# Patient Record
Sex: FEMALE | Race: BLACK OR AFRICAN AMERICAN | NOT HISPANIC OR LATINO
[De-identification: names, ages, dates, MRNs, and addresses within clinical notes are randomized per-mention and may not be internally consistent; named-entity substitution may affect disease eponyms.]

---

## 2017-01-09 PROBLEM — Z00.00 ENCOUNTER FOR PREVENTIVE HEALTH EXAMINATION: Status: ACTIVE | Noted: 2017-01-09

## 2017-01-18 ENCOUNTER — APPOINTMENT (OUTPATIENT)
Dept: GASTROENTEROLOGY | Facility: CLINIC | Age: 37
End: 2017-01-18

## 2017-01-18 VITALS
DIASTOLIC BLOOD PRESSURE: 90 MMHG | TEMPERATURE: 98.2 F | SYSTOLIC BLOOD PRESSURE: 130 MMHG | OXYGEN SATURATION: 99 % | HEART RATE: 71 BPM | WEIGHT: 230 LBS | HEIGHT: 68 IN | BODY MASS INDEX: 34.86 KG/M2

## 2017-01-18 DIAGNOSIS — Z82.49 FAMILY HISTORY OF ISCHEMIC HEART DISEASE AND OTHER DISEASES OF THE CIRCULATORY SYSTEM: ICD-10-CM

## 2017-01-18 DIAGNOSIS — K59.00 CONSTIPATION, UNSPECIFIED: ICD-10-CM

## 2017-01-18 DIAGNOSIS — Z78.9 OTHER SPECIFIED HEALTH STATUS: ICD-10-CM

## 2017-01-18 DIAGNOSIS — F15.90 OTHER STIMULANT USE, UNSPECIFIED, UNCOMPLICATED: ICD-10-CM

## 2017-01-18 DIAGNOSIS — K92.1 MELENA: ICD-10-CM

## 2017-01-18 DIAGNOSIS — K46.9 UNSPECIFIED ABDOMINAL HERNIA W/OUT OBSTRUCTION OR GANGRENE: ICD-10-CM

## 2017-01-18 DIAGNOSIS — I10 ESSENTIAL (PRIMARY) HYPERTENSION: ICD-10-CM

## 2017-01-18 RX ORDER — DOCUSATE SODIUM 100 MG/1
100 CAPSULE ORAL
Qty: 30 | Refills: 0 | Status: ACTIVE | OUTPATIENT
Start: 2017-01-18

## 2017-01-18 RX ORDER — LUBIPROSTONE 24 UG/1
24 CAPSULE, GELATIN COATED ORAL TWICE DAILY
Qty: 60 | Refills: 3 | Status: ACTIVE | OUTPATIENT
Start: 2017-01-18

## 2017-01-18 RX ORDER — RANITIDINE 150 MG/1
150 TABLET ORAL
Refills: 0 | Status: ACTIVE | COMMUNITY

## 2018-12-11 ENCOUNTER — EMERGENCY (EMERGENCY)
Facility: HOSPITAL | Age: 38
LOS: 1 days | Discharge: HOME | End: 2018-12-11
Attending: EMERGENCY MEDICINE | Admitting: EMERGENCY MEDICINE
Payer: MEDICAID

## 2018-12-11 VITALS
RESPIRATION RATE: 16 BRPM | OXYGEN SATURATION: 100 % | SYSTOLIC BLOOD PRESSURE: 142 MMHG | DIASTOLIC BLOOD PRESSURE: 84 MMHG | HEART RATE: 75 BPM | TEMPERATURE: 98 F

## 2018-12-11 DIAGNOSIS — Z98.891 HISTORY OF UTERINE SCAR FROM PREVIOUS SURGERY: Chronic | ICD-10-CM

## 2018-12-11 LAB
ALBUMIN SERPL ELPH-MCNC: 3.4 G/DL — SIGNIFICANT CHANGE UP (ref 3.3–5)
ALP SERPL-CCNC: 67 U/L — SIGNIFICANT CHANGE UP (ref 40–120)
ALT FLD-CCNC: 4 U/L — SIGNIFICANT CHANGE UP (ref 4–33)
ANISOCYTOSIS BLD QL: SIGNIFICANT CHANGE UP
APPEARANCE UR: CLEAR — SIGNIFICANT CHANGE UP
AST SERPL-CCNC: 10 U/L — SIGNIFICANT CHANGE UP (ref 4–32)
BACTERIA # UR AUTO: NEGATIVE — SIGNIFICANT CHANGE UP
BASOPHILS # BLD AUTO: 0.05 K/UL — SIGNIFICANT CHANGE UP (ref 0–0.2)
BASOPHILS NFR BLD AUTO: 0.9 % — SIGNIFICANT CHANGE UP (ref 0–2)
BASOPHILS NFR SPEC: 0.9 % — SIGNIFICANT CHANGE UP (ref 0–2)
BILIRUB SERPL-MCNC: < 0.2 MG/DL — LOW (ref 0.2–1.2)
BILIRUB UR-MCNC: NEGATIVE — SIGNIFICANT CHANGE UP
BLASTS # FLD: 0 % — SIGNIFICANT CHANGE UP (ref 0–0)
BLOOD UR QL VISUAL: NEGATIVE — SIGNIFICANT CHANGE UP
BUN SERPL-MCNC: 4 MG/DL — LOW (ref 7–23)
CALCIUM SERPL-MCNC: 8.7 MG/DL — SIGNIFICANT CHANGE UP (ref 8.4–10.5)
CHLORIDE SERPL-SCNC: 103 MMOL/L — SIGNIFICANT CHANGE UP (ref 98–107)
CO2 SERPL-SCNC: 27 MMOL/L — SIGNIFICANT CHANGE UP (ref 22–31)
COLOR SPEC: YELLOW — SIGNIFICANT CHANGE UP
CREAT SERPL-MCNC: 0.68 MG/DL — SIGNIFICANT CHANGE UP (ref 0.5–1.3)
DACRYOCYTES BLD QL SMEAR: SLIGHT — SIGNIFICANT CHANGE UP
EOSINOPHIL # BLD AUTO: 0.23 K/UL — SIGNIFICANT CHANGE UP (ref 0–0.5)
EOSINOPHIL NFR BLD AUTO: 4 % — SIGNIFICANT CHANGE UP (ref 0–6)
EOSINOPHIL NFR FLD: 7 % — HIGH (ref 0–6)
GIANT PLATELETS BLD QL SMEAR: PRESENT — SIGNIFICANT CHANGE UP
GLUCOSE SERPL-MCNC: 87 MG/DL — SIGNIFICANT CHANGE UP (ref 70–99)
GLUCOSE UR-MCNC: NEGATIVE — SIGNIFICANT CHANGE UP
HCG SERPL-ACNC: < 5 MIU/ML — SIGNIFICANT CHANGE UP
HCT VFR BLD CALC: 23.9 % — LOW (ref 34.5–45)
HGB BLD-MCNC: 6.7 G/DL — CRITICAL LOW (ref 11.5–15.5)
HIV COMBO RESULT: SIGNIFICANT CHANGE UP
HIV1+2 AB SPEC QL: SIGNIFICANT CHANGE UP
HYALINE CASTS # UR AUTO: HIGH
HYPOCHROMIA BLD QL: SIGNIFICANT CHANGE UP
IMM GRANULOCYTES # BLD AUTO: 0.01 # — SIGNIFICANT CHANGE UP
IMM GRANULOCYTES NFR BLD AUTO: 0.2 % — SIGNIFICANT CHANGE UP (ref 0–1.5)
KETONES UR-MCNC: NEGATIVE — SIGNIFICANT CHANGE UP
LEUKOCYTE ESTERASE UR-ACNC: NEGATIVE — SIGNIFICANT CHANGE UP
LIDOCAIN IGE QN: 13.7 U/L — SIGNIFICANT CHANGE UP (ref 7–60)
LYMPHOCYTES # BLD AUTO: 1.84 K/UL — SIGNIFICANT CHANGE UP (ref 1–3.3)
LYMPHOCYTES # BLD AUTO: 32.2 % — SIGNIFICANT CHANGE UP (ref 13–44)
LYMPHOCYTES NFR SPEC AUTO: 23.5 % — SIGNIFICANT CHANGE UP (ref 13–44)
MCHC RBC-ENTMCNC: 16 PG — LOW (ref 27–34)
MCHC RBC-ENTMCNC: 28 % — LOW (ref 32–36)
MCV RBC AUTO: 57 FL — LOW (ref 80–100)
METAMYELOCYTES # FLD: 0 % — SIGNIFICANT CHANGE UP (ref 0–1)
MICROCYTES BLD QL: SIGNIFICANT CHANGE UP
MONOCYTES # BLD AUTO: 0.75 K/UL — SIGNIFICANT CHANGE UP (ref 0–0.9)
MONOCYTES NFR BLD AUTO: 13.1 % — SIGNIFICANT CHANGE UP (ref 2–14)
MONOCYTES NFR BLD: 5.2 % — SIGNIFICANT CHANGE UP (ref 2–9)
MYELOCYTES NFR BLD: 0 % — SIGNIFICANT CHANGE UP (ref 0–0)
NEUTROPHIL AB SER-ACNC: 61.7 % — SIGNIFICANT CHANGE UP (ref 43–77)
NEUTROPHILS # BLD AUTO: 2.84 K/UL — SIGNIFICANT CHANGE UP (ref 1.8–7.4)
NEUTROPHILS NFR BLD AUTO: 49.6 % — SIGNIFICANT CHANGE UP (ref 43–77)
NEUTS BAND # BLD: 0 % — SIGNIFICANT CHANGE UP (ref 0–6)
NITRITE UR-MCNC: NEGATIVE — SIGNIFICANT CHANGE UP
NRBC # FLD: 0.02 — SIGNIFICANT CHANGE UP
OB PNL STL: NEGATIVE — SIGNIFICANT CHANGE UP
OTHER - HEMATOLOGY %: 0 — SIGNIFICANT CHANGE UP
PH UR: 7 — SIGNIFICANT CHANGE UP (ref 5–8)
PLATELET # BLD AUTO: 455 K/UL — HIGH (ref 150–400)
PLATELET COUNT - ESTIMATE: NORMAL — SIGNIFICANT CHANGE UP
PMV BLD: SIGNIFICANT CHANGE UP FL (ref 7–13)
POIKILOCYTOSIS BLD QL AUTO: SIGNIFICANT CHANGE UP
POLYCHROMASIA BLD QL SMEAR: SLIGHT — SIGNIFICANT CHANGE UP
POTASSIUM SERPL-MCNC: 3.3 MMOL/L — LOW (ref 3.5–5.3)
POTASSIUM SERPL-SCNC: 3.3 MMOL/L — LOW (ref 3.5–5.3)
PROMYELOCYTES # FLD: 0 % — SIGNIFICANT CHANGE UP (ref 0–0)
PROT SERPL-MCNC: 6.6 G/DL — SIGNIFICANT CHANGE UP (ref 6–8.3)
PROT UR-MCNC: 30 — SIGNIFICANT CHANGE UP
RBC # BLD: 4.19 M/UL — SIGNIFICANT CHANGE UP (ref 3.8–5.2)
RBC # FLD: 21.5 % — HIGH (ref 10.3–14.5)
RBC CASTS # UR COMP ASSIST: SIGNIFICANT CHANGE UP (ref 0–?)
SCHISTOCYTES BLD QL AUTO: SLIGHT — SIGNIFICANT CHANGE UP
SODIUM SERPL-SCNC: 141 MMOL/L — SIGNIFICANT CHANGE UP (ref 135–145)
SP GR SPEC: 1.03 — SIGNIFICANT CHANGE UP (ref 1–1.04)
SQUAMOUS # UR AUTO: SIGNIFICANT CHANGE UP
TARGETS BLD QL SMEAR: SLIGHT — SIGNIFICANT CHANGE UP
UROBILINOGEN FLD QL: SIGNIFICANT CHANGE UP
VARIANT LYMPHS # BLD: 1.7 % — SIGNIFICANT CHANGE UP
WBC # BLD: 5.72 K/UL — SIGNIFICANT CHANGE UP (ref 3.8–10.5)
WBC # FLD AUTO: 5.72 K/UL — SIGNIFICANT CHANGE UP (ref 3.8–10.5)
WBC UR QL: SIGNIFICANT CHANGE UP (ref 0–?)

## 2018-12-11 PROCEDURE — 76830 TRANSVAGINAL US NON-OB: CPT | Mod: 26

## 2018-12-11 PROCEDURE — 99218: CPT | Mod: 25

## 2018-12-11 RX ORDER — ACETAMINOPHEN 500 MG
650 TABLET ORAL ONCE
Qty: 0 | Refills: 0 | Status: COMPLETED | OUTPATIENT
Start: 2018-12-11 | End: 2018-12-11

## 2018-12-11 RX ADMIN — Medication 650 MILLIGRAM(S): at 23:37

## 2018-12-11 RX ADMIN — Medication 650 MILLIGRAM(S): at 22:08

## 2018-12-11 NOTE — ED PROVIDER NOTE - OBJECTIVE STATEMENT
37 yo F poor historian, unknown PMHx, chronic abdominal pain x 1 year, worked up by PCP and had endoscopy and colonoscopy with no findings presents to the ED c/o vaginal discomfort, watery vaginal discharge clear to white and malodor x 3 days. Pt report happened gradually, not associated with sexual intercourse. Denies h/o of STDs, or concern for STDs pt is sexual active with one partner, monogamous relationship, but would like to be tested today for all STDs. On ROS pt reports intermittent abdominal pain and constipation. subjective fevers and chills, intermittent diarrhea x 1 year, as noted above pt with colonoscopy and endoscopy and outpatient workup negative. LMP one week ago, menses usually one week, 3-5 pads a day. No melena no BRBPR.  Denies sob, cp, nausea, vomiting. 37 yo F poor historian, unknown PMHx, chronic abdominal pain x 1 year, worked up by PCP and had endoscopy and colonoscopy with no findings presents to the ED c/o vaginal discomfort, watery vaginal discharge clear to white and malodor x 3 days. Pt report happened gradually, not associated with sexual intercourse. Denies h/o of STDs, or concern for STDs pt is sexual active with one partner, monogamous relationship, but would like to be tested today for all STDs. On ROS pt reports dizziness, weakness, intermittent abdominal pain and constipation. subjective fevers and chills, intermittent diarrhea x 1 year, as noted above pt with colonoscopy and endoscopy and outpatient workup negative. LMP one week ago, menses usually one week, 3-5 pads a day. No melena no BRBPR.  Denies sob, cp, nausea, vomiting.

## 2018-12-11 NOTE — ED CDU PROVIDER INITIAL DAY NOTE - ATTENDING CONTRIBUTION TO CARE
DR. CATES, ATTENDING MD-  I performed a face to face bedside interview with patient regarding history of present illness, review of symptoms and past medical history. I completed an independent physical exam.  I have discussed patient's plan of care with PA.   Documentation as above in the note.    Bharathi: HD stable.  Being admitted to obs unit for transfusion, observation, repeat CBC post transfusion

## 2018-12-11 NOTE — ED CDU PROVIDER INITIAL DAY NOTE - NONTENDER LOCATION
periumbilical/umbilical/suprapubic/left costovertebral angle/left lower quadrant/right costovertebral angle/left upper quadrant/right upper quadrant/right lower quadrant

## 2018-12-11 NOTE — ED ADULT NURSE NOTE - ED STAT RN HANDOFF DETAILS
No acute distress at present. Respirations are even and unlabored on room air. Awaiting type and screen results, receiving RN made aware. Pt. is admitted to Observation Unit. Report given to CDU SALEEM Honeycutt. Pt. will be transported to CDU bed 3.

## 2018-12-11 NOTE — ED CDU PROVIDER INITIAL DAY NOTE - OBJECTIVE STATEMENT
39 yo F, with PMH of anemia w/ blood transfusion (17 years ago), menorrhagia, chronic abdominal pain (1 year) with neg work up of endoscopy and colonoscopy, presents to ER c/o vaginal discomfort w/ discharge x3 days. Pt reports generalized weakness. Admits pain with urination x1 week, no hematuria. Admits abdominal pain and constipation, last bowel movement yesterday, nonbloody. Denies any headache, visual disturbances, n/v, or any other complaints. Pt has neg TVUS in ER, found to have Hg of 6.7, sent to CDU for 1 units of pRBC.

## 2018-12-11 NOTE — ED ADULT NURSE NOTE - NSIMPLEMENTINTERV_GEN_ALL_ED
Implemented All Fall Risk Interventions:  [place] to call system. Call bell, personal items and telephone within reach. Instruct patient to call for assistance. Room bathroom lighting operational. Non-slip footwear when patient is off stretcher. Physically safe environment: no spills, clutter or unnecessary equipment. Stretcher in lowest position, wheels locked, appropriate side rails in place. Provide visual cue, wrist band, yellow gown, etc. Monitor gait and stability. Monitor for mental status changes and reorient to person, place, and time. Review medications for side effects contributing to fall risk. Reinforce activity limits and safety measures with patient and family.

## 2018-12-11 NOTE — ED PROVIDER NOTE - MEDICAL DECISION MAKING DETAILS
39 yo F with vaginal complaints.  And with chronic abdominal complaints  Plan cbc, cmp, lipase, ua, STD studies.

## 2018-12-11 NOTE — ED CDU PROVIDER INITIAL DAY NOTE - MEDICAL DECISION MAKING DETAILS
39 yo F, with PMH of anemia w/ blood transfusion (17 years ago), menorrhagia, chronic abdominal pain (1 year) with neg work up of endoscopy and colonoscopy, presents to ER c/o vaginal discomfort w/ discharge x3 days. Pt has neg TVUS in ER, found to have Hg of 6.7, sent to CDU for 1 units of pRBC.

## 2018-12-11 NOTE — ED ADULT TRIAGE NOTE - CHIEF COMPLAINT QUOTE
Pt. c/o vaginal pain, itching and white colored discharge with odor x few days. States she's been also having generalized abdominal pain with n/v/d and subjective fevers x 1 years. All tests normal done by PCP.

## 2018-12-11 NOTE — ED PROVIDER NOTE - PROGRESS NOTE DETAILS
when cbc did result, h/h 6.7/ 23. pt does report history of anemia. Pt consented. 1 unit ordered to transfuse. No concern for vaginal complaints as no discharge on exam and no CMT, no h/o STD. Pt will need PMD follow, hematology, obgyn and GI for chronic symptoms upon dc .

## 2018-12-11 NOTE — ED ADULT NURSE REASSESSMENT NOTE - NS ED NURSE REASSESS COMMENT FT1
Report received from SALEEM New. Pt. received in intake room 7, with 20 gauge IV in left ac, A&Ox4, ambulatory. As per SALEEM Noyola, first type and screen sent. Author of this note sent confirmatory type and screen. Awaiting results. VS as noted. Respirations are even and unlabored on room air. Will continue to monitor.

## 2018-12-11 NOTE — ED ADULT NURSE NOTE - OBJECTIVE STATEMENT
pt coming with some vag. discharge denies dysuria/hematuria, pt stated has Hx. Anemia taking Iron pill for long menses approx 10 days.   GYN done and US done repeated cbc for low hgb.  IV to left AC 20g in place.   pending dispo.    Jazmín New RN

## 2018-12-11 NOTE — ED PROVIDER NOTE - ATTENDING CONTRIBUTION TO CARE
DR. CATES, ATTENDING MD-  I performed a face to face bedside interview with patient regarding history of present illness, review of symptoms and past medical history. I completed an independent physical exam.  I have discussed patient's plan of care with PA.   Documentation as above in the note.    Bharathi: presents with multiple chronic complaints, but primary reason for ER visit was for vaginal itching.  Non-pregnant, and benign exam per my PA.  Of more concern, Hb 6.7.  Guiaic neg.  abd soft ntnd.  Patient does report having recent heavy menstruation and is on iron pills, although non-compliant.  HD stable.  I suspect this is more chronic, and has no signs on exam of active bleeding.  but given her complaints of weakness/dizziness will transfuse, and put in CDU for observation.

## 2018-12-12 VITALS
SYSTOLIC BLOOD PRESSURE: 138 MMHG | HEART RATE: 70 BPM | OXYGEN SATURATION: 100 % | DIASTOLIC BLOOD PRESSURE: 88 MMHG | TEMPERATURE: 99 F | RESPIRATION RATE: 18 BRPM

## 2018-12-12 DIAGNOSIS — R07.89 OTHER CHEST PAIN: ICD-10-CM

## 2018-12-12 LAB
C TRACH RRNA SPEC QL NAA+PROBE: SIGNIFICANT CHANGE UP
FERRITIN SERPL-MCNC: 15.87 NG/ML — SIGNIFICANT CHANGE UP (ref 15–150)
FERRITIN SERPL-MCNC: 26.68 NG/ML — SIGNIFICANT CHANGE UP (ref 15–150)
HBA1C BLD-MCNC: 5.4 % — SIGNIFICANT CHANGE UP (ref 4–5.6)
HCT VFR BLD CALC: 24.9 % — LOW (ref 34.5–45)
HCT VFR BLD CALC: 27.3 % — LOW (ref 34.5–45)
HGB BLD-MCNC: 7.2 G/DL — LOW (ref 11.5–15.5)
HGB BLD-MCNC: 8.3 G/DL — LOW (ref 11.5–15.5)
IRON SATN MFR SERPL: 12 UG/DL — LOW (ref 30–160)
IRON SATN MFR SERPL: 271 UG/DL — HIGH (ref 30–160)
IRON SATN MFR SERPL: 315 UG/DL — SIGNIFICANT CHANGE UP (ref 140–530)
IRON SATN MFR SERPL: 340 UG/DL — SIGNIFICANT CHANGE UP (ref 140–530)
MCHC RBC-ENTMCNC: 16.8 PG — LOW (ref 27–34)
MCHC RBC-ENTMCNC: 17.5 PG — LOW (ref 27–34)
MCHC RBC-ENTMCNC: 28.9 % — LOW (ref 32–36)
MCHC RBC-ENTMCNC: 30.4 % — LOW (ref 32–36)
MCV RBC AUTO: 57.7 FL — LOW (ref 80–100)
MCV RBC AUTO: 58 FL — LOW (ref 80–100)
N GONORRHOEA RRNA SPEC QL NAA+PROBE: SIGNIFICANT CHANGE UP
NRBC # FLD: 0 — SIGNIFICANT CHANGE UP
NRBC # FLD: 0.03 — SIGNIFICANT CHANGE UP
PLATELET # BLD AUTO: 375 K/UL — SIGNIFICANT CHANGE UP (ref 150–400)
PLATELET # BLD AUTO: 394 K/UL — SIGNIFICANT CHANGE UP (ref 150–400)
PMV BLD: SIGNIFICANT CHANGE UP FL (ref 7–13)
PMV BLD: SIGNIFICANT CHANGE UP FL (ref 7–13)
RBC # BLD: 4.29 M/UL — SIGNIFICANT CHANGE UP (ref 3.8–5.2)
RBC # BLD: 4.73 M/UL — SIGNIFICANT CHANGE UP (ref 3.8–5.2)
RBC # FLD: 22.9 % — HIGH (ref 10.3–14.5)
RBC # FLD: 25.1 % — HIGH (ref 10.3–14.5)
SPECIMEN SOURCE: SIGNIFICANT CHANGE UP
T PALLIDUM AB TITR SER: NEGATIVE — SIGNIFICANT CHANGE UP
TROPONIN T, HIGH SENSITIVITY: < 6 NG/L — SIGNIFICANT CHANGE UP (ref ?–14)
TROPONIN T, HIGH SENSITIVITY: < 6 NG/L — SIGNIFICANT CHANGE UP (ref ?–14)
UIBC SERPL-MCNC: 327.7 UG/DL — SIGNIFICANT CHANGE UP (ref 110–370)
UIBC SERPL-MCNC: 43.6 UG/DL — LOW (ref 110–370)
WBC # BLD: 8.6 K/UL — SIGNIFICANT CHANGE UP (ref 3.8–10.5)
WBC # BLD: 9.82 K/UL — SIGNIFICANT CHANGE UP (ref 3.8–10.5)
WBC # FLD AUTO: 8.6 K/UL — SIGNIFICANT CHANGE UP (ref 3.8–10.5)
WBC # FLD AUTO: 9.82 K/UL — SIGNIFICANT CHANGE UP (ref 3.8–10.5)

## 2018-12-12 PROCEDURE — 93306 TTE W/DOPPLER COMPLETE: CPT | Mod: 26

## 2018-12-12 PROCEDURE — 99217: CPT

## 2018-12-12 PROCEDURE — 93010 ELECTROCARDIOGRAM REPORT: CPT | Mod: 59

## 2018-12-12 NOTE — ED CDU PROVIDER SUBSEQUENT DAY NOTE - ATTENDING CONTRIBUTION TO CARE
38 year old female with fe def anemia presents to cdu with anemia, no active bleeding. transfused 1 unit but did not respond appropriately.  will tx 2nd unit prbc. patient non compliant with iron.  recheck cbc following blood

## 2018-12-12 NOTE — ED CDU PROVIDER DISPOSITION NOTE - NSFOLLOWUPINSTRUCTIONS_ED_ALL_ED_FT
Follow up with your primary physician as well as your gynecologist within 48 hours, taking all results from the ER to be reviewed.  Continue all medications you were previously on prior to this ER visit.  Compliance reinforced with meds. Make sure your taking your iron  every day as directed. If any concerning or new signs or symptoms return to the ER .

## 2018-12-12 NOTE — CONSULT NOTE ADULT - PROBLEM SELECTOR RECOMMENDATION 9
-chest pain less like cardiac etiology, given chronic in nature and trop <6  -obtain echo to r/o structural abnormalities given

## 2018-12-12 NOTE — ED CDU PROVIDER SUBSEQUENT DAY NOTE - PROGRESS NOTE DETAILS
Pt states she experiences some midsternal chest pressure while just started getting blood transfusion, no other associate symptoms. Ekg ordered, showing inverted T in Lead III, V1, V3-V6, ST flattening in Lead II, V2. No prior EKG to compare. EKG reviewed by Dr. Rod, recommends trop and cardiology consult. Unable to draw blood from IV line.  Trop finally obtained and sent by RN. Trop <6. Patient sleeping comfortably in bed, NAD, No complaints. As per RN, pt's chest pressure resolved. Cardiology consulted to evaluate patient in CDU. Pt received one unit of pRBC, will repeat CBC. Will continue to monitor and reassess. Pt feeling well, no complaints. Seen and cleared by cardi earlier in day, echo wnl.    Repeat hgb up to 8.3, pt without complaints . As dw attending scofi will dc home with pcp , gyn fu and compliance reinforced with con't iron

## 2018-12-12 NOTE — ED CDU PROVIDER DISPOSITION NOTE - ATTENDING CONTRIBUTION TO CARE
38 year old with iron def anemia presents with weakness and anemia. tx'd 2 units. ok to dc if hg above 8

## 2018-12-12 NOTE — CONSULT NOTE ADULT - SUBJECTIVE AND OBJECTIVE BOX
HISTORY OF PRESENT ILLNESS:37 yo F, with PMH of anemia w/ blood transfusion (17 years ago), menorrhagia, chronic abdominal pain (1 year) with neg work up of endoscopy and colonoscopy, presents to ER c/o vaginal discomfort w/ discharge x3 days. Pt reports generalized weakness. Admits pain with urination x1 week, no hematuria. Admits abdominal pain and constipation, last bowel movement yesterday, nonbloody. Denies any headache, visual disturbances, n/v, or any other complaints. Pt has neg TVUS in ER, found to have Hg of 6.7, sent to CDU for 1 units of pRBC. Pt c/o some midsternal chest pressure while just started getting blood transfusion, with no other associate symptoms. Cardiology consulted for Ekg showed inverted T in Lead III,aVF V1, V3-V6, ST flattening in Lead V2. No prior EKG to compare. High sensitive trop <6.  Vital signs :127/60.HR 76.RR16,sat 100%.temp 98,4F  0357: Patient currently sleeping comfortably without any complains and request me to come back later to examine.    Allergies: No Known Allergies        	    MEDICATIONS:  iron pillsPAST MEDICAL & SURGICAL HISTORY:  Anemia  H/O:       FAMILY HISTORY:  No pertinent family history in first degree relatives        REVIEW OF SYSTEMS:  General: + fatigue/malaise,   Skin: no rashes.  Ophthalmologic: no blurred vision, no loss of vision. 	  ENT: no sore throat, rhinorrhea, sinus congestion.  Cardiovascular: + chest pain ,no palpitation, no dizziness, no diaphoresis, no edema  Respiratory: no SOB, cough or wheeze.  Gastrointestinal: + abdomen pain, no N/V/D, no melena/hematemesis/hematochezia.  Genitourinary: + burning on urination,+ vaginal discharge and discharge  ,no hematuria.  Musculoskeletal: no myalgias or arthralgias.  Neurological: no changes in vision or hearing, no lightheadedness/dizziness, no syncope/near syncope	  Psychiatric: no unusual stress/anxiety.       PHYSICAL EXAM:  T(C): 36.9 (18 @ 02:19), Max: 37.1 (18 @ 20:43)  HR: 76 (18 @ 02:19) (73 - 79)  BP: 127/66 (12-12-18 @ 02:19) (103/60 - 142/84)  RR: 16 (18 @ 02:19) (16 - 18)        Appearance: Normal	  HEENT:   Normal oral mucosa, PERRL, EOMI	  Lymphatic: No lymphadenopathy  Cardiovascular: Normal S1 S2, No JVD, No murmurs, No edema  Respiratory: Lungs clear to auscultation	  Psychiatry: A & O x 3, Mood & affect appropriate  Gastrointestinal: obese, Soft, + BS	  Skin: No rashes, No ecchymoses, No cyanosis	  Neurologic: Non-focal  Extremities: Normal range of motion, No clubbing, cyanosis or edema  Vascular: Peripheral pulses palpable 2+ bilaterally        LABS:	 	    CBC Full  -  ( 11 Dec 2018 15:35 )  WBC Count : 5.72 K/uL  Hemoglobin : 6.7 g/dL  Hematocrit : 23.9 %  Platelet Count - Automated : 455 K/uL  Mean Cell Volume : 57.0 fL  Mean Cell Hemoglobin : 16.0 pg  Mean Cell Hemoglobin Concentration : 28.0 %  Auto Neutrophil # : 2.84 K/uL  Auto Lymphocyte # : 1.84 K/uL  Auto Monocyte # : 0.75 K/uL  Auto Eosinophil # : 0.23 K/uL  Auto Basophil # : 0.05 K/uL  Auto Neutrophil % : 49.6 %  Auto Lymphocyte % : 32.2 %  Auto Monocyte % : 13.1 %  Auto Eosinophil % : 4.0 %  Auto Basophil % : 0.9 %        141  |  103  |  4<L>  ----------------------------<  87  3.3<L>   |  27  |  0.68    Ca    8.7      11 Dec 2018 15:35    TPro  6.6  /  Alb  3.4  /  TBili  < 0.2<L>  /  DBili  x   /  AST  10  /  ALT  4   /  AlkPhos  67        HgA1c: Hemoglobin A1C, Whole Blood: 5.4 % ( @ 15:15)    high sensitive trop <6 HISTORY OF PRESENT ILLNESS:37 yo F, with PMH of anemia w/ blood transfusion (17 years ago), menorrhagia, chronic abdominal pain (1 year) with neg work up of endoscopy and colonoscopy, presents to ER c/o vaginal discomfort w/ discharge x3 days. Pt reports generalized weakness. Admits pain with urination x1 week, no hematuria. Admits abdominal pain and constipation, last bowel movement yesterday, nonbloody. Denies any headache, visual disturbances, n/v, or any other complaints. Pt has neg TVUS in ER, found to have Hg of 6.7, sent to CDU for 1 units of pRBC. Pt c/o some midsternal chest pressure while just started getting blood transfusion, with no other associate symptoms. Cardiology consulted for Ekg showed  1st degree heart block , inverted T in Lead III,aVF V1, V3-V6, ST flattening in Lead V2. No prior EKG to compare. High sensitive trop <6.  Vital signs :127/60.HR 76.RR16,sat 100%.temp 98,4F  0357: Patient currently sleeping comfortably without any complains and request me to come back later to examine.    Allergies: No Known Allergies        	    MEDICATIONS:  iron pillsPAST MEDICAL & SURGICAL HISTORY:  Anemia  H/O:       FAMILY HISTORY:  No pertinent family history in first degree relatives        REVIEW OF SYSTEMS:  General: + fatigue/malaise,   Skin: no rashes.  Ophthalmologic: no blurred vision, no loss of vision. 	  ENT: no sore throat, rhinorrhea, sinus congestion.  Cardiovascular: + chest pain ,no palpitation, no dizziness, no diaphoresis, no edema  Respiratory: no SOB, cough or wheeze.  Gastrointestinal: + abdomen pain, no N/V/D, no melena/hematemesis/hematochezia.  Genitourinary: + burning on urination,+ vaginal discharge and discharge  ,no hematuria.  Musculoskeletal: no myalgias or arthralgias.  Neurological: no changes in vision or hearing, no lightheadedness/dizziness, no syncope/near syncope	  Psychiatric: no unusual stress/anxiety.       PHYSICAL EXAM:  T(C): 36.9 (18 @ 02:19), Max: 37.1 (18 @ 20:43)  HR: 76 (18 @ 02:19) (73 - 79)  BP: 127/66 (18 @ 02:19) (103/60 - 142/84)  RR: 16 (18 @ 02:19) (16 - 18)        Appearance: Normal	  HEENT:   Normal oral mucosa, PERRL, EOMI	  Lymphatic: No lymphadenopathy  Cardiovascular: Normal S1 S2, No JVD, No murmurs, No edema  Respiratory: Lungs clear to auscultation	  Psychiatry: A & O x 3, Mood & affect appropriate  Gastrointestinal: obese, Soft, + BS	  Skin: No rashes, No ecchymoses, No cyanosis	  Neurologic: Non-focal  Extremities: Normal range of motion, No clubbing, cyanosis or edema  Vascular: Peripheral pulses palpable 2+ bilaterally        LABS:	 	    CBC Full  -  ( 11 Dec 2018 15:35 )  WBC Count : 5.72 K/uL  Hemoglobin : 6.7 g/dL  Hematocrit : 23.9 %  Platelet Count - Automated : 455 K/uL  Mean Cell Volume : 57.0 fL  Mean Cell Hemoglobin : 16.0 pg  Mean Cell Hemoglobin Concentration : 28.0 %  Auto Neutrophil # : 2.84 K/uL  Auto Lymphocyte # : 1.84 K/uL  Auto Monocyte # : 0.75 K/uL  Auto Eosinophil # : 0.23 K/uL  Auto Basophil # : 0.05 K/uL  Auto Neutrophil % : 49.6 %  Auto Lymphocyte % : 32.2 %  Auto Monocyte % : 13.1 %  Auto Eosinophil % : 4.0 %  Auto Basophil % : 0.9 %        141  |  103  |  4<L>  ----------------------------<  87  3.3<L>   |  27  |  0.68    Ca    8.7      11 Dec 2018 15:35    TPro  6.6  /  Alb  3.4  /  TBili  < 0.2<L>  /  DBili  x   /  AST  10  /  ALT  4   /  AlkPhos  67        HgA1c: Hemoglobin A1C, Whole Blood: 5.4 % ( @ 15:15)    high sensitive trop <6 HISTORY OF PRESENT ILLNESS:37 yo F, with PMH of anemia w/ blood transfusion (17 years ago), menorrhagia, chronic abdominal pain (1 year) with neg work up of endoscopy and colonoscopy, presents to ER c/o vaginal discomfort w/ discharge x3 days. Pt reports generalized weakness. Admits pain with urination x1 week, no hematuria. Admits abdominal pain and constipation, last bowel movement yesterday, nonbloody. Denies any headache, visual disturbances, n/v, or any other complaints. Pt has neg TVUS in ER, found to have Hg of 6.7, sent to CDU for 1 units of pRBC. Pt c/o some midsternal chest pressure while just started getting blood transfusion, with no other associate symptoms. Cardiology consulted for Ekg showed  1st degree heart block , inverted T in Lead III,aVF V1, V3-V6, ST flattening in Lead V2. No prior EKG to compare. High sensitive trop <6.  Vital signs :127/60.HR 76.RR16,sat 100%.temp 98,4F  0357: Patient currently sleeping comfortably without any complains and request me to come back later to examine.  0545: patient was seen again. Currently  denies chest pain or sob. She reports  chronic frequent epigastric pain with  transient chest pressures which resolve itself .She had similar 8/10 chest pressure raditating  from epigastric area  for few minutes and resolved itself last night.    Allergies: No Known Allergies        	    MEDICATIONS:  iron pillsPAST MEDICAL & SURGICAL HISTORY:  Anemia  H/O:       FAMILY HISTORY:  No pertinent family history in first degree relatives        REVIEW OF SYSTEMS:  General: + fatigue/malaise,   Skin: no rashes.  Ophthalmologic: no blurred vision, no loss of vision. 	  ENT: no sore throat, rhinorrhea, sinus congestion.  Cardiovascular: + chest pain ,no palpitation, no dizziness, no diaphoresis, no edema  Respiratory: no SOB, cough or wheeze.  Gastrointestinal: + abdomen pain and epigastric pain , no N/V/D, no melena/hematemesis/hematochezia.  Genitourinary: + burning on urination,+ vaginal  pain and discharge   ,no hematuria.  Musculoskeletal: no myalgias or arthralgias.  Neurological: no changes in vision or hearing, no lightheadedness/dizziness, no syncope/near syncope	  Psychiatric: no unusual stress/anxiety.       PHYSICAL EXAM:  T(C): 36.9 (18 @ 02:19), Max: 37.1 (18 @ 20:43)  HR: 76 (18 @ 02:19) (73 - 79)  BP: 127/66 (18 @ 02:19) (103/60 - 142/84)  RR: 16 (18 @ 02:19) (16 - 18)        Appearance: Normal	  HEENT:   Normal oral mucosa, PERRL, EOMI	  Lymphatic: No lymphadenopathy  Cardiovascular: Normal S1 S2, No JVD, No murmurs, No edema  Respiratory: Lungs clear to auscultation	  Psychiatry: A & O x 3, Mood & affect appropriate  Gastrointestinal: obese, Soft, + BS	  Skin: No rashes, No ecchymoses, No cyanosis	  Neurologic: Non-focal  Extremities: Normal range of motion, No clubbing, cyanosis or edema  Vascular: Peripheral pulses palpable 2+ bilaterally        LABS:	 	    CBC Full  -  ( 11 Dec 2018 15:35 )  WBC Count : 5.72 K/uL  Hemoglobin : 6.7 g/dL  Hematocrit : 23.9 %  Platelet Count - Automated : 455 K/uL  Mean Cell Volume : 57.0 fL  Mean Cell Hemoglobin : 16.0 pg  Mean Cell Hemoglobin Concentration : 28.0 %  Auto Neutrophil # : 2.84 K/uL  Auto Lymphocyte # : 1.84 K/uL  Auto Monocyte # : 0.75 K/uL  Auto Eosinophil # : 0.23 K/uL  Auto Basophil # : 0.05 K/uL  Auto Neutrophil % : 49.6 %  Auto Lymphocyte % : 32.2 %  Auto Monocyte % : 13.1 %  Auto Eosinophil % : 4.0 %  Auto Basophil % : 0.9 %        141  |  103  |  4<L>  ----------------------------<  87  3.3<L>   |  27  |  0.68    Ca    8.7      11 Dec 2018 15:35    TPro  6.6  /  Alb  3.4  /  TBili  < 0.2<L>  /  DBili  x   /  AST  10  /  ALT  4   /  AlkPhos  67        HgA1c: Hemoglobin A1C, Whole Blood: 5.4 % ( @ 15:15)    high sensitive trop <6 HISTORY OF PRESENT ILLNESS:37 yo F, with PMH of anemia w/ blood transfusion (17 years ago), menorrhagia, chronic abdominal pain (1 year) with neg work up of endoscopy and colonoscopy, presents to ER c/o vaginal discomfort w/ discharge x3 days. Pt reports generalized weakness. Admits pain with urination x1 week, no hematuria. Admits abdominal pain and constipation, last bowel movement yesterday, nonbloody. Denies any headache, visual disturbances, n/v, or any other complaints. Pt has neg TVUS in ER, found to have Hg of 6.7, sent to CDU for 1 units of pRBC. Pt c/o some midsternal chest pressure while just started getting blood transfusion, with no other associate symptoms. Cardiology consulted for Ekg showed  1st degree heart block , inverted T in Lead III,aVF V1, V3-V6, ST flattening in Lead V2. No prior EKG to compare. High sensitive trop <6.  Vital signs :127/60.HR 76.RR16,sat 100%.temp 98,4F  0357: Patient currently sleeping comfortably without any complains and request me to come back later to examine.  0545: patient was seen again. Currently  denies chest pain or sob. She reports  chronic frequent epigastric pain with  transient chest pressures which resolve itself .She had similar 8/10 chest pressure raditating  from epigastric area to chest   for few minutes and resolved itself last night.    Allergies: No Known Allergies        	    MEDICATIONS:  iron pillsPAST MEDICAL & SURGICAL HISTORY:  Anemia  H/O:       FAMILY HISTORY:  No pertinent family history in first degree relatives        REVIEW OF SYSTEMS:  General: + fatigue/malaise,   Skin: no rashes.  Ophthalmologic: no blurred vision, no loss of vision. 	  ENT: no sore throat, rhinorrhea, sinus congestion.  Cardiovascular: + chest pain ,no palpitation, no dizziness, no diaphoresis, no edema  Respiratory: no SOB, cough or wheeze.  Gastrointestinal: + abdomen pain and epigastric pain , no N/V/D, no melena/hematemesis/hematochezia.  Genitourinary: + burning on urination,+ vaginal  pain and discharge   ,no hematuria.  Musculoskeletal: no myalgias or arthralgias.  Neurological: no changes in vision or hearing, no lightheadedness/dizziness, no syncope/near syncope	  Psychiatric: no unusual stress/anxiety.       PHYSICAL EXAM:  T(C): 36.9 (18 @ 02:19), Max: 37.1 (18 @ 20:43)  HR: 76 (18 @ 02:19) (73 - 79)  BP: 127/66 (18 @ 02:19) (103/60 - 142/84)  RR: 16 (18 @ 02:19) (16 - 18)        Appearance: Normal	  HEENT:   Normal oral mucosa, PERRL, EOMI	  Lymphatic: No lymphadenopathy  Cardiovascular: Normal S1 S2, No JVD, No murmurs, No edema  Respiratory: Lungs clear to auscultation	  Psychiatry: A & O x 3, Mood & affect appropriate  Gastrointestinal: obese, Soft, + BS	  Skin: No rashes, No ecchymoses, No cyanosis	  Neurologic: Non-focal  Extremities: Normal range of motion, No clubbing, cyanosis or edema  Vascular: Peripheral pulses palpable 2+ bilaterally        LABS:	 	    CBC Full  -  ( 11 Dec 2018 15:35 )  WBC Count : 5.72 K/uL  Hemoglobin : 6.7 g/dL  Hematocrit : 23.9 %  Platelet Count - Automated : 455 K/uL  Mean Cell Volume : 57.0 fL  Mean Cell Hemoglobin : 16.0 pg  Mean Cell Hemoglobin Concentration : 28.0 %  Auto Neutrophil # : 2.84 K/uL  Auto Lymphocyte # : 1.84 K/uL  Auto Monocyte # : 0.75 K/uL  Auto Eosinophil # : 0.23 K/uL  Auto Basophil # : 0.05 K/uL  Auto Neutrophil % : 49.6 %  Auto Lymphocyte % : 32.2 %  Auto Monocyte % : 13.1 %  Auto Eosinophil % : 4.0 %  Auto Basophil % : 0.9 %        141  |  103  |  4<L>  ----------------------------<  87  3.3<L>   |  27  |  0.68    Ca    8.7      11 Dec 2018 15:35    TPro  6.6  /  Alb  3.4  /  TBili  < 0.2<L>  /  DBili  x   /  AST  10  /  ALT  4   /  AlkPhos  67        HgA1c: Hemoglobin A1C, Whole Blood: 5.4 % ( @ 15:15)    high sensitive trop <6

## 2018-12-12 NOTE — ED CDU PROVIDER SUBSEQUENT DAY NOTE - MEDICAL DECISION MAKING DETAILS
38 y.o female with pmhx of anemia , sent to CDU for transfusion. hemoglobin 6.7-> 7.2 after 1 unit. additional PRBC ordered. pending repeat CBC.

## 2018-12-12 NOTE — CONSULT NOTE ADULT - ASSESSMENT
37 yo F, with PMH of anemia w/ blood transfusion (17 years ago), menorrhagia, chronic abdominal pain (1 year) with neg work up of endoscopy and colonoscopy, presents to ER c/o vaginal discomfort w/ discharge x3 days, generalized weakness, pain with urination x1 week, abdominal pain and constipation found to be anemia ,who c/o  chest pain while blood transfusion .EKG showed  TWI in multiple leads with normal high sensitive trops 39 yo F, with PMH of anemia w/ blood transfusion (17 years ago), menorrhagia, chronic abdominal pain (1 year) with neg work up of endoscopy and colonoscopy, presents to ER c/o vaginal discomfort w/ discharge x3 days, generalized weakness, pain with urination x1 week, abdominal pain and constipation found to be anemia ,who c/o transient   chest pain while blood transfusion .EKG showed  TWI in multiple leads with normal high sensitive trops 37 yo F, with PMH of anemia w/ blood transfusion (17 years ago), menorrhagia, chronic abdominal pain (1 year) with neg work up of endoscopy and colonoscopy, presents to ER c/o vaginal discomfort w/ discharge x3 days, generalized weakness, pain with urination x1 week, abdominal pain and constipation found to be anemia ,who c/o transient   epigastric pain raditating to midsternum  while blood transfusion .EKG showed  TWI in multiple leads with normal high sensitive trops

## 2018-12-12 NOTE — ED CDU PROVIDER SUBSEQUENT DAY NOTE - HISTORY
38 y.o female with pmhx of anemia w/ blood transfusion (17 years ago), menorrhagia, chronic abdominal pain (1 year) with neg work up of endoscopy and colonoscopy sent to CDU for transfusion. Was also seen by cardiology who recommended echo, which was wnl. repeat cbc showed 7.2 and additional PRBC unit was ordered. Pt awaiting repeat cbc. Denies any current complaints. Resting comfortably.

## 2018-12-13 LAB
BACTERIA UR CULT: SIGNIFICANT CHANGE UP
SPECIMEN SOURCE: SIGNIFICANT CHANGE UP

## 2018-12-14 NOTE — ED POST DISCHARGE NOTE - RESULT SUMMARY
UCX: Culture grew 3 or more types of organisms which indicate collection contamination; consider recollection only if clinically indicated. No antibiotic noted.

## 2018-12-14 NOTE — ED POST DISCHARGE NOTE - DETAILS
Pt called and informed ucx results, Pt c/o slight dysuria> Pt instructed to follow up with dr and have repeat ucx. Pt called and informed ucx results, Pt c/o  vaginal discomfort and slight dysuria. Pt instructed to follow up with dr and have repeat ucx. Pt called and informed ucx results, Pt c/o  vaginal discomfort, watery discharge and slight dysuria. Pt instructed to follow up with  and have repeat ucx.

## 2018-12-31 ENCOUNTER — EMERGENCY (EMERGENCY)
Facility: HOSPITAL | Age: 38
LOS: 1 days | Discharge: HOME | End: 2018-12-31
Attending: EMERGENCY MEDICINE | Admitting: EMERGENCY MEDICINE
Payer: MEDICAID

## 2018-12-31 VITALS
HEART RATE: 77 BPM | OXYGEN SATURATION: 99 % | SYSTOLIC BLOOD PRESSURE: 120 MMHG | RESPIRATION RATE: 16 BRPM | TEMPERATURE: 98 F | DIASTOLIC BLOOD PRESSURE: 67 MMHG

## 2018-12-31 DIAGNOSIS — Z98.891 HISTORY OF UTERINE SCAR FROM PREVIOUS SURGERY: Chronic | ICD-10-CM

## 2018-12-31 PROBLEM — D64.9 ANEMIA, UNSPECIFIED: Chronic | Status: ACTIVE | Noted: 2018-12-11

## 2018-12-31 LAB
ALBUMIN SERPL ELPH-MCNC: 3.7 G/DL — SIGNIFICANT CHANGE UP (ref 3.3–5)
ALP SERPL-CCNC: 71 U/L — SIGNIFICANT CHANGE UP (ref 40–120)
ALT FLD-CCNC: 5 U/L — SIGNIFICANT CHANGE UP (ref 4–33)
APPEARANCE UR: SIGNIFICANT CHANGE UP
APTT BLD: 33.1 SEC — SIGNIFICANT CHANGE UP (ref 27.5–36.3)
AST SERPL-CCNC: 9 U/L — SIGNIFICANT CHANGE UP (ref 4–32)
BACTERIA # UR AUTO: NEGATIVE — SIGNIFICANT CHANGE UP
BASE EXCESS BLDV CALC-SCNC: 3 MMOL/L — SIGNIFICANT CHANGE UP
BASOPHILS # BLD AUTO: 0.06 K/UL — SIGNIFICANT CHANGE UP (ref 0–0.2)
BASOPHILS NFR BLD AUTO: 0.8 % — SIGNIFICANT CHANGE UP (ref 0–2)
BILIRUB SERPL-MCNC: < 0.2 MG/DL — LOW (ref 0.2–1.2)
BILIRUB UR-MCNC: NEGATIVE — SIGNIFICANT CHANGE UP
BLD GP AB SCN SERPL QL: NEGATIVE — SIGNIFICANT CHANGE UP
BLOOD GAS VENOUS - CREATININE: 0.51 MG/DL — SIGNIFICANT CHANGE UP (ref 0.5–1.3)
BLOOD UR QL VISUAL: HIGH
BUN SERPL-MCNC: 6 MG/DL — LOW (ref 7–23)
CALCIUM SERPL-MCNC: 9 MG/DL — SIGNIFICANT CHANGE UP (ref 8.4–10.5)
CHLORIDE BLDV-SCNC: 108 MMOL/L — SIGNIFICANT CHANGE UP (ref 96–108)
CHLORIDE SERPL-SCNC: 103 MMOL/L — SIGNIFICANT CHANGE UP (ref 98–107)
CO2 SERPL-SCNC: 25 MMOL/L — SIGNIFICANT CHANGE UP (ref 22–31)
COLOR SPEC: YELLOW — SIGNIFICANT CHANGE UP
CREAT SERPL-MCNC: 0.7 MG/DL — SIGNIFICANT CHANGE UP (ref 0.5–1.3)
EOSINOPHIL # BLD AUTO: 0.22 K/UL — SIGNIFICANT CHANGE UP (ref 0–0.5)
EOSINOPHIL NFR BLD AUTO: 2.9 % — SIGNIFICANT CHANGE UP (ref 0–6)
GAS PNL BLDV: 136 MMOL/L — SIGNIFICANT CHANGE UP (ref 136–146)
GLUCOSE BLDV-MCNC: 107 — HIGH (ref 70–99)
GLUCOSE SERPL-MCNC: 96 MG/DL — SIGNIFICANT CHANGE UP (ref 70–99)
GLUCOSE UR-MCNC: NEGATIVE — SIGNIFICANT CHANGE UP
HCO3 BLDV-SCNC: 26 MMOL/L — SIGNIFICANT CHANGE UP (ref 20–27)
HCT VFR BLD CALC: 26.9 % — LOW (ref 34.5–45)
HCT VFR BLDV CALC: 25.5 % — LOW (ref 34.5–45)
HGB BLD-MCNC: 8 G/DL — LOW (ref 11.5–15.5)
HGB BLDV-MCNC: 8.2 G/DL — LOW (ref 11.5–15.5)
IMM GRANULOCYTES # BLD AUTO: 0.05 # — SIGNIFICANT CHANGE UP
IMM GRANULOCYTES NFR BLD AUTO: 0.7 % — SIGNIFICANT CHANGE UP (ref 0–1.5)
INR BLD: 1.07 — SIGNIFICANT CHANGE UP (ref 0.88–1.17)
KETONES UR-MCNC: NEGATIVE — SIGNIFICANT CHANGE UP
LACTATE BLDV-MCNC: 0.8 MMOL/L — SIGNIFICANT CHANGE UP (ref 0.5–2)
LEUKOCYTE ESTERASE UR-ACNC: SIGNIFICANT CHANGE UP
LIDOCAIN IGE QN: 13 U/L — SIGNIFICANT CHANGE UP (ref 7–60)
LYMPHOCYTES # BLD AUTO: 2.14 K/UL — SIGNIFICANT CHANGE UP (ref 1–3.3)
LYMPHOCYTES # BLD AUTO: 28 % — SIGNIFICANT CHANGE UP (ref 13–44)
MCHC RBC-ENTMCNC: 17.8 PG — LOW (ref 27–34)
MCHC RBC-ENTMCNC: 29.7 % — LOW (ref 32–36)
MCV RBC AUTO: 59.8 FL — LOW (ref 80–100)
MONOCYTES # BLD AUTO: 0.91 K/UL — HIGH (ref 0–0.9)
MONOCYTES NFR BLD AUTO: 11.9 % — SIGNIFICANT CHANGE UP (ref 2–14)
NEUTROPHILS # BLD AUTO: 4.25 K/UL — SIGNIFICANT CHANGE UP (ref 1.8–7.4)
NEUTROPHILS NFR BLD AUTO: 55.7 % — SIGNIFICANT CHANGE UP (ref 43–77)
NITRITE UR-MCNC: NEGATIVE — SIGNIFICANT CHANGE UP
NRBC # FLD: 0 — SIGNIFICANT CHANGE UP
PCO2 BLDV: 49 MMHG — SIGNIFICANT CHANGE UP (ref 41–51)
PH BLDV: 7.37 PH — SIGNIFICANT CHANGE UP (ref 7.32–7.43)
PH UR: 6 — SIGNIFICANT CHANGE UP (ref 5–8)
PLATELET # BLD AUTO: 475 K/UL — HIGH (ref 150–400)
PMV BLD: 9.9 FL — SIGNIFICANT CHANGE UP (ref 7–13)
PO2 BLDV: 26 MMHG — LOW (ref 35–40)
POTASSIUM BLDV-SCNC: 3.9 MMOL/L — SIGNIFICANT CHANGE UP (ref 3.4–4.5)
POTASSIUM SERPL-MCNC: 3.9 MMOL/L — SIGNIFICANT CHANGE UP (ref 3.5–5.3)
POTASSIUM SERPL-SCNC: 3.9 MMOL/L — SIGNIFICANT CHANGE UP (ref 3.5–5.3)
PROT SERPL-MCNC: 7.1 G/DL — SIGNIFICANT CHANGE UP (ref 6–8.3)
PROT UR-MCNC: 30 — SIGNIFICANT CHANGE UP
PROTHROM AB SERPL-ACNC: 11.9 SEC — SIGNIFICANT CHANGE UP (ref 9.8–13.1)
RBC # BLD: 4.5 M/UL — SIGNIFICANT CHANGE UP (ref 3.8–5.2)
RBC # FLD: 27.1 % — HIGH (ref 10.3–14.5)
RBC CASTS # UR COMP ASSIST: SIGNIFICANT CHANGE UP (ref 0–?)
RH IG SCN BLD-IMP: POSITIVE — SIGNIFICANT CHANGE UP
SAO2 % BLDV: 34.5 % — LOW (ref 60–85)
SODIUM SERPL-SCNC: 139 MMOL/L — SIGNIFICANT CHANGE UP (ref 135–145)
SP GR SPEC: 1.02 — SIGNIFICANT CHANGE UP (ref 1–1.04)
SQUAMOUS # UR AUTO: SIGNIFICANT CHANGE UP
UROBILINOGEN FLD QL: NORMAL — SIGNIFICANT CHANGE UP
WBC # BLD: 7.63 K/UL — SIGNIFICANT CHANGE UP (ref 3.8–10.5)
WBC # FLD AUTO: 7.63 K/UL — SIGNIFICANT CHANGE UP (ref 3.8–10.5)
WBC UR QL: SIGNIFICANT CHANGE UP (ref 0–?)

## 2018-12-31 PROCEDURE — 99283 EMERGENCY DEPT VISIT LOW MDM: CPT

## 2018-12-31 RX ORDER — MECLIZINE HCL 12.5 MG
1 TABLET ORAL
Qty: 21 | Refills: 0 | OUTPATIENT
Start: 2018-12-31 | End: 2019-01-06

## 2018-12-31 RX ORDER — MECLIZINE HCL 12.5 MG
25 TABLET ORAL ONCE
Qty: 0 | Refills: 0 | Status: COMPLETED | OUTPATIENT
Start: 2018-12-31 | End: 2018-12-31

## 2018-12-31 RX ADMIN — Medication 25 MILLIGRAM(S): at 12:44

## 2018-12-31 NOTE — ED ADULT NURSE NOTE - NSIMPLEMENTINTERV_GEN_ALL_ED
Implemented All Universal Safety Interventions:  [place] to call system. Call bell, personal items and telephone within reach. Instruct patient to call for assistance. Room bathroom lighting operational. Non-slip footwear when patient is off stretcher. Physically safe environment: no spills, clutter or unnecessary equipment. Stretcher in lowest position, wheels locked, appropriate side rails in place.

## 2018-12-31 NOTE — ED PROVIDER NOTE - NSFOLLOWUPINSTRUCTIONS_ED_ALL_ED_FT
Please follow up with your primary care doctor in a week. Also make an appointment with your gastroenterologist for further management of your abdominal pain. Take your medications as prescribed and return to the Emergency Department with any worsening or concerning symptoms.

## 2018-12-31 NOTE — ED ADULT NURSE NOTE - OBJECTIVE STATEMENT
39 yo female c/o one month of worsening dizziness, nausea and epigastric abdominal pain. No associated vomiting, fevers, chills, or diarrhea. No dysuria, frequency ot urgency. No flank pain. Pt also c/o headaches. Headache was not acute onset and not worst headache of her life. Dizziness is worse with turning head and positional changes.

## 2018-12-31 NOTE — ED PROVIDER NOTE - OBJECTIVE STATEMENT
37 yo female c/o one month of worsening dizziness, nausea and epigastric abdominal pain. No associated vomiting, fevers, chills, or diarrhea. No dysuria, frequency ot urgency. No flank pain. Pt also c/o headaches. Headache was not acute onset and not worst headache of her life. Dizziness is worse with turning head and positional changes. No recent travel or known sick contacts. Pt endorses that she had a recent URI with nasal congestions but denies ear pain and ringing in the ear. 37 yo female c/o one month of worsening dizziness, nausea and epigastric abdominal pain. No associated vomiting, fevers, chills, or diarrhea. No dysuria, frequency ot urgency. No flank pain. Pt also c/o headaches. Headache was not acute onset and not worst headache of her life. Dizziness is worse with turning head and positional changes. No recent travel or known sick contacts. Pt endorses that she had a recent URI with nasal congestions but denies ear pain and ringing in the ear. Pt seen recently in the ED for abdominal pain and had abnormal EKG and is s/p CDU workup.

## 2018-12-31 NOTE — ED PROVIDER NOTE - MEDICAL DECISION MAKING DETAILS
39 yo female with positional dizziness in the setting of recent URI- likley BPPV, No neuro deficits on exam. Pt also has mild epigastric TTP will obtain cbc, cmp, lipase, u/a, EKG, treat symptomatically and reassess.

## 2018-12-31 NOTE — ED PROVIDER NOTE - CRANIAL NERVE AND PUPILLARY EXAM
cranial nerves 2-12 intact/+ nystagmus as documented above, no dysmetria, disdiadochokinesia, negative rombergs, no pronator drift, no ataxia.

## 2018-12-31 NOTE — ED PROVIDER NOTE - PROGRESS NOTE DETAILS
Ranjana: EKG unchanged from previous, no chest pain or sob. Ranjana: labs unremarkable, pt well appearing and feeling better. pt had recent endoscopy/colonoscopy for the same epigastric pain. will d.c o f/u with her outpatient GI doctor. Pt also had recent CDU stay with normal echo. will instruct pt to f/u with outpatient cardiology as well. referral list provided to her.

## 2019-01-01 LAB
BACTERIA UR CULT: SIGNIFICANT CHANGE UP
SPECIMEN SOURCE: SIGNIFICANT CHANGE UP

## 2019-02-01 ENCOUNTER — INPATIENT (INPATIENT)
Facility: HOSPITAL | Age: 39
LOS: 3 days | Discharge: HOME | End: 2019-02-05
Attending: HOSPITALIST | Admitting: HOSPITALIST
Payer: MEDICAID

## 2019-02-01 VITALS
OXYGEN SATURATION: 100 % | SYSTOLIC BLOOD PRESSURE: 114 MMHG | TEMPERATURE: 98 F | RESPIRATION RATE: 18 BRPM | DIASTOLIC BLOOD PRESSURE: 66 MMHG | HEART RATE: 81 BPM

## 2019-02-01 DIAGNOSIS — Z98.891 HISTORY OF UTERINE SCAR FROM PREVIOUS SURGERY: Chronic | ICD-10-CM

## 2019-02-01 LAB
ALBUMIN SERPL ELPH-MCNC: 3.4 G/DL — SIGNIFICANT CHANGE UP (ref 3.3–5)
ALP SERPL-CCNC: 66 U/L — SIGNIFICANT CHANGE UP (ref 40–120)
ALT FLD-CCNC: < 4 U/L — LOW (ref 4–33)
ANION GAP SERPL CALC-SCNC: 12 MMO/L — SIGNIFICANT CHANGE UP (ref 7–14)
APPEARANCE UR: CLEAR — SIGNIFICANT CHANGE UP
AST SERPL-CCNC: 15 U/L — SIGNIFICANT CHANGE UP (ref 4–32)
BACTERIA # UR AUTO: SIGNIFICANT CHANGE UP
BASOPHILS # BLD AUTO: 0.05 K/UL — SIGNIFICANT CHANGE UP (ref 0–0.2)
BASOPHILS NFR BLD AUTO: 0.9 % — SIGNIFICANT CHANGE UP (ref 0–2)
BILIRUB SERPL-MCNC: < 0.2 MG/DL — LOW (ref 0.2–1.2)
BILIRUB UR-MCNC: NEGATIVE — SIGNIFICANT CHANGE UP
BLOOD UR QL VISUAL: NEGATIVE — SIGNIFICANT CHANGE UP
BUN SERPL-MCNC: 5 MG/DL — LOW (ref 7–23)
CALCIUM SERPL-MCNC: 9.5 MG/DL — SIGNIFICANT CHANGE UP (ref 8.4–10.5)
CHLORIDE SERPL-SCNC: 101 MMOL/L — SIGNIFICANT CHANGE UP (ref 98–107)
CO2 SERPL-SCNC: 27 MMOL/L — SIGNIFICANT CHANGE UP (ref 22–31)
COLOR SPEC: YELLOW — SIGNIFICANT CHANGE UP
CREAT SERPL-MCNC: 0.63 MG/DL — SIGNIFICANT CHANGE UP (ref 0.5–1.3)
EOSINOPHIL # BLD AUTO: 0.27 K/UL — SIGNIFICANT CHANGE UP (ref 0–0.5)
EOSINOPHIL NFR BLD AUTO: 4.9 % — SIGNIFICANT CHANGE UP (ref 0–6)
GLUCOSE SERPL-MCNC: 112 MG/DL — HIGH (ref 70–99)
GLUCOSE UR-MCNC: NEGATIVE — SIGNIFICANT CHANGE UP
HCT VFR BLD CALC: 26.5 % — LOW (ref 34.5–45)
HGB BLD-MCNC: 7.8 G/DL — LOW (ref 11.5–15.5)
HYALINE CASTS # UR AUTO: HIGH
IMM GRANULOCYTES NFR BLD AUTO: 0.5 % — SIGNIFICANT CHANGE UP (ref 0–1.5)
KETONES UR-MCNC: NEGATIVE — SIGNIFICANT CHANGE UP
LEUKOCYTE ESTERASE UR-ACNC: NEGATIVE — SIGNIFICANT CHANGE UP
LIDOCAIN IGE QN: 11.7 U/L — SIGNIFICANT CHANGE UP (ref 7–60)
LYMPHOCYTES # BLD AUTO: 1.84 K/UL — SIGNIFICANT CHANGE UP (ref 1–3.3)
LYMPHOCYTES # BLD AUTO: 33.7 % — SIGNIFICANT CHANGE UP (ref 13–44)
MCHC RBC-ENTMCNC: 17.4 PG — LOW (ref 27–34)
MCHC RBC-ENTMCNC: 29.4 % — LOW (ref 32–36)
MCV RBC AUTO: 59.3 FL — LOW (ref 80–100)
MONOCYTES # BLD AUTO: 0.64 K/UL — SIGNIFICANT CHANGE UP (ref 0–0.9)
MONOCYTES NFR BLD AUTO: 11.7 % — SIGNIFICANT CHANGE UP (ref 2–14)
MUCOUS THREADS # UR AUTO: SIGNIFICANT CHANGE UP
NEUTROPHILS # BLD AUTO: 2.63 K/UL — SIGNIFICANT CHANGE UP (ref 1.8–7.4)
NEUTROPHILS NFR BLD AUTO: 48.3 % — SIGNIFICANT CHANGE UP (ref 43–77)
NITRITE UR-MCNC: NEGATIVE — SIGNIFICANT CHANGE UP
NRBC # FLD: 0 K/UL — LOW (ref 25–125)
PH UR: 6 — SIGNIFICANT CHANGE UP (ref 5–8)
PLATELET # BLD AUTO: 539 K/UL — HIGH (ref 150–400)
PMV BLD: 9.5 FL — SIGNIFICANT CHANGE UP (ref 7–13)
POTASSIUM SERPL-MCNC: 3.7 MMOL/L — SIGNIFICANT CHANGE UP (ref 3.5–5.3)
POTASSIUM SERPL-SCNC: 3.7 MMOL/L — SIGNIFICANT CHANGE UP (ref 3.5–5.3)
PROT SERPL-MCNC: 6.7 G/DL — SIGNIFICANT CHANGE UP (ref 6–8.3)
PROT UR-MCNC: 20 — SIGNIFICANT CHANGE UP
RBC # BLD: 4.47 M/UL — SIGNIFICANT CHANGE UP (ref 3.8–5.2)
RBC # FLD: 24.3 % — HIGH (ref 10.3–14.5)
RBC CASTS # UR COMP ASSIST: SIGNIFICANT CHANGE UP (ref 0–?)
SODIUM SERPL-SCNC: 140 MMOL/L — SIGNIFICANT CHANGE UP (ref 135–145)
SP GR SPEC: 1.02 — SIGNIFICANT CHANGE UP (ref 1–1.04)
SQUAMOUS # UR AUTO: SIGNIFICANT CHANGE UP
UROBILINOGEN FLD QL: NORMAL — SIGNIFICANT CHANGE UP
WBC # BLD: 5.46 K/UL — SIGNIFICANT CHANGE UP (ref 3.8–10.5)
WBC # FLD AUTO: 5.46 K/UL — SIGNIFICANT CHANGE UP (ref 3.8–10.5)
WBC UR QL: SIGNIFICANT CHANGE UP (ref 0–?)

## 2019-02-01 RX ORDER — SODIUM CHLORIDE 9 MG/ML
1000 INJECTION INTRAMUSCULAR; INTRAVENOUS; SUBCUTANEOUS ONCE
Qty: 0 | Refills: 0 | Status: COMPLETED | OUTPATIENT
Start: 2019-02-01 | End: 2019-02-01

## 2019-02-01 RX ORDER — ONDANSETRON 8 MG/1
4 TABLET, FILM COATED ORAL ONCE
Qty: 0 | Refills: 0 | Status: COMPLETED | OUTPATIENT
Start: 2019-02-01 | End: 2019-02-01

## 2019-02-01 RX ORDER — FAMOTIDINE 10 MG/ML
20 INJECTION INTRAVENOUS ONCE
Qty: 0 | Refills: 0 | Status: COMPLETED | OUTPATIENT
Start: 2019-02-01 | End: 2019-02-01

## 2019-02-01 RX ADMIN — SODIUM CHLORIDE 2000 MILLILITER(S): 9 INJECTION INTRAMUSCULAR; INTRAVENOUS; SUBCUTANEOUS at 21:10

## 2019-02-01 RX ADMIN — Medication 30 MILLILITER(S): at 21:10

## 2019-02-01 RX ADMIN — ONDANSETRON 4 MILLIGRAM(S): 8 TABLET, FILM COATED ORAL at 21:10

## 2019-02-01 RX ADMIN — FAMOTIDINE 20 MILLIGRAM(S): 10 INJECTION INTRAVENOUS at 21:10

## 2019-02-01 NOTE — ED ADULT NURSE NOTE - OBJECTIVE STATEMENT
patient presents for intermittent abd pain x3 days with N/V/D. Patient taking medications from home country. Seen at [facility] and given docusate and pantoprazole.  20G IV in left AC. Labs sent.

## 2019-02-01 NOTE — ED ADULT TRIAGE NOTE - CHIEF COMPLAINT QUOTE
Abdominal pain with green diarrhea, with headache and dizziness x 3 days.  Positive nausea.  Chest pain last week.

## 2019-02-01 NOTE — ED PROVIDER NOTE - OBJECTIVE STATEMENT
39 yo female PMHx gastritis presented to the ED for abdominal pain x 3 days. Pain is located in epigastrium and radiates to her chest, crampy, 6/10 at this time, but can get to a 10/10. Pain is worse after eating. Has had similar episodes of this over the past 10 years. Last went to GI 1 year ago, endoscopy was done and told she had a stomach infection, started on abx, as per patient. Pt tried taking Tylenol and Motrin for pain w/o relief. Pt is also reporting green diarrhea x 3 days. Admits to taking Ampicillin from her country for the past 2 weeks to try helping the abdominal pain. Admits to chest pain, dizziness (room is spinning), and nausea. Went to [facility] 2 days ago and they gave her docusate and pantoprazole without relief.  Denies SOB, vomiting, LOC, vision changes, sick contacts, recent travel.

## 2019-02-01 NOTE — ED PROVIDER NOTE - MEDICAL DECISION MAKING DETAILS
39 yo female PMHx gastritis c/o abominal pain x 3 days  -possible gastritis  -labs, fluids, pain meds, reassess  -GI f/u outpatient A:  -37 yo female PMHx gastritis c/o abominal pain x 3 days - possible gastritis  P:  -labs, fluids, pain meds, reassess

## 2019-02-02 DIAGNOSIS — R50.9 FEVER, UNSPECIFIED: ICD-10-CM

## 2019-02-02 DIAGNOSIS — T80.92XA UNSPECIFIED TRANSFUSION REACTION, INITIAL ENCOUNTER: ICD-10-CM

## 2019-02-02 DIAGNOSIS — Z29.9 ENCOUNTER FOR PROPHYLACTIC MEASURES, UNSPECIFIED: ICD-10-CM

## 2019-02-02 DIAGNOSIS — D50.9 IRON DEFICIENCY ANEMIA, UNSPECIFIED: ICD-10-CM

## 2019-02-02 DIAGNOSIS — R10.9 UNSPECIFIED ABDOMINAL PAIN: ICD-10-CM

## 2019-02-02 LAB
ALBUMIN SERPL ELPH-MCNC: 3.4 G/DL — SIGNIFICANT CHANGE UP (ref 3.3–5)
ALP SERPL-CCNC: 67 U/L — SIGNIFICANT CHANGE UP (ref 40–120)
ALT FLD-CCNC: 5 U/L — SIGNIFICANT CHANGE UP (ref 4–33)
ANION GAP SERPL CALC-SCNC: 14 MMO/L — SIGNIFICANT CHANGE UP (ref 7–14)
APPEARANCE UR: CLEAR — SIGNIFICANT CHANGE UP
APTT BLD: 28.7 SEC — SIGNIFICANT CHANGE UP (ref 27.5–36.3)
AST SERPL-CCNC: 17 U/L — SIGNIFICANT CHANGE UP (ref 4–32)
B PERT DNA SPEC QL NAA+PROBE: NOT DETECTED — SIGNIFICANT CHANGE UP
BASE EXCESS BLDV CALC-SCNC: 0.7 MMOL/L — SIGNIFICANT CHANGE UP
BASOPHILS # BLD AUTO: 0.05 K/UL — SIGNIFICANT CHANGE UP (ref 0–0.2)
BASOPHILS NFR BLD AUTO: 0.4 % — SIGNIFICANT CHANGE UP (ref 0–2)
BILIRUB SERPL-MCNC: 1.2 MG/DL — SIGNIFICANT CHANGE UP (ref 0.2–1.2)
BILIRUB UR-MCNC: NEGATIVE — SIGNIFICANT CHANGE UP
BLD GP AB SCN SERPL QL: NEGATIVE — SIGNIFICANT CHANGE UP
BLD GP AB SCN SERPL QL: NEGATIVE — SIGNIFICANT CHANGE UP
BLOOD GAS VENOUS - CREATININE: 0.55 MG/DL — SIGNIFICANT CHANGE UP (ref 0.5–1.3)
BLOOD UR QL VISUAL: NEGATIVE — SIGNIFICANT CHANGE UP
BUN SERPL-MCNC: 5 MG/DL — LOW (ref 7–23)
C PNEUM DNA SPEC QL NAA+PROBE: NOT DETECTED — SIGNIFICANT CHANGE UP
CALCIUM SERPL-MCNC: 8.7 MG/DL — SIGNIFICANT CHANGE UP (ref 8.4–10.5)
CHLORIDE BLDV-SCNC: 110 MMOL/L — HIGH (ref 96–108)
CHLORIDE SERPL-SCNC: 102 MMOL/L — SIGNIFICANT CHANGE UP (ref 98–107)
CO2 SERPL-SCNC: 24 MMOL/L — SIGNIFICANT CHANGE UP (ref 22–31)
COLOR SPEC: YELLOW — SIGNIFICANT CHANGE UP
CREAT SERPL-MCNC: 0.7 MG/DL — SIGNIFICANT CHANGE UP (ref 0.5–1.3)
D DIMER BLD IA.RAPID-MCNC: 967 NG/ML — SIGNIFICANT CHANGE UP
EOSINOPHIL # BLD AUTO: 0.17 K/UL — SIGNIFICANT CHANGE UP (ref 0–0.5)
EOSINOPHIL NFR BLD AUTO: 1.3 % — SIGNIFICANT CHANGE UP (ref 0–6)
FIBRINOGEN PPP-MCNC: 734 MG/DL — HIGH (ref 350–510)
FLUAV H1 2009 PAND RNA SPEC QL NAA+PROBE: NOT DETECTED — SIGNIFICANT CHANGE UP
FLUAV H1 RNA SPEC QL NAA+PROBE: NOT DETECTED — SIGNIFICANT CHANGE UP
FLUAV H3 RNA SPEC QL NAA+PROBE: NOT DETECTED — SIGNIFICANT CHANGE UP
FLUAV SUBTYP SPEC NAA+PROBE: NOT DETECTED — SIGNIFICANT CHANGE UP
FLUBV RNA SPEC QL NAA+PROBE: NOT DETECTED — SIGNIFICANT CHANGE UP
GAS PNL BLDV: 138 MMOL/L — SIGNIFICANT CHANGE UP (ref 136–146)
GLUCOSE BLDV-MCNC: 86 — SIGNIFICANT CHANGE UP (ref 70–99)
GLUCOSE SERPL-MCNC: 84 MG/DL — SIGNIFICANT CHANGE UP (ref 70–99)
GLUCOSE UR-MCNC: NEGATIVE — SIGNIFICANT CHANGE UP
HADV DNA SPEC QL NAA+PROBE: NOT DETECTED — SIGNIFICANT CHANGE UP
HCO3 BLDV-SCNC: 24 MMOL/L — SIGNIFICANT CHANGE UP (ref 20–27)
HCOV PNL SPEC NAA+PROBE: SIGNIFICANT CHANGE UP
HCT VFR BLD CALC: 29.4 % — LOW (ref 34.5–45)
HCT VFR BLDV CALC: 26.1 % — LOW (ref 34.5–45)
HGB BLD-MCNC: 8.8 G/DL — LOW (ref 11.5–15.5)
HGB BLDV-MCNC: 8.4 G/DL — LOW (ref 11.5–15.5)
HMPV RNA SPEC QL NAA+PROBE: NOT DETECTED — SIGNIFICANT CHANGE UP
HPIV1 RNA SPEC QL NAA+PROBE: NOT DETECTED — SIGNIFICANT CHANGE UP
HPIV2 RNA SPEC QL NAA+PROBE: NOT DETECTED — SIGNIFICANT CHANGE UP
HPIV3 RNA SPEC QL NAA+PROBE: NOT DETECTED — SIGNIFICANT CHANGE UP
HPIV4 RNA SPEC QL NAA+PROBE: NOT DETECTED — SIGNIFICANT CHANGE UP
IMM GRANULOCYTES NFR BLD AUTO: 0.4 % — SIGNIFICANT CHANGE UP (ref 0–1.5)
INR BLD: 1.16 — SIGNIFICANT CHANGE UP (ref 0.88–1.17)
KETONES UR-MCNC: NEGATIVE — SIGNIFICANT CHANGE UP
LACTATE BLDV-MCNC: 2.1 MMOL/L — HIGH (ref 0.5–2)
LEUKOCYTE ESTERASE UR-ACNC: NEGATIVE — SIGNIFICANT CHANGE UP
LYMPHOCYTES # BLD AUTO: 1.11 K/UL — SIGNIFICANT CHANGE UP (ref 1–3.3)
LYMPHOCYTES # BLD AUTO: 8.3 % — LOW (ref 13–44)
MCHC RBC-ENTMCNC: 18.3 PG — LOW (ref 27–34)
MCHC RBC-ENTMCNC: 29.9 % — LOW (ref 32–36)
MCV RBC AUTO: 61.3 FL — LOW (ref 80–100)
MONOCYTES # BLD AUTO: 0.32 K/UL — SIGNIFICANT CHANGE UP (ref 0–0.9)
MONOCYTES NFR BLD AUTO: 2.4 % — SIGNIFICANT CHANGE UP (ref 2–14)
NEUTROPHILS # BLD AUTO: 11.65 K/UL — HIGH (ref 1.8–7.4)
NEUTROPHILS NFR BLD AUTO: 87.2 % — HIGH (ref 43–77)
NITRITE UR-MCNC: NEGATIVE — SIGNIFICANT CHANGE UP
NRBC # FLD: 0 K/UL — LOW (ref 25–125)
OB PNL STL: NEGATIVE — SIGNIFICANT CHANGE UP
PCO2 BLDV: 46 MMHG — SIGNIFICANT CHANGE UP (ref 41–51)
PH BLDV: 7.36 PH — SIGNIFICANT CHANGE UP (ref 7.32–7.43)
PH UR: 6 — SIGNIFICANT CHANGE UP (ref 5–8)
PLATELET # BLD AUTO: 462 K/UL — HIGH (ref 150–400)
PMV BLD: 9.2 FL — SIGNIFICANT CHANGE UP (ref 7–13)
PO2 BLDV: 34 MMHG — LOW (ref 35–40)
POTASSIUM BLDV-SCNC: 3.4 MMOL/L — SIGNIFICANT CHANGE UP (ref 3.4–4.5)
POTASSIUM SERPL-MCNC: 4.1 MMOL/L — SIGNIFICANT CHANGE UP (ref 3.5–5.3)
POTASSIUM SERPL-SCNC: 4.1 MMOL/L — SIGNIFICANT CHANGE UP (ref 3.5–5.3)
PROT SERPL-MCNC: 6.7 G/DL — SIGNIFICANT CHANGE UP (ref 6–8.3)
PROT UR-MCNC: 10 — SIGNIFICANT CHANGE UP
PROTHROM AB SERPL-ACNC: 13.3 SEC — HIGH (ref 9.8–13.1)
RBC # BLD: 4.8 M/UL — SIGNIFICANT CHANGE UP (ref 3.8–5.2)
RBC # FLD: 25.2 % — HIGH (ref 10.3–14.5)
RH IG SCN BLD-IMP: POSITIVE — SIGNIFICANT CHANGE UP
RH IG SCN BLD-IMP: POSITIVE — SIGNIFICANT CHANGE UP
RSV RNA SPEC QL NAA+PROBE: NOT DETECTED — SIGNIFICANT CHANGE UP
RV+EV RNA SPEC QL NAA+PROBE: NOT DETECTED — SIGNIFICANT CHANGE UP
SAO2 % BLDV: 55.4 % — LOW (ref 60–85)
SODIUM SERPL-SCNC: 140 MMOL/L — SIGNIFICANT CHANGE UP (ref 135–145)
SP GR SPEC: 1.02 — SIGNIFICANT CHANGE UP (ref 1–1.04)
UROBILINOGEN FLD QL: NORMAL — SIGNIFICANT CHANGE UP
WBC # BLD: 13.36 K/UL — HIGH (ref 3.8–10.5)
WBC # FLD AUTO: 13.36 K/UL — HIGH (ref 3.8–10.5)

## 2019-02-02 PROCEDURE — 71046 X-RAY EXAM CHEST 2 VIEWS: CPT | Mod: 26

## 2019-02-02 PROCEDURE — 86078 PHYS BLOOD BANK SERV REACTJ: CPT

## 2019-02-02 PROCEDURE — 99223 1ST HOSP IP/OBS HIGH 75: CPT

## 2019-02-02 RX ORDER — PANTOPRAZOLE SODIUM 20 MG/1
40 TABLET, DELAYED RELEASE ORAL
Qty: 0 | Refills: 0 | Status: DISCONTINUED | OUTPATIENT
Start: 2019-02-02 | End: 2019-02-03

## 2019-02-02 RX ORDER — ACETAMINOPHEN 500 MG
975 TABLET ORAL ONCE
Qty: 0 | Refills: 0 | Status: COMPLETED | OUTPATIENT
Start: 2019-02-02 | End: 2019-02-02

## 2019-02-02 RX ORDER — SODIUM CHLORIDE 9 MG/ML
1000 INJECTION INTRAMUSCULAR; INTRAVENOUS; SUBCUTANEOUS ONCE
Qty: 0 | Refills: 0 | Status: COMPLETED | OUTPATIENT
Start: 2019-02-02 | End: 2019-02-02

## 2019-02-02 RX ORDER — ACETAMINOPHEN 500 MG
1000 TABLET ORAL ONCE
Qty: 0 | Refills: 0 | Status: COMPLETED | OUTPATIENT
Start: 2019-02-02 | End: 2019-02-02

## 2019-02-02 RX ORDER — ACETAMINOPHEN 500 MG
650 TABLET ORAL ONCE
Qty: 0 | Refills: 0 | Status: DISCONTINUED | OUTPATIENT
Start: 2019-02-02 | End: 2019-02-02

## 2019-02-02 RX ORDER — DIPHENHYDRAMINE HCL 50 MG
50 CAPSULE ORAL ONCE
Qty: 0 | Refills: 0 | Status: COMPLETED | OUTPATIENT
Start: 2019-02-02 | End: 2019-02-02

## 2019-02-02 RX ORDER — ACETAMINOPHEN 500 MG
975 TABLET ORAL ONCE
Qty: 0 | Refills: 0 | Status: DISCONTINUED | OUTPATIENT
Start: 2019-02-02 | End: 2019-02-02

## 2019-02-02 RX ORDER — ACETAMINOPHEN 500 MG
650 TABLET ORAL EVERY 6 HOURS
Qty: 0 | Refills: 0 | Status: DISCONTINUED | OUTPATIENT
Start: 2019-02-02 | End: 2019-02-03

## 2019-02-02 RX ORDER — PANTOPRAZOLE SODIUM 20 MG/1
40 TABLET, DELAYED RELEASE ORAL ONCE
Qty: 0 | Refills: 0 | Status: COMPLETED | OUTPATIENT
Start: 2019-02-02 | End: 2019-02-02

## 2019-02-02 RX ORDER — ACETAMINOPHEN 500 MG
1000 TABLET ORAL ONCE
Qty: 0 | Refills: 0 | Status: DISCONTINUED | OUTPATIENT
Start: 2019-02-02 | End: 2019-02-02

## 2019-02-02 RX ORDER — TRAMADOL HYDROCHLORIDE 50 MG/1
25 TABLET ORAL ONCE
Qty: 0 | Refills: 0 | Status: DISCONTINUED | OUTPATIENT
Start: 2019-02-02 | End: 2019-02-02

## 2019-02-02 RX ADMIN — Medication 50 MILLIGRAM(S): at 05:16

## 2019-02-02 RX ADMIN — Medication 650 MILLIGRAM(S): at 15:38

## 2019-02-02 RX ADMIN — Medication 30 MILLILITER(S): at 23:27

## 2019-02-02 RX ADMIN — Medication 1000 MILLIGRAM(S): at 22:46

## 2019-02-02 RX ADMIN — Medication 125 MILLIGRAM(S): at 05:16

## 2019-02-02 RX ADMIN — SODIUM CHLORIDE 1000 MILLILITER(S): 9 INJECTION INTRAMUSCULAR; INTRAVENOUS; SUBCUTANEOUS at 06:36

## 2019-02-02 RX ADMIN — Medication 650 MILLIGRAM(S): at 14:06

## 2019-02-02 RX ADMIN — Medication 400 MILLIGRAM(S): at 22:31

## 2019-02-02 RX ADMIN — Medication 975 MILLIGRAM(S): at 05:35

## 2019-02-02 RX ADMIN — TRAMADOL HYDROCHLORIDE 25 MILLIGRAM(S): 50 TABLET ORAL at 20:34

## 2019-02-02 RX ADMIN — Medication 975 MILLIGRAM(S): at 05:06

## 2019-02-02 RX ADMIN — TRAMADOL HYDROCHLORIDE 25 MILLIGRAM(S): 50 TABLET ORAL at 21:03

## 2019-02-02 RX ADMIN — PANTOPRAZOLE SODIUM 40 MILLIGRAM(S): 20 TABLET, DELAYED RELEASE ORAL at 01:59

## 2019-02-02 NOTE — ED CDU PROVIDER INITIAL DAY NOTE - OBJECTIVE STATEMENT
39 yo female PMHx gastritis presented to the ED for abdominal pain x 3 days. Pain is located in epigastrium and radiates to her chest, crampy, 6/10 at this time, but can get to a 10/10. Pain is worse after eating. Has had similar episodes of this over the past 10 years. Last went to GI 1 year ago, endoscopy was done and told she had a stomach infection, started on abx, as per patient. Pt tried taking Tylenol and Motrin for pain w/o relief. Pt is also reporting green diarrhea x 3 days. Admits to taking A[place]icillin from her country for the past 2 weeks to try helping the abdominal pain. Admits to chest pain, dizziness (room is spinning), and nausea. Went to [facility] 2 days ago and they gave her docusate and pantoprazole without relief.  Denies SOB, vomiting, LOC, vision changes, sick contacts, recent travel.    CDU JAZMYN [place] Note-----  35 yo female, hx as above.  Pt. was evaluated in the ED; labs grossly unremarkable except for Hb 7.8.  Assessment:  Abdominal pain likely secondary to gastritis, Symptomatic anemia requiring transfusions.  Pt dispo'd to CDU for care plan:  Transfuse PRBC per orders, repeat CBC, supportive care, general observation care / monitoring.  On CDU evaluation, pt reports current abdominal pain (points all over abdomen in general) has been occurring for months; pt states she is in process of getting referral from PMD for a new GI doctor.  With episodes of abdominal pain, +associated nausea, no hx/o vomiting.  No hx/o urinary discomfort, frequency, hematuria.  Pt. has no other c/o.  CDU plan of care d/w pt who verbalizes agreement with plan.  Pt. states last transfusion was in December 2018.

## 2019-02-02 NOTE — CONSULT NOTE ADULT - SUBJECTIVE AND OBJECTIVE BOX
Chief Complaint: Abdominal pain    HPI:    37 y/o F originally from [place] with history of microcytic anemia and presumed H. pylori gastritis, presenting with abdominal pain. Ms. Whelan endorses worsening abdominal pain over the past several weeks. She describes the pain as a constant cramping sensation located in her epigastrium. The pain does not radiate. The pain is worse after meals. She endorse nausea, but denies vomiting. She endorses occasional episodes of bloody stools, but denies black stools, bloody emesis, and coffee ground emesis, diarrhea, and constipation. She denies difficulty swallowing and painful swallowing.     The patient underwent upper endoscopy and colonoscopy in 2017 at [facility]. She states she was diagnosed with an infection and took antibiotics for two weeks. She had similar symptoms of abdominal pain at the time which improved after her antibiotic treatment.     The patient states she has had anemia since her childhood, but she does not recall if she was ever given a diagnosis for her anemia. Her father, sister, and children are all anemic. She endorses prolonged periods for several months last year, with periods last 2 weeks. Her current periods last about 1 week.    The patient was found to have a Hgb of 7.8 upon presentation and was transfused 1 unit of pRBCs, after which she developed a transfusion reaction. She was admitted for observation following her transfusion reaction.    Allergies:  No Known Allergies    Home Medications:  None    Hospital Medications:  acetaminophen   Tablet .. 650 milliGRAM(s) Oral every 6 hours PRN  pantoprazole    Tablet 40 milliGRAM(s) Oral before breakfast    PMHX/PSHX:  Gastritis  Anemia  H/O:     Family history:  Family history of anemia (Father, Sibling, Child)    Denies family history of colon cancer/polyps, stomach cancer/polyps, pancreatic cancer/masses, liver cancer/disease, ovarian cancer and endometrial cancer.    Social History:     No history of tobacco use, EtOH use or illicit drug use    ROS:     General:  No wt loss, fevers, chills, night sweats, + fatigue  Eyes:  Good vision, no reported pain  ENT:  No sore throat, pain, runny nose, dysphagia  CV:  No pain, palpitations, hypo/hypertension  Pulm:  No dyspnea, cough, tachypnea, wheezing  GI:  + pain, + nausea, No vomiting, No diarrhea, No constipation, No weight loss, No fever, No pruritis, + rectal bleeding, No tarry stools, No dysphagia,  :  No pain, bleeding, incontinence, nocturia  Skin:  No rash, tattoos, scars, edema    PHYSICAL EXAM:     GENERAL:  No acute distress, obese  HEENT:  Normocephalic/atraumatic, no scleral icterus  CHEST:  Clear to auscultation bilaterally, no wheezes/rales/ronchi, no accessory muscle use  HEART:  Regular rate and rhythm, no murmurs/rubs/gallops  ABDOMEN:  Soft, non-tender, non-distended, normoactive bowel sounds  RECTAL: Patient declined  EXTREMITIES: No cyanosis, clubbing, or edema  SKIN:  No rash/erythema  NEURO:  Alert and oriented x 3    Vital Signs:  Vital Signs Last 24 Hrs  T(C): 36.7 (2019 11:37), Max: 38.6 (2019 04:17)  T(F): 98 (2019 11:37), Max: 101.4 (2019 04:17)  HR: 83 (2019 11:37) (78 - 116)  BP: 116/69 (2019 11:37) (103/67 - 134/60)  BP(mean): --  RR: 17 (2019 11:37) (15 - 22)  SpO2: 98% (2019 11:37) (98% - 100%)  Daily Height in cm: 182.88 (2019 11:37)    Daily Weight in k.3 (2019 11:37)    LABS:                        8.8    13.36 )-----------( 462      ( 2019 05:00 )             29.4     Mean Cell Volume: 61.3 fL (- @ 05:00)        140  |  102  |  5<L>  ----------------------------<  84  4.1   |  24  |  0.70    Ca    8.7      2019 05:00    TPro  6.7  /  Alb  3.4  /  TBili  1.2  /  DBili  x   /  AST  17  /  ALT  5   /  AlkPhos  67      LIVER FUNCTIONS - ( 2019 05:00 )  Alb: 3.4 g/dL / Pro: 6.7 g/dL / ALK PHOS: 67 u/L / ALT: 5 u/L / AST: 17 u/L / GGT: x           PT/INR - ( 2019 05:00 )   PT: 13.3 SEC;   INR: 1.16       PTT - ( 2019 05:00 )  PTT:28.7 SEC  Urinalysis Basic - ( 2019 06:20 )    Color: YELLOW / Appearance: CLEAR / S.021 / pH: 6.0  Gluc: NEGATIVE / Ketone: NEGATIVE  / Bili: NEGATIVE / Urobili: NORMAL   Blood: NEGATIVE / Protein: 10 / Nitrite: NEGATIVE   Leuk Esterase: NEGATIVE / RBC: x / WBC x   Sq Epi: x / Non Sq Epi: x / Bacteria: x    Amylase Serum--      Lipase serum11.7       Ammonia--                          8.8    13.36 )-----------( 462      ( 2019 05:00 )             29.4                         7.8    5.46  )-----------( 539      ( 2019 21:14 )             26.5     Imaging:    < from: Xray Chest 2 Views PA/Lat (19 @ 06:25) >    EXAM:  XR CHEST PA LAT 2V        PROCEDURE DATE:  2019         INTERPRETATION:  CLINICAL INFORMATION: Fever.    EXAM: PA and lateral views of the chest.    COMPARISON: None.        IMPRESSION:  Clear lungs. No pleural effusions or pneumothorax.    Cardiac and mediastinal silhouettes within normal limits.    Trachea midline.    Unremarkable osseous structures.    CIPRIANO ALVAREZ M.D., RADIOLOGY RESIDENT  This document has been electronically signed.  BERNARD ROMO M.D., ATTENDING RADIOLOGIST  This document has been electronically signed. 2019  9:42AM      < end of copied text > Chief Complaint: Abdominal pain    HPI:    37 y/o F originally from [place] with history of microcytic anemia and presumed H. pylori gastritis, presenting with abdominal pain. Ms. Whelan endorses worsening abdominal pain over the past several weeks. She describes the pain as a constant cramping sensation located in her epigastrium. The pain does not radiate. The pain is worse after meals. She endorse nausea, but denies vomiting. She endorses occasional episodes of bloody stools, but denies black stools, bloody emesis, and coffee ground emesis, diarrhea, and constipation. She denies difficulty swallowing and painful swallowing. She denies NSAID use.    The patient underwent upper endoscopy and colonoscopy in 2017 at [facility]. She states she was diagnosed with an infection and took antibiotics for two weeks. She had similar symptoms of abdominal pain at the time which improved after her antibiotic treatment.     The patient states she has had anemia since her childhood, but she does not recall if she was ever given a diagnosis for her anemia. Her father, sister, and children are all anemic. She endorses prolonged periods for several months last year, with periods last 2 weeks. Her current periods last about 1 week.    The patient was found to have a Hgb of 7.8 upon presentation and was transfused 1 unit of pRBCs, after which she developed a transfusion reaction. She was admitted for observation following her transfusion reaction.    Allergies:  No Known Allergies    Home Medications:  None    Hospital Medications:  acetaminophen   Tablet .. 650 milliGRAM(s) Oral every 6 hours PRN  pantoprazole    Tablet 40 milliGRAM(s) Oral before breakfast    PMHX/PSHX:  Gastritis  Anemia  H/O:     Family history:  Family history of anemia (Father, Sibling, Child)    Denies family history of colon cancer/polyps, stomach cancer/polyps, pancreatic cancer/masses, liver cancer/disease, ovarian cancer and endometrial cancer.    Social History:     No history of tobacco use, EtOH use or illicit drug use    ROS:     General:  No wt loss, fevers, chills, night sweats, + fatigue  Eyes:  Good vision, no reported pain  ENT:  No sore throat, pain, runny nose, dysphagia  CV:  No pain, palpitations, hypo/hypertension  Pulm:  No dyspnea, cough, tachypnea, wheezing  GI:  + pain, + nausea, No vomiting, No diarrhea, No constipation, No weight loss, No fever, No pruritis, + rectal bleeding, No tarry stools, No dysphagia,  :  No pain, bleeding, incontinence, nocturia  Skin:  No rash, tattoos, scars, edema    PHYSICAL EXAM:     GENERAL:  No acute distress, obese  HEENT:  Normocephalic/atraumatic, no scleral icterus  CHEST:  Clear to auscultation bilaterally, no wheezes/rales/ronchi, no accessory muscle use  HEART:  Regular rate and rhythm, no murmurs/rubs/gallops  ABDOMEN:  Soft, non-tender, non-distended, normoactive bowel sounds  RECTAL: Patient declined  EXTREMITIES: No cyanosis, clubbing, or edema  SKIN:  No rash/erythema  NEURO:  Alert and oriented x 3    Vital Signs:  Vital Signs Last 24 Hrs  T(C): 36.7 (2019 11:37), Max: 38.6 (2019 04:17)  T(F): 98 (2019 11:37), Max: 101.4 (2019 04:17)  HR: 83 (2019 11:37) (78 - 116)  BP: 116/69 (2019 11:37) (103/67 - 134/60)  BP(mean): --  RR: 17 (2019 11:37) (15 - 22)  SpO2: 98% (2019 11:37) (98% - 100%)  Daily Height in cm: 182.88 (2019 11:37)    Daily Weight in k.3 (2019 11:37)    LABS:                        8.8    13.36 )-----------( 462      ( 2019 05:00 )             29.4     Mean Cell Volume: 61.3 fL (-19 @ 05:00)        140  |  102  |  5<L>  ----------------------------<  84  4.1   |  24  |  0.70    Ca    8.7      2019 05:00    TPro  6.7  /  Alb  3.4  /  TBili  1.2  /  DBili  x   /  AST  17  /  ALT  5   /  AlkPhos  67      LIVER FUNCTIONS - ( 2019 05:00 )  Alb: 3.4 g/dL / Pro: 6.7 g/dL / ALK PHOS: 67 u/L / ALT: 5 u/L / AST: 17 u/L / GGT: x           PT/INR - ( 2019 05:00 )   PT: 13.3 SEC;   INR: 1.16       PTT - ( 2019 05:00 )  PTT:28.7 SEC  Urinalysis Basic - ( 2019 06:20 )    Color: YELLOW / Appearance: CLEAR / S.021 / pH: 6.0  Gluc: NEGATIVE / Ketone: NEGATIVE  / Bili: NEGATIVE / Urobili: NORMAL   Blood: NEGATIVE / Protein: 10 / Nitrite: NEGATIVE   Leuk Esterase: NEGATIVE / RBC: x / WBC x   Sq Epi: x / Non Sq Epi: x / Bacteria: x    Amylase Serum--      Lipase serum11.7       Ammonia--                          8.8    13.36 )-----------( 462      ( 2019 05:00 )             29.4                         7.8    5.46  )-----------( 539      ( 2019 21:14 )             26.5     Imaging:    < from: Xray Chest 2 Views PA/Lat (19 @ 06:25) >    EXAM:  XR CHEST PA LAT 2V        PROCEDURE DATE:  2019         INTERPRETATION:  CLINICAL INFORMATION: Fever.    EXAM: PA and lateral views of the chest.    COMPARISON: None.        IMPRESSION:  Clear lungs. No pleural effusions or pneumothorax.    Cardiac and mediastinal silhouettes within normal limits.    Trachea midline.    Unremarkable osseous structures.    CIPRIANO ALVAREZ M.D., RADIOLOGY RESIDENT  This document has been electronically signed.  BERNARD ROMO M.D., ATTENDING RADIOLOGIST  This document has been electronically signed. 2019  9:42AM      < end of copied text >

## 2019-02-02 NOTE — CONSULT NOTE ADULT - ATTENDING COMMENTS
Anemia, abdominal pain, rectal bleeding/BRBPR  Can not rule out PUD, neoplasm  EGD colonoscopy this week

## 2019-02-02 NOTE — H&P ADULT - NSHPPHYSICALEXAM_GEN_ALL_CORE
GENERAL: NAD, well-groomed, appears fatigued and lethargic  HEAD:  Atraumatic, Normocephalic  EYES: EOMI, PERRLA, conjunctiva and sclera clear  ENMT: No tonsillar erythema, exudates, or enlargement; Moist mucous membranes, good dentition  NECK: Supple, No JVD, Normal thyroid  HEART: Regular rate and rhythm; No murmurs, rubs, or gallops  RESPIRATORY: CTA B/L, No W/R/R  ABDOMEN: Soft, Nontender, Nondistended; Bowel sounds present, no hepatomegaly/splenomegaly  NEUROLOGY: A&Ox3, nonfocal, moving all extremities  EXTREMITIES:  2+ Peripheral Pulses, No clubbing, cyanosis, or edema  SKIN: warm, dry, normal color, no rash or abnormal lesions

## 2019-02-02 NOTE — H&P ADULT - PROBLEM SELECTOR PLAN 3
Pt developed Temp 101.4 hour into pRBC transfusion. Pt w/ chills but absence of other systemic symptoms.   - will give Tylenol for fever Pt developed Temp 101.4 one hour into pRBC transfusion. Pt w/ chills but absence of other systemic symptoms. CXR clear.  - will give Tylenol for fever

## 2019-02-02 NOTE — ED CDU PROVIDER INITIAL DAY NOTE - PROGRESS NOTE DETAILS
Called to bedside for r/o prbc transfusion reaction. See attending attestation for details. At 0415 hrs, pt called out c/o chills; oral temp checked ---> 100.1; rectal temp checked ---> 101.4 reported by RN.  /84, , RR 22.  Transfusion stopped; blood bank notified; transfusion reaction protocol initiated.  I notified ED blue side attending Dr. Bain, who evaluated pt (see attending note).  On my reassessment, pt with no active c/o.  Pt has benign cardio/pulm/abdominal exam with no objective TTP.  On further questioning of pt, pt mentions she has been having loose stools for many months, not just one day as originally stated.  No URI symptoms. Full labs/UA/blood and urine cultures/CXR ordered.  Pt. being admitted to hospital for further medical workup; pt verbalizes understanding and agreement to plan. In the interim, case discussed with Dr. Frankel, hospitalist, who accepted pt to his service for admission.  (Pt states her PMD is affiliated with [facility].)  MAR actively being notified. At 0415 hrs, pt called out c/o chills; oral temp checked ---> 100.1; rectal temp checked ---> 101.4 reported by RN.  /84, , RR 22.  Transfusion stopped; Tylenol ordered; blood bank notified; transfusion reaction protocol initiated.  I notified ED blue side attending Dr. Bain, who evaluated pt (see attending note).  On my reassessment, pt with no active c/o (apart from chills).  Pt has benign cardio/pulm/abdominal exam with no objective TTP on abd exam.  On further questioning of pt, pt mentions she has been having loose stools for many months, not just one day as originally stated.  No URI symptoms. Full labs/UA/blood and urine cultures/CXR ordered.  Pt. being admitted to hospital for further medical workup; pt verbalizes understanding and agreement to plan.

## 2019-02-02 NOTE — H&P ADULT - PROBLEM SELECTOR PLAN 2
Pt presents w/ acute on chronic epigastric abdominal pain. According to hx of "bacterial infection" found on EGD/colonoscopy from 2017, high suspicion for H pylori infection causing gastritis vs PUD.   - will try to obtain records of prior EGD/colonoscopy from [place]  - GI consulted  - Pt presents w/ acute on chronic epigastric abdominal pain. According to hx of "bacterial infection" found on EGD/colonoscopy from 2017, high suspicion for H pylori infection causing PUD vs gastritis.   - will try to obtain records of prior EGD/colonoscopy from [place]  - GI consulted  - started on protonix 40 mg daily

## 2019-02-02 NOTE — ED CDU PROVIDER INITIAL DAY NOTE - INDICATION FOR OBSERVATION
Other/Transfuse PRBC per orders, repeat CBC, supportive care, general observation care / monitoring./Therapeutic Intensity

## 2019-02-02 NOTE — H&P ADULT - PROBLEM SELECTOR PLAN 1
Pt w/ hx of chronic microcytic anemia 2/2 iron deficiency due to GI bleed vs menorrhagia vs hemoglobinopathy. Iron panel from 12/11/18 showing total Fe of 12.  - will reorder iron panel  - fecal occult negative. However, will c/s GI given hx concerning for gastritis vs PUD  - will try to obtain records of previous GI w/u from [facility] on Monday  - pt states she follows closely at the [facility], will try to obtain any hemoglobinopathy w/u  - if GI w/u negative, will consider GYN c/s given possible hx of menorrhagia  - c/w iron supplementation Pt w/ hx of chronic microcytic anemia 2/2 iron deficiency due to GI bleed vs menorrhagia vs underlying hemoglobinopathy. Iron panel from 12/11/18 notable for low total iron. Baseline Hgb unknown.   - will reorder iron panel  - fecal occult negative. However, will c/s GI given hx concerning for PUD vs gastritis  - will try to obtain records of previous GI w/u from [place] [facility] on Monday  - pt states she follows closely at the [facility], will try to obtain any hemoglobinopathy w/u  - will order Hgb electrophoresis  - if GI w/u negative, will consider GYN c/s given possible hx of menorrhagia  - c/w iron supplementation]  - transfuse for goal Hgb>7  - daily CBCs

## 2019-02-02 NOTE — H&P ADULT - ASSESSMENT
Pt is a 39 yo F with a PMHx of chronic gastritis 2/2 possible H pylori infection, chronic anemia, presenting with abdominal pain. Pt found to have Hgb 7.8 Pt is a 37 yo F with a PMHx of chronic gastritis, chronic anemia, presenting with abdominal pain. Pt found to have Hgb 7.8, started on 1U pRBC transfusion but developed fever to 101.4 about an hour into the transfusion.

## 2019-02-02 NOTE — H&P ADULT - NSHPLABSRESULTS_GEN_ALL_CORE
8.8    13.36 )-----------( 462      ( 2019 05:00 )             29.4     02-    140  |  102  |  5<L>  ----------------------------<  84  4.1   |  24  |  0.70    Ca    8.7      2019 05:00    TPro  6.7  /  Alb  3.4  /  TBili  1.2  /  DBili  x   /  AST  17  /  ALT  5   /  AlkPhos  67  02-02    PT/INR - ( 2019 05:00 )   PT: 13.3 SEC;   INR: 1.16          PTT - ( 2019 05:00 )  PTT:28.7 SEC  Urinalysis Basic - ( 2019 06:20 )    Color: YELLOW / Appearance: CLEAR / S.021 / pH: 6.0  Gluc: NEGATIVE / Ketone: NEGATIVE  / Bili: NEGATIVE / Urobili: NORMAL   Blood: NEGATIVE / Protein: 10 / Nitrite: NEGATIVE   Leuk Esterase: NEGATIVE / RBC: x / WBC x   Sq Epi: x / Non Sq Epi: x / Bacteria: x      RADIOLOGY, EKG & ADDITIONAL TESTS: Reviewed.     EXAM:  XR CHEST PA LAT 2V      PROCEDURE DATE:  2019     INTERPRETATION:  CLINICAL INFORMATION: Fever.  EXAM: PA and lateral views of the chest.  COMPARISON: None.        IMPRESSION:  Clear lungs. No pleural effusions or pneumothorax.  Cardiac and mediastinal silhouettes within normal limits.  Trachea midline.  Unremarkable osseous structures.

## 2019-02-02 NOTE — H&P ADULT - NSHPREVIEWOFSYSTEMS_GEN_ALL_CORE
REVIEW OF SYSTEMS:    CONSTITUTIONAL: + weakness, fatigue, fevers or chills. No weight change, appetite change  EYES: No visual changes; No double vision,  No vertigo, eye pain  Ears: no otalgia, no otorrhea, no hearing loss, tinnitus  Nose: no epistaxis, rhinorrhea, post-discharge, sinus pressure  Throat: no throat pain, no oral lesions, tooth pain   NECK: No pain or stiffness  RESPIRATORY: No cough (productive or dry), wheezing, hemoptysis; No shortness of breath, orthopnea, PND, HENRY, snoring  CARDIOVASCULAR: No chest pain or palpitations, no leg edema, no claudication    GASTROINTESTINAL: + epigastric pain, nausea, and diarrhea. No vomiting, hematemesis, constipation. No melena or hematochezia.  GENITOURINARY: No dysuria, frequency, urgency or hematuria, no pelvic pain, urinary incontinence, urgency  Musculoskeletal: no joints or muscle pain, no swelling in joints or muscles  NEUROLOGICAL: No numbness or weakness, headache, memory loss, seizures, dizziness, vertigo, syncope, ataxia  SKIN: No pruritis, rashes, lesions or new moles  Psych: No anxiety, sadness, insomnia, suicide thoughts  Endocrine: No Heat or Cold intolerance, polydipsia, polyphagia  Heme/Lymph: no LN enlargement, no easy bruising or bleeding

## 2019-02-02 NOTE — H&P ADULT - FAMILY HISTORY
No pertinent family history in first degree relatives Father  Still living? Unknown  Family history of anemia, Age at diagnosis: Age Unknown     Sibling  Still living? Unknown  Family history of anemia, Age at diagnosis: Age Unknown     Child  Still living? Unknown  Family history of anemia, Age at diagnosis: Age Unknown

## 2019-02-02 NOTE — H&P ADULT - HISTORY OF PRESENT ILLNESS
Pt is a 39 yo F with a PMHx of chronic gastritis 2/2 possible H pylori infection, chronic anemia, presenting with Pt is a 37 yo F with a PMHx of chronic gastritis 2/2 possible H pylori infection, chronic anemia, presenting with abdominal pain. Pain is epigastric and radiates up her chest. Pt unable to describe the quality of the pain but states it is waxing/waning and intermittent. She has had this pain for the past 10 years. She underwent an endoscopy/colonoscopy in 2017 at [place] and was told she had a bacteria which was treated with antibiotics. The pain initially improved with the antibiotics but it later returned. Pt states she had green diarrhea for the past few days but no melena, hematochezia. Pt took Tylenol and Motrin for the pain without relief. Pt went to [facility] 2 days ago where she received docusate and pantoprazole without relief. Pt endorsing nausea, headache, lightheadedness. Pt denies dysuria, polyuria, f/c, URI sx. In terms of her anemia history, pt states that she has been anemic since she was a child but she does not know the etiology of the anemia. Her father, sister, and children are also anemic. She has only been transfused once in December 2018 and she denies experiencing any reaction at that time.     In ED, VS Temp 98.3, HR 78, /67, RR 15 SpO2 99%. Hgb 7.8, MCV 59.3. Pt ordered for 1U pRBCs but about an hour into transfusion, pt developed chills, found to have fever to 101.4 and transfusion stopped. Pt had no associated rash, cp, sob. UA, RVP negative. CXR w/ clear lungs b/l. Pt received solumedrol and benadryl x1 per transfusion reaction protocol. Pt is a 37 yo F with a PMHx of chronic gastritis 2/2 possible H pylori infection, chronic anemia, presenting with abdominal pain. Pain is epigastric and radiates up her chest. Pt unable to describe the quality of the pain but states it is waxing/waning and intermittent. She has had this pain for the past 10 years. She underwent an endoscopy/colonoscopy in 2017 at [place] and was told she had a bacteria which was treated with antibiotics. The pain initially improved with the antibiotics but it later returned. Pt states she had green diarrhea for the past few days but no melena, hematochezia. Pt took Tylenol and Motrin for the pain without relief. Pt went to [facility] 2 days ago where she received docusate and pantoprazole without relief. Pt states that she has had dark stools over the past few months and has occasional hematochezia. She has been taking lifelong iron supplementation. Pt endorsing nausea, headache, lightheadedness. Pt denies dysuria, polyuria, f/c, URI sx. In terms of her anemia history, pt states that she has been anemic since she was a child but she does not know the etiology of the anemia. Her father, sister, and children also have anemia. She has only been transfused once in December 2018 and she denies experiencing any reaction at that time.     In ED, VS Temp 98.3, HR 78, /67, RR 15 SpO2 99%. Hgb 7.8, MCV 59.3. Pt ordered for 1U pRBCs but about an hour into transfusion, pt developed chills, found to have fever to 101.4 and transfusion stopped. Pt had no associated rash, cp, sob. UA, RVP negative. CXR w/ clear lungs b/l. Pt received solumedrol and benadryl x1 per transfusion reaction protocol. Pt is a 39 yo F with a PMHx of chronic gastritis, microcytic anemia, presenting with acute on chronic abdominal pain x3 days. Pain is epigastric and radiates up her chest. Pt unable to describe the quality of the pain but states it is waxing/waning and intermittent. Pt took Tylenol and Motrin for the pain without relief. Pt went to [facility] 2 days ago where she received docusate and pantoprazole which also did not provide improvement. She also endorses multiple days of green BMs. She states she has epigastric pain for a number of years. She underwent an endoscopy/colonoscopy in 2017 at [place] and was told she had a bacteria which was treated with antibiotics. The pain initially improved with the antibiotics but it later returned. She also endorses numerous months of dark stools and occasional hematochezia. She has been on iron supplementation for years. Pt also currently endorses nausea, headache, lightheadedness. Pt denies dysuria, polyuria, f/c, URI sx. In terms of her anemia history, pt states that she has been anemic since she was a child but she does not know the etiology of the anemia. Her father, sister, and children also have anemia. She has only been transfused once in December 2018 and she denies experiencing any reaction at that time. She endorses menorrhagia. She states her menstrual periods last around a week, and she requires up to 5 medium sized pads for the first four days of her period. She has had an IUD for the past two years.     In ED, VS Temp 98.3, HR 78, /67, RR 15 SpO2 99%. Hgb 7.8, MCV 59.3. Pt ordered for 1U pRBCs but about an hour into transfusion, pt developed chills, found to have fever to 101.4 and transfusion stopped. Pt had no associated rash, cp, sob. UA, RVP negative. CXR w/ clear lungs b/l. Pt received solumedrol and benadryl x1 per transfusion reaction protocol. Pt is a 39 yo F with a PMHx of chronic gastritis, microcytic anemia, presenting with acute on chronic abdominal pain x3 days. Pain is epigastric and radiates up her chest. Pt unable to describe the quality of the pain but states it is waxing/waning and intermittent. Pt took Tylenol and Motrin for the pain without relief. She went to [facility] 2 days ago where she received docusate and pantoprazole which also did not provide improvement. She endorses multiple days of green BMs. She states she has epigastric pain for a number of years, for which she underwent an endoscopy/colonoscopy in 2017 at [place]. She was told she had a bacteria which was treated with antibiotics. The pain initially improved with the antibiotics but it later returned. She also endorses numerous months of dark stools and occasional hematochezia. She has been on iron supplementation for years. Pt currently endorses nausea, headache, lightheadedness. Pt denies dysuria, polyuria, f/c, URI sx, UTI sx. In terms of her anemia history, pt states that she has been anemic since she was a child but she does not know the etiology of the anemia. Her father, sister, and children also have anemia. She has only been transfused once in December 2018 and she denies experiencing any reaction at that time. She endorses menorrhagia. She states her menstrual periods last around a week, and she requires up to 5 medium sized pads for the first four days of her period. She has had an IUD for the past two years.     In ED, VS Temp 98.3, HR 78, /67, RR 15 SpO2 99%. Hgb 7.8, MCV 59.3. Pt ordered for 1U pRBCs but about an hour into transfusion, pt developed chills, found to have fever to 101.4 and transfusion stopped. Pt had no associated rash, cp, sob. UA, RVP negative. CXR w/ clear lungs b/l. Pt received solumedrol and benadryl x1 per transfusion reaction protocol.

## 2019-02-02 NOTE — ED CDU PROVIDER INITIAL DAY NOTE - ATTENDING CONTRIBUTION TO CARE
Called to bedside for possible transfusion reaction to pRBCs. Fever 101 2 hours into receiving blood transfusion and chills. Pt presented to ED for epigastric pain worse with eating for 1-2 months (time course varies by interviewer) a/w watery diarrhea (last diarrhea 24 hrs ago). No cough. No dysuria. No fever prior to blood transfusion. Lungs CTA. No CP or SOB. Abdomen soft NTND. Solumedrol and benadryl + transfusion reaction protocol. Fever w/u with UA and CXR given fever. Abdomen soft with out focal TTP will defer imaging. Pt to be admitted for fever of unknown origin vs transfusion reaction. HD stable. pRBCs were being given for low Hgb (however close to pt's baseline per MR) without active bleeding but due to symptoms of weakness and lightheadedness. AVERY.

## 2019-02-02 NOTE — PATIENT PROFILE ADULT - NSPROPTRIGHTSUPPORTPHONE_GEN_A_NUR
Infant stable temp in [place], <3N-PASS, voided this shift, no stool. Meds given, A&B spell x1,  tolerating 29mls gavage feedings of 26kcal SHMF/Neosure & Liq Protein over 45 mins. Breast fed/attempt x2 6-21 mls, Mom attentive to infant, updated at bedside, continue to monitor.   [phone]

## 2019-02-02 NOTE — CONSULT NOTE ADULT - ASSESSMENT
Impression:    1. Abdominal pain: Primary concern for peptic ulcer disease given patient's prior history likely representing H. pylori infection. Differential includes esophagitis and erosive gastritis.  2. Microcytic anemia: Prior iron studies in 12/2018 notable for elevated iron and normal ferritin, which may indicate underlying thalassemia, especially in light of patient's family history. The patient endorses occasional episodes of hematochezia, which may represent anorectal disease.     Recommendations:  - Plan for upper endoscopy / colonoscopy early next week  - Monitor CBCs daily  - Transfuse for goal Hgb >/= 7.0  - Continue pantoprazole 40 mg PO daily  - Consider hematology consult for evaluation of anemia  - Supportive care per primary team    Pedro Luis Friedman MD  Gastroenterology Fellow  Pager number: [phone] / [phone]

## 2019-02-02 NOTE — H&P ADULT - NSHPSOCIALHISTORY_GEN_ALL_CORE
Pt lives at home with her two children. She works as a home health aid. She immigrated from [place] 15 years ago. She denies any etoh use, illicit drug use, cigarette smoking.

## 2019-02-02 NOTE — ED CDU PROVIDER INITIAL DAY NOTE - CARDIOVASCULAR [+], MLM
Pt not c/o chest pain on CDU evaluation; c/o chronic intermittent abdominal pains, points over abdomen in general.

## 2019-02-03 ENCOUNTER — TRANSCRIPTION ENCOUNTER (OUTPATIENT)
Age: 39
End: 2019-02-03

## 2019-02-03 LAB
ALBUMIN SERPL ELPH-MCNC: 3.3 G/DL — SIGNIFICANT CHANGE UP (ref 3.3–5)
ALP SERPL-CCNC: 60 U/L — SIGNIFICANT CHANGE UP (ref 40–120)
ALT FLD-CCNC: < 4 U/L — LOW (ref 4–33)
ANION GAP SERPL CALC-SCNC: 12 MMO/L — SIGNIFICANT CHANGE UP (ref 7–14)
ANISOCYTOSIS BLD QL: SIGNIFICANT CHANGE UP
AST SERPL-CCNC: 8 U/L — SIGNIFICANT CHANGE UP (ref 4–32)
BACTERIA UR CULT: SIGNIFICANT CHANGE UP
BACTERIA UR CULT: SIGNIFICANT CHANGE UP
BILIRUB SERPL-MCNC: 0.3 MG/DL — SIGNIFICANT CHANGE UP (ref 0.2–1.2)
BUN SERPL-MCNC: 7 MG/DL — SIGNIFICANT CHANGE UP (ref 7–23)
CALCIUM SERPL-MCNC: 8.7 MG/DL — SIGNIFICANT CHANGE UP (ref 8.4–10.5)
CHLORIDE SERPL-SCNC: 104 MMOL/L — SIGNIFICANT CHANGE UP (ref 98–107)
CO2 SERPL-SCNC: 24 MMOL/L — SIGNIFICANT CHANGE UP (ref 22–31)
CREAT SERPL-MCNC: 0.59 MG/DL — SIGNIFICANT CHANGE UP (ref 0.5–1.3)
ELLIPTOCYTES BLD QL SMEAR: SLIGHT — SIGNIFICANT CHANGE UP
FERRITIN SERPL-MCNC: 72.14 NG/ML — SIGNIFICANT CHANGE UP (ref 15–150)
GLUCOSE SERPL-MCNC: 98 MG/DL — SIGNIFICANT CHANGE UP (ref 70–99)
HAPTOGLOB SERPL-MCNC: 297 MG/DL — HIGH (ref 34–200)
HCT VFR BLD CALC: 25.9 % — LOW (ref 34.5–45)
HCT VFR BLD CALC: 26.2 % — LOW (ref 34.5–45)
HGB BLD-MCNC: 7.7 G/DL — LOW (ref 11.5–15.5)
HGB BLD-MCNC: 8 G/DL — LOW (ref 11.5–15.5)
HYPOCHROMIA BLD QL: SIGNIFICANT CHANGE UP
IRON SATN MFR SERPL: 20 UG/DL — LOW (ref 30–160)
IRON SATN MFR SERPL: 255 UG/DL — SIGNIFICANT CHANGE UP (ref 140–530)
MAGNESIUM SERPL-MCNC: 2 MG/DL — SIGNIFICANT CHANGE UP (ref 1.6–2.6)
MANUAL SMEAR VERIFICATION: YES — SIGNIFICANT CHANGE UP
MCHC RBC-ENTMCNC: 18.1 PG — LOW (ref 27–34)
MCHC RBC-ENTMCNC: 18.5 PG — LOW (ref 27–34)
MCHC RBC-ENTMCNC: 29.7 % — LOW (ref 32–36)
MCHC RBC-ENTMCNC: 30.5 % — LOW (ref 32–36)
MCV RBC AUTO: 60.5 FL — LOW (ref 80–100)
MCV RBC AUTO: 60.8 FL — LOW (ref 80–100)
MICROCYTES BLD QL: SIGNIFICANT CHANGE UP
NRBC # FLD: 0 K/UL — LOW (ref 25–125)
NRBC # FLD: 0 K/UL — LOW (ref 25–125)
PHOSPHATE SERPL-MCNC: 2.8 MG/DL — SIGNIFICANT CHANGE UP (ref 2.5–4.5)
PLATELET # BLD AUTO: 515 K/UL — HIGH (ref 150–400)
PLATELET # BLD AUTO: 516 K/UL — HIGH (ref 150–400)
PLATELET COUNT - ESTIMATE: SIGNIFICANT CHANGE UP
PMV BLD: 9.2 FL — SIGNIFICANT CHANGE UP (ref 7–13)
PMV BLD: 9.3 FL — SIGNIFICANT CHANGE UP (ref 7–13)
POIKILOCYTOSIS BLD QL AUTO: SIGNIFICANT CHANGE UP
POLYCHROMASIA BLD QL SMEAR: SLIGHT — SIGNIFICANT CHANGE UP
POTASSIUM SERPL-MCNC: 3.6 MMOL/L — SIGNIFICANT CHANGE UP (ref 3.5–5.3)
POTASSIUM SERPL-SCNC: 3.6 MMOL/L — SIGNIFICANT CHANGE UP (ref 3.5–5.3)
PROT SERPL-MCNC: 6.1 G/DL — SIGNIFICANT CHANGE UP (ref 6–8.3)
RBC # BLD: 4.26 M/UL — SIGNIFICANT CHANGE UP (ref 3.8–5.2)
RBC # BLD: 4.33 M/UL — SIGNIFICANT CHANGE UP (ref 3.8–5.2)
RBC # FLD: 25 % — HIGH (ref 10.3–14.5)
RBC # FLD: 25 % — HIGH (ref 10.3–14.5)
RETICS #: 38 K/UL — SIGNIFICANT CHANGE UP (ref 25–125)
RETICS/RBC NFR: 0.9 % — SIGNIFICANT CHANGE UP (ref 0.5–2.5)
SCHISTOCYTES BLD QL AUTO: SLIGHT — SIGNIFICANT CHANGE UP
SODIUM SERPL-SCNC: 140 MMOL/L — SIGNIFICANT CHANGE UP (ref 135–145)
SPECIMEN SOURCE: SIGNIFICANT CHANGE UP
UIBC SERPL-MCNC: 234.7 UG/DL — SIGNIFICANT CHANGE UP (ref 110–370)
WBC # BLD: 9.62 K/UL — SIGNIFICANT CHANGE UP (ref 3.8–10.5)
WBC # BLD: 9.67 K/UL — SIGNIFICANT CHANGE UP (ref 3.8–10.5)
WBC # FLD AUTO: 9.62 K/UL — SIGNIFICANT CHANGE UP (ref 3.8–10.5)
WBC # FLD AUTO: 9.67 K/UL — SIGNIFICANT CHANGE UP (ref 3.8–10.5)

## 2019-02-03 PROCEDURE — 99222 1ST HOSP IP/OBS MODERATE 55: CPT | Mod: GC

## 2019-02-03 PROCEDURE — 99233 SBSQ HOSP IP/OBS HIGH 50: CPT

## 2019-02-03 PROCEDURE — 83020 HEMOGLOBIN ELECTROPHORESIS: CPT | Mod: 26

## 2019-02-03 RX ORDER — FERROUS SULFATE 325(65) MG
325 TABLET ORAL DAILY
Qty: 0 | Refills: 0 | Status: DISCONTINUED | OUTPATIENT
Start: 2019-02-03 | End: 2019-02-05

## 2019-02-03 RX ORDER — ACETAMINOPHEN 500 MG
1000 TABLET ORAL ONCE
Qty: 0 | Refills: 0 | Status: COMPLETED | OUTPATIENT
Start: 2019-02-03 | End: 2019-02-03

## 2019-02-03 RX ORDER — PANTOPRAZOLE SODIUM 20 MG/1
40 TABLET, DELAYED RELEASE ORAL
Qty: 0 | Refills: 0 | Status: DISCONTINUED | OUTPATIENT
Start: 2019-02-03 | End: 2019-02-05

## 2019-02-03 RX ADMIN — Medication 400 MILLIGRAM(S): at 23:28

## 2019-02-03 RX ADMIN — Medication 1000 MILLIGRAM(S): at 05:17

## 2019-02-03 RX ADMIN — PANTOPRAZOLE SODIUM 40 MILLIGRAM(S): 20 TABLET, DELAYED RELEASE ORAL at 17:00

## 2019-02-03 RX ADMIN — Medication 400 MILLIGRAM(S): at 05:02

## 2019-02-03 RX ADMIN — PANTOPRAZOLE SODIUM 40 MILLIGRAM(S): 20 TABLET, DELAYED RELEASE ORAL at 06:50

## 2019-02-03 RX ADMIN — Medication 325 MILLIGRAM(S): at 17:00

## 2019-02-03 RX ADMIN — Medication 1000 MILLIGRAM(S): at 23:43

## 2019-02-03 NOTE — DISCHARGE NOTE ADULT - PROVIDER TOKENS
FREE:[LAST:[[facility]],PHONE:[phone]],FAX:[(   )    -],ADDRESS:[Please wait for them to contact you about an appointment.]] FREE:[LAST:[Please call your insurance company for a PCP.],PHONE:[(   )    -],FAX:[(   )    -]]

## 2019-02-03 NOTE — DISCHARGE NOTE ADULT - MEDICATION SUMMARY - MEDICATIONS TO TAKE
I will START or STAY ON the medications listed below when I get home from the hospital:    aluminum hydroxide-magnesium hydroxide 200 mg-200 mg/5 mL oral suspension  -- 30 milliliter(s) by mouth every 4 hours, As needed, for heart burn  -- Indication: For Heartburn    FeroSul 325 mg (65 mg elemental iron) oral tablet  -- 1 tab(s) by mouth once a day with food and juice.   -- Indication: For Anemia    Colace 50 mg oral capsule  -- 1 cap(s) by mouth 2 times a day  -- Indication: For Constipation    Protonix 40 mg oral delayed release tablet  -- 1 tab(s) by mouth once a day  -- Indication: For GERD I will START or STAY ON the medications listed below when I get home from the hospital:    aluminum hydroxide-magnesium hydroxide 200 mg-200 mg/5 mL oral suspension  -- 30 milliliter(s) by mouth every 4 hours, As needed, for heart burn  -- Indication: For Abdominal pain    FeroSul 325 mg (65 mg elemental iron) oral tablet  -- 1 tab(s) by mouth once a day with food and juice.   -- Indication: For Anemia    Colace 50 mg oral capsule  -- 1 cap(s) by mouth 2 times a day  -- Indication: For Need for prophylactic measure    Protonix 40 mg oral delayed release tablet  -- 1 tab(s) by mouth once a day  -- Indication: For Abdominal pain

## 2019-02-03 NOTE — DISCHARGE NOTE ADULT - CONDITIONS AT DISCHARGE
patient is alert and orientedx4, self sufficient, able to make needs known. Patient is stable at the time of discharge. Patient came  in with abdominal pain and fevers, pt afebrile, denies abdominal pain. Patient is s/p colonoscopy. Patient is alert and oriented x4, self sufficient, able to make needs known. Patient is stable at the time of discharge. Patient came  in with abdominal pain and fevers. Pt currently afebrile, pt complained of abdominal discomfort post solid food.  Given Maalox for discomfort with relief verbalized. Patient is s/p colonoscopy/endoscopy, medical team explained results to patient. Verbal & written discharge instructions provided to pt. IV to be removed prior to discharge.

## 2019-02-03 NOTE — DISCHARGE NOTE ADULT - ADDITIONAL INSTRUCTIONS
For a primary care physician, please call your INSURANCE COMPANY to see which physician is covered. Unfortunately [facility] does not accept your insurance at this time.

## 2019-02-03 NOTE — PROGRESS NOTE ADULT - SUBJECTIVE AND OBJECTIVE BOX
Patient is a 38y old  Female who presents with a chief complaint of Transfusion reaction (02 Feb 2019 13:48)      Overnight Events:      REVIEW OF SYSTEMS:  CONSTITUTIONAL: No weakness, fevers or chills  EYES/ENT: No visual changes, no throat pain   RESPIRATORY: No cough, wheezing, hemoptysis; No shortness of breath  CARDIOVASCULAR: No chest pain or palpitations  GASTROINTESTINAL: No abdominal, nausea, vomiting, or hematemesis; No diarrhea or constipation. No melena or hematochezia.  GENITOURINARY: No dysuria, frequency or hematuria  NEUROLOGICAL: No dizziness, numbness, or weakness  SKIN: No itching, burning, rashes, or lesions   All other review of systems is negative unless indicated above.    VITAL SIGNS:  Vital Signs Last 24 Hrs  T(C): 36.4 (03 Feb 2019 06:48), Max: 36.8 (02 Feb 2019 21:27)  T(F): 97.5 (03 Feb 2019 06:48), Max: 98.2 (02 Feb 2019 21:27)  HR: 63 (03 Feb 2019 06:48) (63 - 83)  BP: 98/62 (03 Feb 2019 06:48) (98/62 - 132/84)  BP(mean): --  RR: 17 (03 Feb 2019 06:48) (17 - 18)  SpO2: 100% (03 Feb 2019 06:48) (99% - 100%)    PHYSICAL EXAM:   GENERAL: no acute distress  HEENT: NC/AT, EOMI, neck supple, MMM  RESPIRATORY: LCTAB/L, no rhonchi, rales, or wheezing  CARDIOVASCULAR: RRR, no murmurs, gallops, rubs  ABDOMINAL: soft, non-tender, non-distended, positive bowel sounds   EXTREMITIES: no clubbing, cyanosis, or edema  NEUROLOGICAL: alert and oriented x 3, non-focal  SKIN: no rashes or lesions   MUSCULOSKELETAL: no gross joint deformity                          7.7    9.67  )-----------( 515      ( 03 Feb 2019 05:20 )             25.9     02-03    140  |  104  |  7   ----------------------------<  98  3.6   |  24  |  0.59    Ca    8.7      03 Feb 2019 05:20  Phos  2.8     02-03  Mg     2.0     02-03    TPro  6.1  /  Alb  3.3  /  TBili  0.3  /  DBili  x   /  AST  8   /  ALT  < 4<L>  /  AlkPhos  60  02-03    PT/INR - ( 02 Feb 2019 05:00 )   PT: 13.3 SEC;   INR: 1.16          PTT - ( 02 Feb 2019 05:00 )  PTT:28.7 SEC    CAPILLARY BLOOD GLUCOSE        I&O's Summary      MEDICATIONS  (STANDING):  pantoprazole  Injectable 40 milliGRAM(s) IV Push two times a day Patient is a 38y old  Female who presents with a chief complaint of Transfusion reaction (02 Feb 2019 13:48)      Overnight Events:  Pt feels well this am. She has not had any further dark/tarry stools.     REVIEW OF SYSTEMS:  CONSTITUTIONAL: No weakness, fevers or chills  EYES/ENT: No visual changes, no throat pain   RESPIRATORY: No cough, wheezing, hemoptysis; No shortness of breath  CARDIOVASCULAR: No chest pain or palpitations  GASTROINTESTINAL: No abdominal, nausea, vomiting, or hematemesis; No diarrhea or constipation. No melena or hematochezia.  GENITOURINARY: No dysuria, frequency or hematuria  NEUROLOGICAL: No dizziness, numbness, or weakness  SKIN: No itching, burning, rashes, or lesions   All other review of systems is negative unless indicated above.    VITAL SIGNS:  Vital Signs Last 24 Hrs  T(C): 36.4 (03 Feb 2019 06:48), Max: 36.8 (02 Feb 2019 21:27)  T(F): 97.5 (03 Feb 2019 06:48), Max: 98.2 (02 Feb 2019 21:27)  HR: 63 (03 Feb 2019 06:48) (63 - 83)  BP: 98/62 (03 Feb 2019 06:48) (98/62 - 132/84)  BP(mean): --  RR: 17 (03 Feb 2019 06:48) (17 - 18)  SpO2: 100% (03 Feb 2019 06:48) (99% - 100%)    PHYSICAL EXAM:   GENERAL: no acute distress  HEENT: NC/AT, EOMI, neck supple, MMM  RESPIRATORY: LCTAB/L, no rhonchi, rales, or wheezing  CARDIOVASCULAR: RRR, no murmurs, gallops, rubs  ABDOMINAL: soft, non-tender, non-distended, positive bowel sounds   EXTREMITIES: no clubbing, cyanosis, or edema  NEUROLOGICAL: alert and oriented x 3, non-focal  SKIN: no rashes or lesions   MUSCULOSKELETAL: no gross joint deformity                          7.7    9.67  )-----------( 515      ( 03 Feb 2019 05:20 )             25.9     02-03    140  |  104  |  7   ----------------------------<  98  3.6   |  24  |  0.59    Ca    8.7      03 Feb 2019 05:20  Phos  2.8     02-03  Mg     2.0     02-03    TPro  6.1  /  Alb  3.3  /  TBili  0.3  /  DBili  x   /  AST  8   /  ALT  < 4<L>  /  AlkPhos  60  02-03    PT/INR - ( 02 Feb 2019 05:00 )   PT: 13.3 SEC;   INR: 1.16          PTT - ( 02 Feb 2019 05:00 )  PTT:28.7 SEC    CAPILLARY BLOOD GLUCOSE        I&O's Summary      MEDICATIONS  (STANDING):  pantoprazole  Injectable 40 milliGRAM(s) IV Push two times a day

## 2019-02-03 NOTE — DISCHARGE NOTE ADULT - PATIENT PORTAL LINK FT
You can access the [facility], offered by [facility], by registering with the following website: [email or web address]

## 2019-02-03 NOTE — DISCHARGE NOTE ADULT - HOSPITAL COURSE
Pt is a 37 yo F with a PMHx of chronic gastritis, microcytic anemia, presenting with acute on chronic abdominal pain x3 days. In ED, VS Temp 98.3, HR 78, /67, RR 15 SpO2 99%. Hgb 7.8, MCV 59.3. Pt ordered for 1U pRBCs but about an hour into transfusion, pt developed chills, found to have fever to 101.4 and transfusion stopped. Pt had no associated rash, cp, sob. UA, RVP negative. CXR w/ clear lungs b/l. Pt received solumedrol and benadryl x1 per transfusion reaction protocol. Pt admitted to medicine floors for further management. She was started on a PPI. 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 Pt admitted w/ HgB < 7 and stabilized w/ 1u pRBC. Transfusion c/b febrile non-hemolytic reaction that resolved w/ termination of transfusion and tylenol w/ no further complications. 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 Per Heme, did not have enough HgB S to qualify for SC disease. Course c/b diffuse abdominal pain for which pt underwent EGD + colonoscopy w/ no acute findings, pending final biopsy results. Infectious w/up negative. Prior to d/c, pt tolerated normal diet w/out issue. Pt instructed to f/up w/ new PCP and Hematology.

## 2019-02-03 NOTE — PROGRESS NOTE ADULT - PROBLEM SELECTOR PLAN 2
Pt presents w/ acute on chronic epigastric abdominal pain. According to hx of "bacterial infection" found on EGD/colonoscopy from 2017, high suspicion for H pylori infection causing PUD vs gastritis.   - will try to obtain records of prior EGD/colonoscopy from [place]  - GI consulted  - on IV protonix BID

## 2019-02-03 NOTE — DISCHARGE NOTE ADULT - CARE PROVIDER_API CALL
[facility],   Please wait for them to contact you about an appointment.  Phone: [phone]  Fax: (   )    -  Follow Up Time: Please call your insurance company for a PCP.,   Phone: (   )    -  Fax: (   )    -  Follow Up Time:

## 2019-02-03 NOTE — PROGRESS NOTE ADULT - PROBLEM SELECTOR PLAN 1
Pt w/ hx of chronic microcytic anemia 2/2 iron deficiency due to GI bleed vs underlying hemoglobinopathy. Iron panel from 12/11/18 notable for low total iron. Baseline Hgb unknown.   -Pelvic US wnl   - fecal occult negative. However, will c/s GI given hx concerning for PUD vs gastritis  - will try to obtain records of previous GI w/u from [facility] on Monday  - pt states she follows closely at the [facility], will try to obtain any hemoglobinopathy w/u  - will order Hgb electrophoresis  - c/w iron supplementation]  - transfuse for goal Hgb>7  - daily CBCs

## 2019-02-03 NOTE — DISCHARGE NOTE ADULT - PLAN OF CARE
Please call [phone] to set up an appointment with a Hematologist (blood specialist) within 4 WEEKS. Hemoglobin C trait is when parts of your red blood cells are made differently than other people and increases your risk for low red blood cell counts (anemia) and fatigue. Take iron pills and eat a balanced diet to increase  your body's ability to produce more red blood cells.     Our team will call you to set up an appointment with a [facility] Primary Care Provider. You will need to call the number above to get an appointment with a blood specialist.    When you take your IRON (FERROUS SULFATE), take it with food and a glass of apple or orange juice. Hemoglobin C trait is when parts of your red blood cells are made differently than other people and increases your risk for low red blood cell counts (anemia) and fatigue. Take iron pills and eat a balanced diet to increase  your body's ability to produce more red blood cells. You will need to call the number above to get an appointment with a blood specialist.    When you take your IRON (FERROUS SULFATE), take it with food and a glass of apple or orange juice.

## 2019-02-03 NOTE — DISCHARGE NOTE ADULT - MEDICATION SUMMARY - MEDICATIONS TO STOP TAKING
I will STOP taking the medications listed below when I get home from the hospital:    iron polysaccharide (as elemental iron) 50 mg oral capsule  -- cap(s) by mouth once a day I will STOP taking the medications listed below when I get home from the hospital:  None

## 2019-02-03 NOTE — DISCHARGE NOTE ADULT - CARE PLAN
Principal Discharge DX:	Hemoglobin C trait  Goal:	Please call [phone] to set up an appointment with a Hematologist (blood specialist) within 4 WEEKS.  Assessment and plan of treatment:	Hemoglobin C trait is when parts of your red blood cells are made differently than other people and increases your risk for low red blood cell counts (anemia) and fatigue. Take iron pills and eat a balanced diet to increase  your body's ability to produce more red blood cells.     Our team will call you to set up an appointment with a [facility] Primary Care Provider. You will need to call the number above to get an appointment with a blood specialist.    When you take your IRON (FERROUS SULFATE), take it with food and a glass of apple or orange juice. Principal Discharge DX:	Hemoglobin C trait  Goal:	Please call [phone] to set up an appointment with a Hematologist (blood specialist) within 4 WEEKS.  Assessment and plan of treatment:	Hemoglobin C trait is when parts of your red blood cells are made differently than other people and increases your risk for low red blood cell counts (anemia) and fatigue. Take iron pills and eat a balanced diet to increase  your body's ability to produce more red blood cells. You will need to call the number above to get an appointment with a blood specialist.    When you take your IRON (FERROUS SULFATE), take it with food and a glass of apple or orange juice.

## 2019-02-04 LAB
ANION GAP SERPL CALC-SCNC: 10 MMO/L — SIGNIFICANT CHANGE UP (ref 7–14)
BUN SERPL-MCNC: 7 MG/DL — SIGNIFICANT CHANGE UP (ref 7–23)
CALCIUM SERPL-MCNC: 8.5 MG/DL — SIGNIFICANT CHANGE UP (ref 8.4–10.5)
CHLORIDE SERPL-SCNC: 103 MMOL/L — SIGNIFICANT CHANGE UP (ref 98–107)
CO2 SERPL-SCNC: 26 MMOL/L — SIGNIFICANT CHANGE UP (ref 22–31)
CREAT SERPL-MCNC: 0.67 MG/DL — SIGNIFICANT CHANGE UP (ref 0.5–1.3)
GLUCOSE SERPL-MCNC: 86 MG/DL — SIGNIFICANT CHANGE UP (ref 70–99)
HCT VFR BLD CALC: 26.5 % — LOW (ref 34.5–45)
HGB A MFR BLD: 68.2 % — LOW
HGB A2 MFR BLD: 2.8 % — SIGNIFICANT CHANGE UP (ref 2.4–3.5)
HGB BLD-MCNC: 7.7 G/DL — LOW (ref 11.5–15.5)
HGB C MFR BLD: 27 % — HIGH (ref 0–0)
HGB ELECT COMMENT: SIGNIFICANT CHANGE UP
HGB F MFR BLD: < 1 % — SIGNIFICANT CHANGE UP (ref 0–1.5)
HGB S BLD QL: NEGATIVE — SIGNIFICANT CHANGE UP
HGB S MFR BLD: 1.7 % — HIGH (ref 0–0)
MAGNESIUM SERPL-MCNC: 2.1 MG/DL — SIGNIFICANT CHANGE UP (ref 1.6–2.6)
MCHC RBC-ENTMCNC: 17.8 PG — LOW (ref 27–34)
MCHC RBC-ENTMCNC: 29.1 % — LOW (ref 32–36)
MCV RBC AUTO: 61.2 FL — LOW (ref 80–100)
NRBC # FLD: 0 K/UL — LOW (ref 25–125)
PHOSPHATE SERPL-MCNC: 2.8 MG/DL — SIGNIFICANT CHANGE UP (ref 2.5–4.5)
PLATELET # BLD AUTO: 546 K/UL — HIGH (ref 150–400)
PMV BLD: 9.6 FL — SIGNIFICANT CHANGE UP (ref 7–13)
POTASSIUM SERPL-MCNC: 3.4 MMOL/L — LOW (ref 3.5–5.3)
POTASSIUM SERPL-SCNC: 3.4 MMOL/L — LOW (ref 3.5–5.3)
RBC # BLD: 4.33 M/UL — SIGNIFICANT CHANGE UP (ref 3.8–5.2)
RBC # FLD: 25.2 % — HIGH (ref 10.3–14.5)
SODIUM SERPL-SCNC: 139 MMOL/L — SIGNIFICANT CHANGE UP (ref 135–145)
SOLUBILITY: NEGATIVE — SIGNIFICANT CHANGE UP
TRANSFERRIN SERPL-MCNC: 195 MG/DL — LOW (ref 200–360)
WBC # BLD: 8.54 K/UL — SIGNIFICANT CHANGE UP (ref 3.8–10.5)
WBC # FLD AUTO: 8.54 K/UL — SIGNIFICANT CHANGE UP (ref 3.8–10.5)

## 2019-02-04 PROCEDURE — 99232 SBSQ HOSP IP/OBS MODERATE 35: CPT | Mod: GC

## 2019-02-04 PROCEDURE — 99233 SBSQ HOSP IP/OBS HIGH 50: CPT | Mod: GC

## 2019-02-04 RX ORDER — SODIUM CHLORIDE 9 MG/ML
500 INJECTION, SOLUTION INTRAVENOUS
Qty: 0 | Refills: 0 | Status: DISCONTINUED | OUTPATIENT
Start: 2019-02-04 | End: 2019-02-04

## 2019-02-04 RX ORDER — AER TRAVELER 0.5 G/1
1 SOLUTION RECTAL; TOPICAL EVERY 12 HOURS
Qty: 0 | Refills: 0 | Status: DISCONTINUED | OUTPATIENT
Start: 2019-02-04 | End: 2019-02-05

## 2019-02-04 RX ORDER — SOD SULF/SODIUM/NAHCO3/KCL/PEG
1000 SOLUTION, RECONSTITUTED, ORAL ORAL
Qty: 0 | Refills: 0 | Status: DISCONTINUED | OUTPATIENT
Start: 2019-02-04 | End: 2019-02-04

## 2019-02-04 RX ORDER — SOD SULF/SODIUM/NAHCO3/KCL/PEG
4000 SOLUTION, RECONSTITUTED, ORAL ORAL ONCE
Qty: 0 | Refills: 0 | Status: DISCONTINUED | OUTPATIENT
Start: 2019-02-04 | End: 2019-02-04

## 2019-02-04 RX ORDER — LIDOCAINE 4 G/100G
1 CREAM TOPICAL EVERY 12 HOURS
Qty: 0 | Refills: 0 | Status: DISCONTINUED | OUTPATIENT
Start: 2019-02-04 | End: 2019-02-05

## 2019-02-04 RX ORDER — POTASSIUM CHLORIDE 20 MEQ
20 PACKET (EA) ORAL ONCE
Qty: 0 | Refills: 0 | Status: COMPLETED | OUTPATIENT
Start: 2019-02-04 | End: 2019-02-04

## 2019-02-04 RX ORDER — NITROGLYCERIN 6.5 MG
1 CAPSULE, EXTENDED RELEASE ORAL EVERY 12 HOURS
Qty: 0 | Refills: 0 | Status: DISCONTINUED | OUTPATIENT
Start: 2019-02-04 | End: 2019-02-04

## 2019-02-04 RX ORDER — SOD SULF/SODIUM/NAHCO3/KCL/PEG
1000 SOLUTION, RECONSTITUTED, ORAL ORAL
Qty: 0 | Refills: 0 | Status: COMPLETED | OUTPATIENT
Start: 2019-02-04 | End: 2019-02-05

## 2019-02-04 RX ORDER — SODIUM CHLORIDE 9 MG/ML
1000 INJECTION, SOLUTION INTRAVENOUS
Qty: 0 | Refills: 0 | Status: DISCONTINUED | OUTPATIENT
Start: 2019-02-04 | End: 2019-02-05

## 2019-02-04 RX ORDER — PHENYLEPHRINE-SHARK LIVER OIL-MINERAL OIL-PETROLATUM RECTAL OINTMENT
1 OINTMENT (GRAM) RECTAL DAILY
Qty: 0 | Refills: 0 | Status: DISCONTINUED | OUTPATIENT
Start: 2019-02-04 | End: 2019-02-04

## 2019-02-04 RX ADMIN — SODIUM CHLORIDE 75 MILLILITER(S): 9 INJECTION, SOLUTION INTRAVENOUS at 16:08

## 2019-02-04 RX ADMIN — Medication 325 MILLIGRAM(S): at 11:52

## 2019-02-04 RX ADMIN — Medication 1000 MILLILITER(S): at 22:05

## 2019-02-04 RX ADMIN — Medication 20 MILLIEQUIVALENT(S): at 08:31

## 2019-02-04 RX ADMIN — PANTOPRAZOLE SODIUM 40 MILLIGRAM(S): 20 TABLET, DELAYED RELEASE ORAL at 19:23

## 2019-02-04 RX ADMIN — SODIUM CHLORIDE 75 MILLILITER(S): 9 INJECTION, SOLUTION INTRAVENOUS at 21:39

## 2019-02-04 RX ADMIN — PANTOPRAZOLE SODIUM 40 MILLIGRAM(S): 20 TABLET, DELAYED RELEASE ORAL at 05:02

## 2019-02-04 NOTE — PROGRESS NOTE ADULT - SUBJECTIVE AND OBJECTIVE BOX
Nasreen Beatty MD PGY1  [phone]    Patient is a 38y old  Female who presents with a chief complaint of transfusion reaction (03 Feb 2019 18:21)    SUBJECTIVE/OVERNIGHT EVENTS     Vital Signs Last 24 Hrs  T(C): 36.9 (04 Feb 2019 06:00), Max: 37.1 (03 Feb 2019 21:47)  T(F): 98.4 (04 Feb 2019 06:00), Max: 98.7 (03 Feb 2019 21:47)  HR: 62 (04 Feb 2019 06:00) (59 - 71)  BP: 100/52 (04 Feb 2019 06:00) (100/52 - 119/67)  BP(mean): --  RR: 18 (04 Feb 2019 06:00) (16 - 18)  SpO2: 100% (04 Feb 2019 06:00) (98% - 100%)    PHYSICAL EXAM:  GENERAL: NAD, well-developed  HEAD:  Atraumatic, Normocephalic  EYES: EOMI, PERRLA, conjunctiva and sclera clear  NECK: Supple, No JVD  CHEST/LUNG: Clear to auscultation bilaterally; No wheeze  HEART: Regular rate and rhythm; No murmurs, rubs, or gallops  ABDOMEN: Soft, Nontender, Nondistended; Bowel sounds present  EXTREMITIES:  2+ Peripheral Pulses, No clubbing, cyanosis, or edema  PSYCH: AAOx3  NEUROLOGY: non-focal  SKIN: No rashes or lesions Nasreen Beatty MD PGY1  [phone]    Patient is a 38y old  Female who presents with a chief complaint of transfusion reaction (03 Feb 2019 18:21)    SUBJECTIVE/OVERNIGHT EVENTS   No acute events O/N.    This AM continues to complain of diffuse abdominal pain, worse w/ any food or liquid. Complains of painful BMs w/ small amounts of blood on toilet paper, but no melena or blood in toilet bowel or on stool. Also complaining of malaise and headache, believes it is due to hypotension. Orthostatic negative.     Vital Signs Last 24 Hrs  T(C): 36.9 (04 Feb 2019 06:00), Max: 37.1 (03 Feb 2019 21:47)  T(F): 98.4 (04 Feb 2019 06:00), Max: 98.7 (03 Feb 2019 21:47)  HR: 62 (04 Feb 2019 06:00) (59 - 71)  BP: 100/52 (04 Feb 2019 06:00) (100/52 - 119/67)  BP(mean): --  RR: 18 (04 Feb 2019 06:00) (16 - 18)  SpO2: 100% (04 Feb 2019 06:00) (98% - 100%)    PHYSICAL EXAM:  GENERAL: NAD, appears fatigued  HEAD:  Atraumatic, Normocephalic  EYES: EOMI, conjunctiva and sclera clear  NECK: Supple,   CHEST/LUNG: Clear to auscultation bilaterally; No wheeze  HEART: Regular rate and rhythm; No murmurs, rubs, or gallops  ABDOMEN: Soft, Nondistended; Bowel sounds present                   Diffuse non-specific tenderness of RUQ, LUQ, and RLQ.  EXTREMITIES:  2+ Peripheral Pulses, trace pitting edema to knees.  PSYCH: AAOx3  NEUROLOGY: non-focal  SKIN: No rashes or lesions        MEDICATIONS  (STANDING):  ferrous    sulfate 325 milliGRAM(s) Oral daily  nitroglycerin  0.2% Ointment Compound 1 Application(s) Rectal every 12 hours  pantoprazole  Injectable 40 milliGRAM(s) IV Push two times a day  polyethylene glycol/electrolyte Solution. 4000 milliLiter(s) Oral once    MEDICATIONS  (PRN):  aluminum hydroxide/magnesium hydroxide/simethicone Suspension 30 milliLiter(s) Oral every 4 hours PRN Dyspepsia  hemorrhoidal Ointment 1 Application(s) Rectal daily PRN hemorrhoids    CAPILLARY BLOOD GLUCOSE        I&O's Summary      LABS                        7.7    8.54  )-----------( 546      ( 04 Feb 2019 05:40 )             26.5                         8.0    9.62  )-----------( 516      ( 03 Feb 2019 19:04 )             26.2     02-04    139  |  103  |  7   ----------------------------<  86  3.4<L>   |  26  |  0.67  02-03    140  |  104  |  7   ----------------------------<  98  3.6   |  24  |  0.59    Ca    8.5      04 Feb 2019 05:40  Ca    8.7      03 Feb 2019 05:20  Phos  2.8     02-04  Mg     2.1     02-04    TPro  6.1  /  Alb  3.3  /  TBili  0.3  /  DBili  x   /  AST  8   /  ALT  < 4<L>  /  AlkPhos  60  02-03

## 2019-02-04 NOTE — PROGRESS NOTE ADULT - PROBLEM SELECTOR PLAN 2
-pain stable, not improving  -per pt, hx "bacterial infection" found on EGD/colonoscopy from 2017  -pending endoscopy w/ GI tomorrow

## 2019-02-04 NOTE — PROGRESS NOTE ADULT - SUBJECTIVE AND OBJECTIVE BOX
Chief Complaint: Abdominal pain    Interval Events:   - The patient endorses improvement in her abdominal pain. She denies nausea and vomiting. The patient denies bloody emesis, coffee ground emesis, bloody stools, and black tarry stools.    Allergies:  No Known Allergies    Hospital Medications:  aluminum hydroxide/magnesium hydroxide/simethicone Suspension 30 milliLiter(s) Oral every 4 hours PRN  ferrous    sulfate 325 milliGRAM(s) Oral daily  pantoprazole  Injectable 40 milliGRAM(s) IV Push two times a day  potassium chloride    Tablet ER 20 milliEquivalent(s) Oral once    PMHX/PSHX:  Gastritis  Anemia  H/O:     ROS:     General:  No wt loss, fevers, chills, night sweats, fatigue,   Eyes:  Good vision, no reported pain  ENT:  No sore throat, pain, runny nose, dysphagia  CV:  No pain, palpitations, hypo/hypertension  Pulm:  No dyspnea, cough, tachypnea, wheezing  GI:  + pain, No nausea, No vomiting, No diarrhea, No constipation, No weight loss, No fever, No pruritis, No rectal bleeding, No tarry stools, No dysphagia  :  No pain, bleeding, incontinence, nocturia  Skin:  No rash, tattoos, scars, edema    PHYSICAL EXAM:   Vital Signs:  Vital Signs Last 24 Hrs  T(C): 36.9 (2019 06:00), Max: 37.1 (2019 21:47)  T(F): 98.4 (2019 06:00), Max: 98.7 (2019 21:47)  HR: 62 (2019 06:00) (59 - 71)  BP: 100/52 (2019 06:00) (100/52 - 119/67)  BP(mean): --  RR: 18 (2019 06:00) (16 - 18)  SpO2: 100% (2019 06:00) (98% - 100%)    GENERAL:  No acute distress, obese  HEENT:  Normocephalic/atraumatic, no scleral icterus  CHEST:  Clear to auscultation bilaterally, no wheezes/rales/ronchi, no accessory muscle use  HEART:  Regular rate and rhythm, no murmurs/rubs/gallops  ABDOMEN:  Soft, non-tender, non-distended, normoactive bowel sounds  RECTAL: Patient declined  EXTREMITIES: No cyanosis, clubbing, or edema  SKIN:  No rash/erythema  NEURO:  Alert and oriented x 3    LABS:                        7.7    8.54  )-----------( 546      ( 2019 05:40 )             26.5     Mean Cell Volume: 61.2 fL (- @ 05:40)    -    139  |  103  |  7   ----------------------------<  86  3.4<L>   |  26  |  0.67    Ca    8.5      2019 05:40  Phos  2.8       Mg     2.1     -    TPro  6.1  /  Alb  3.3  /  TBili  0.3  /  DBili  x   /  AST  8   /  ALT  < 4<L>  /  AlkPhos  60  02-03    LIVER FUNCTIONS - ( 2019 05:20 )  Alb: 3.3 g/dL / Pro: 6.1 g/dL / ALK PHOS: 60 u/L / ALT: < 4 u/L / AST: 8 u/L / GGT: x                      7.7    8.54  )-----------( 546      ( 2019 05:40 )             26.5                         8.0    9.62  )-----------( 516      ( 2019 19:04 )             26.2                         7.7    9.67  )-----------( 515      ( 2019 05:20 )             25.9                         8.8    13.36 )-----------( 462      ( 2019 05:00 )             29.4                         7.8    5.46  )-----------( 539      ( 2019 21:14 )             26.5     Imaging:    No new imaging

## 2019-02-05 ENCOUNTER — RESULT REVIEW (OUTPATIENT)
Age: 39
End: 2019-02-05

## 2019-02-05 VITALS
TEMPERATURE: 98 F | SYSTOLIC BLOOD PRESSURE: 102 MMHG | DIASTOLIC BLOOD PRESSURE: 57 MMHG | OXYGEN SATURATION: 100 % | RESPIRATION RATE: 20 BRPM | HEART RATE: 58 BPM

## 2019-02-05 LAB
ANION GAP SERPL CALC-SCNC: 11 MMO/L — SIGNIFICANT CHANGE UP (ref 7–14)
BLD GP AB SCN SERPL QL: NEGATIVE — SIGNIFICANT CHANGE UP
BUN SERPL-MCNC: 5 MG/DL — LOW (ref 7–23)
CALCIUM SERPL-MCNC: 9.3 MG/DL — SIGNIFICANT CHANGE UP (ref 8.4–10.5)
CHLORIDE SERPL-SCNC: 105 MMOL/L — SIGNIFICANT CHANGE UP (ref 98–107)
CO2 SERPL-SCNC: 25 MMOL/L — SIGNIFICANT CHANGE UP (ref 22–31)
CREAT SERPL-MCNC: 0.72 MG/DL — SIGNIFICANT CHANGE UP (ref 0.5–1.3)
GLUCOSE SERPL-MCNC: 94 MG/DL — SIGNIFICANT CHANGE UP (ref 70–99)
HCT VFR BLD CALC: 29.4 % — LOW (ref 34.5–45)
HGB BLD-MCNC: 8.9 G/DL — LOW (ref 11.5–15.5)
MAGNESIUM SERPL-MCNC: 2.1 MG/DL — SIGNIFICANT CHANGE UP (ref 1.6–2.6)
MCHC RBC-ENTMCNC: 18.4 PG — LOW (ref 27–34)
MCHC RBC-ENTMCNC: 30.3 % — LOW (ref 32–36)
MCV RBC AUTO: 60.9 FL — LOW (ref 80–100)
NRBC # FLD: 0 K/UL — LOW (ref 25–125)
PHOSPHATE SERPL-MCNC: 3.1 MG/DL — SIGNIFICANT CHANGE UP (ref 2.5–4.5)
PLATELET # BLD AUTO: 569 K/UL — HIGH (ref 150–400)
PMV BLD: 9.2 FL — SIGNIFICANT CHANGE UP (ref 7–13)
POTASSIUM SERPL-MCNC: 3.6 MMOL/L — SIGNIFICANT CHANGE UP (ref 3.5–5.3)
POTASSIUM SERPL-SCNC: 3.6 MMOL/L — SIGNIFICANT CHANGE UP (ref 3.5–5.3)
RBC # BLD: 4.83 M/UL — SIGNIFICANT CHANGE UP (ref 3.8–5.2)
RBC # FLD: 25.5 % — HIGH (ref 10.3–14.5)
RH IG SCN BLD-IMP: POSITIVE — SIGNIFICANT CHANGE UP
SODIUM SERPL-SCNC: 141 MMOL/L — SIGNIFICANT CHANGE UP (ref 135–145)
WBC # BLD: 7.91 K/UL — SIGNIFICANT CHANGE UP (ref 3.8–10.5)
WBC # FLD AUTO: 7.91 K/UL — SIGNIFICANT CHANGE UP (ref 3.8–10.5)

## 2019-02-05 PROCEDURE — 88312 SPECIAL STAINS GROUP 1: CPT | Mod: 26

## 2019-02-05 PROCEDURE — 45380 COLONOSCOPY AND BIOPSY: CPT | Mod: GC

## 2019-02-05 PROCEDURE — 99239 HOSP IP/OBS DSCHRG MGMT >30: CPT

## 2019-02-05 PROCEDURE — 43239 EGD BIOPSY SINGLE/MULTIPLE: CPT | Mod: GC

## 2019-02-05 PROCEDURE — 88305 TISSUE EXAM BY PATHOLOGIST: CPT | Mod: 26

## 2019-02-05 RX ORDER — PANTOPRAZOLE SODIUM 20 MG/1
1 TABLET, DELAYED RELEASE ORAL
Qty: 30 | Refills: 0
Start: 2019-02-05 | End: 2019-03-06

## 2019-02-05 RX ORDER — IRON POLYSACCHARIDE COMPLEX 150 MG
0 CAPSULE ORAL
Qty: 0 | Refills: 0 | COMMUNITY

## 2019-02-05 RX ORDER — FERROUS SULFATE 325(65) MG
1 TABLET ORAL
Qty: 30 | Refills: 11
Start: 2019-02-05 | End: 2020-01-30

## 2019-02-05 RX ADMIN — Medication 1000 MILLILITER(S): at 00:36

## 2019-02-05 RX ADMIN — PANTOPRAZOLE SODIUM 40 MILLIGRAM(S): 20 TABLET, DELAYED RELEASE ORAL at 05:57

## 2019-02-05 RX ADMIN — Medication 325 MILLIGRAM(S): at 13:37

## 2019-02-05 RX ADMIN — Medication 30 MILLILITER(S): at 15:02

## 2019-02-05 RX ADMIN — SODIUM CHLORIDE 75 MILLILITER(S): 9 INJECTION, SOLUTION INTRAVENOUS at 05:58

## 2019-02-05 NOTE — PROGRESS NOTE ADULT - PROBLEM SELECTOR PLAN 3
-febrile to 101.4 w/ chills, since resolved w/ no new fevers  -CXR clear, stable on RA, no SOB.
-febrile to 101.4 w/ chills, since resolved w/ no new fevers  -CXR clear, stable on RA, no SOB.
Pt developed Temp 101.4 one hour into pRBC transfusion. Pt w/ chills but absence of other systemic symptoms. CXR clear.  - s/p Tylenol for fever

## 2019-02-05 NOTE — PROGRESS NOTE ADULT - PROBLEM SELECTOR PLAN 2
-pain stable, not improving  -per pt, hx "bacterial infection" found on EGD/colonoscopy from 2017  -pending endoscopy w/ GI tomorrow -pain stable, not improving  -per pt, hx "bacterial infection" found on EGD/colonoscopy from 2017  -endoscopy and colonoscopy negative

## 2019-02-05 NOTE — PROGRESS NOTE ADULT - ASSESSMENT
Impression:    1. Abdominal pain: Primary concern for peptic ulcer disease given patient's prior history likely representing H. pylori infection. Differential includes esophagitis and erosive gastritis.  2. Microcytic anemia: Iron studies this admission most consistent for MAGDALENO, however, with elevated haptoglobin which is suspicious for hemolytic anemia. Prior iron studies in 12/2018 notable for elevated iron and normal ferritin, which may indicate underlying thalassemia, especially in light of patient's family history. The patient endorses occasional episodes of hematochezia, which may represent anorectal disease.     Recommendations:  - Plan for upper endoscopy / colonoscopy early next week  - Monitor CBCs daily  - Transfuse for goal Hgb >/= 7.0  - Continue pantoprazole 40 mg PO daily  - Consider hematology consult for evaluation of anemia  - Supportive care per primary team    Pedro Luis Friedman MD  Gastroenterology Fellow  Pager number: [phone] / [phone]
Pt is a 39 yo F with a PMHx of chronic gastritis, chronic anemia, presenting with abdominal pain. Pt found to have Hgb 7.8, started on 1U pRBC transfusion but developed fever to 101.4 about an hour into the transfusion.

## 2019-02-05 NOTE — PROGRESS NOTE ADULT - PROBLEM SELECTOR PLAN 1
-s/p txf on admission, since then HgB stable, hemodynamically stable          -febrile rxn to txf, since has remained asymptomatic w/ no new fevers  -per pt, hx of anemia since 7 yo and states all children also have anemia  -concern for thalassemia, pending HgB electrophoresis vs GI loss, pending endoscopy tomorrow  -hemolysis labs negative. TIBC/ferritin wnl.   -scant blood w/ BM, likely anal fissure/hemorrhoids, low concern for LGIB. -likely hemoglobin C trait per electrophoresis  -H/H stable   -hemolysis labs negative. TIBC/ferritin wnl.   -scant blood w/ BM, likely anal fissure/hemorrhoids, low concern for LGIB.  -Endo/colonoscopy negative

## 2019-02-05 NOTE — PROGRESS NOTE ADULT - PROBLEM SELECTOR PLAN 4
DVT: Improve 0, encourage ambulation  Diet: clear liquid for GI prep, NPO midnight
DVT: Improve 0, encourage ambulation  Diet: clear liquid for GI prep, NPO midnight
DVT: Improve 0, encourage ambulation  Diet: Regular  Dispo: pending

## 2019-02-05 NOTE — PROGRESS NOTE ADULT - SUBJECTIVE AND OBJECTIVE BOX
Nasreen Beatty MD PGY1  [phone]    Patient is a 38y old  Female who presents with a chief complaint of Transfusion reaction (04 Feb 2019 08:28)    SUBJECTIVE/OVERNIGHT EVENTS   Unable to complete prep O/N.    Vital Signs Last 24 Hrs  T(C): 36.8 (05 Feb 2019 05:56), Max: 37.5 (04 Feb 2019 14:19)  T(F): 98.2 (05 Feb 2019 05:56), Max: 99.5 (04 Feb 2019 14:19)  HR: 65 (05 Feb 2019 05:56) (62 - 65)  BP: 114/70 (05 Feb 2019 05:56) (97/61 - 128/77)  BP(mean): --  RR: 17 (05 Feb 2019 05:56) (17 - 17)  SpO2: 99% (05 Feb 2019 05:56) (98% - 100%)    PHYSICAL EXAM:  GENERAL: NAD, well-developed  HEAD:  Atraumatic, Normocephalic  EYES: EOMI, PERRLA, conjunctiva and sclera clear  NECK: Supple, No JVD  CHEST/LUNG: Clear to auscultation bilaterally; No wheeze  HEART: Regular rate and rhythm; No murmurs, rubs, or gallops  ABDOMEN: Soft, Nontender, Nondistended; Bowel sounds present  EXTREMITIES:  2+ Peripheral Pulses, No clubbing, cyanosis, or edema  PSYCH: AAOx3  NEUROLOGY: non-focal  SKIN: No rashes or lesions Nasreen Beatty MD PGY1  [phone]    Patient is a 38y old  Female who presents with a chief complaint of Transfusion reaction (04 Feb 2019 08:28)    SUBJECTIVE/OVERNIGHT EVENTS   Unable to complete prep O/N.    Vital Signs Last 24 Hrs  T(C): 36.8 (05 Feb 2019 05:56), Max: 37.5 (04 Feb 2019 14:19)  T(F): 98.2 (05 Feb 2019 05:56), Max: 99.5 (04 Feb 2019 14:19)  HR: 65 (05 Feb 2019 05:56) (62 - 65)  BP: 114/70 (05 Feb 2019 05:56) (97/61 - 128/77)  BP(mean): --  RR: 17 (05 Feb 2019 05:56) (17 - 17)  SpO2: 99% (05 Feb 2019 05:56) (98% - 100%)    PHYSICAL EXAM:  GENERAL: NAD, appears fatigued  HEAD:  Atraumatic, Normocephalic  EYES: EOMI, conjunctiva and sclera clear  NECK: Supple,   CHEST/LUNG: Clear to auscultation bilaterally; No wheeze  HEART: Regular rate and rhythm; No murmurs, rubs, or gallops  ABDOMEN: Soft, Nondistended; Bowel sounds present                   Diffuse non-specific tenderness of RUQ, LUQ, and RLQ.  EXTREMITIES:  2+ Peripheral Pulses, trace pitting edema to knees.  PSYCH: AAOx3  NEUROLOGY: non-focal  SKIN: No rashes or lesions        MEDICATIONS  (STANDING):  ferrous    sulfate 325 milliGRAM(s) Oral daily  lactated ringers. 1000 milliLiter(s) (75 mL/Hr) IV Continuous <Continuous>  pantoprazole  Injectable 40 milliGRAM(s) IV Push two times a day    MEDICATIONS  (PRN):  aluminum hydroxide/magnesium hydroxide/simethicone Suspension 30 milliLiter(s) Oral every 4 hours PRN Dyspepsia  lidocaine 2% Gel 1 Application(s) Topical every 12 hours PRN anal fissure/hemorroids  witch hazel Pads 1 Application(s) Topical every 12 hours PRN hemorrhoids    CAPILLARY BLOOD GLUCOSE        I&O's Summary      LABS                        8.9    7.91  )-----------( 569      ( 05 Feb 2019 06:30 )             29.4                         7.7    8.54  )-----------( 546      ( 04 Feb 2019 05:40 )             26.5     02-05    141  |  105  |  5<L>  ----------------------------<  94  3.6   |  25  |  0.72  02-04    139  |  103  |  7   ----------------------------<  86  3.4<L>   |  26  |  0.67    Ca    9.3      05 Feb 2019 06:30  Ca    8.5      04 Feb 2019 05:40  Phos  3.1     02-05  Mg     2.1     02-05            < from: Upper Endoscopy (02.05.19 @ 11:10) >  Findings:       The examined esophagus was normal.       Z-line was regular and was found 40 cm from the incisors. The Z line,        GEJ, proximal aspect of the gastric folds and hiatus were lcoated at        40cm.       The entire examined stomach was normal. Biopsies x 2 were taken with a        cold forceps for Helicobacter pylori testing from the gastric antrum and        the gastric body. Estimated blood loss was minimal.       One less 3 mm sessile polyp with no bleeding was found in the posterior        wall of the duodenal bulb. Biopsies were taken with a cold forceps for        histology.       The exam of the duodenum was otherwise normal to the 2nd portion.       Biopsies for histology were taken with a cold forceps in the 2nd part of        the duodenum for evaluation of celiac disease.                                                                                   Impression:          - Normal esophagus.                       - Z-lineregular, 40 cm from the incisors.                       - Normal stomach. Biopsied.                       - One 3mm posterior wall duodenal bulb polyp. Biopsied.                       - Biopsies were taken with a cold forceps for evaluation                of celiac disease.  Recommendation:      - Return patient to hospital [facility] for ongoing care.                       - Resume regular diet.                       - Await pathology results.    < end of copied text >    < from: Colonoscopy (02.05.19 @ 11:09) >  Findings:       A few superficial 3 to 4 mm ulcers were found in the distal rectum. The        interval rectal mucosa was normal. No bleeding was present. No stigmata        of recent bleeding were seen. Biopsies were taken with a cold forceps     for histology. Estimated blood loss was minimal.       A moderate amount of liquid stool was found in the entire colon, without        interference to visualization.       The exam was otherwise normal throughout the examined colon. Mucous was     seen in the terminal ileum, which was easily washed off.                                                                                   Impression:          - A few superfical ulcers in the distal rectum.                        Biopsied. Colonosocpy examination was otherwise normal.  Recommendation:      - Return patient to hospital [facility] for ongoing care.                       - Resume regular diet.                       - Await pathology results.                                       Attending Participation:       I was present and participated during the entire procedure, including        non-key portions.                                                                                     < end of copied text >

## 2019-02-06 LAB — SURGICAL PATHOLOGY STUDY: SIGNIFICANT CHANGE UP

## 2019-02-07 LAB
BACTERIA BLD CULT: SIGNIFICANT CHANGE UP
BACTERIA BLD CULT: SIGNIFICANT CHANGE UP

## 2019-05-29 ENCOUNTER — EMERGENCY (EMERGENCY)
Facility: HOSPITAL | Age: 39
LOS: 1 days | Discharge: HOME | End: 2019-05-29
Attending: EMERGENCY MEDICINE | Admitting: EMERGENCY MEDICINE
Payer: MEDICAID

## 2019-05-29 VITALS
TEMPERATURE: 98 F | HEART RATE: 75 BPM | DIASTOLIC BLOOD PRESSURE: 71 MMHG | RESPIRATION RATE: 17 BRPM | OXYGEN SATURATION: 100 % | SYSTOLIC BLOOD PRESSURE: 114 MMHG

## 2019-05-29 DIAGNOSIS — Z98.891 HISTORY OF UTERINE SCAR FROM PREVIOUS SURGERY: Chronic | ICD-10-CM

## 2019-05-29 PROBLEM — K29.70 GASTRITIS, UNSPECIFIED, WITHOUT BLEEDING: Chronic | Status: ACTIVE | Noted: 2019-02-01

## 2019-05-29 LAB
ALBUMIN SERPL ELPH-MCNC: 3.4 G/DL — SIGNIFICANT CHANGE UP (ref 3.3–5)
ALP SERPL-CCNC: 74 U/L — SIGNIFICANT CHANGE UP (ref 40–120)
ALT FLD-CCNC: 7 U/L — SIGNIFICANT CHANGE UP (ref 4–33)
ANION GAP SERPL CALC-SCNC: 11 MMO/L — SIGNIFICANT CHANGE UP (ref 7–14)
ANISOCYTOSIS BLD QL: SIGNIFICANT CHANGE UP
APPEARANCE UR: SIGNIFICANT CHANGE UP
AST SERPL-CCNC: 10 U/L — SIGNIFICANT CHANGE UP (ref 4–32)
BACTERIA # UR AUTO: NEGATIVE — SIGNIFICANT CHANGE UP
BASOPHILS # BLD AUTO: 0.05 K/UL — SIGNIFICANT CHANGE UP (ref 0–0.2)
BASOPHILS NFR BLD AUTO: 0.8 % — SIGNIFICANT CHANGE UP (ref 0–2)
BILIRUB SERPL-MCNC: < 0.2 MG/DL — LOW (ref 0.2–1.2)
BILIRUB UR-MCNC: NEGATIVE — SIGNIFICANT CHANGE UP
BLOOD UR QL VISUAL: NEGATIVE — SIGNIFICANT CHANGE UP
BUN SERPL-MCNC: 4 MG/DL — LOW (ref 7–23)
CALCIUM SERPL-MCNC: 9.2 MG/DL — SIGNIFICANT CHANGE UP (ref 8.4–10.5)
CHLORIDE SERPL-SCNC: 104 MMOL/L — SIGNIFICANT CHANGE UP (ref 98–107)
CO2 SERPL-SCNC: 26 MMOL/L — SIGNIFICANT CHANGE UP (ref 22–31)
COLOR SPEC: YELLOW — SIGNIFICANT CHANGE UP
CREAT SERPL-MCNC: 0.54 MG/DL — SIGNIFICANT CHANGE UP (ref 0.5–1.3)
EOSINOPHIL # BLD AUTO: 0.21 K/UL — SIGNIFICANT CHANGE UP (ref 0–0.5)
EOSINOPHIL NFR BLD AUTO: 3.6 % — SIGNIFICANT CHANGE UP (ref 0–6)
GLUCOSE SERPL-MCNC: 93 MG/DL — SIGNIFICANT CHANGE UP (ref 70–99)
GLUCOSE UR-MCNC: NEGATIVE — SIGNIFICANT CHANGE UP
HCT VFR BLD CALC: 26.3 % — LOW (ref 34.5–45)
HGB BLD-MCNC: 7.8 G/DL — LOW (ref 11.5–15.5)
HYALINE CASTS # UR AUTO: HIGH
HYPOCHROMIA BLD QL: SIGNIFICANT CHANGE UP
IMM GRANULOCYTES NFR BLD AUTO: 0.3 % — SIGNIFICANT CHANGE UP (ref 0–1.5)
KETONES UR-MCNC: NEGATIVE — SIGNIFICANT CHANGE UP
LEUKOCYTE ESTERASE UR-ACNC: NEGATIVE — SIGNIFICANT CHANGE UP
LIDOCAIN IGE QN: 9 U/L — SIGNIFICANT CHANGE UP (ref 7–60)
LYMPHOCYTES # BLD AUTO: 1.77 K/UL — SIGNIFICANT CHANGE UP (ref 1–3.3)
LYMPHOCYTES # BLD AUTO: 29.9 % — SIGNIFICANT CHANGE UP (ref 13–44)
MANUAL SMEAR VERIFICATION: SIGNIFICANT CHANGE UP
MCHC RBC-ENTMCNC: 17.2 PG — LOW (ref 27–34)
MCHC RBC-ENTMCNC: 29.7 % — LOW (ref 32–36)
MCV RBC AUTO: 58.1 FL — LOW (ref 80–100)
MICROCYTES BLD QL: SIGNIFICANT CHANGE UP
MONOCYTES # BLD AUTO: 0.58 K/UL — SIGNIFICANT CHANGE UP (ref 0–0.9)
MONOCYTES NFR BLD AUTO: 9.8 % — SIGNIFICANT CHANGE UP (ref 2–14)
NEUTROPHILS # BLD AUTO: 3.28 K/UL — SIGNIFICANT CHANGE UP (ref 1.8–7.4)
NEUTROPHILS NFR BLD AUTO: 55.6 % — SIGNIFICANT CHANGE UP (ref 43–77)
NITRITE UR-MCNC: NEGATIVE — SIGNIFICANT CHANGE UP
NRBC # FLD: 0.04 K/UL — SIGNIFICANT CHANGE UP (ref 0–0)
PH UR: 6 — SIGNIFICANT CHANGE UP (ref 5–8)
PLATELET # BLD AUTO: 525 K/UL — HIGH (ref 150–400)
PLATELET COUNT - ESTIMATE: SIGNIFICANT CHANGE UP
PMV BLD: 9.7 FL — SIGNIFICANT CHANGE UP (ref 7–13)
POIKILOCYTOSIS BLD QL AUTO: SIGNIFICANT CHANGE UP
POLYCHROMASIA BLD QL SMEAR: SLIGHT — SIGNIFICANT CHANGE UP
POTASSIUM SERPL-MCNC: 3.7 MMOL/L — SIGNIFICANT CHANGE UP (ref 3.5–5.3)
POTASSIUM SERPL-SCNC: 3.7 MMOL/L — SIGNIFICANT CHANGE UP (ref 3.5–5.3)
PROT SERPL-MCNC: 6.7 G/DL — SIGNIFICANT CHANGE UP (ref 6–8.3)
PROT UR-MCNC: 30 — SIGNIFICANT CHANGE UP
RBC # BLD: 4.53 M/UL — SIGNIFICANT CHANGE UP (ref 3.8–5.2)
RBC # FLD: 21.1 % — HIGH (ref 10.3–14.5)
RBC CASTS # UR COMP ASSIST: SIGNIFICANT CHANGE UP (ref 0–?)
SODIUM SERPL-SCNC: 141 MMOL/L — SIGNIFICANT CHANGE UP (ref 135–145)
SP GR SPEC: 1.03 — SIGNIFICANT CHANGE UP (ref 1–1.04)
SQUAMOUS # UR AUTO: SIGNIFICANT CHANGE UP
TARGETS BLD QL SMEAR: SLIGHT — SIGNIFICANT CHANGE UP
UROBILINOGEN FLD QL: NORMAL — SIGNIFICANT CHANGE UP
WBC # BLD: 5.91 K/UL — SIGNIFICANT CHANGE UP (ref 3.8–10.5)
WBC # FLD AUTO: 5.91 K/UL — SIGNIFICANT CHANGE UP (ref 3.8–10.5)
WBC UR QL: SIGNIFICANT CHANGE UP (ref 0–?)

## 2019-05-29 PROCEDURE — 99284 EMERGENCY DEPT VISIT MOD MDM: CPT

## 2019-05-29 RX ORDER — FAMOTIDINE 10 MG/ML
20 INJECTION INTRAVENOUS ONCE
Refills: 0 | Status: COMPLETED | OUTPATIENT
Start: 2019-05-29 | End: 2019-05-29

## 2019-05-29 RX ORDER — SODIUM CHLORIDE 9 MG/ML
1000 INJECTION INTRAMUSCULAR; INTRAVENOUS; SUBCUTANEOUS ONCE
Refills: 0 | Status: COMPLETED | OUTPATIENT
Start: 2019-05-29 | End: 2019-05-29

## 2019-05-29 RX ADMIN — SODIUM CHLORIDE 1000 MILLILITER(S): 9 INJECTION INTRAMUSCULAR; INTRAVENOUS; SUBCUTANEOUS at 13:09

## 2019-05-29 RX ADMIN — FAMOTIDINE 20 MILLIGRAM(S): 10 INJECTION INTRAVENOUS at 13:09

## 2019-05-29 NOTE — ED PROVIDER NOTE - CARE PLAN
Principal Discharge DX:	Gastritis Principal Discharge DX:	Gastritis  Assessment and plan of treatment:	Patient advised to follow up with PRIMARY CARE DOCTOR IN 1-2 DAYS, GASTROENTEROLOGIST WITHIN WEEK  and told to return to the emergency department immediately for any new or concerning symptoms  OR ANY OTHER COMPLAINTS. Patient agrees with plan.    Take medication as directed   Follow Acid reflux/Gastritis diet to limit symptoms  Stay well hydrated   FOLLOW UP WITH HEMATOLOGIST FOR ANEMIA AND GASTROENTEROLOGIST FOR CHRONIC GASTRITIS

## 2019-05-29 NOTE — ED PROVIDER NOTE - PROGRESS NOTE DETAILS
Patient results of labs and UA reviewed. Pt anemic but denies any active bleeding. Will recommend f/u with PCP/hematologist. UA normal. ECG stable, discussed with MD, stable for DC home and outpatient f/u with PCP and GI for chronic gastritis. Pt understood and agreed with plan. All question and concerns addressed. Strict return instructions given. Pt given GERD diet recommendations and Rx for Pepcid. GI referral list given.

## 2019-05-29 NOTE — ED PROVIDER NOTE - CHIEF COMPLAINT
The patient is a 38y Female complaining of The patient is a 38y Female complaining of abdominal pain, fatigue

## 2019-05-29 NOTE — ED ADULT TRIAGE NOTE - CHIEF COMPLAINT QUOTE
Co right upper abdominal pain x "few months", states she has been seen in ED in past for same complaint but nothing was found. Pain accompanied with nausea, headache and intermittent diarrhea, denies vomiting.

## 2019-05-29 NOTE — ED PROVIDER NOTE - OBJECTIVE STATEMENT
HPI; Patient is a 38 y.o female with PMHx of anemia (requiring prior transfusion), prior H.pylori infection, who presents to ED c/o epigastric abdominal pain intermittently x 5 months. Patient also notes intermittent lightheadedness over past week, states mostly with standing. Also notes an episode during this past week where she states epigastric pain felt more pressure like. Pt seen and worked up for similar abdominal pain in February, 2019 where she had endoscopy, report showed + gastritis, patient was suppose to be on PPI and f/u with GI outpatient but never did. Pt also admits to Slight Rt side headache, but states she has hx of similar headaches in past. Also notes mild dysuria. Denies pleuritic pain, recent travel, changes in vision, numbness or tingling, weakness, dizziness, recent trauma/falls, melena, hematemesis, hematuria, back pain, neck pain, fevers, chills, hx of strokes, CAD or any other complaints.

## 2019-05-29 NOTE — ED PROVIDER NOTE - CLINICAL SUMMARY MEDICAL DECISION MAKING FREE TEXT BOX
Patient is a 38 y.o female with PMHx of anemia (requiring prior transfusion), prior H.pylori infection, who presents to ED c/o epigastric abdominal pain intermittently x 5 months. DDx- includes but not limited to; Gastritis, dehydration, r/o UTI. Plan- labs, ecg, UA/UC, pepcid, maalox, fluids, will monitor and reassess.

## 2019-05-29 NOTE — ED PROVIDER NOTE - ATTENDING CONTRIBUTION TO CARE
DR. CATES, ATTENDING MD-  I performed a face to face bedside interview with patient regarding history of present illness, review of symptoms and past medical history. I completed an independent physical exam.  I have discussed patient's plan of care with PA.   Documentation as above in the note.    Bharathi: c/o abdominal pain, nausea, decreased appetite and dizziness for months, gradually worsening in intensity, but no acute change in sx today.  Patient is s/p recent admission for same in 2/2019, at that time workup included endoscopy which showed pathology findings c/w chronic inflammation vs celiac disease.  Patient did not follow-up after leaving hospital, or change diet or take prescribed protonix.  On my exam, abd soft nt, nabs.  Patient otherwise well appearing, comfortable, ambulatory without ataxia or weakness.  Neuro exam afocal incl nl ftn and gait.    A/P: chronic gastritis/duodenitis secondary to IBD vs celiac disease.  plan for PPI, gluten-free diet and outpt f/u with GI.

## 2019-05-29 NOTE — ED PROVIDER NOTE - FAMILY HISTORY
Father  Still living? Unknown  Family history of anemia, Age at diagnosis: Age Unknown     Sibling  Still living? Unknown  Family history of anemia, Age at diagnosis: Age Unknown     Child  Still living? Unknown  Family history of anemia, Age at diagnosis: Age Unknown

## 2019-05-29 NOTE — ED PROVIDER NOTE - PLAN OF CARE
Patient advised to follow up with PRIMARY CARE DOCTOR IN 1-2 DAYS, GASTROENTEROLOGIST WITHIN WEEK  and told to return to the emergency department immediately for any new or concerning symptoms  OR ANY OTHER COMPLAINTS. Patient agrees with plan.    Take medication as directed   Follow Acid reflux/Gastritis diet to limit symptoms  Stay well hydrated   FOLLOW UP WITH HEMATOLOGIST FOR ANEMIA AND GASTROENTEROLOGIST FOR CHRONIC GASTRITIS

## 2019-05-29 NOTE — ED PROVIDER NOTE - PHYSICAL EXAMINATION
Vital signs reviewed.   CONSTITUTIONAL: Well-appearing; well-nourished; in no apparent distress. Non-toxic appearing.   HEAD: Normocephalic, atraumatic.  EYES: PERRL, EOM intact, conjunctiva and sclera WNL. No nystagmus   ENT: normal nose; no rhinorrhea; normal pharynx  NECK/LYMPH: Supple; non-tender; No midline tenderness.   CARD: Normal S1, S2; no murmurs, rubs, or gallops noted.  RESP: Normal chest excursion with respiration; breath sounds clear and equal bilaterally; no wheezes, rhonchi, or rales.  ABD/GI: soft, non-distended; non-tender; no palpable organomegaly, no pulsatile mass. NCVAT.   EXT/MS: moves all extremities; distal pulses are normal, no pedal edema. No midline tenderness noted.  Pt ambulating well in ED.   SKIN: Normal for age and race; warm; dry; good turgor; no apparent lesions or exudate noted.  NEURO: Awake, alert, oriented x 3, no gross deficits, CN II-XII grossly intact, no motor or sensory deficit noted. No pronator drift. No facial droop. No slurred speech. 5/5 strength UE/LE b/l. Normal finger to nose test.   PSYCH: Normal mood; appropriate affect.

## 2019-05-29 NOTE — ED PROVIDER NOTE - NSFOLLOWUPCLINICS_GEN_ALL_ED_FT
[facility]  Gastroenterology  [address] [place] [id number]  Phone: [phone]  Fax:   Follow Up Time: 1-3 Days

## 2019-05-30 LAB
BACTERIA UR CULT: SIGNIFICANT CHANGE UP
SPECIMEN SOURCE: SIGNIFICANT CHANGE UP

## 2019-06-26 ENCOUNTER — EMERGENCY (EMERGENCY)
Facility: HOSPITAL | Age: 39
LOS: 1 days | Discharge: HOME | End: 2019-06-26
Attending: EMERGENCY MEDICINE | Admitting: EMERGENCY MEDICINE
Payer: MEDICAID

## 2019-06-26 VITALS
OXYGEN SATURATION: 99 % | DIASTOLIC BLOOD PRESSURE: 66 MMHG | HEART RATE: 78 BPM | SYSTOLIC BLOOD PRESSURE: 107 MMHG | RESPIRATION RATE: 16 BRPM

## 2019-06-26 VITALS
RESPIRATION RATE: 16 BRPM | DIASTOLIC BLOOD PRESSURE: 85 MMHG | TEMPERATURE: 99 F | OXYGEN SATURATION: 99 % | HEART RATE: 80 BPM | SYSTOLIC BLOOD PRESSURE: 131 MMHG

## 2019-06-26 DIAGNOSIS — Z98.891 HISTORY OF UTERINE SCAR FROM PREVIOUS SURGERY: Chronic | ICD-10-CM

## 2019-06-26 LAB
ALBUMIN SERPL ELPH-MCNC: 3.4 G/DL — SIGNIFICANT CHANGE UP (ref 3.3–5)
ALP SERPL-CCNC: 67 U/L — SIGNIFICANT CHANGE UP (ref 40–120)
ALT FLD-CCNC: 4 U/L — SIGNIFICANT CHANGE UP (ref 4–33)
ANION GAP SERPL CALC-SCNC: 12 MMO/L — SIGNIFICANT CHANGE UP (ref 7–14)
APPEARANCE UR: CLEAR — SIGNIFICANT CHANGE UP
AST SERPL-CCNC: 11 U/L — SIGNIFICANT CHANGE UP (ref 4–32)
BACTERIA # UR AUTO: NEGATIVE — SIGNIFICANT CHANGE UP
BASOPHILS # BLD AUTO: 0.06 K/UL — SIGNIFICANT CHANGE UP (ref 0–0.2)
BASOPHILS NFR BLD AUTO: 0.8 % — SIGNIFICANT CHANGE UP (ref 0–2)
BILIRUB SERPL-MCNC: < 0.2 MG/DL — LOW (ref 0.2–1.2)
BILIRUB UR-MCNC: NEGATIVE — SIGNIFICANT CHANGE UP
BLOOD UR QL VISUAL: NEGATIVE — SIGNIFICANT CHANGE UP
BUN SERPL-MCNC: 7 MG/DL — SIGNIFICANT CHANGE UP (ref 7–23)
CALCIUM SERPL-MCNC: 9.1 MG/DL — SIGNIFICANT CHANGE UP (ref 8.4–10.5)
CHLORIDE SERPL-SCNC: 104 MMOL/L — SIGNIFICANT CHANGE UP (ref 98–107)
CO2 SERPL-SCNC: 25 MMOL/L — SIGNIFICANT CHANGE UP (ref 22–31)
COLOR SPEC: YELLOW — SIGNIFICANT CHANGE UP
CREAT SERPL-MCNC: 0.62 MG/DL — SIGNIFICANT CHANGE UP (ref 0.5–1.3)
EOSINOPHIL # BLD AUTO: 0.22 K/UL — SIGNIFICANT CHANGE UP (ref 0–0.5)
EOSINOPHIL NFR BLD AUTO: 3.1 % — SIGNIFICANT CHANGE UP (ref 0–6)
GLUCOSE SERPL-MCNC: 97 MG/DL — SIGNIFICANT CHANGE UP (ref 70–99)
GLUCOSE UR-MCNC: NEGATIVE — SIGNIFICANT CHANGE UP
HCT VFR BLD CALC: 26.3 % — LOW (ref 34.5–45)
HGB BLD-MCNC: 7.8 G/DL — LOW (ref 11.5–15.5)
HYALINE CASTS # UR AUTO: NEGATIVE — SIGNIFICANT CHANGE UP
IMM GRANULOCYTES NFR BLD AUTO: 0.3 % — SIGNIFICANT CHANGE UP (ref 0–1.5)
KETONES UR-MCNC: NEGATIVE — SIGNIFICANT CHANGE UP
LEUKOCYTE ESTERASE UR-ACNC: NEGATIVE — SIGNIFICANT CHANGE UP
LIDOCAIN IGE QN: 17.2 U/L — SIGNIFICANT CHANGE UP (ref 7–60)
LYMPHOCYTES # BLD AUTO: 1.84 K/UL — SIGNIFICANT CHANGE UP (ref 1–3.3)
LYMPHOCYTES # BLD AUTO: 25.7 % — SIGNIFICANT CHANGE UP (ref 13–44)
MCHC RBC-ENTMCNC: 17.1 PG — LOW (ref 27–34)
MCHC RBC-ENTMCNC: 29.7 % — LOW (ref 32–36)
MCV RBC AUTO: 57.8 FL — LOW (ref 80–100)
MONOCYTES # BLD AUTO: 0.71 K/UL — SIGNIFICANT CHANGE UP (ref 0–0.9)
MONOCYTES NFR BLD AUTO: 9.9 % — SIGNIFICANT CHANGE UP (ref 2–14)
NEUTROPHILS # BLD AUTO: 4.32 K/UL — SIGNIFICANT CHANGE UP (ref 1.8–7.4)
NEUTROPHILS NFR BLD AUTO: 60.2 % — SIGNIFICANT CHANGE UP (ref 43–77)
NITRITE UR-MCNC: NEGATIVE — SIGNIFICANT CHANGE UP
NRBC # FLD: 0 K/UL — SIGNIFICANT CHANGE UP (ref 0–0)
PH UR: 6 — SIGNIFICANT CHANGE UP (ref 5–8)
PLATELET # BLD AUTO: 513 K/UL — HIGH (ref 150–400)
PMV BLD: 9.8 FL — SIGNIFICANT CHANGE UP (ref 7–13)
POTASSIUM SERPL-MCNC: 3.5 MMOL/L — SIGNIFICANT CHANGE UP (ref 3.5–5.3)
POTASSIUM SERPL-SCNC: 3.5 MMOL/L — SIGNIFICANT CHANGE UP (ref 3.5–5.3)
PROT SERPL-MCNC: 6.3 G/DL — SIGNIFICANT CHANGE UP (ref 6–8.3)
PROT UR-MCNC: 20 — SIGNIFICANT CHANGE UP
RBC # BLD: 4.55 M/UL — SIGNIFICANT CHANGE UP (ref 3.8–5.2)
RBC # FLD: 21.2 % — HIGH (ref 10.3–14.5)
RBC CASTS # UR COMP ASSIST: SIGNIFICANT CHANGE UP (ref 0–?)
SODIUM SERPL-SCNC: 141 MMOL/L — SIGNIFICANT CHANGE UP (ref 135–145)
SP GR SPEC: 1.02 — SIGNIFICANT CHANGE UP (ref 1–1.04)
SQUAMOUS # UR AUTO: SIGNIFICANT CHANGE UP
UROBILINOGEN FLD QL: NORMAL — SIGNIFICANT CHANGE UP
WBC # BLD: 7.17 K/UL — SIGNIFICANT CHANGE UP (ref 3.8–10.5)
WBC # FLD AUTO: 7.17 K/UL — SIGNIFICANT CHANGE UP (ref 3.8–10.5)
WBC UR QL: SIGNIFICANT CHANGE UP (ref 0–?)

## 2019-06-26 PROCEDURE — 93010 ELECTROCARDIOGRAM REPORT: CPT

## 2019-06-26 PROCEDURE — 99284 EMERGENCY DEPT VISIT MOD MDM: CPT | Mod: 25

## 2019-06-26 RX ORDER — FAMOTIDINE 10 MG/ML
20 INJECTION INTRAVENOUS ONCE
Refills: 0 | Status: COMPLETED | OUTPATIENT
Start: 2019-06-26 | End: 2019-06-26

## 2019-06-26 RX ORDER — ACETAMINOPHEN 500 MG
975 TABLET ORAL ONCE
Refills: 0 | Status: COMPLETED | OUTPATIENT
Start: 2019-06-26 | End: 2019-06-26

## 2019-06-26 RX ORDER — FAMOTIDINE 10 MG/ML
1 INJECTION INTRAVENOUS
Qty: 28 | Refills: 0
Start: 2019-06-26 | End: 2019-07-09

## 2019-06-26 RX ORDER — SODIUM CHLORIDE 9 MG/ML
2000 INJECTION INTRAMUSCULAR; INTRAVENOUS; SUBCUTANEOUS ONCE
Refills: 0 | Status: COMPLETED | OUTPATIENT
Start: 2019-06-26 | End: 2019-06-26

## 2019-06-26 RX ADMIN — FAMOTIDINE 20 MILLIGRAM(S): 10 INJECTION INTRAVENOUS at 03:21

## 2019-06-26 RX ADMIN — Medication 30 MILLILITER(S): at 03:21

## 2019-06-26 RX ADMIN — SODIUM CHLORIDE 1000 MILLILITER(S): 9 INJECTION INTRAMUSCULAR; INTRAVENOUS; SUBCUTANEOUS at 03:22

## 2019-06-26 RX ADMIN — Medication 975 MILLIGRAM(S): at 03:21

## 2019-06-26 NOTE — ED PROVIDER NOTE - NSFOLLOWUPINSTRUCTIONS_ED_ALL_ED_FT
fair balance Please take Pepcid 20mg, 1 tablet 2 times a day for 14 days  Please follow up with a GI within a week.

## 2019-06-26 NOTE — ED PROVIDER NOTE - CHPI ED SYMPTOMS NEG
no chills/Denies f/c, vomiting, sour taste in the back of the mouth, blood in stool, or burning urinating./no burning urination/no blood in stool/no fever/no vomiting

## 2019-06-26 NOTE — ED ADULT TRIAGE NOTE - CHIEF COMPLAINT QUOTE
pt arrives w/ c/o epigastric pain. pt states has nausea but no vomiting. pt pmh anemia and gastritis, ekg in progress

## 2019-06-26 NOTE — ED PROVIDER NOTE - OBJECTIVE STATEMENT
37 y/o F with a PMHx of anemic presents to the ED c/o epigastric pain radiating to the back x2 years, worsening today. Pt states pain worsen when eating spicy foods in which pt ate spicy food yesterday.  Of note, pt has mild n/d. Denies f/c, vomiting, sour taste in the back of the mouth, blood in stool, or burning urinating. No other acute complaints at time of eval. 37 y/o F with a PMHx of anemic presents to the ED c/o burning epigastric pain radiating to the back x2 years, worsening today. Pt states pain worsen when eating spicy foods in which pt ate spicy food yesterday.  Of note, pt has mild n/d. Denies f/c, vomiting, sour taste in the back of the mouth, blood in stool, or burning urinating. No other acute complaints at time of eval. Pt was previously being treated for GERD and this feels similar to her regular GERD pain.

## 2019-06-26 NOTE — ED PROVIDER NOTE - NSFOLLOWUPCLINICS_GEN_ALL_ED_FT
[facility]  Gastroenterology  [address] [place] [address] [address]  [place] [address]  Phone: [phone]  Fax:   Follow Up Time:     [facility]  Gastroenterology  [address] [place] [address]  Phone: [phone]  Fax: [phone]  Follow Up Time:

## 2019-06-26 NOTE — ED PROVIDER NOTE - ATTENDING CONTRIBUTION TO CARE
Afebrile. Awake and Alert. Lungs CTA. Heart RRR. Abdomen soft, mild epigastric TTP, ND. CN II-XII grossly intact. Moves all extremities without lateralization.    GI meds for gastritis  r/o pancreatitis w/ lipase  No RUQ TTP to suggest acute cholecystitis  f/u GI

## 2019-06-26 NOTE — ED ADULT NURSE NOTE - OBJECTIVE STATEMENT
pt received to tr-c, c/o epigastric pain x a few hours. pt endorse eating spicy foods. endorses nausea, no vomiting. 20G IV placed L AC, labs sent. medicated per MD order.

## 2019-06-26 NOTE — ED PROVIDER NOTE - CLINICAL SUMMARY MEDICAL DECISION MAKING FREE TEXT BOX
39 y/o F with a PMHx of anemic presents to the ED c/o burning epigastric pain radiating to the back x2 years, worsening today. Pt states pain worsen when eating spicy foods in which pt ate spicy food yesterday.  Of note, pt has mild n/d. Denies f/c, vomiting, sour taste in the back of the mouth, blood in stool, or burning urinating. No other acute complaints at time of eval. Pt was previously being treated for GERD and this feels similar to her regular GERD pain. PE was unremarkable, likely GERD, will give GI cocktail and obtain basic labs, then reassess.

## 2019-06-27 LAB
BACTERIA UR CULT: SIGNIFICANT CHANGE UP
SPECIMEN SOURCE: SIGNIFICANT CHANGE UP

## 2019-10-18 NOTE — ED PROVIDER NOTE - CPE EDP EYES NORM
[facility] for continuing care.  [facility].  The  Facility-Administered Medications   Medication   • mirtazapine (REMERON) tablet 15 mg   • hydrALAZINE (APRESOLINE) tablet 25 mg   • venlafaxine XR (EFFEXOR XR) 24 hr capsule 37.5 mg   • CARBOXYMethylcellulose (REFRESH PLUS) 0.5 % ophthalmic solution 1 drop   • ferrous sulfate (65 mg Fe per 325 mg) tablet 325 mg   • levothyroxine (SYNTHROID, LEVOTHROID) tablet 25 mcg   • benztropine mesylate (COGENTIN) 1 MG/ML injection 1 mg    Or   • benztropine (COGENTIN) tablet 1 mg   • hydrOXYzine (ATARAX) tablet 50 mg   • OLANZapine (ZyPREXA ZYDIS) disintegrating tablet 5 mg   • LORazepam (ATIVAN) injection 1 mg    Or   • LORazepam (ATIVAN) tablet 1 mg   • haloperidol (HALDOL) 5 MG/ML injection 5 mg    Or   • haloperidol (HALDOL) tablet 10 mg   • zolpidem (AMBIEN) tablet 5 mg   • ibuprofen (MOTRIN) tablet 600 mg   • polyethylene glycol (GLYCOLAX, MIRALAX) packet 17 g   • docusate sodium-sennosides (SENOKOT S) 50-8.6 MG 2 tablet   • bisacodyl (DULCOLAX) suppository 10 mg   • magnesium hydroxide (MILK OF MAGNESIA) 400 MG/5ML suspension 30 mL   • aluminum-magnesium hydroxide-simethicone (MAALOX) 200-200-20 MG/5ML suspension 30 mL   • ondansetron (ZOFRAN ODT) disintegrating tablet 4 mg   • loperamide (IMODIUM) capsule 2 mg         Examination:     VITALS:  Visit Vitals  /90 (BP Location: RUE - Right upper extremity, Patient Position: Supine)   Pulse 79   Temp 97.7 °F (36.5 °C) (Oral)   Resp 18   Ht 5' 6\" (1.676 m)   Wt 51.6 kg   SpO2 97%   BMI 18.35 kg/m²       Labs:  Reviewed           Mental Status Examination:    Appearance  [] Unremarkable  [x]  Thin  [] Uncomfortable  [] Intoxicated  [] Other:    Hygiene  [x] Unremarkable  []  Marginal  []  Disheveled  []  Malodorous  []  Unkempt    []  Other:    Interpersonal behavior  [x]  Appropriate  []  Pleasant  []  Engaged  []  Guarded  []  Hostile  []  Withdrawn  []  Good eye contact  []  Avoidant eye contact  []  Other:    Speech Rate  [x] Normal  []   Fast  []   Slow  []    CMS/[facility]) normal...

## 2020-03-10 NOTE — PATIENT PROFILE ADULT - DO YOU FEEL LIKE HURTING YOURSELF OR OTHERS?
Fluconazole 100 MG       Last Written Prescription Date:  2/17/2020  Last Fill Quantity: 10,   # refills: 0  Last Office Visit: 2/17/2020  Future Office visit:       Routing refill request to provider for review/approval because:  Drug not on the G, P or [facility] refill protocol or controlled substance     no

## 2020-05-14 ENCOUNTER — EMERGENCY (EMERGENCY)
Facility: HOSPITAL | Age: 40
LOS: 1 days | Discharge: HOME | End: 2020-05-14
Attending: STUDENT IN AN ORGANIZED HEALTH CARE EDUCATION/TRAINING PROGRAM | Admitting: STUDENT IN AN ORGANIZED HEALTH CARE EDUCATION/TRAINING PROGRAM
Payer: MEDICAID

## 2020-05-14 VITALS
RESPIRATION RATE: 19 BRPM | DIASTOLIC BLOOD PRESSURE: 66 MMHG | TEMPERATURE: 98 F | OXYGEN SATURATION: 99 % | SYSTOLIC BLOOD PRESSURE: 106 MMHG | HEART RATE: 64 BPM

## 2020-05-14 VITALS
TEMPERATURE: 98 F | RESPIRATION RATE: 16 BRPM | SYSTOLIC BLOOD PRESSURE: 116 MMHG | OXYGEN SATURATION: 100 % | DIASTOLIC BLOOD PRESSURE: 75 MMHG | HEART RATE: 78 BPM

## 2020-05-14 DIAGNOSIS — Z98.891 HISTORY OF UTERINE SCAR FROM PREVIOUS SURGERY: Chronic | ICD-10-CM

## 2020-05-14 LAB
ALBUMIN SERPL ELPH-MCNC: 3.7 G/DL — SIGNIFICANT CHANGE UP (ref 3.3–5)
ALP SERPL-CCNC: 79 U/L — SIGNIFICANT CHANGE UP (ref 40–120)
ALT FLD-CCNC: < 5 U/L — SIGNIFICANT CHANGE UP (ref 4–33)
ANION GAP SERPL CALC-SCNC: 11 MMO/L — SIGNIFICANT CHANGE UP (ref 7–14)
ANISOCYTOSIS BLD QL: SIGNIFICANT CHANGE UP
APPEARANCE UR: CLEAR — SIGNIFICANT CHANGE UP
APTT BLD: 32.6 SEC — SIGNIFICANT CHANGE UP (ref 27.5–36.3)
AST SERPL-CCNC: 7 U/L — SIGNIFICANT CHANGE UP (ref 4–32)
BASE EXCESS BLDV CALC-SCNC: 2.4 MMOL/L — SIGNIFICANT CHANGE UP
BASOPHILS # BLD AUTO: 0.08 K/UL — SIGNIFICANT CHANGE UP (ref 0–0.2)
BASOPHILS NFR BLD AUTO: 1.1 % — SIGNIFICANT CHANGE UP (ref 0–2)
BASOPHILS NFR SPEC: 0.9 % — SIGNIFICANT CHANGE UP (ref 0–2)
BILIRUB SERPL-MCNC: < 0.2 MG/DL — LOW (ref 0.2–1.2)
BILIRUB UR-MCNC: NEGATIVE — SIGNIFICANT CHANGE UP
BLASTS # FLD: 0 % — SIGNIFICANT CHANGE UP (ref 0–0)
BLD GP AB SCN SERPL QL: NEGATIVE — SIGNIFICANT CHANGE UP
BLOOD GAS VENOUS - CREATININE: 0.67 MG/DL — SIGNIFICANT CHANGE UP (ref 0.5–1.3)
BLOOD GAS VENOUS - FIO2: 21 — SIGNIFICANT CHANGE UP
BLOOD UR QL VISUAL: NEGATIVE — SIGNIFICANT CHANGE UP
BUN SERPL-MCNC: 8 MG/DL — SIGNIFICANT CHANGE UP (ref 7–23)
CALCIUM SERPL-MCNC: 9 MG/DL — SIGNIFICANT CHANGE UP (ref 8.4–10.5)
CHLORIDE BLDV-SCNC: 106 MMOL/L — SIGNIFICANT CHANGE UP (ref 96–108)
CHLORIDE SERPL-SCNC: 103 MMOL/L — SIGNIFICANT CHANGE UP (ref 98–107)
CO2 SERPL-SCNC: 24 MMOL/L — SIGNIFICANT CHANGE UP (ref 22–31)
COLOR SPEC: SIGNIFICANT CHANGE UP
CREAT SERPL-MCNC: 0.75 MG/DL — SIGNIFICANT CHANGE UP (ref 0.5–1.3)
EOSINOPHIL # BLD AUTO: 0.39 K/UL — SIGNIFICANT CHANGE UP (ref 0–0.5)
EOSINOPHIL NFR BLD AUTO: 5.4 % — SIGNIFICANT CHANGE UP (ref 0–6)
EOSINOPHIL NFR FLD: 5.2 % — SIGNIFICANT CHANGE UP (ref 0–6)
EPI CELLS # UR: SIGNIFICANT CHANGE UP
GAS PNL BLDV: 139 MMOL/L — SIGNIFICANT CHANGE UP (ref 136–146)
GIANT PLATELETS BLD QL SMEAR: PRESENT — SIGNIFICANT CHANGE UP
GLUCOSE BLDV-MCNC: 97 MG/DL — SIGNIFICANT CHANGE UP (ref 70–99)
GLUCOSE SERPL-MCNC: 100 MG/DL — HIGH (ref 70–99)
GLUCOSE UR-MCNC: NEGATIVE — SIGNIFICANT CHANGE UP
HCO3 BLDV-SCNC: 25 MMOL/L — SIGNIFICANT CHANGE UP (ref 20–27)
HCT VFR BLD CALC: 29.5 % — LOW (ref 34.5–45)
HCT VFR BLDV CALC: 28.8 % — LOW (ref 34.5–45)
HGB BLD-MCNC: 9.1 G/DL — LOW (ref 11.5–15.5)
HGB BLDV-MCNC: 9.3 G/DL — LOW (ref 11.5–15.5)
HYPOCHROMIA BLD QL: SLIGHT — SIGNIFICANT CHANGE UP
IMM GRANULOCYTES NFR BLD AUTO: 0.4 % — SIGNIFICANT CHANGE UP (ref 0–1.5)
INR BLD: 1.12 — SIGNIFICANT CHANGE UP (ref 0.88–1.17)
KETONES UR-MCNC: NEGATIVE — SIGNIFICANT CHANGE UP
LACTATE BLDV-MCNC: 0.9 MMOL/L — SIGNIFICANT CHANGE UP (ref 0.5–2)
LEUKOCYTE ESTERASE UR-ACNC: NEGATIVE — SIGNIFICANT CHANGE UP
LIDOCAIN IGE QN: 17.2 U/L — SIGNIFICANT CHANGE UP (ref 7–60)
LYMPHOCYTES # BLD AUTO: 1.97 K/UL — SIGNIFICANT CHANGE UP (ref 1–3.3)
LYMPHOCYTES # BLD AUTO: 27.1 % — SIGNIFICANT CHANGE UP (ref 13–44)
LYMPHOCYTES NFR SPEC AUTO: 16.7 % — SIGNIFICANT CHANGE UP (ref 13–44)
MCHC RBC-ENTMCNC: 18.5 PG — LOW (ref 27–34)
MCHC RBC-ENTMCNC: 30.8 % — LOW (ref 32–36)
MCV RBC AUTO: 59.8 FL — LOW (ref 80–100)
METAMYELOCYTES # FLD: 0 % — SIGNIFICANT CHANGE UP (ref 0–1)
MICROCYTES BLD QL: SIGNIFICANT CHANGE UP
MONOCYTES # BLD AUTO: 0.53 K/UL — SIGNIFICANT CHANGE UP (ref 0–0.9)
MONOCYTES NFR BLD AUTO: 7.3 % — SIGNIFICANT CHANGE UP (ref 2–14)
MONOCYTES NFR BLD: 5.3 % — SIGNIFICANT CHANGE UP (ref 2–9)
MYELOCYTES NFR BLD: 0 % — SIGNIFICANT CHANGE UP (ref 0–0)
NEUTROPHIL AB SER-ACNC: 71.9 % — SIGNIFICANT CHANGE UP (ref 43–77)
NEUTROPHILS # BLD AUTO: 4.28 K/UL — SIGNIFICANT CHANGE UP (ref 1.8–7.4)
NEUTROPHILS NFR BLD AUTO: 58.7 % — SIGNIFICANT CHANGE UP (ref 43–77)
NEUTS BAND # BLD: 0 % — SIGNIFICANT CHANGE UP (ref 0–6)
NITRITE UR-MCNC: NEGATIVE — SIGNIFICANT CHANGE UP
NRBC # BLD: 1 /100WBC — SIGNIFICANT CHANGE UP
NRBC # FLD: 0 K/UL — SIGNIFICANT CHANGE UP (ref 0–0)
OTHER - HEMATOLOGY %: 0 — SIGNIFICANT CHANGE UP
OVALOCYTES BLD QL SMEAR: SLIGHT — SIGNIFICANT CHANGE UP
PCO2 BLDV: 49 MMHG — SIGNIFICANT CHANGE UP (ref 41–51)
PH BLDV: 7.37 PH — SIGNIFICANT CHANGE UP (ref 7.32–7.43)
PH UR: 5.5 — SIGNIFICANT CHANGE UP (ref 5–8)
PLATELET # BLD AUTO: 558 K/UL — HIGH (ref 150–400)
PLATELET COUNT - ESTIMATE: NORMAL — SIGNIFICANT CHANGE UP
PMV BLD: 9.5 FL — SIGNIFICANT CHANGE UP (ref 7–13)
PO2 BLDV: < 24 MMHG — LOW (ref 35–40)
POIKILOCYTOSIS BLD QL AUTO: SIGNIFICANT CHANGE UP
POLYCHROMASIA BLD QL SMEAR: SLIGHT — SIGNIFICANT CHANGE UP
POTASSIUM BLDV-SCNC: 3.4 MMOL/L — SIGNIFICANT CHANGE UP (ref 3.4–4.5)
POTASSIUM SERPL-MCNC: 3.4 MMOL/L — LOW (ref 3.5–5.3)
POTASSIUM SERPL-SCNC: 3.4 MMOL/L — LOW (ref 3.5–5.3)
PROMYELOCYTES # FLD: 0 % — SIGNIFICANT CHANGE UP (ref 0–0)
PROT SERPL-MCNC: 7.5 G/DL — SIGNIFICANT CHANGE UP (ref 6–8.3)
PROT UR-MCNC: NEGATIVE — SIGNIFICANT CHANGE UP
PROTHROM AB SERPL-ACNC: 12.8 SEC — SIGNIFICANT CHANGE UP (ref 9.8–13.1)
RBC # BLD: 4.93 M/UL — SIGNIFICANT CHANGE UP (ref 3.8–5.2)
RBC # FLD: 22.4 % — HIGH (ref 10.3–14.5)
RBC CASTS # UR COMP ASSIST: SIGNIFICANT CHANGE UP (ref 0–?)
RH IG SCN BLD-IMP: POSITIVE — SIGNIFICANT CHANGE UP
SAO2 % BLDV: 24.7 % — LOW (ref 60–85)
SMUDGE CELLS # BLD: PRESENT — SIGNIFICANT CHANGE UP
SODIUM SERPL-SCNC: 138 MMOL/L — SIGNIFICANT CHANGE UP (ref 135–145)
SP GR SPEC: 1.02 — SIGNIFICANT CHANGE UP (ref 1–1.04)
TARGETS BLD QL SMEAR: SLIGHT — SIGNIFICANT CHANGE UP
UROBILINOGEN FLD QL: NORMAL — SIGNIFICANT CHANGE UP
VARIANT LYMPHS # BLD: 0 % — SIGNIFICANT CHANGE UP
WBC # BLD: 7.28 K/UL — SIGNIFICANT CHANGE UP (ref 3.8–10.5)
WBC # FLD AUTO: 7.28 K/UL — SIGNIFICANT CHANGE UP (ref 3.8–10.5)
WBC UR QL: SIGNIFICANT CHANGE UP (ref 0–?)

## 2020-05-14 PROCEDURE — 99283 EMERGENCY DEPT VISIT LOW MDM: CPT

## 2020-05-14 RX ORDER — POTASSIUM CHLORIDE 20 MEQ
40 PACKET (EA) ORAL ONCE
Refills: 0 | Status: COMPLETED | OUTPATIENT
Start: 2020-05-14 | End: 2020-05-14

## 2020-05-14 RX ORDER — FAMOTIDINE 10 MG/ML
20 INJECTION INTRAVENOUS ONCE
Refills: 0 | Status: COMPLETED | OUTPATIENT
Start: 2020-05-14 | End: 2020-05-14

## 2020-05-14 RX ORDER — SODIUM CHLORIDE 9 MG/ML
1000 INJECTION INTRAMUSCULAR; INTRAVENOUS; SUBCUTANEOUS ONCE
Refills: 0 | Status: COMPLETED | OUTPATIENT
Start: 2020-05-14 | End: 2020-05-14

## 2020-05-14 RX ADMIN — Medication 40 MILLIEQUIVALENT(S): at 22:00

## 2020-05-14 RX ADMIN — Medication 30 MILLILITER(S): at 21:09

## 2020-05-14 RX ADMIN — FAMOTIDINE 20 MILLIGRAM(S): 10 INJECTION INTRAVENOUS at 21:09

## 2020-05-14 RX ADMIN — SODIUM CHLORIDE 1000 MILLILITER(S): 9 INJECTION INTRAMUSCULAR; INTRAVENOUS; SUBCUTANEOUS at 21:34

## 2020-05-14 NOTE — ED ADULT TRIAGE NOTE - PAIN: PRESENCE, MLM
Problem: Falls - Risk of  Goal: Absence of falls  Outcome: Met This Shift      Problem: Risk for Impaired Skin Integrity  Goal: Tissue integrity - skin and mucous membranes  Structural intactness and normal physiological function of skin and  mucous membranes.    Outcome: Met This Shift
Problem: Falls - Risk of  Goal: Absence of falls  Outcome: Ongoing      Problem: Risk for Impaired Skin Integrity  Goal: Tissue integrity - skin and mucous membranes  Structural intactness and normal physiological function of skin and  mucous membranes.    Outcome: Ongoing      Problem: Mobility - Impaired:  Goal: Mobility will improve  Mobility will improve   Outcome: Ongoing      Problem: IP BALANCE  Goal: LTG - patient will maintain standing balance to allow for completion of daily activities  Outcome: Ongoing      Problem: Nutrition  Goal: Optimal nutrition therapy  Outcome: Ongoing
Problem: Mobility - Impaired:  Goal: Mobility will improve  Mobility will improve  Outcome: Ongoing
Problem: Nutrition  Goal: Optimal nutrition therapy  Outcome: Ongoing  Nutrition Problem: Inadequate oral intake  Intervention: Food and/or Nutrient Delivery: Modify current diet, Start ONS (Liberalize diet to General with decreased intake/recent weight loss. To add high calorie oral supplement tid and frozen oral supplement bid)  Nutritional Goals: Intake >75% of meals/supplement. Stable weight.
complains of pain/discomfort

## 2020-05-14 NOTE — ED PROVIDER NOTE - CLINICAL SUMMARY MEDICAL DECISION MAKING FREE TEXT BOX
Multiple recurrent complaints. Recurrent abdominal pain for which pt has had multiple work ups within the past few years. Will evaluate for worsening anemia. Will check labs. If all normal advised pt to follow up with her PCP and GI doctor for further evaluation. Pt verbalizes understanding and agreement.

## 2020-05-14 NOTE — ED ADULT TRIAGE NOTE - CHIEF COMPLAINT QUOTE
Pt. c/o abdominal pain, cp, nausea and vomiting x few days. Denies diarrhea, cough or fevers. h/o gastric ulcers, anemia. No known Covid exposure. Pt. c/o abdominal pain, cp, nausea and vomiting x few days. Also states she's been having bloody stools. Denies diarrhea, cough or fevers. h/o gastric ulcers, anemia. No known Covid exposure.

## 2020-05-14 NOTE — ED ADULT NURSE NOTE - OBJECTIVE STATEMENT
pt comes in c/o generalized pain including CP and states it has been going on for months now. Pt a.xox3 and ambulatory with a steady gait. Pt also c/o skin rash that has appeared on R foot and back. Pt appears in no distress at this time and noted to have even and unlabored respirations. Pt monitored via cardiac monitor and pulse ox.

## 2020-05-14 NOTE — ED ADULT NURSE NOTE - CHIEF COMPLAINT QUOTE
Pt. c/o abdominal pain, cp, nausea and vomiting x few days. Also states she's been having bloody stools. Denies diarrhea, cough or fevers. h/o gastric ulcers, anemia. No known Covid exposure.

## 2020-05-14 NOTE — ED PROVIDER NOTE - OBJECTIVE STATEMENT
38yo F h/o anemia, gastritis, h pylori, p/w months-years of abdominal pain, chest pain, back pain, arm pain. Pt states she has been having the same pain for years and has been taking tylenol for it.  Pt wants to know why she is having these symptoms for so long, states sh cant tolerate pain anymore so came to ED tonight. Pt reports months of intermittent blood streaked stools. Pt has had multiple endoscopies and a colonoscopy within the past 2-3 years. Pt denies any acute changes in symptoms. no fevers, no chills, no vomiting. Has been transfused on the past.   No etoh or tobacco use

## 2020-05-14 NOTE — ED PROVIDER NOTE - ATTENDING CONTRIBUTION TO CARE
40yo F ho anemia, gastritis, h pylori, pw months of abdominal pain, chest pain, back pain, arm pain. pt states she has been having the same pain for years and has been taking tylenol for it. cant tolerate pain anymore so came to ED tonight. no acute change in symptoms. no fevers, no chills, no vomiting. kayley cute bleeding. has been transfused on the past.   pt with normal vs, normal exam, with vague nonacute symptoms. will check basic labs, eval for worsening and symptomatic anemia. abd benign. pt with multiple complaints and willneed outpatient follow up with heme/gi/rheum and pmd

## 2020-05-14 NOTE — ED PROVIDER NOTE - NSFOLLOWUPINSTRUCTIONS_ED_ALL_ED_FT
Follow up with your primary care physician in 48-72 hours- bring copies of your results.  Return to the ER for worsening or persistent symptoms, including but not limited to worsening/persistent pain, shortness of breath, fevers, vomiting, lightheadedness, passing out and/or ANY NEW OR CONCERNING SYMPTOMS. If you have issues obtaining follow up, please call: [phone] to obtain a doctor or specialist who takes your insurance in your area.  You may call [phone] to make an appointment with the internal medicine clinic.

## 2020-05-14 NOTE — ED PROVIDER NOTE - PATIENT PORTAL LINK FT
You can access the FollowMyHealth Patient Portal offered by [facility] by registering at the following website: [email or web address]. By joining [employer]’s FollowMyHealth portal, you will also be able to view your health information using other applications (apps) compatible with our system.

## 2020-09-14 ENCOUNTER — EMERGENCY (EMERGENCY)
Age: 40
LOS: 1 days | Discharge: HOME | End: 2020-09-14
Attending: STUDENT IN AN ORGANIZED HEALTH CARE EDUCATION/TRAINING PROGRAM | Admitting: STUDENT IN AN ORGANIZED HEALTH CARE EDUCATION/TRAINING PROGRAM
Payer: MEDICAID

## 2020-09-14 VITALS
HEART RATE: 80 BPM | DIASTOLIC BLOOD PRESSURE: 87 MMHG | TEMPERATURE: 98 F | OXYGEN SATURATION: 100 % | SYSTOLIC BLOOD PRESSURE: 124 MMHG | HEIGHT: 72 IN | RESPIRATION RATE: 16 BRPM

## 2020-09-14 DIAGNOSIS — Z98.891 HISTORY OF UTERINE SCAR FROM PREVIOUS SURGERY: Chronic | ICD-10-CM

## 2020-09-14 LAB
ALBUMIN SERPL ELPH-MCNC: 3.5 G/DL — SIGNIFICANT CHANGE UP (ref 3.3–5)
ALP SERPL-CCNC: 63 U/L — SIGNIFICANT CHANGE UP (ref 40–120)
ALT FLD-CCNC: < 5 U/L — SIGNIFICANT CHANGE UP (ref 4–33)
ANION GAP SERPL CALC-SCNC: 11 MMO/L — SIGNIFICANT CHANGE UP (ref 7–14)
ANISOCYTOSIS BLD QL: SIGNIFICANT CHANGE UP
APPEARANCE UR: SIGNIFICANT CHANGE UP
AST SERPL-CCNC: 6 U/L — SIGNIFICANT CHANGE UP (ref 4–32)
BACTERIA # UR AUTO: SIGNIFICANT CHANGE UP
BASOPHILS # BLD AUTO: 0.06 K/UL — SIGNIFICANT CHANGE UP (ref 0–0.2)
BASOPHILS NFR BLD AUTO: 1.2 % — SIGNIFICANT CHANGE UP (ref 0–2)
BASOPHILS NFR SPEC: 2.6 % — HIGH (ref 0–2)
BILIRUB SERPL-MCNC: 0.2 MG/DL — SIGNIFICANT CHANGE UP (ref 0.2–1.2)
BILIRUB UR-MCNC: NEGATIVE — SIGNIFICANT CHANGE UP
BLASTS # FLD: 0 % — SIGNIFICANT CHANGE UP (ref 0–0)
BLOOD UR QL VISUAL: SIGNIFICANT CHANGE UP
BUN SERPL-MCNC: 5 MG/DL — LOW (ref 7–23)
CALCIUM SERPL-MCNC: 8.6 MG/DL — SIGNIFICANT CHANGE UP (ref 8.4–10.5)
CHLORIDE SERPL-SCNC: 105 MMOL/L — SIGNIFICANT CHANGE UP (ref 98–107)
CO2 SERPL-SCNC: 24 MMOL/L — SIGNIFICANT CHANGE UP (ref 22–31)
COLOR SPEC: YELLOW — SIGNIFICANT CHANGE UP
CREAT SERPL-MCNC: 0.56 MG/DL — SIGNIFICANT CHANGE UP (ref 0.5–1.3)
DACRYOCYTES BLD QL SMEAR: SLIGHT — SIGNIFICANT CHANGE UP
ELLIPTOCYTES BLD QL SMEAR: SIGNIFICANT CHANGE UP
EOSINOPHIL # BLD AUTO: 0.24 K/UL — SIGNIFICANT CHANGE UP (ref 0–0.5)
EOSINOPHIL NFR BLD AUTO: 4.7 % — SIGNIFICANT CHANGE UP (ref 0–6)
EOSINOPHIL NFR FLD: 4.3 % — SIGNIFICANT CHANGE UP (ref 0–6)
GIANT PLATELETS BLD QL SMEAR: PRESENT — SIGNIFICANT CHANGE UP
GLUCOSE SERPL-MCNC: 89 MG/DL — SIGNIFICANT CHANGE UP (ref 70–99)
GLUCOSE UR-MCNC: NEGATIVE — SIGNIFICANT CHANGE UP
HCT VFR BLD CALC: 25 % — LOW (ref 34.5–45)
HGB BLD-MCNC: 7.1 G/DL — LOW (ref 11.5–15.5)
HYALINE CASTS # UR AUTO: HIGH
HYPOCHROMIA BLD QL: SIGNIFICANT CHANGE UP
IMM GRANULOCYTES NFR BLD AUTO: 0.4 % — SIGNIFICANT CHANGE UP (ref 0–1.5)
KETONES UR-MCNC: NEGATIVE — SIGNIFICANT CHANGE UP
LEUKOCYTE ESTERASE UR-ACNC: NEGATIVE — SIGNIFICANT CHANGE UP
LIDOCAIN IGE QN: 11.1 U/L — SIGNIFICANT CHANGE UP (ref 7–60)
LYMPHOCYTES # BLD AUTO: 1.78 K/UL — SIGNIFICANT CHANGE UP (ref 1–3.3)
LYMPHOCYTES # BLD AUTO: 34.7 % — SIGNIFICANT CHANGE UP (ref 13–44)
LYMPHOCYTES NFR SPEC AUTO: 23.9 % — SIGNIFICANT CHANGE UP (ref 13–44)
MCHC RBC-ENTMCNC: 16 PG — LOW (ref 27–34)
MCHC RBC-ENTMCNC: 28.4 % — LOW (ref 32–36)
MCV RBC AUTO: 56.4 FL — LOW (ref 80–100)
METAMYELOCYTES # FLD: 0 % — SIGNIFICANT CHANGE UP (ref 0–1)
MICROCYTES BLD QL: SIGNIFICANT CHANGE UP
MONOCYTES # BLD AUTO: 0.5 K/UL — SIGNIFICANT CHANGE UP (ref 0–0.9)
MONOCYTES NFR BLD AUTO: 9.7 % — SIGNIFICANT CHANGE UP (ref 2–14)
MONOCYTES NFR BLD: 6.8 % — SIGNIFICANT CHANGE UP (ref 2–9)
MYELOCYTES NFR BLD: 0 % — SIGNIFICANT CHANGE UP (ref 0–0)
NEUTROPHIL AB SER-ACNC: 59.8 % — SIGNIFICANT CHANGE UP (ref 43–77)
NEUTROPHILS # BLD AUTO: 2.53 K/UL — SIGNIFICANT CHANGE UP (ref 1.8–7.4)
NEUTROPHILS NFR BLD AUTO: 49.3 % — SIGNIFICANT CHANGE UP (ref 43–77)
NEUTS BAND # BLD: 0 % — SIGNIFICANT CHANGE UP (ref 0–6)
NITRITE UR-MCNC: NEGATIVE — SIGNIFICANT CHANGE UP
NRBC # FLD: 0 K/UL — SIGNIFICANT CHANGE UP (ref 0–0)
OTHER - HEMATOLOGY %: 0 — SIGNIFICANT CHANGE UP
OVALOCYTES BLD QL SMEAR: SLIGHT — SIGNIFICANT CHANGE UP
PH UR: 6 — SIGNIFICANT CHANGE UP (ref 5–8)
PLATELET # BLD AUTO: 462 K/UL — HIGH (ref 150–400)
PLATELET COUNT - ESTIMATE: NORMAL — SIGNIFICANT CHANGE UP
PMV BLD: SIGNIFICANT CHANGE UP FL (ref 7–13)
POIKILOCYTOSIS BLD QL AUTO: SIGNIFICANT CHANGE UP
POLYCHROMASIA BLD QL SMEAR: SLIGHT — SIGNIFICANT CHANGE UP
POTASSIUM SERPL-MCNC: 3.7 MMOL/L — SIGNIFICANT CHANGE UP (ref 3.5–5.3)
POTASSIUM SERPL-SCNC: 3.7 MMOL/L — SIGNIFICANT CHANGE UP (ref 3.5–5.3)
PROMYELOCYTES # FLD: 0 % — SIGNIFICANT CHANGE UP (ref 0–0)
PROT SERPL-MCNC: 6.4 G/DL — SIGNIFICANT CHANGE UP (ref 6–8.3)
PROT UR-MCNC: 30 — SIGNIFICANT CHANGE UP
RBC # BLD: 4.43 M/UL — SIGNIFICANT CHANGE UP (ref 3.8–5.2)
RBC # FLD: 22.2 % — HIGH (ref 10.3–14.5)
RBC CASTS # UR COMP ASSIST: SIGNIFICANT CHANGE UP (ref 0–?)
REVIEW TO FOLLOW: YES — SIGNIFICANT CHANGE UP
SCHISTOCYTES BLD QL AUTO: SLIGHT — SIGNIFICANT CHANGE UP
SODIUM SERPL-SCNC: 140 MMOL/L — SIGNIFICANT CHANGE UP (ref 135–145)
SP GR SPEC: 1.03 — SIGNIFICANT CHANGE UP (ref 1–1.04)
SQUAMOUS # UR AUTO: SIGNIFICANT CHANGE UP
TARGETS BLD QL SMEAR: SLIGHT — SIGNIFICANT CHANGE UP
UROBILINOGEN FLD QL: NORMAL — SIGNIFICANT CHANGE UP
VARIANT LYMPHS # BLD: 2.6 % — SIGNIFICANT CHANGE UP
WBC # BLD: 5.13 K/UL — SIGNIFICANT CHANGE UP (ref 3.8–10.5)
WBC # FLD AUTO: 5.13 K/UL — SIGNIFICANT CHANGE UP (ref 3.8–10.5)
WBC UR QL: SIGNIFICANT CHANGE UP (ref 0–?)

## 2020-09-14 PROCEDURE — 99284 EMERGENCY DEPT VISIT MOD MDM: CPT

## 2020-09-14 RX ORDER — SODIUM CHLORIDE 9 MG/ML
1000 INJECTION INTRAMUSCULAR; INTRAVENOUS; SUBCUTANEOUS ONCE
Refills: 0 | Status: COMPLETED | OUTPATIENT
Start: 2020-09-14 | End: 2020-09-14

## 2020-09-14 RX ORDER — FAMOTIDINE 10 MG/ML
20 INJECTION INTRAVENOUS DAILY
Refills: 0 | Status: DISCONTINUED | OUTPATIENT
Start: 2020-09-14 | End: 2020-09-14

## 2020-09-14 RX ORDER — SUCRALFATE 1 G
1 TABLET ORAL
Qty: 28 | Refills: 0
Start: 2020-09-14 | End: 2020-09-20

## 2020-09-14 RX ORDER — FERROUS SULFATE 325(65) MG
1 TABLET ORAL
Qty: 30 | Refills: 1
Start: 2020-09-14 | End: 2020-11-12

## 2020-09-14 RX ORDER — FAMOTIDINE 10 MG/ML
1 INJECTION INTRAVENOUS
Qty: 10 | Refills: 0
Start: 2020-09-14 | End: 2020-09-23

## 2020-09-14 RX ORDER — ACETAMINOPHEN 500 MG
975 TABLET ORAL ONCE
Refills: 0 | Status: COMPLETED | OUTPATIENT
Start: 2020-09-14 | End: 2020-09-14

## 2020-09-14 RX ORDER — METOCLOPRAMIDE HCL 10 MG
10 TABLET ORAL ONCE
Refills: 0 | Status: COMPLETED | OUTPATIENT
Start: 2020-09-14 | End: 2020-09-14

## 2020-09-14 RX ORDER — FAMOTIDINE 10 MG/ML
20 INJECTION INTRAVENOUS ONCE
Refills: 0 | Status: DISCONTINUED | OUTPATIENT
Start: 2020-09-14 | End: 2020-09-14

## 2020-09-14 NOTE — ED ADULT NURSE NOTE - NSIMPLEMENTINTERV_GEN_ALL_ED
Implemented All Universal Safety Interventions:  [employer] to call system. Call bell, personal items and telephone within reach. Instruct patient to call for assistance. Room bathroom lighting operational. Non-slip footwear when patient is off stretcher. Physically safe environment: no spills, clutter or unnecessary equipment. Stretcher in lowest position, wheels locked, appropriate side rails in place.

## 2020-09-14 NOTE — ED ADULT TRIAGE NOTE - CHIEF COMPLAINT QUOTE
Pt complaining of abdominal pain and nausea for 2 weeks. Pt also complaining of weakness and unable to tolerate PO intake. Pt denies chest pain, sob, fever or chills.

## 2020-09-14 NOTE — ED PROVIDER NOTE - ATTENDING CONTRIBUTION TO CARE
38 yo F past medical history anemia with chronic abd pain, n/v. no changes in bowel movements, urinary complaints. Prior work ups have not identified etiology.  exam as above. chronic abd pain. plan: labs, symptom relief, reassess.

## 2020-09-14 NOTE — ED PROVIDER NOTE - NS_EDPROVIDERDISPOUSERTYPE_ED_A_ED
Statement Selected Scribe Attestation (For Scribes USE Only)... Attending Attestation (For Attendings USE Only).../Scribe Attestation (For Scribes USE Only)...

## 2020-09-14 NOTE — ED PROVIDER NOTE - CLINICAL SUMMARY MEDICAL DECISION MAKING FREE TEXT BOX
38 y/o F with abdominal pain. Obtain CBC, CMP to r/o infectious etiology/electrolyte abnormality and UA to r/o UTI. Give pain, antiemetic medication and reassess. 38 y/o F with abdominal pain. Obtain CBC, CMP to r/o infectious etiology/electrolyte abnormality and UA to r/o UTI. Give pain, antiemetic medication and reassess. Hemoglobin 7.1. Pt states she stopped taking her iron medications a few months ago. No active bleeding, denies chest pain, shortness of breath, nausea/vomiting or weakness. Will discharge with Heme referral and iron sulfate. Return precautions provided. 38 y/o F with abdominal pain. Obtain CBC, CMP to r/o infectious etiology/electrolyte abnormality and UA to r/o UTI. Give pain, antiemetic medication and reassess. Hemoglobin 7.1. Pt states she stopped taking her iron medications a few months ago do to side affects of iron. No active bleeding, denies chest pain, shortness of breath, nausea/vomiting or weakness, or other signs if symptomatic anemia. Will discharge with Heme referral and iron sulfate. Return precautions provided.

## 2020-09-14 NOTE — ED PROVIDER NOTE - OBJECTIVE STATEMENT
40 y/o F no PMHx presents to the ED c/o abdominal pain starting yesterday around 3pm-4pm. Described abdominal pain and strong and deep inside. Experiencing similar symptoms for the last 8 years. Has been evaluated by GI and PCP a few months ago with an unremarkable workup. Hx of multiple CT scans and two colonoscopy. Admits to nausea and vomiting. Takes African medication for symptom relief. Has daily bowel movements. Denies CP, SOB. LMP September 6th. 38 y/o F w/ PMHx of anemia presents to the ED c/o abdominal pain starting yesterday around 3pm-4pm. Described abdominal pain and strong and deep inside. Experiencing similar symptoms for the last 8 years. Has been evaluated by GI and PCP a few months ago with an unremarkable workup. Hx of multiple CT scans and two colonoscopy. Admits to nausea and vomiting. Takes African medication for symptom relief. Has daily bowel movements. Denies CP, SOB, palpitations, weakness, vaginal bleeding or rectal bleeding. LMP September 6th. 40 y/o F w/ PMH of anemia presents to the ED c/o abdominal pain. Has experienced similar symptoms for the past 8 years. Describes pain as "something deep" that comes and goes. Most recent episode occurred yesterday afternoon prompting visit to ER. Evaluated by GI and PCP a few months ago with an unremarkable workup. Hx of multiple CT scans, endoscopy and colonoscopy. Admits to nausea and vomiting. Takes African medication for symptom relief. Has daily bowel movements. Denies headache, dizziness, nausea/vomiting, CP, SOB, palpitations, weakness, vaginal bleeding or rectal bleeding. LMP September 6th.

## 2020-09-14 NOTE — ED PROVIDER NOTE - PATIENT PORTAL LINK FT
You can access the FollowMyHealth Patient Portal offered by [facility] by registering at the following website: [email or web address]. By joining [employer]’s FollowMyHealth portal, you will also be able to view your health information using other applications (apps) compatible with our system.

## 2020-09-14 NOTE — ED PROVIDER NOTE - NS ED ROS FT
Constitutional: (-) fever  Head: Normal cephalic, Atraumatic  Eyes/ENT: (-) vision changes, (-) hearing changes  Cardiovascular: (-) chest pain, (-) wheezing  Respiratory: (-) cough, (-) shortness of breath  Gastrointestinal: (+) vomiting, (-) diarrhea, (+) abdominal pain  : (-) dysuria   Musculoskeletal: (-) back pain  Integumentary: (-) rash, (-) edema  Neurological: (-)loc  Allergic/Immunologic: (-) pruritus

## 2020-09-14 NOTE — ED PROVIDER NOTE - PHYSICAL EXAMINATION
Vital signs reviewed.   CONSTITUTIONAL: Well-appearing; well-nourished; in no apparent distress. Non-toxic appearing.   HEAD: Normocephalic, atraumatic.  EYES: PERRL, EOM intact, conjunctiva and no sclera injection noted  ENT: normal nose; no rhinorrhea; normal pharynx with no tonsillar hypertrophy, no erythema, no exudate  NECK/LYMPH: Supple; non-tender  CARD: Normal S1, S2  RESP: Normal chest excursion with respiration; breath sounds clear and equal bilaterally; no wheezes, rhonchi, or rales.  ABD/GI: soft, non-distended; non-tender;   EXT/MS: moves all extremities; distal pulses are normal, no pedal edema.  SKIN: Normal for age and race; warm; dry; good turgor; no apparent lesions or exudate noted.  NEURO: Awake, alert, oriented x 3, no gross deficits, CN II-XII grossly intact, no motor or sensory deficit noted.  PSYCH: Normal mood; appropriate affect.

## 2020-09-15 LAB
CULTURE RESULTS: SIGNIFICANT CHANGE UP
SPECIMEN SOURCE: SIGNIFICANT CHANGE UP

## 2020-10-08 ENCOUNTER — EMERGENCY (EMERGENCY)
Facility: HOSPITAL | Age: 40
LOS: 1 days | Discharge: HOME | End: 2020-10-08
Attending: EMERGENCY MEDICINE | Admitting: EMERGENCY MEDICINE
Payer: MEDICAID

## 2020-10-08 VITALS
TEMPERATURE: 98 F | HEIGHT: 72 IN | HEART RATE: 88 BPM | OXYGEN SATURATION: 100 % | DIASTOLIC BLOOD PRESSURE: 68 MMHG | SYSTOLIC BLOOD PRESSURE: 102 MMHG | RESPIRATION RATE: 15 BRPM

## 2020-10-08 DIAGNOSIS — Z98.891 HISTORY OF UTERINE SCAR FROM PREVIOUS SURGERY: Chronic | ICD-10-CM

## 2020-10-08 LAB
ALBUMIN SERPL ELPH-MCNC: 3.6 G/DL — SIGNIFICANT CHANGE UP (ref 3.3–5)
ALP SERPL-CCNC: 71 U/L — SIGNIFICANT CHANGE UP (ref 40–120)
ALT FLD-CCNC: < 4 U/L — LOW (ref 4–33)
ANION GAP SERPL CALC-SCNC: 11 MMO/L — SIGNIFICANT CHANGE UP (ref 7–14)
ANISOCYTOSIS BLD QL: SIGNIFICANT CHANGE UP
APPEARANCE UR: SIGNIFICANT CHANGE UP
AST SERPL-CCNC: 6 U/L — SIGNIFICANT CHANGE UP (ref 4–32)
BACTERIA # UR AUTO: NEGATIVE — SIGNIFICANT CHANGE UP
BASOPHILS # BLD AUTO: 0.05 K/UL — SIGNIFICANT CHANGE UP (ref 0–0.2)
BASOPHILS NFR BLD AUTO: 0.8 % — SIGNIFICANT CHANGE UP (ref 0–2)
BASOPHILS NFR SPEC: 1.7 % — SIGNIFICANT CHANGE UP (ref 0–2)
BILIRUB SERPL-MCNC: 0.2 MG/DL — SIGNIFICANT CHANGE UP (ref 0.2–1.2)
BILIRUB UR-MCNC: SIGNIFICANT CHANGE UP
BLASTS # FLD: 0 % — SIGNIFICANT CHANGE UP (ref 0–0)
BLOOD UR QL VISUAL: NEGATIVE — SIGNIFICANT CHANGE UP
BUN SERPL-MCNC: 7 MG/DL — SIGNIFICANT CHANGE UP (ref 7–23)
CALCIUM SERPL-MCNC: 9.3 MG/DL — SIGNIFICANT CHANGE UP (ref 8.4–10.5)
CHLORIDE SERPL-SCNC: 103 MMOL/L — SIGNIFICANT CHANGE UP (ref 98–107)
CO2 SERPL-SCNC: 25 MMOL/L — SIGNIFICANT CHANGE UP (ref 22–31)
COLOR SPEC: YELLOW — SIGNIFICANT CHANGE UP
CREAT SERPL-MCNC: 0.7 MG/DL — SIGNIFICANT CHANGE UP (ref 0.5–1.3)
DACRYOCYTES BLD QL SMEAR: SIGNIFICANT CHANGE UP
ELLIPTOCYTES BLD QL SMEAR: SIGNIFICANT CHANGE UP
EOSINOPHIL # BLD AUTO: 0.16 K/UL — SIGNIFICANT CHANGE UP (ref 0–0.5)
EOSINOPHIL NFR BLD AUTO: 2.5 % — SIGNIFICANT CHANGE UP (ref 0–6)
EOSINOPHIL NFR FLD: 0.9 % — SIGNIFICANT CHANGE UP (ref 0–6)
GIANT PLATELETS BLD QL SMEAR: PRESENT — SIGNIFICANT CHANGE UP
GLUCOSE SERPL-MCNC: 92 MG/DL — SIGNIFICANT CHANGE UP (ref 70–99)
GLUCOSE UR-MCNC: NEGATIVE — SIGNIFICANT CHANGE UP
HCT VFR BLD CALC: 27.1 % — LOW (ref 34.5–45)
HGB BLD-MCNC: 7.7 G/DL — LOW (ref 11.5–15.5)
HYPOCHROMIA BLD QL: SIGNIFICANT CHANGE UP
IMM GRANULOCYTES NFR BLD AUTO: 0.8 % — SIGNIFICANT CHANGE UP (ref 0–1.5)
KETONES UR-MCNC: SIGNIFICANT CHANGE UP
LEUKOCYTE ESTERASE UR-ACNC: NEGATIVE — SIGNIFICANT CHANGE UP
LYMPHOCYTES # BLD AUTO: 1.54 K/UL — SIGNIFICANT CHANGE UP (ref 1–3.3)
LYMPHOCYTES # BLD AUTO: 23.8 % — SIGNIFICANT CHANGE UP (ref 13–44)
LYMPHOCYTES NFR SPEC AUTO: 20.2 % — SIGNIFICANT CHANGE UP (ref 13–44)
MCHC RBC-ENTMCNC: 15.8 PG — LOW (ref 27–34)
MCHC RBC-ENTMCNC: 28.4 % — LOW (ref 32–36)
MCV RBC AUTO: 55.6 FL — LOW (ref 80–100)
METAMYELOCYTES # FLD: 0 % — SIGNIFICANT CHANGE UP (ref 0–1)
MICROCYTES BLD QL: SIGNIFICANT CHANGE UP
MONOCYTES # BLD AUTO: 0.7 K/UL — SIGNIFICANT CHANGE UP (ref 0–0.9)
MONOCYTES NFR BLD AUTO: 10.8 % — SIGNIFICANT CHANGE UP (ref 2–14)
MONOCYTES NFR BLD: 7 % — SIGNIFICANT CHANGE UP (ref 2–9)
MUCOUS THREADS # UR AUTO: SIGNIFICANT CHANGE UP
MYELOCYTES NFR BLD: 0 % — SIGNIFICANT CHANGE UP (ref 0–0)
NEUTROPHIL AB SER-ACNC: 66.7 % — SIGNIFICANT CHANGE UP (ref 43–77)
NEUTROPHILS # BLD AUTO: 3.98 K/UL — SIGNIFICANT CHANGE UP (ref 1.8–7.4)
NEUTROPHILS NFR BLD AUTO: 61.3 % — SIGNIFICANT CHANGE UP (ref 43–77)
NEUTS BAND # BLD: 2.6 % — SIGNIFICANT CHANGE UP (ref 0–6)
NITRITE UR-MCNC: NEGATIVE — SIGNIFICANT CHANGE UP
NRBC # FLD: 0 K/UL — SIGNIFICANT CHANGE UP (ref 0–0)
OTHER - HEMATOLOGY %: 0 — SIGNIFICANT CHANGE UP
PH UR: 6 — SIGNIFICANT CHANGE UP (ref 5–8)
PLATELET # BLD AUTO: 534 K/UL — HIGH (ref 150–400)
PLATELET COUNT - ESTIMATE: SIGNIFICANT CHANGE UP
PMV BLD: SIGNIFICANT CHANGE UP FL (ref 7–13)
POIKILOCYTOSIS BLD QL AUTO: SIGNIFICANT CHANGE UP
POTASSIUM SERPL-MCNC: 3.7 MMOL/L — SIGNIFICANT CHANGE UP (ref 3.5–5.3)
POTASSIUM SERPL-SCNC: 3.7 MMOL/L — SIGNIFICANT CHANGE UP (ref 3.5–5.3)
PROMYELOCYTES # FLD: 0 % — SIGNIFICANT CHANGE UP (ref 0–0)
PROT SERPL-MCNC: 6.5 G/DL — SIGNIFICANT CHANGE UP (ref 6–8.3)
PROT UR-MCNC: 50 — SIGNIFICANT CHANGE UP
RBC # BLD: 4.87 M/UL — SIGNIFICANT CHANGE UP (ref 3.8–5.2)
RBC # FLD: 23 % — HIGH (ref 10.3–14.5)
RBC CASTS # UR COMP ASSIST: SIGNIFICANT CHANGE UP (ref 0–?)
REVIEW TO FOLLOW: YES — SIGNIFICANT CHANGE UP
SARS-COV-2 RNA SPEC QL NAA+PROBE: SIGNIFICANT CHANGE UP
SCHISTOCYTES BLD QL AUTO: SIGNIFICANT CHANGE UP
SODIUM SERPL-SCNC: 139 MMOL/L — SIGNIFICANT CHANGE UP (ref 135–145)
SP GR SPEC: 1.04 — SIGNIFICANT CHANGE UP (ref 1–1.04)
SQUAMOUS # UR AUTO: SIGNIFICANT CHANGE UP
TARGETS BLD QL SMEAR: SLIGHT — SIGNIFICANT CHANGE UP
TSH SERPL-MCNC: 0.95 UIU/ML — SIGNIFICANT CHANGE UP (ref 0.27–4.2)
UROBILINOGEN FLD QL: SIGNIFICANT CHANGE UP
VARIANT LYMPHS # BLD: 0.9 % — SIGNIFICANT CHANGE UP
WBC # BLD: 6.48 K/UL — SIGNIFICANT CHANGE UP (ref 3.8–10.5)
WBC # FLD AUTO: 6.48 K/UL — SIGNIFICANT CHANGE UP (ref 3.8–10.5)
WBC UR QL: SIGNIFICANT CHANGE UP (ref 0–?)

## 2020-10-08 PROCEDURE — 99284 EMERGENCY DEPT VISIT MOD MDM: CPT

## 2020-10-08 PROCEDURE — 71046 X-RAY EXAM CHEST 2 VIEWS: CPT | Mod: 26

## 2020-10-08 RX ORDER — LIDOCAINE 4 G/100G
10 CREAM TOPICAL ONCE
Refills: 0 | Status: COMPLETED | OUTPATIENT
Start: 2020-10-08 | End: 2020-10-08

## 2020-10-08 RX ORDER — ACETAMINOPHEN 500 MG
975 TABLET ORAL ONCE
Refills: 0 | Status: COMPLETED | OUTPATIENT
Start: 2020-10-08 | End: 2020-10-08

## 2020-10-08 RX ORDER — FAMOTIDINE 10 MG/ML
20 INJECTION INTRAVENOUS ONCE
Refills: 0 | Status: COMPLETED | OUTPATIENT
Start: 2020-10-08 | End: 2020-10-08

## 2020-10-08 RX ADMIN — FAMOTIDINE 20 MILLIGRAM(S): 10 INJECTION INTRAVENOUS at 12:22

## 2020-10-08 RX ADMIN — Medication 975 MILLIGRAM(S): at 12:23

## 2020-10-08 NOTE — ED PROVIDER NOTE - NSFOLLOWUPINSTRUCTIONS_ED_ALL_ED_FT
The cause of your abdominal pain and shortness of breath was not found.     Your hemoglobin was 7.7 which is your baseline. Please follow up with your primary care doctor for continued treatment of your anemia.    Your COVID results will be texted to you.    You should expect a call from our booking office for rheumatology to make an appointment.     Abdominal Pain    Many things can cause abdominal pain. Many times, abdominal pain is not caused by a disease and will improve without treatment. Your health care provider will do a physical exam to determine if there is a dangerous cause of your pain; blood tests and imaging may help determine the cause of your pain. However, in many cases, no cause may be found and you may need further testing as an outpatient. Monitor your abdominal pain for any changes.     SEEK IMMEDIATE MEDICAL CARE IF YOU HAVE ANY OF THE FOLLOWING SYMPTOMS: worsening abdominal pain, uncontrollable vomiting, profuse diarrhea, inability to have bowel movements or pass gas, black or bloody stools, fever accompanying chest pain or back pain, or fainting. Worsening shortness of breath. These symptoms may represent a serious problem that is an emergency. Do not wait to see if the symptoms will go away. Get medical help right away. Call 911 and do not drive yourself to the hospital. The cause of your abdominal pain and shortness of breath was not found.     Your hemoglobin was 7.7 which is your baseline. Please follow up with your primary care doctor for continued treatment of your anemia.    Your COVID results will be texted to you.    You should expect a call from our booking office for gastroenterology to make an appointment.     Abdominal Pain    Many things can cause abdominal pain. Many times, abdominal pain is not caused by a disease and will improve without treatment. Your health care provider will do a physical exam to determine if there is a dangerous cause of your pain; blood tests and imaging may help determine the cause of your pain. However, in many cases, no cause may be found and you may need further testing as an outpatient. Monitor your abdominal pain for any changes.     SEEK IMMEDIATE MEDICAL CARE IF YOU HAVE ANY OF THE FOLLOWING SYMPTOMS: worsening abdominal pain, uncontrollable vomiting, profuse diarrhea, inability to have bowel movements or pass gas, black or bloody stools, fever accompanying chest pain or back pain, or fainting. Worsening shortness of breath. These symptoms may represent a serious problem that is an emergency. Do not wait to see if the symptoms will go away. Get medical help right away. Call 911 and do not drive yourself to the hospital.

## 2020-10-08 NOTE — ED PROVIDER NOTE - OBJECTIVE STATEMENT
38 yo F with ho iron deficiency anemia presenting with gastritis and sob in past few days. Abdominal pain is diffuse an moderate mild to moderate severity. Non radiating. No modifying factors. Has been seen previously here and at [facility] for similar complaint and nothing was found to be abnormal. Denies n/v, fever. LBM today. No ho abdominal surgeries. Also with some SOB over last few days. Denies fever, cough, covid exposure, swelling legs, recent travel, contraception use. States that her entire body has been hurting her for years.

## 2020-10-08 NOTE — ED PROVIDER NOTE - CLINICAL SUMMARY MEDICAL DECISION MAKING FREE TEXT BOX
Joseph Frankel PGY2: 40 yo F with abdominal pain and sob. VSS. Patient looks well and is non toxic appearing. PE as above. Most likely gastritis as patient has history. Also consider pancreatitis but less likely as this has been chronic. For SOB consider anemia given ho. Patient PERC negative. will get CXR to ro anemia and ecg to ro arrhythmia although no history to suggest this. For patients chronic body pains will give referral for rheumatology follow up. Will reassess. Joseph Frankel PGY2: 40 yo F with abdominal pain and sob. VSS. Patient looks well and is non toxic appearing. PE as above. Most likely gastritis as patient has history. Also consider pancreatitis but less likely as this has been chronic. For SOB consider anemia given ho. Patient PERC negative. will get CXR to ro anemia and ecg to ro arrhythmia although no history to suggest this. Will reassess.

## 2020-10-08 NOTE — ED PROVIDER NOTE - PROGRESS NOTE DETAILS
Joseph Frankel PGY2: Labs with anemia to 7.7 which is patient's baseline. Taking iron and is following outpatient for this. Patient feeling better after medications. Given chronic full body pain and all other work up outpatient with no etiology will dc with rheum follow up. Discussed plan and return precautions with patient who understands and agrees. All questions answered. Joseph Frankel PGY2: Labs with anemia to 7.7 which is patient's baseline. Taking iron and is following outpatient for this. Patient feeling better after medications. Given chronic abd pain and all other work up outpatient with no etiology will dc with gastro follow up. Discussed plan and return precautions with patient who understands and agrees. All questions answered.

## 2020-10-08 NOTE — ED ADULT NURSE NOTE - CAS EDN DISCHARGE ASSESSMENT
Patient lab wnl. xrayshow pna, not typical b/l pattern of covid, patient taken zpac last week. saturation stable. will test for covid, treat pna, return precaution, patient neurovascularly intact, well appearing on dc Alert and oriented to person, place and time

## 2020-10-08 NOTE — ED ADULT NURSE NOTE - OBJECTIVE STATEMENT
Pt received AOX4, ambulatory at baseline presents to the ED with multiple medical complaints. Pt states she has been having mid-sternal, non radiating CP, dizziness, generalized abdominal pain, and headache for the past week. Pt states the headache is 4/10 and is located in the frontal areas. Pt states she has 4/10 midsternal CP, non-radiating at the moment along with SOB which started couple of days ago. Pt breathing even and unlabored, saturating at 100% on RA.  Pt states the abdomen pain is chronic for her. Pt states she also feels nauseous, and have been vomiting throughout the past week. Pt denies blood in the vomit. Pt denies vomiting today. Pt denies Chills, fever, cough, fatigue, diarrhea.  20G placed in LAC.

## 2020-10-08 NOTE — ED PROVIDER NOTE - ATTENDING CONTRIBUTION TO CARE
Dr. Ash: 40 yo female with iron deficiency anemia, in ED with abdominal pain and SOB for a few days.  Has previously been evaluated for this at outside hospital without source found.  On exam pt overall well appearing, in NAD, heart RRR, lungs CTAB, abd NTND, extremities without swelling, strength 5/5 in all extremities and skin without rash.

## 2020-10-08 NOTE — ED ADULT NURSE NOTE - CHIEF COMPLAINT QUOTE
Pt presents to ED for abd pain, nausea, headache and dizziness x1wk and SOB x2days. LMP 10/2. Pt able to speak in full complete sentences at this time, airway patent, SpO2 100%. Pmhx of anemia.

## 2020-10-08 NOTE — ED PROVIDER NOTE - NSFOLLOWUPCLINICS_GEN_ALL_ED_FT
[facility]  Gastroenterology  [address] [place] [id number]  Phone: [phone]  Fax:   Follow Up Time:

## 2020-10-08 NOTE — ED PROVIDER NOTE - PATIENT PORTAL LINK FT
You can access the FollowMyHealth Patient Portal offered by [facility] by registering at the following website: [email or web address]. By joining [employer]’s FollowMyHealth portal, you will also be able to view your health information using other applications (apps) compatible with our system.

## 2020-10-08 NOTE — ED PROVIDER NOTE - PHYSICAL EXAMINATION
Gen: Alert and oriented. Lying comfortably in bed. Answering questions appropriately  HEENT: extra occular movements intact, no nasal discharge, mucous membranes moist  CV: Regular rate and rhythm, +S1/S2, no murmurs/rubs/gallops,   Resp: Clear to ausculation bilaterally, no wheezes/rhonchi/rales  GI: Abdomen soft non-distended, mild epigastric tenderness, no masses  MSK: No open wounds, no bruising, no LE edema, Homans sign negative bl  Neuro: A&Ox4, following commands, moving all four extremities spontaneously  Psych: appropriate mood

## 2020-10-09 LAB
CULTURE RESULTS: NO GROWTH — SIGNIFICANT CHANGE UP
SPECIMEN SOURCE: SIGNIFICANT CHANGE UP

## 2020-12-10 NOTE — ED PROVIDER NOTE - NEUROLOGICAL, MLM
LOV 4/22/20. NOV 4/23/21.     Ok for neurology referral for HAs, 6 x 1.5 cm greatest diameter left posterior fossa arachnoid cyst seen on CT neck in 8/2013. Paola Dalton PA-C     Alert and oriented, no focal deficits, no motor or sensory deficits.

## 2021-01-01 NOTE — ED ADULT TRIAGE NOTE - PAIN: PRESENCE, MLM
NICU TRANSITIONAL CARE COORDINATION/DISCHARGE PLANNING NOTE    CGA: 31w6d DOL: 30    Barriers to DC: VT, NPO, TPN/SMOF, IV Caffeine, remains in isolette    Anticipate d/c home with parent in approximately 9 more weeks or up to 36 weeks post-menstrual age when infant able to PO all feeds and maintain body temperature in an open crib for minimum 48 hours, gain weight within acceptable limits for post-gestational age and is otherwise hemodynamically stable.      Possible need for skilled nursing visits, medications and/or dme at time of discharge    CM continue to follow complains of pain/discomfort

## 2021-04-13 NOTE — ED PROVIDER NOTE - GASTROINTESTINAL [-], MLM
no nausea/no vomiting Verbal - The patient responds to verbal stimuli by opening their eyes when someone speaks to them. The patient is not fully oriented to time, place, or person.

## 2021-04-14 ENCOUNTER — EMERGENCY (EMERGENCY)
Facility: HOSPITAL | Age: 41
LOS: 1 days | Discharge: HOME | End: 2021-04-14
Attending: STUDENT IN AN ORGANIZED HEALTH CARE EDUCATION/TRAINING PROGRAM | Admitting: STUDENT IN AN ORGANIZED HEALTH CARE EDUCATION/TRAINING PROGRAM
Payer: MEDICAID

## 2021-04-14 VITALS
RESPIRATION RATE: 18 BRPM | SYSTOLIC BLOOD PRESSURE: 124 MMHG | HEART RATE: 84 BPM | HEIGHT: 72 IN | DIASTOLIC BLOOD PRESSURE: 74 MMHG | OXYGEN SATURATION: 98 % | TEMPERATURE: 100 F

## 2021-04-14 VITALS
OXYGEN SATURATION: 100 % | SYSTOLIC BLOOD PRESSURE: 106 MMHG | RESPIRATION RATE: 15 BRPM | DIASTOLIC BLOOD PRESSURE: 67 MMHG | TEMPERATURE: 99 F | HEART RATE: 68 BPM

## 2021-04-14 DIAGNOSIS — Z98.891 HISTORY OF UTERINE SCAR FROM PREVIOUS SURGERY: Chronic | ICD-10-CM

## 2021-04-14 LAB
ALBUMIN SERPL ELPH-MCNC: 3.4 G/DL — SIGNIFICANT CHANGE UP (ref 3.3–5)
ALP SERPL-CCNC: 70 U/L — SIGNIFICANT CHANGE UP (ref 40–120)
ALT FLD-CCNC: <5 U/L — LOW (ref 4–33)
ANION GAP SERPL CALC-SCNC: 13 MMOL/L — SIGNIFICANT CHANGE UP (ref 7–14)
APPEARANCE UR: CLEAR — SIGNIFICANT CHANGE UP
AST SERPL-CCNC: 7 U/L — SIGNIFICANT CHANGE UP (ref 4–32)
BACTERIA # UR AUTO: NEGATIVE — SIGNIFICANT CHANGE UP
BASOPHILS # BLD AUTO: 0.12 K/UL — SIGNIFICANT CHANGE UP (ref 0–0.2)
BASOPHILS NFR BLD AUTO: 1.8 % — SIGNIFICANT CHANGE UP (ref 0–2)
BILIRUB SERPL-MCNC: <0.2 MG/DL — SIGNIFICANT CHANGE UP (ref 0.2–1.2)
BILIRUB UR-MCNC: NEGATIVE — SIGNIFICANT CHANGE UP
BUN SERPL-MCNC: 5 MG/DL — LOW (ref 7–23)
CALCIUM SERPL-MCNC: 9 MG/DL — SIGNIFICANT CHANGE UP (ref 8.4–10.5)
CHLORIDE SERPL-SCNC: 98 MMOL/L — SIGNIFICANT CHANGE UP (ref 98–107)
CO2 SERPL-SCNC: 26 MMOL/L — SIGNIFICANT CHANGE UP (ref 22–31)
COLOR SPEC: SIGNIFICANT CHANGE UP
CREAT SERPL-MCNC: 0.64 MG/DL — SIGNIFICANT CHANGE UP (ref 0.5–1.3)
DIFF PNL FLD: NEGATIVE — SIGNIFICANT CHANGE UP
EOSINOPHIL # BLD AUTO: 0.18 K/UL — SIGNIFICANT CHANGE UP (ref 0–0.5)
EOSINOPHIL NFR BLD AUTO: 2.6 % — SIGNIFICANT CHANGE UP (ref 0–6)
EPI CELLS # UR: 4 /HPF — SIGNIFICANT CHANGE UP (ref 0–5)
GLUCOSE SERPL-MCNC: 94 MG/DL — SIGNIFICANT CHANGE UP (ref 70–99)
GLUCOSE UR QL: NEGATIVE — SIGNIFICANT CHANGE UP
HCT VFR BLD CALC: 31.6 % — LOW (ref 34.5–45)
HGB BLD-MCNC: 9.8 G/DL — LOW (ref 11.5–15.5)
IANC: 3.68 K/UL — SIGNIFICANT CHANGE UP (ref 1.5–8.5)
KETONES UR-MCNC: NEGATIVE — SIGNIFICANT CHANGE UP
LEUKOCYTE ESTERASE UR-ACNC: NEGATIVE — SIGNIFICANT CHANGE UP
LIDOCAIN IGE QN: 17 U/L — SIGNIFICANT CHANGE UP (ref 7–60)
LYMPHOCYTES # BLD AUTO: 1.24 K/UL — SIGNIFICANT CHANGE UP (ref 1–3.3)
LYMPHOCYTES # BLD AUTO: 18.3 % — SIGNIFICANT CHANGE UP (ref 13–44)
MCHC RBC-ENTMCNC: 19 PG — LOW (ref 27–34)
MCHC RBC-ENTMCNC: 31 GM/DL — LOW (ref 32–36)
MCV RBC AUTO: 61.4 FL — LOW (ref 80–100)
MONOCYTES # BLD AUTO: 0.7 K/UL — SIGNIFICANT CHANGE UP (ref 0–0.9)
MONOCYTES NFR BLD AUTO: 10.4 % — SIGNIFICANT CHANGE UP (ref 2–14)
NEUTROPHILS # BLD AUTO: 4.41 K/UL — SIGNIFICANT CHANGE UP (ref 1.8–7.4)
NEUTROPHILS NFR BLD AUTO: 65.2 % — SIGNIFICANT CHANGE UP (ref 43–77)
NITRITE UR-MCNC: NEGATIVE — SIGNIFICANT CHANGE UP
PH UR: 6.5 — SIGNIFICANT CHANGE UP (ref 5–8)
PLATELET # BLD AUTO: 535 K/UL — HIGH (ref 150–400)
POTASSIUM SERPL-MCNC: 3.3 MMOL/L — LOW (ref 3.5–5.3)
POTASSIUM SERPL-SCNC: 3.3 MMOL/L — LOW (ref 3.5–5.3)
PROT SERPL-MCNC: 6.8 G/DL — SIGNIFICANT CHANGE UP (ref 6–8.3)
PROT UR-MCNC: ABNORMAL
RBC # BLD: 5.15 M/UL — SIGNIFICANT CHANGE UP (ref 3.8–5.2)
RBC # FLD: 19.4 % — HIGH (ref 10.3–14.5)
RBC CASTS # UR COMP ASSIST: 1 /HPF — SIGNIFICANT CHANGE UP (ref 0–4)
SARS-COV-2 RNA SPEC QL NAA+PROBE: SIGNIFICANT CHANGE UP
SODIUM SERPL-SCNC: 137 MMOL/L — SIGNIFICANT CHANGE UP (ref 135–145)
SP GR SPEC: 1.01 — LOW (ref 1.01–1.02)
UROBILINOGEN FLD QL: SIGNIFICANT CHANGE UP
WBC # BLD: 6.77 K/UL — SIGNIFICANT CHANGE UP (ref 3.8–10.5)
WBC # FLD AUTO: 6.77 K/UL — SIGNIFICANT CHANGE UP (ref 3.8–10.5)
WBC UR QL: 3 /HPF — SIGNIFICANT CHANGE UP (ref 0–5)

## 2021-04-14 PROCEDURE — 99285 EMERGENCY DEPT VISIT HI MDM: CPT

## 2021-04-14 PROCEDURE — 74177 CT ABD & PELVIS W/CONTRAST: CPT | Mod: 26

## 2021-04-14 RX ORDER — CIPROFLOXACIN LACTATE 400MG/40ML
500 VIAL (ML) INTRAVENOUS ONCE
Refills: 0 | Status: COMPLETED | OUTPATIENT
Start: 2021-04-14 | End: 2021-04-14

## 2021-04-14 RX ORDER — MOXIFLOXACIN HYDROCHLORIDE TABLETS, 400 MG 400 MG/1
1 TABLET, FILM COATED ORAL
Qty: 20 | Refills: 0
Start: 2021-04-14 | End: 2021-04-23

## 2021-04-14 RX ORDER — METRONIDAZOLE 500 MG
1 TABLET ORAL
Qty: 30 | Refills: 0
Start: 2021-04-14 | End: 2021-04-23

## 2021-04-14 RX ORDER — METRONIDAZOLE 500 MG
500 TABLET ORAL ONCE
Refills: 0 | Status: COMPLETED | OUTPATIENT
Start: 2021-04-14 | End: 2021-04-14

## 2021-04-14 RX ORDER — SODIUM CHLORIDE 9 MG/ML
1000 INJECTION INTRAMUSCULAR; INTRAVENOUS; SUBCUTANEOUS ONCE
Refills: 0 | Status: COMPLETED | OUTPATIENT
Start: 2021-04-14 | End: 2021-04-14

## 2021-04-14 RX ORDER — ACETAMINOPHEN 500 MG
975 TABLET ORAL ONCE
Refills: 0 | Status: COMPLETED | OUTPATIENT
Start: 2021-04-14 | End: 2021-04-14

## 2021-04-14 RX ORDER — FAMOTIDINE 10 MG/ML
20 INJECTION INTRAVENOUS ONCE
Refills: 0 | Status: COMPLETED | OUTPATIENT
Start: 2021-04-14 | End: 2021-04-14

## 2021-04-14 RX ORDER — MORPHINE SULFATE 50 MG/1
4 CAPSULE, EXTENDED RELEASE ORAL ONCE
Refills: 0 | Status: DISCONTINUED | OUTPATIENT
Start: 2021-04-14 | End: 2021-04-14

## 2021-04-14 RX ADMIN — SODIUM CHLORIDE 1000 MILLILITER(S): 9 INJECTION INTRAMUSCULAR; INTRAVENOUS; SUBCUTANEOUS at 21:04

## 2021-04-14 RX ADMIN — SODIUM CHLORIDE 1000 MILLILITER(S): 9 INJECTION INTRAMUSCULAR; INTRAVENOUS; SUBCUTANEOUS at 18:18

## 2021-04-14 RX ADMIN — Medication 975 MILLIGRAM(S): at 20:30

## 2021-04-14 RX ADMIN — FAMOTIDINE 20 MILLIGRAM(S): 10 INJECTION INTRAVENOUS at 18:18

## 2021-04-14 RX ADMIN — Medication 975 MILLIGRAM(S): at 18:18

## 2021-04-14 RX ADMIN — Medication 500 MILLIGRAM(S): at 23:27

## 2021-04-14 RX ADMIN — MORPHINE SULFATE 4 MILLIGRAM(S): 50 CAPSULE, EXTENDED RELEASE ORAL at 21:04

## 2021-04-14 NOTE — ED PROVIDER NOTE - ATTENDING CONTRIBUTION TO CARE
39yo F with 7 years of abdominal pain, states she has been through multiple CTs and EGDs but pain worsened he alst 2 weeks, cant sleep at night and decreased PO. + nausea, no vomiting, no diarrhea, no dysuria, pain is worse in epigastrium. has not been taking anything currently for pain. no fevers but borderline febrile in ED, lmp 4/2, no stds, no vaginal discharge  on exam bl lower abd tenderness  will get labs, CT abd, gi follow up

## 2021-04-14 NOTE — ED PROVIDER NOTE - PROGRESS NOTE DETAILS
Tanna: received pt on sign out pending CTAP. CTAP reveals multifocal enteritis and cecitis. Plan to treat with abx. Pt has GI to follow up with, however will give referral to our GI team and referral to patient liaison for appointment assistance. Given strict return precautions. VSS.

## 2021-04-14 NOTE — ED PROVIDER NOTE - PATIENT PORTAL LINK FT
You can access the FollowMyHealth Patient Portal offered by [facility] by registering at the following website: [email or web address]. By joining [employer]s FollowMyHealth portal, you will also be able to view your health information using other applications (apps) compatible with our system.

## 2021-04-14 NOTE — ED ADULT NURSE NOTE - OBJECTIVE STATEMENT
Receive pt. in Intake room 7 alert and oriented x 4, presenting to the ER with complaints of epigastric pain. Pt. stated " I have stomach burning and pain for 2 weeks". Medicated as ordered, no c/o nausea, no vomiting. Labs sent, will continue to monitor.

## 2021-04-14 NOTE — ED PROVIDER NOTE - OBJECTIVE STATEMENT
39yo F PMH Anemia, Gastritis C/O 7 years of abdominal pain, states she has been through multiple CTs and EGDs but pain worsened he over the past 2 week. Pt states she cannot sleep at night and does not have an appetite. Pt endorses nausea, denies vomiting, states she has difficulty eating due to nausea and poor appetite. Pt states she has a GI doctor who she last saw 1 month ago, states she had endoscopies in the past, normal as per patient. Pt denies any other sx or acute complaints, contrary to triage note pt does not endorse CASTILLO.

## 2021-04-14 NOTE — ED PROVIDER NOTE - CARE PLAN
Principal Discharge DX:	Acute abdominal pain   Principal Discharge DX:	Enteritis  Secondary Diagnosis:	Cecitis

## 2021-04-14 NOTE — ED PROVIDER NOTE - CLINICAL SUMMARY MEDICAL DECISION MAKING FREE TEXT BOX
39yo F PMH Anemia, Gastritis C/O 7 years of abdominal pain, states she has been through multiple CTs and EGDs but pain worsened he over the past 2 week. Pt states she cannot sleep at night and does not have an appetite. Pt endorses nausea, denies vomiting, states she has difficulty eating due to nausea and poor appetite. CT ordered to eval for acute abdominal pathology. Pt is well appearing, no acute distress. + Diffuse abdominal tenderness to palpation.

## 2021-04-14 NOTE — ED ADULT TRIAGE NOTE - CHIEF COMPLAINT QUOTE
p/t c/o of headaches, epigastric pain for few days, p/t unable to sleep due to pain, no neuro deficits noted

## 2021-04-14 NOTE — ED PROVIDER NOTE - NSFOLLOWUPCLINICS_GEN_ALL_ED_FT
[facility]astroenter[facility]  Gastroenterology  [address] [place] [address] [address]  [place] [address]  Phone: [phone]  Fax:   Follow Up Time: Urgent

## 2021-04-16 LAB
CULTURE RESULTS: SIGNIFICANT CHANGE UP
SPECIMEN SOURCE: SIGNIFICANT CHANGE UP

## 2021-12-23 ENCOUNTER — EMERGENCY (EMERGENCY)
Facility: HOSPITAL | Age: 41
LOS: 1 days | Discharge: HOME | End: 2021-12-23
Attending: STUDENT IN AN ORGANIZED HEALTH CARE EDUCATION/TRAINING PROGRAM | Admitting: STUDENT IN AN ORGANIZED HEALTH CARE EDUCATION/TRAINING PROGRAM
Payer: MEDICAID

## 2021-12-23 VITALS
HEART RATE: 52 BPM | DIASTOLIC BLOOD PRESSURE: 73 MMHG | OXYGEN SATURATION: 100 % | HEIGHT: 72 IN | TEMPERATURE: 98 F | SYSTOLIC BLOOD PRESSURE: 107 MMHG | RESPIRATION RATE: 18 BRPM

## 2021-12-23 VITALS
RESPIRATION RATE: 18 BRPM | OXYGEN SATURATION: 100 % | DIASTOLIC BLOOD PRESSURE: 68 MMHG | SYSTOLIC BLOOD PRESSURE: 101 MMHG | HEART RATE: 58 BPM | TEMPERATURE: 99 F

## 2021-12-23 DIAGNOSIS — Z98.891 HISTORY OF UTERINE SCAR FROM PREVIOUS SURGERY: Chronic | ICD-10-CM

## 2021-12-23 LAB
ALBUMIN SERPL ELPH-MCNC: 3.4 G/DL — SIGNIFICANT CHANGE UP (ref 3.3–5)
ALP SERPL-CCNC: 74 U/L — SIGNIFICANT CHANGE UP (ref 40–120)
ALT FLD-CCNC: 5 U/L — SIGNIFICANT CHANGE UP (ref 4–33)
ANION GAP SERPL CALC-SCNC: 9 MMOL/L — SIGNIFICANT CHANGE UP (ref 7–14)
APPEARANCE UR: ABNORMAL
AST SERPL-CCNC: 8 U/L — SIGNIFICANT CHANGE UP (ref 4–32)
BASOPHILS # BLD AUTO: 0.08 K/UL — SIGNIFICANT CHANGE UP (ref 0–0.2)
BASOPHILS NFR BLD AUTO: 1.7 % — SIGNIFICANT CHANGE UP (ref 0–2)
BILIRUB SERPL-MCNC: <0.2 MG/DL — SIGNIFICANT CHANGE UP (ref 0.2–1.2)
BILIRUB UR-MCNC: NEGATIVE — SIGNIFICANT CHANGE UP
BUN SERPL-MCNC: 6 MG/DL — LOW (ref 7–23)
CALCIUM SERPL-MCNC: 8.9 MG/DL — SIGNIFICANT CHANGE UP (ref 8.4–10.5)
CHLORIDE SERPL-SCNC: 103 MMOL/L — SIGNIFICANT CHANGE UP (ref 98–107)
CO2 SERPL-SCNC: 28 MMOL/L — SIGNIFICANT CHANGE UP (ref 22–31)
COLOR SPEC: ABNORMAL
CREAT SERPL-MCNC: 0.7 MG/DL — SIGNIFICANT CHANGE UP (ref 0.5–1.3)
DIFF PNL FLD: ABNORMAL
EOSINOPHIL # BLD AUTO: 0.09 K/UL — SIGNIFICANT CHANGE UP (ref 0–0.5)
EOSINOPHIL NFR BLD AUTO: 1.9 % — SIGNIFICANT CHANGE UP (ref 0–6)
GLUCOSE SERPL-MCNC: 82 MG/DL — SIGNIFICANT CHANGE UP (ref 70–99)
GLUCOSE UR QL: NEGATIVE — SIGNIFICANT CHANGE UP
HCT VFR BLD CALC: 31.7 % — LOW (ref 34.5–45)
HGB BLD-MCNC: 10.3 G/DL — LOW (ref 11.5–15.5)
IANC: 2.52 K/UL — SIGNIFICANT CHANGE UP (ref 1.5–8.5)
IMM GRANULOCYTES NFR BLD AUTO: 0.4 % — SIGNIFICANT CHANGE UP (ref 0–1.5)
KETONES UR-MCNC: ABNORMAL
LEUKOCYTE ESTERASE UR-ACNC: NEGATIVE — SIGNIFICANT CHANGE UP
LIDOCAIN IGE QN: 13 U/L — SIGNIFICANT CHANGE UP (ref 7–60)
LYMPHOCYTES # BLD AUTO: 1.4 K/UL — SIGNIFICANT CHANGE UP (ref 1–3.3)
LYMPHOCYTES # BLD AUTO: 29.7 % — SIGNIFICANT CHANGE UP (ref 13–44)
MCHC RBC-ENTMCNC: 21.2 PG — LOW (ref 27–34)
MCHC RBC-ENTMCNC: 32.5 GM/DL — SIGNIFICANT CHANGE UP (ref 32–36)
MCV RBC AUTO: 65.4 FL — LOW (ref 80–100)
MONOCYTES # BLD AUTO: 0.6 K/UL — SIGNIFICANT CHANGE UP (ref 0–0.9)
MONOCYTES NFR BLD AUTO: 12.7 % — SIGNIFICANT CHANGE UP (ref 2–14)
NEUTROPHILS # BLD AUTO: 2.52 K/UL — SIGNIFICANT CHANGE UP (ref 1.8–7.4)
NEUTROPHILS NFR BLD AUTO: 53.6 % — SIGNIFICANT CHANGE UP (ref 43–77)
NITRITE UR-MCNC: NEGATIVE — SIGNIFICANT CHANGE UP
NRBC # BLD: 0 /100 WBCS — SIGNIFICANT CHANGE UP
NRBC # FLD: 0 K/UL — SIGNIFICANT CHANGE UP
PH UR: 5.5 — SIGNIFICANT CHANGE UP (ref 5–8)
PLATELET # BLD AUTO: 418 K/UL — HIGH (ref 150–400)
POTASSIUM SERPL-MCNC: 3.6 MMOL/L — SIGNIFICANT CHANGE UP (ref 3.5–5.3)
POTASSIUM SERPL-SCNC: 3.6 MMOL/L — SIGNIFICANT CHANGE UP (ref 3.5–5.3)
PROT SERPL-MCNC: 6.1 G/DL — SIGNIFICANT CHANGE UP (ref 6–8.3)
PROT UR-MCNC: ABNORMAL
RBC # BLD: 4.85 M/UL — SIGNIFICANT CHANGE UP (ref 3.8–5.2)
RBC # FLD: 18.7 % — HIGH (ref 10.3–14.5)
SODIUM SERPL-SCNC: 140 MMOL/L — SIGNIFICANT CHANGE UP (ref 135–145)
SP GR SPEC: 1.03 — SIGNIFICANT CHANGE UP (ref 1–1.05)
TROPONIN T, HIGH SENSITIVITY RESULT: <6 NG/L — SIGNIFICANT CHANGE UP
UROBILINOGEN FLD QL: SIGNIFICANT CHANGE UP
WBC # BLD: 4.71 K/UL — SIGNIFICANT CHANGE UP (ref 3.8–10.5)
WBC # FLD AUTO: 4.71 K/UL — SIGNIFICANT CHANGE UP (ref 3.8–10.5)

## 2021-12-23 PROCEDURE — 71046 X-RAY EXAM CHEST 2 VIEWS: CPT | Mod: 26

## 2021-12-23 PROCEDURE — 93010 ELECTROCARDIOGRAM REPORT: CPT

## 2021-12-23 PROCEDURE — 99285 EMERGENCY DEPT VISIT HI MDM: CPT | Mod: 25

## 2021-12-23 PROCEDURE — 74177 CT ABD & PELVIS W/CONTRAST: CPT | Mod: 26,MA

## 2021-12-23 RX ORDER — FAMOTIDINE 10 MG/ML
20 INJECTION INTRAVENOUS ONCE
Refills: 0 | Status: COMPLETED | OUTPATIENT
Start: 2021-12-23 | End: 2021-12-23

## 2021-12-23 RX ORDER — SODIUM CHLORIDE 9 MG/ML
1000 INJECTION INTRAMUSCULAR; INTRAVENOUS; SUBCUTANEOUS ONCE
Refills: 0 | Status: COMPLETED | OUTPATIENT
Start: 2021-12-23 | End: 2021-12-23

## 2021-12-23 RX ADMIN — SODIUM CHLORIDE 1000 MILLILITER(S): 9 INJECTION INTRAMUSCULAR; INTRAVENOUS; SUBCUTANEOUS at 13:01

## 2021-12-23 RX ADMIN — Medication 30 MILLILITER(S): at 13:01

## 2021-12-23 RX ADMIN — FAMOTIDINE 20 MILLIGRAM(S): 10 INJECTION INTRAVENOUS at 12:28

## 2021-12-23 NOTE — ED PROVIDER NOTE - PATIENT PORTAL LINK FT
You can access the FollowMyHealth Patient Portal offered by [facility] by registering at the following website: [email or web address]. By joining [employer]’s FollowMyHealth portal, you will also be able to view your health information using other applications (apps) compatible with our system.

## 2021-12-23 NOTE — ED PROVIDER NOTE - NS ED ROS FT
ROS:  GENERAL: No fever, no chills, +weight loss  EYES: no change in vision  HEENT: no trouble swallowing, no trouble speaking  CARDIAC: no chest pain, no palpitations  PULMONARY: no cough, no shortness of breath  GI: + abdominal pain, + nausea, no vomiting, no diarrhea, + constipation  : No dysuria, no frequency, no change in appearance, or odor of urine  SKIN: no rashes  NEURO: no headache, no weakness  MSK: No joint pain

## 2021-12-23 NOTE — ED PROVIDER NOTE - NSDCPRINTRESULTS_ED_ALL_ED
Patient requests all Lab, Cardiology, and Radiology Results on their Discharge Instructions
Statement Selected

## 2021-12-23 NOTE — ED ADULT NURSE NOTE - OBJECTIVE STATEMENT
pt received to intake c/o abdominal pain x3 months. pt denies pain at this time but states it was 10/10 for the last few days. Pt states when present, pain radiates to her back and causes chest discomfort with SOB. Dr. Rogers made aware. EKG to be completed. Pt alert and oriented. Ambulatory with steady gait. Respirations even and unlabored. Abdomen contour symmetrical, nondistended. Denies fever, chills, N/V/D, urinary symptoms. Report bright red blood in stools. Right 20G PIV placed. Labs collected and sent. IVP Pepcid given as ordered. Pt pending Attending evaluation.

## 2021-12-23 NOTE — ED PROVIDER NOTE - NSICDXFAMILYHX_GEN_ALL_CORE_FT
FAMILY HISTORY:  Father  Still living? Unknown  Family history of anemia, Age at diagnosis: Age Unknown    Sibling  Still living? Unknown  Family history of anemia, Age at diagnosis: Age Unknown    Child  Still living? Unknown  Family history of anemia, Age at diagnosis: Age Unknown

## 2021-12-23 NOTE — ED PROVIDER NOTE - PHYSICAL EXAMINATION
Vital signs reviewed.   CONSTITUTIONAL: Well-appearing; well-nourished; in no apparent distress. Non-toxic appearing.   HEAD: Normocephalic, atraumatic.  NECK Supple; non-tender  CARD: Normal S1, S2, rate regular   RESP: Normal chest excursion with respiration; breath sounds clear and equal bilaterally  ABD/GI: soft, non-distended, diffuse discomfort, no specific point tenderness   EXT/MS: moves all extremities; distal pulses are normal, no pedal edema.  SKIN: warm, dry   NEURO: Awake, alert, oriented x 3

## 2021-12-23 NOTE — ED PROVIDER NOTE - CLINICAL SUMMARY MEDICAL DECISION MAKING FREE TEXT BOX
40 yo female presents with abdominal pain, nausea, constipation, and weight loss over the last 3 months, which seems to be an exacerbation of a chronic condition for which she has not received a diagnosis. Exam notable for diffuse abdominal discomfort. Plan for labs, urinalysis, antacid, CT scan, reassess

## 2021-12-23 NOTE — ED PROVIDER NOTE - OBJECTIVE STATEMENT
40 yo female with PMH anemia and gastritis presents c/o abdominal pain and weight loss for 10 years, worse in the last 3 months. She's recently lost weight (4 pants sizes) unintentionally. She has associated nausea and hard stools. She has been diagnosed with gastritis and has tried many medications which have not helped. She had an endoscopy in 2019 which was reportedly normal. She presents to the ER for evaluation. She denies fever, chills, chest pain, vomiting, diarrhea, flank pain, urinary symptoms, or any other acute complaints.

## 2021-12-23 NOTE — ED PROVIDER NOTE - NSFOLLOWUPCLINICS_GEN_ALL_ED_FT
Gastroenterology at [facility]  Gastroenterology  [address] [place] [address]  Phone: [phone]  Fax:     [facility]astro[facility]  Gastroenterology  [address] [address]  Phone: [phone]  Fax:     [facility]  Gastroenterology  [address] [place] [address]  Phone: [phone]  Fax: [phone]

## 2021-12-23 NOTE — ED PROVIDER NOTE - PROGRESS NOTE DETAILS
CT results reviewed with patient. Admits her son had Chron's disease, but she was never tested. Strongly advised to f/u with GI for further management of colitis. Stable for discharge and outpatient management.

## 2021-12-27 DIAGNOSIS — D58.2 OTHER HEMOGLOBINOPATHIES: ICD-10-CM

## 2021-12-28 ENCOUNTER — APPOINTMENT (OUTPATIENT)
Dept: INTERNAL MEDICINE | Facility: CLINIC | Age: 41
End: 2021-12-28

## 2022-01-18 NOTE — ED ADULT NURSE NOTE - NSSEPSISSUSPECTED_ED_A_ED
Progress Note    Admit Date:  1/12/2022    62 y.o. male with DMII, Chronic sCHF, CAD s/p stent placement x3, hx of MI, asthma, HTN, HLD, GERD, lumbar spondylosis and DDD s/p bilateral dorsal ramus medial branch ablation and interstim implant, depression who presented to [facility] ED with complaint of generalized weakness ongoing for approximately 2 months and frequent falls. In ED, CT of the head showed no acute abnormality. Chest x-ray with no acute cardiopulmonary process. Patient did test positive for COVID. He is vaccinated x2, no booster. Patient admitted to med-surg for further care. PT/OT consulted. Mr. Celsa Quintana developed rapid HR what appears to be Afib on TELE monitor and his pacer inappropriately delivered shock x 5 and this was witnessed by me and nursing staff. Pt awake, conscious during these episodes and by the end of the 5th shock, he started getting slightly confused    Given cardizem 10 mg IV x 1 and metoprolol 5 mg with control of HR  EKG after this episode showed sinus tach    Progressively developed confusion , fevers , started on precedex gtt      Subjective:      Pt transferred to ICU    Pt very confused and became agitated with removing lines. Now on precedex gtt, ct head neg   No further shocks from ICD. His ICD has been adjusted by cardiology   Now remains in NSR  , intermittent high rates    Low grade fevers resolved after starting rocephin       Objective:   Patient Vitals for the past 4 hrs:   BP Temp Temp src Pulse SpO2   01/18/22 0700 115/65 -- -- 70 --   01/18/22 0600 (!) 99/51 -- -- 68 --   01/18/22 0500 (!) 100/56 -- -- 62 --   01/18/22 0400 (!) 92/52 98.8 °F (37.1 °C) Oral 78 95 %            Intake/Output Summary (Last 24 hours) at 1/18/2022 0748  Last data filed at 1/18/2022 0534  Gross per 24 hour   Intake 2688.35 ml   Output 1450 ml   Net 1238.35 ml       Physical Exam:  Gen: middle aged obese male, confused and drowsy on sedation   Restless and agitated   . Appears to be not in any distress  Mucous Membranes:  Pink , anicteric  NG in place  Neck: No JVD, no carotid bruit, no thyromegaly  Chest:  Clear to auscultation bilaterally, diminished in bases   Cardiovascular:  Irregular,  S1S2 heard, no murmurs or gallops- left sided ICD +  Abdomen:  Soft, undistended, non tender, no organomegaly, BS present  Extremities: No edema or cyanosis. Distal pulses well felt  Neurological : drowsy , restless , moving all ext. Confused       Data:  CBC:   Recent Labs     01/16/22  0513 01/17/22  0538 01/18/22  0517   WBC 5.7 2.9* 2.3*   HGB 14.3 13.3* 12.6*   HCT 42.8 38.6* 37.7*   MCV 86.9 84.1 87.1   PLT 91* 62* 51*     BMP:   Recent Labs     01/16/22  0513 01/17/22  0538   * 134*   K 4.3 3.4*   CL 93* 94*   CO2 15* 20*   BUN 34* 14   CREATININE 1.4* 0.8*     LIVER PROFILE:   Recent Labs     01/17/22  1326   AST 30   ALT 22   BILIDIR <0.2   BILITOT 0.5   ALKPHOS 58     PT/INR:   Recent Labs     01/16/22  0513 01/16/22  1426   PROTIME 10.3 10.6   INR 0.91 0.94       CULTURES  Results for Renae Finney (MRN [id number]) as of 1/13/2022 13:29    Ref. Range 1/12/2022 19:35   SARS-CoV-2, NAAT Latest Ref Range: Not Detected  DETECTED (AA)          RADIOLOGY  XR CHEST PORTABLE   Final Result   NG tube is difficult to visualize, tip is likely below the diaphragm. Repeat   exam could be considered if clinically indicated. XR CHEST PORTABLE   Final Result   New elevated right hemidiaphragm, likely atelectasis or possibly infiltrate. Subpulmonic effusion not excluded. No overt failure. CT HEAD WO CONTRAST   Final Result   1. No acute intracranial abnormality. CT Head WO Contrast   Final Result   No acute intracranial abnormality. XR CHEST PORTABLE   Final Result   No acute cardiopulmonary process.            Pertinent previous results reviewed   Diagnostic cardiac cath 7/13/21  CONCLUSION:  Successful stenting of the true circumflex into the obtuse  marginal branch, successful stenting of the left posterior descending  coronary artery and successful stenting of the obtuse marginal branch,  all 80% stenosis reduced to 0 with these three drug-eluting stents. JAMES-3 blood flow was present in the circumflex artery and all its  branches.  The LAD is normal with minor irregularities only and the left  main is normal.  Nondominant right coronary artery.  LV ejection  fraction of 40% with inferior wall and inferoposterior hypokinesia.     ECHO 11/27/19  Summary   The left ventricular systolic function is moderately reduced with an   ejection fraction of 35 -40 %.   There is hypokinesis of the inferior and basal inferior walls.   There is akinesis of the inferolateral wall.   Left ventricular cavity size is moderately dilated.   Compared to previous study from 2-2-2015 no changes noted in left   ventricular function.   Mild mitral regurgitation. Assessment/Plan:      # ICD firing - he had 5 witnessed shocks and then more bursts (4-5 additional episodes) of AICD discharges and concern for malfunctioning ICD  - s.p  [employer] interrogation and now ICD is adjusted . - Cardiology consulted. - resumed  BB - toprol XL stopped due to low BP - decreased to  metoprolol 25BID with close monitoring. Afibb with RVR  - new onset with above ICD issues  - resume home cardizem and BB if tolerated. Can use IV meds intermittently  - now in NSR   - started eliquis but holding with low PLT       Acute Encephalopathy - possible sec to covid     CT head non focal x 2   He denies any drug or alcohol use.    precedex gtt for agitation  - adding seroquel  Ammonia level wnl   Consider MRI brain  Supportive care for now        #COVID 19  -Positive 1/12/22  -only complaints is a cough  -patient is vaccinated x2- no booster  - minimal  Hypoxia at 2 L.   - D2 decadron today   -Monitor, continuous pulse ox and telemetry        #DMII  - not controlled noted to have >1000 glucose in urine  -Hold home oral meds  -hold lantus , started D 5 fluids with NPO and low sugars   -Low dose SSI  -POCT glucose    #Thrombocytopenia  -Plt dropping to 51  -Monitor CBC , hold eliquis        #CAD s/p stent placement x3  #Hx of MI  Hx of VTACH s.p ICD  - ASA, statin, plavix       #Chronic sCHF no AE   -Last echo 11/27/19, EF 35-40%  -Pacemaker/ICD in place  -Continue ACEi, toprol XL, cardizem  - patient takes Lasix on Monday and Thursday - holding at this time        #Asthma no AE  -Symbicort  -albuterol prn  - on room air       #HLD  -continue statin       #GERD  -continue ppi       #Lumbar spondylosis and DDD   -s/p bilateral dorsal ramus medial branch ablation and interstim implant  -PDMP displays on chronic gabapentin, continue  - follows with Dr. Letty Romeo         #ALIN  - unsure if patient is compliant with CPAP ? If he still has CPAP at home       Morbid Obesity  - Body mass index is 41.37 kg/m². - Complicating assessment and treatment.  Placing patient at risk for multiple co-morbidities as well as early death and contributing to the patient's presentation.   - Counseled on weight loss.        DVT Prophylaxis: eliquis  Start TF      Diet: Diet NPO  ADULT TUBE FEEDING; Nasogastric; Standard with Fiber; Continuous; 20; Yes; 20; Q 4 hours; 60; 100; Q 4 hours  Code Status: Full Code      Samreen Baker MD, 1/18/2022 7:48 AM No
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
## 2022-03-19 ENCOUNTER — INPATIENT (INPATIENT)
Facility: HOSPITAL | Age: 42
LOS: 2 days | Discharge: HOME | End: 2022-03-22
Attending: STUDENT IN AN ORGANIZED HEALTH CARE EDUCATION/TRAINING PROGRAM | Admitting: STUDENT IN AN ORGANIZED HEALTH CARE EDUCATION/TRAINING PROGRAM
Payer: MEDICAID

## 2022-03-19 VITALS
HEIGHT: 72 IN | HEART RATE: 66 BPM | RESPIRATION RATE: 16 BRPM | SYSTOLIC BLOOD PRESSURE: 97 MMHG | DIASTOLIC BLOOD PRESSURE: 70 MMHG | TEMPERATURE: 98 F | OXYGEN SATURATION: 100 %

## 2022-03-19 DIAGNOSIS — Z98.891 HISTORY OF UTERINE SCAR FROM PREVIOUS SURGERY: Chronic | ICD-10-CM

## 2022-03-19 LAB
ALBUMIN SERPL ELPH-MCNC: 3.1 G/DL — LOW (ref 3.3–5)
ALP SERPL-CCNC: 44 U/L — SIGNIFICANT CHANGE UP (ref 40–120)
ALT FLD-CCNC: <5 U/L — SIGNIFICANT CHANGE UP (ref 4–33)
ANION GAP SERPL CALC-SCNC: 12 MMOL/L — SIGNIFICANT CHANGE UP (ref 7–14)
AST SERPL-CCNC: 7 U/L — SIGNIFICANT CHANGE UP (ref 4–32)
BASE EXCESS BLDV CALC-SCNC: 1.2 MMOL/L — SIGNIFICANT CHANGE UP (ref -2–3)
BASOPHILS # BLD AUTO: 0.02 K/UL — SIGNIFICANT CHANGE UP (ref 0–0.2)
BASOPHILS NFR BLD AUTO: 0.2 % — SIGNIFICANT CHANGE UP (ref 0–2)
BILIRUB SERPL-MCNC: <0.2 MG/DL — SIGNIFICANT CHANGE UP (ref 0.2–1.2)
BLOOD GAS VENOUS COMPREHENSIVE RESULT: SIGNIFICANT CHANGE UP
BUN SERPL-MCNC: 10 MG/DL — SIGNIFICANT CHANGE UP (ref 7–23)
CALCIUM SERPL-MCNC: 8.7 MG/DL — SIGNIFICANT CHANGE UP (ref 8.4–10.5)
CHLORIDE BLDV-SCNC: 104 MMOL/L — SIGNIFICANT CHANGE UP (ref 96–108)
CHLORIDE SERPL-SCNC: 105 MMOL/L — SIGNIFICANT CHANGE UP (ref 98–107)
CO2 BLDV-SCNC: 27.3 MMOL/L — HIGH (ref 22–26)
CO2 SERPL-SCNC: 22 MMOL/L — SIGNIFICANT CHANGE UP (ref 22–31)
CREAT SERPL-MCNC: 0.61 MG/DL — SIGNIFICANT CHANGE UP (ref 0.5–1.3)
EGFR: 116 ML/MIN/1.73M2 — SIGNIFICANT CHANGE UP
EOSINOPHIL # BLD AUTO: 0.06 K/UL — SIGNIFICANT CHANGE UP (ref 0–0.5)
EOSINOPHIL NFR BLD AUTO: 0.6 % — SIGNIFICANT CHANGE UP (ref 0–6)
GAS PNL BLDV: 137 MMOL/L — SIGNIFICANT CHANGE UP (ref 136–145)
GLUCOSE BLDV-MCNC: 94 MG/DL — SIGNIFICANT CHANGE UP (ref 70–99)
GLUCOSE SERPL-MCNC: 106 MG/DL — HIGH (ref 70–99)
HCG SERPL-ACNC: <5 MIU/ML — SIGNIFICANT CHANGE UP
HCO3 BLDV-SCNC: 26 MMOL/L — SIGNIFICANT CHANGE UP (ref 22–29)
HCT VFR BLD CALC: 29.3 % — LOW (ref 34.5–45)
HCT VFR BLDA CALC: 29 % — LOW (ref 34.5–46.5)
HGB BLD CALC-MCNC: 9.8 G/DL — LOW (ref 11.5–15.5)
HGB BLD-MCNC: 9.9 G/DL — LOW (ref 11.5–15.5)
IANC: 6.28 K/UL — SIGNIFICANT CHANGE UP (ref 1.5–8.5)
IMM GRANULOCYTES NFR BLD AUTO: 0.9 % — SIGNIFICANT CHANGE UP (ref 0–1.5)
LACTATE BLDV-MCNC: 0.8 MMOL/L — SIGNIFICANT CHANGE UP (ref 0.5–2)
LIDOCAIN IGE QN: 26 U/L — SIGNIFICANT CHANGE UP (ref 7–60)
LYMPHOCYTES # BLD AUTO: 2.23 K/UL — SIGNIFICANT CHANGE UP (ref 1–3.3)
LYMPHOCYTES # BLD AUTO: 23.3 % — SIGNIFICANT CHANGE UP (ref 13–44)
MCHC RBC-ENTMCNC: 22 PG — LOW (ref 27–34)
MCHC RBC-ENTMCNC: 33.8 GM/DL — SIGNIFICANT CHANGE UP (ref 32–36)
MCV RBC AUTO: 65 FL — LOW (ref 80–100)
MONOCYTES # BLD AUTO: 0.91 K/UL — HIGH (ref 0–0.9)
MONOCYTES NFR BLD AUTO: 9.5 % — SIGNIFICANT CHANGE UP (ref 2–14)
NEUTROPHILS # BLD AUTO: 6.28 K/UL — SIGNIFICANT CHANGE UP (ref 1.8–7.4)
NEUTROPHILS NFR BLD AUTO: 65.5 % — SIGNIFICANT CHANGE UP (ref 43–77)
NRBC # BLD: 0 /100 WBCS — SIGNIFICANT CHANGE UP
NRBC # FLD: 0 K/UL — SIGNIFICANT CHANGE UP
PCO2 BLDV: 41 MMHG — SIGNIFICANT CHANGE UP (ref 39–42)
PH BLDV: 7.41 — SIGNIFICANT CHANGE UP (ref 7.32–7.43)
PLATELET # BLD AUTO: 484 K/UL — HIGH (ref 150–400)
PO2 BLDV: 45 MMHG — SIGNIFICANT CHANGE UP
POTASSIUM BLDV-SCNC: 3.3 MMOL/L — LOW (ref 3.5–5.1)
POTASSIUM SERPL-MCNC: 3.5 MMOL/L — SIGNIFICANT CHANGE UP (ref 3.5–5.3)
POTASSIUM SERPL-SCNC: 3.5 MMOL/L — SIGNIFICANT CHANGE UP (ref 3.5–5.3)
PROT SERPL-MCNC: 5.4 G/DL — LOW (ref 6–8.3)
RBC # BLD: 4.51 M/UL — SIGNIFICANT CHANGE UP (ref 3.8–5.2)
RBC # FLD: 20.3 % — HIGH (ref 10.3–14.5)
SAO2 % BLDV: 72.6 % — SIGNIFICANT CHANGE UP
SODIUM SERPL-SCNC: 139 MMOL/L — SIGNIFICANT CHANGE UP (ref 135–145)
WBC # BLD: 9.59 K/UL — SIGNIFICANT CHANGE UP (ref 3.8–10.5)
WBC # FLD AUTO: 9.59 K/UL — SIGNIFICANT CHANGE UP (ref 3.8–10.5)

## 2022-03-19 PROCEDURE — 74177 CT ABD & PELVIS W/CONTRAST: CPT | Mod: 26,MA

## 2022-03-19 PROCEDURE — 99284 EMERGENCY DEPT VISIT MOD MDM: CPT

## 2022-03-19 PROCEDURE — 71045 X-RAY EXAM CHEST 1 VIEW: CPT | Mod: 26

## 2022-03-19 RX ORDER — SODIUM CHLORIDE 9 MG/ML
1000 INJECTION INTRAMUSCULAR; INTRAVENOUS; SUBCUTANEOUS ONCE
Refills: 0 | Status: COMPLETED | OUTPATIENT
Start: 2022-03-19 | End: 2022-03-19

## 2022-03-19 RX ORDER — ACETAMINOPHEN 500 MG
1000 TABLET ORAL ONCE
Refills: 0 | Status: COMPLETED | OUTPATIENT
Start: 2022-03-19 | End: 2022-03-19

## 2022-03-19 RX ADMIN — SODIUM CHLORIDE 1000 MILLILITER(S): 9 INJECTION INTRAMUSCULAR; INTRAVENOUS; SUBCUTANEOUS at 20:50

## 2022-03-19 RX ADMIN — Medication 400 MILLIGRAM(S): at 22:23

## 2022-03-19 NOTE — ED PROVIDER NOTE - OBJECTIVE STATEMENT
melonie: pt here after surgery in [facility] for some problem.  she says she [facility] hospitals [facility] [place] and "no one could do anything for my problem" so she went to [facility].  they did surgery- perhaps repair of a hernia or rectus diastasis.  of note pt also appears to be taking medicine for uc.  pt is on (meds via translation on [employer]ello) nexium, azathioprine, paracetamol, prednisolone phloroglucinol (antispasmotic), l\kalinor retard (used for calcium replacement)  pt here states her pain is now in her whole body.  denies fever, abd is soft,     denies N/V/abd pain/ HA/ fever/ chest pain/ SOB/ dysuria/ urgency/ vaginal dc/ extremity issue melonie: pt here after surgery in [facility] for some problem.  she says she went to multiple hospitals in the [facility] area and "no one could do anything for my problem" so she went to [facility].  they did surgery- perhaps repair of a hernia or rectus diastasis.  of note pt also appears to be taking medicine for uc.  pt is on (meds via translation on xiao qu wu you) nexium, azathioprine, paracetamol, prednisolone phloroglucinol (antispasmotic), l\kalinor retard (used for calcium replacement)  pt here states her pain is now in her abd and back.  denies fever, abd is soft,   pt has lost more than 70 lbs over the last year.  has not been able to folow with GI bc had [employer] insurance, now switching to [employer], won't be active until next month.    denies N/V/HA/ fever/ chest pain/ SOB/ dysuria/ urgency/ vaginal dc/ extremity issue

## 2022-03-19 NOTE — ED ADULT TRIAGE NOTE - CHIEF COMPLAINT QUOTE
Pt states that she had abd surgery in [facility] on 2/10, unclear what kind of surgery, pt comes with medical record that are not in english.  Pt states that she is having operative site pain.  PMH anemia, colitis

## 2022-03-19 NOTE — ED PROVIDER NOTE - PROGRESS NOTE DETAILS
melonie: ct results appreciated.  pt here to eval abd pain.  ct shows concern for infected seroma.  no elevated wbc, wound generally clean.  surgery to eval. DD ED ATTG:  rec'd s/o from Dr Johnson.  Surg saw pt, I d/w them, pt doesn't need surgical intervention.  Admit to medicine for GI eval and possible colonoscopy for definitive dx of Crohn's.  Above discussed with pt, she understands.  pt reports abd pain returning, reports tylenol she got before not very helpful.  Will rx morphine.  Pt not in significant distress.

## 2022-03-19 NOTE — ED ADULT TRIAGE NOTE - BP NONINVASIVE SYSTOLIC (MM HG)
----- Message from Jax Ramos MD sent at 12/6/2017  1:59 PM CST -----  Please notify the family of normal results.   97

## 2022-03-19 NOTE — ED ADULT TRIAGE NOTE - HAVE YOU HAD A FIRST COVID-19 BOOSTER?
I have reviewed discharge instructions with the patient. The patient verbalized understanding. Pt verbalized understanding. Provider Jeffrey Rater updated pt daughter [facility] that she will be d/c to home. Pt daughter coming to pick her up. No

## 2022-03-19 NOTE — ED PROVIDER NOTE - CLINICAL SUMMARY MEDICAL DECISION MAKING FREE TEXT BOX
melonie: pt here with abd surgery 10 days ago in [facility] to close rectus, here for all over abd pain/ body pain.  abd soft, pt unclear as to actual surgery.  also with hx of possible crohn's disease.  will ct to eval  abd soft.  pt afebrile

## 2022-03-19 NOTE — ED ADULT NURSE NOTE - OBJECTIVE STATEMENT
pt presents to intake room 4,  s/p ventral hernia surgery repair on 3/10. Patient states increased pain since last night, gneralized abdominal tenderness noted radiating to her lower back. central vertical suture site noted, dressing clean dry and intact. Respirations even and unlabored. Lung sounds clear with equal chest rise bilaterally. ABD is soft, tender, non distended with normal active bowel sounds. skin warm dry and intact otherwise. No complaints of chest pain, headache, nausea, dizziness, vomiting  SOB, fever, chills verbalized. 18GLAC, labs running, fluids running. awaiting further orders.

## 2022-03-19 NOTE — ED ADULT NURSE NOTE - CHIEF COMPLAINT QUOTE
Pt states that she had abd surgery in [facility] on [facility], unclear what kind of surgery, pt comes with medical record that are not in english.  Pt states that she is having operative site pain.  PMH anemia, colitis

## 2022-03-20 DIAGNOSIS — Z29.9 ENCOUNTER FOR PROPHYLACTIC MEASURES, UNSPECIFIED: ICD-10-CM

## 2022-03-20 DIAGNOSIS — K50.90 CROHN'S DISEASE, UNSPECIFIED, WITHOUT COMPLICATIONS: ICD-10-CM

## 2022-03-20 DIAGNOSIS — R10.9 UNSPECIFIED ABDOMINAL PAIN: ICD-10-CM

## 2022-03-20 DIAGNOSIS — K29.70 GASTRITIS, UNSPECIFIED, WITHOUT BLEEDING: ICD-10-CM

## 2022-03-20 DIAGNOSIS — K52.9 NONINFECTIVE GASTROENTERITIS AND COLITIS, UNSPECIFIED: ICD-10-CM

## 2022-03-20 DIAGNOSIS — Z98.890 OTHER SPECIFIED POSTPROCEDURAL STATES: Chronic | ICD-10-CM

## 2022-03-20 DIAGNOSIS — D64.9 ANEMIA, UNSPECIFIED: ICD-10-CM

## 2022-03-20 LAB
ALBUMIN SERPL ELPH-MCNC: 2.8 G/DL — LOW (ref 3.3–5)
ALP SERPL-CCNC: 34 U/L — LOW (ref 40–120)
ALT FLD-CCNC: <5 U/L — LOW (ref 4–33)
ANION GAP SERPL CALC-SCNC: 9 MMOL/L — SIGNIFICANT CHANGE UP (ref 7–14)
APPEARANCE UR: CLEAR — SIGNIFICANT CHANGE UP
AST SERPL-CCNC: 5 U/L — SIGNIFICANT CHANGE UP (ref 4–32)
BACTERIA # UR AUTO: NEGATIVE — SIGNIFICANT CHANGE UP
BILIRUB SERPL-MCNC: <0.2 MG/DL — SIGNIFICANT CHANGE UP (ref 0.2–1.2)
BILIRUB UR-MCNC: NEGATIVE — SIGNIFICANT CHANGE UP
BUN SERPL-MCNC: 8 MG/DL — SIGNIFICANT CHANGE UP (ref 7–23)
CALCIUM SERPL-MCNC: 8.1 MG/DL — LOW (ref 8.4–10.5)
CHLORIDE SERPL-SCNC: 102 MMOL/L — SIGNIFICANT CHANGE UP (ref 98–107)
CO2 SERPL-SCNC: 23 MMOL/L — SIGNIFICANT CHANGE UP (ref 22–31)
COLOR SPEC: YELLOW — SIGNIFICANT CHANGE UP
CREAT SERPL-MCNC: 0.51 MG/DL — SIGNIFICANT CHANGE UP (ref 0.5–1.3)
DIFF PNL FLD: NEGATIVE — SIGNIFICANT CHANGE UP
EGFR: 121 ML/MIN/1.73M2 — SIGNIFICANT CHANGE UP
EPI CELLS # UR: 2 /HPF — SIGNIFICANT CHANGE UP (ref 0–5)
GLUCOSE SERPL-MCNC: 94 MG/DL — SIGNIFICANT CHANGE UP (ref 70–99)
GLUCOSE UR QL: NEGATIVE — SIGNIFICANT CHANGE UP
HCT VFR BLD CALC: 28.1 % — LOW (ref 34.5–45)
HGB BLD-MCNC: 9 G/DL — LOW (ref 11.5–15.5)
HYALINE CASTS # UR AUTO: 5 /LPF — SIGNIFICANT CHANGE UP (ref 0–7)
KETONES UR-MCNC: NEGATIVE — SIGNIFICANT CHANGE UP
LEUKOCYTE ESTERASE UR-ACNC: NEGATIVE — SIGNIFICANT CHANGE UP
MAGNESIUM SERPL-MCNC: 1.7 MG/DL — SIGNIFICANT CHANGE UP (ref 1.6–2.6)
MCHC RBC-ENTMCNC: 21.4 PG — LOW (ref 27–34)
MCHC RBC-ENTMCNC: 32 GM/DL — SIGNIFICANT CHANGE UP (ref 32–36)
MCV RBC AUTO: 66.7 FL — LOW (ref 80–100)
NITRITE UR-MCNC: NEGATIVE — SIGNIFICANT CHANGE UP
NRBC # BLD: 0 /100 WBCS — SIGNIFICANT CHANGE UP
NRBC # FLD: 0 K/UL — SIGNIFICANT CHANGE UP
PH UR: 6 — SIGNIFICANT CHANGE UP (ref 5–8)
PHOSPHATE SERPL-MCNC: 3.3 MG/DL — SIGNIFICANT CHANGE UP (ref 2.5–4.5)
PLATELET # BLD AUTO: 416 K/UL — HIGH (ref 150–400)
POTASSIUM SERPL-MCNC: 3.4 MMOL/L — LOW (ref 3.5–5.3)
POTASSIUM SERPL-SCNC: 3.4 MMOL/L — LOW (ref 3.5–5.3)
PROT SERPL-MCNC: 5 G/DL — LOW (ref 6–8.3)
PROT UR-MCNC: ABNORMAL
RBC # BLD: 4.21 M/UL — SIGNIFICANT CHANGE UP (ref 3.8–5.2)
RBC # FLD: 20.1 % — HIGH (ref 10.3–14.5)
RBC CASTS # UR COMP ASSIST: 1 /HPF — SIGNIFICANT CHANGE UP (ref 0–4)
SARS-COV-2 RNA SPEC QL NAA+PROBE: SIGNIFICANT CHANGE UP
SODIUM SERPL-SCNC: 134 MMOL/L — LOW (ref 135–145)
SP GR SPEC: 1.03 — SIGNIFICANT CHANGE UP (ref 1–1.05)
UROBILINOGEN FLD QL: ABNORMAL
WBC # BLD: 7.09 K/UL — SIGNIFICANT CHANGE UP (ref 3.8–10.5)
WBC # FLD AUTO: 7.09 K/UL — SIGNIFICANT CHANGE UP (ref 3.8–10.5)
WBC UR QL: 3 /HPF — SIGNIFICANT CHANGE UP (ref 0–5)

## 2022-03-20 PROCEDURE — 99223 1ST HOSP IP/OBS HIGH 75: CPT

## 2022-03-20 PROCEDURE — 99222 1ST HOSP IP/OBS MODERATE 55: CPT

## 2022-03-20 PROCEDURE — 99231 SBSQ HOSP IP/OBS SF/LOW 25: CPT | Mod: GC

## 2022-03-20 RX ORDER — PREDNISOLONE 5 MG
0 TABLET ORAL
Qty: 0 | Refills: 0 | DISCHARGE

## 2022-03-20 RX ORDER — DOCUSATE SODIUM 100 MG
1 CAPSULE ORAL
Qty: 0 | Refills: 0 | DISCHARGE

## 2022-03-20 RX ORDER — KETOROLAC TROMETHAMINE 30 MG/ML
15 SYRINGE (ML) INJECTION ONCE
Refills: 0 | Status: DISCONTINUED | OUTPATIENT
Start: 2022-03-20 | End: 2022-03-20

## 2022-03-20 RX ORDER — LACTULOSE 10 G/15ML
10 SOLUTION ORAL DAILY
Refills: 0 | Status: DISCONTINUED | OUTPATIENT
Start: 2022-03-20 | End: 2022-03-22

## 2022-03-20 RX ORDER — ACETAMINOPHEN 500 MG
650 TABLET ORAL EVERY 6 HOURS
Refills: 0 | Status: DISCONTINUED | OUTPATIENT
Start: 2022-03-20 | End: 2022-03-22

## 2022-03-20 RX ORDER — HYDROMORPHONE HYDROCHLORIDE 2 MG/ML
0.5 INJECTION INTRAMUSCULAR; INTRAVENOUS; SUBCUTANEOUS EVERY 6 HOURS
Refills: 0 | Status: DISCONTINUED | OUTPATIENT
Start: 2022-03-20 | End: 2022-03-20

## 2022-03-20 RX ORDER — INFLUENZA VIRUS VACCINE 15; 15; 15; 15 UG/.5ML; UG/.5ML; UG/.5ML; UG/.5ML
0.5 SUSPENSION INTRAMUSCULAR ONCE
Refills: 0 | Status: DISCONTINUED | OUTPATIENT
Start: 2022-03-20 | End: 2022-03-22

## 2022-03-20 RX ORDER — POTASSIUM CHLORIDE 20 MEQ
20 PACKET (EA) ORAL ONCE
Refills: 0 | Status: COMPLETED | OUTPATIENT
Start: 2022-03-20 | End: 2022-03-20

## 2022-03-20 RX ORDER — HYDROMORPHONE HYDROCHLORIDE 2 MG/ML
0.5 INJECTION INTRAMUSCULAR; INTRAVENOUS; SUBCUTANEOUS ONCE
Refills: 0 | Status: DISCONTINUED | OUTPATIENT
Start: 2022-03-20 | End: 2022-03-20

## 2022-03-20 RX ORDER — CALCIUM CARBONATE 500(1250)
1 TABLET ORAL
Refills: 0 | Status: DISCONTINUED | OUTPATIENT
Start: 2022-03-20 | End: 2022-03-22

## 2022-03-20 RX ORDER — SODIUM CHLORIDE 9 MG/ML
1000 INJECTION INTRAMUSCULAR; INTRAVENOUS; SUBCUTANEOUS
Refills: 0 | Status: DISCONTINUED | OUTPATIENT
Start: 2022-03-20 | End: 2022-03-21

## 2022-03-20 RX ORDER — PANTOPRAZOLE SODIUM 20 MG/1
40 TABLET, DELAYED RELEASE ORAL
Refills: 0 | Status: DISCONTINUED | OUTPATIENT
Start: 2022-03-20 | End: 2022-03-22

## 2022-03-20 RX ORDER — MORPHINE SULFATE 50 MG/1
4 CAPSULE, EXTENDED RELEASE ORAL ONCE
Refills: 0 | Status: DISCONTINUED | OUTPATIENT
Start: 2022-03-20 | End: 2022-03-20

## 2022-03-20 RX ORDER — HYDROMORPHONE HYDROCHLORIDE 2 MG/ML
1 INJECTION INTRAMUSCULAR; INTRAVENOUS; SUBCUTANEOUS EVERY 6 HOURS
Refills: 0 | Status: DISCONTINUED | OUTPATIENT
Start: 2022-03-20 | End: 2022-03-22

## 2022-03-20 RX ADMIN — HYDROMORPHONE HYDROCHLORIDE 1 MILLIGRAM(S): 2 INJECTION INTRAMUSCULAR; INTRAVENOUS; SUBCUTANEOUS at 21:04

## 2022-03-20 RX ADMIN — HYDROMORPHONE HYDROCHLORIDE 1 MILLIGRAM(S): 2 INJECTION INTRAMUSCULAR; INTRAVENOUS; SUBCUTANEOUS at 15:25

## 2022-03-20 RX ADMIN — SODIUM CHLORIDE 75 MILLILITER(S): 9 INJECTION INTRAMUSCULAR; INTRAVENOUS; SUBCUTANEOUS at 23:24

## 2022-03-20 RX ADMIN — HYDROMORPHONE HYDROCHLORIDE 0.5 MILLIGRAM(S): 2 INJECTION INTRAMUSCULAR; INTRAVENOUS; SUBCUTANEOUS at 05:46

## 2022-03-20 RX ADMIN — SODIUM CHLORIDE 75 MILLILITER(S): 9 INJECTION INTRAMUSCULAR; INTRAVENOUS; SUBCUTANEOUS at 09:07

## 2022-03-20 RX ADMIN — Medication 15 MILLIGRAM(S): at 05:02

## 2022-03-20 RX ADMIN — HYDROMORPHONE HYDROCHLORIDE 1 MILLIGRAM(S): 2 INJECTION INTRAMUSCULAR; INTRAVENOUS; SUBCUTANEOUS at 22:10

## 2022-03-20 RX ADMIN — Medication 20 MILLIEQUIVALENT(S): at 17:20

## 2022-03-20 RX ADMIN — HYDROMORPHONE HYDROCHLORIDE 0.5 MILLIGRAM(S): 2 INJECTION INTRAMUSCULAR; INTRAVENOUS; SUBCUTANEOUS at 06:16

## 2022-03-20 RX ADMIN — Medication 15 MILLIGRAM(S): at 04:32

## 2022-03-20 RX ADMIN — PANTOPRAZOLE SODIUM 40 MILLIGRAM(S): 20 TABLET, DELAYED RELEASE ORAL at 09:07

## 2022-03-20 RX ADMIN — HYDROMORPHONE HYDROCHLORIDE 1 MILLIGRAM(S): 2 INJECTION INTRAMUSCULAR; INTRAVENOUS; SUBCUTANEOUS at 16:25

## 2022-03-20 RX ADMIN — Medication 1 TABLET(S): at 17:19

## 2022-03-20 NOTE — H&P ADULT - ATTENDING COMMENTS
41 year old female with a history of anemia, gastritis, reported Crohn's disease (per patient Dx 12/2021 in [facility]) on prednisolone and azathioprine, and recent rectus diastasis and umbilical hernia repair on 3/10/22 in [facility] presenting with chronic abdominal pain. She went to [facility] for surgery as it was not being offered here to see if it would help with her chronic pain. After her surgery she did not experience any improvement in her pain. Denies nausea, vomiting or diarrhea. Surgical site appears to be healing well. On personal read of documents with my limited [place]divian it appears she was diagnosed with Crohn's disease via a colonoscopy which showed superficial ulcerations in the colon, specifically around the ileocecal valve and terminal ileum. Biopsy showed preserved crypt structure and was suggestive of possible IBD. Patient was started on azathioprine and prednisolone (planned for long taper); states that azathioprine upsets her stomach whenever she takes it.    -appreciate surgery eval, no role for acute intervention  -GI emailed for consult  -CTAP reviewed- seroma at ventral abdominal wall  -will monitor off abx for now as patient appears well and no signs or symptoms of infection  -c/w clear liquid diet for now  -Dilaudid PRN for pain  -holding home steroids and azathioprine for now, will f/u with GI recommendations    D/w NORM Gautam

## 2022-03-20 NOTE — PATIENT PROFILE ADULT - FALL HARM RISK - UNIVERSAL INTERVENTIONS
Bed in lowest position, wheels locked, appropriate side rails in place/Call bell, personal items and telephone in reach/Instruct patient to call for assistance before getting out of bed or chair/Non-slip footwear when patient is out of bed/[place]in[place] to call system/Physically safe environment - no spills, clutter or unnecessary equipment/Purposeful Proactive Rounding/Room/bathroom lighting operational, light cord in reach

## 2022-03-20 NOTE — CONSULT NOTE ADULT - ASSESSMENT
41F PMHx anemia, gastritis, reported Crohn's disease (per patient Dx 12/2021 in [facility]) on prednisolone and azathioprine, and recent rectus diastasis and umbilical hernia repair 10 days ago on 3/10/22 in [facility], now presents with ongoing abdominal pain. Of note, patient has her medical records with her and all are from [facility] and in [place], and her meds are from [facility] and different names/formularies. She reports her last endoscopy being in December 2021 when she was diagnosed with Crohn's disease. She was having ongoing pain and had an umbilical hernia and rectus diastasis repair on 3/10/22 and post op developed abd pain worse with sitting upright and with eating. No a/w N/V/D, fevers/chills. She describes pain as diffuse all over her abdomen, radiating to back, 10/10 on pain scale at its worst, constant, pressure like, feels bloated when eats, feels like her breathing is restricted from taking a full breath as everything feels tight. She denies having any recent bloody BMs but has had bloody BMs in the past (cannot recall when last episode was). In the ED, she had CT scan that showed post op seroma and enteritis (ileal loops thickened). She was afebrile with stable vital signs. GI consulted for h/o Crohn's disease.    Impression:  #post-surgical seroma in setting of recent rectus diastasis and umbilical hernia repair on 3/10/22 in [facility]  #Crohn's disease- on Azathioprine and prednisolone (pt states it was started 3 weeks ago prior to recent abd surgery by a physician in [facility])    Recommendations:  - surgical input for management of seroma  - pain control per primary team  - can obtain GI PCR to eval enteritis given recent travel to [facility]  - no plan for endoscopic intervention at this time  - ok to c/w Azathioprine and prednisolone until pt follows up in GI clinic  - Follow up in Gastroenterology Clinic: [phone] ([facility] at [address] [place] [address]) or [phone] ([facility] at [address] [place] [address]).    We will sign off at this time. Please reconsult/page if questions.    **THIS NOTE IS NOT FINALIZED UNTIL SIGNED BY THE ATTENDING**      Joann Castelan MD  GI Fellow, PGY-4  Available via Microsoft Teams    NON-URGENT CONSULTS:  Please email [email or web address]  AT NIGHT AND ON WEEKENDS:  Contact on-call GI fellow via answering service ([phone]) from 5pm-8am and on weekends/holidays  MONDAY-FRIDAY 8AM-5PM:  Pager# [phone] ([facility]) or [phone] ([facility])  GI Phone# [phone] ([facility])     41F PMHx anemia, gastritis, reported Crohn's disease (per patient Dx 12/2021 in [facility]) on prednisolone and azathioprine, and recent rectus diastasis and umbilical hernia repair 10 days ago on 3/10/22 in [facility], now presents with ongoing abdominal pain. Of note, patient has her medical records with her and all are from [facility] and in [place], and her meds are from [facility] and different names/formularies. She reports her last endoscopy being in December 2021 when she was diagnosed with Crohn's disease. She was having ongoing pain and had an umbilical hernia and rectus diastasis repair on 3/10/22 and post op developed abd pain worse with sitting upright and with eating. No a/w N/V/D, fevers/chills. She describes pain as diffuse all over her abdomen, radiating to back, 10/10 on pain scale at its worst, constant, pressure like, feels bloated when eats, feels like her breathing is restricted from taking a full breath as everything feels tight. She denies having any recent bloody BMs but has had bloody BMs in the past (cannot recall when last episode was). In the ED, she had CT scan that showed post op seroma and enteritis (ileal loops thickened). She was afebrile with stable vital signs. GI consulted for h/o Crohn's disease.    Impression:  #post-surgical seroma in setting of recent rectus diastasis and umbilical hernia repair on 3/10/22 in [facility]  #Crohn's disease- on Azathioprine and prednisolone (pt states it was started 3 weeks ago prior to recent abd surgery by a physician in [facility])    Recommendations:  - surgical input for management of seroma  - pain control per primary team  - obtain GI PCR to eval enteritis given recent travel to [facility]  - send CRP, fecal calprotectin   - consider MRE   - will need outpatient follow up for management of Crohns Disease   - Follow up in Gastroenterology Clinic: [phone] ([facility] at [address] [place] [address]), or [phone] ([facility] at [address] [place] [address])     We will sign off at this time. Please reconsult/page if questions.    **THIS NOTE IS NOT FINALIZED UNTIL SIGNED BY THE ATTENDING**      Joann Castelan MD  GI Fellow, PGY-4  Available via Microsoft Teams    NON-URGENT CONSULTS:  Please email [email or web address]  [email or web address]  AT NIGHT AND ON WEEKENDS:  Contact on-call GI fellow via answering service ([phone]) from 5pm-8am and on weekends/holidays  MONDAY-FRIDAY 8AM-5PM:  Pager# [phone] ([facility]) or [phone] ([facility])  GI Phone# [phone] ([facility])

## 2022-03-20 NOTE — H&P ADULT - GASTROINTESTINAL COMMENTS
see HPI there is midline incision that is well healed, with suture still in place, no signs of infection, no drainage, no erythema

## 2022-03-20 NOTE — H&P ADULT - PROBLEM SELECTOR PLAN 5
- DVT ppx with SCD's given patient is ambulatory and low risk   - Case and plan to be discussed with attending - DVT ppx with SCD's given patient is ambulatory and low risk   - Case and plan discussed with Dr. Melo

## 2022-03-20 NOTE — H&P ADULT - HISTORY OF PRESENT ILLNESS
41F PMHx anemia, gastritis, reported Crohn's disease (per patient Dx 12/2021 in [facility]) on prednisolone and azathioprine, and recent rectus diastasis and umbilical hernia repair 10 days ago on 3/10/22 in [facility] by Dr. Selin Garcia, now presents with ongoing abdominal pain. Of note, patient has her medical records with her and all are from [facility] and in [place], and her meds are from [facility] and different names/formularies. She reports a long history of abdominal pain since 2010. She states she went to 4 different hospitals over the years and no one would intervene or fix the problem. Finally, she sought care in [facility]. She reports her last endoscopy being in December 2021 when she was diagnosed with Chron's disease. She was having ongoing pain and had an umblical hernia and rectus diastasis repair on 3/10/22 and has been doing ok post op except for ongoing pain. She describes pain as diffuse all over her abdomen, radiating to back, 10/10 on pain scale at its worst, constant, pressure like, feels bloated when eats, feels like her breathing is restricted from taking a full breath as everything feels tight. She denies fever, chills, N/V/D. She does endorse constipation and takes lactulose. She denies having any blood in stool and has been able to eat and drink ok. In the ED, she had CT scan that showed post op seroma and enteritis (ileal loops thickened). She was afebrile with stable vital signs. She was given 1L NS bolus, dilaudid IV, toradol, and tylenol for pain. Surgery was consulted and recommended no surgical intervention.

## 2022-03-20 NOTE — H&P ADULT - NSHPLABSRESULTS_GEN_ALL_CORE
9.9    9.59  )-----------( 484      ( 19 Mar 2022 20:56 )             29.3   03-19    139  |  105  |  10  ----------------------------<  106<H>  3.5   |  22  |  0.61    Ca    8.7      19 Mar 2022 20:56    TPro  5.4<L>  /  Alb  3.1<L>  /  TBili  <0.2  /  DBili  x   /  AST  7   /  ALT  <5  /  AlkPhos  44  03-19    covid PCR neg    HCG negative    Lactate 0.8    UA negative    EKG none in chart    CXR: No free air under the diaphragm.    CT A/P: 1. Enteritis.  2. Postsurgical changes in the ventral abdominal wall. 1.6 x 7.0 x 5.6 cm   simple appearing fluid collection in the left ventral abdominal wall most   likely reflects seroma. Recommend clinical correlation for superimposed   infection. 9.9    9.59  )-----------( 484      ( 19 Mar 2022 20:56 )             29.3   03-19    139  |  105  |  10  ----------------------------<  106<H>  3.5   |  22  |  0.61    Ca    8.7      19 Mar 2022 20:56    TPro  5.4<L>  /  Alb  3.1<L>  /  TBili  <0.2  /  DBili  x   /  AST  7   /  ALT  <5  /  AlkPhos  44  03-19    covid PCR neg    HCG negative    Lactate 0.8    UA negative    EKG: none in chart    CXR personally reviewed: No free air under the diaphragm.    CT A/P: 1. Enteritis.  2. Postsurgical changes in the ventral abdominal wall. 1.6 x 7.0 x 5.6 cm   simple appearing fluid collection in the left ventral abdominal wall most   likely reflects seroma. Recommend clinical correlation for superimposed   infection.

## 2022-03-20 NOTE — CONSULT NOTE ADULT - ATTENDING COMMENTS
Pt seen and examined.  Agree with resident eval and plan.  Imp: Seroma of abdominal surgical site.  No evidence of infection.  No surgical intervention.
41F w reported ?recent diagnosis of Crohns disease in [facility] (rx'd azathioprine and prednisone but ?compliance), very recent rectus diastasis and umbilical hernia repair in [facility] (10d ago), now presenting w abdominal pain. CT showing seroma, and enteritis.     - surgical management of seroma and post op pain  - check inflammatory markres -- send CRP, fecal calprotectin]  - if having diarrhea, send GI PCR and C diff   - inpatient vs outpatient MRE for ? small bowel crohns disease   - will need full out patient re-staging w/ egd/colonoscopy/imaging   - translation of records if possible     GI To follow, please call with questions.

## 2022-03-20 NOTE — H&P ADULT - ASSESSMENT
41F PMHx anemia, gastritis, reported Crohn's disease (per patient Dx 12/2021 in [facility]) on prednisolone and azathioprine, and recent rectus diastasis and umbilical hernia repair 10 days ago on 3/10/22 in [facility] by Dr. Selin Garcia, now presents with ongoing abdominal pain.

## 2022-03-20 NOTE — H&P ADULT - PROBLEM SELECTOR PLAN 2
- [facility] of [facility]  - she has Micronesian meds at bedside that include Azathioprine 50mg and Vitapred (prednisolone 20mg).  - she reports being on week 3 of 5 week plan of prednisolone 60mg daily, followed by plan for slow taper thereafter   - She reports that the Azathioprine makes her stomach upset   - Given all medical records are in Ugandan, ideally would have Ugandan in person  transcribe all the documents into our system in English for accuracy; per  this we do not have in person   - will consult GI for evaluation of enteritis as well as possibly definitive evaluation for inflammatory bowel disease as suggested by ER team with possible colonoscopy; as well as help with current management  - per my discussion with pharmacy, we have oral prednisolone liquid available, otherwise prednisolone is a 1:1 conversion with prednisone per pharmacy - will discuss with attending/GI about what steroid regimen to continue  - given GI upset with Azathioprine, will hold off on ordering pending GI eval and discussion with attending   - continue PPI daily and calcium supplement - [facility]  - she has Maltese meds at bedside that include Azathioprine 50mg and Vitapred (prednisolone 20mg).  - she reports being on week 3 of 5 week plan of prednisolone 60mg daily, followed by plan for slow taper thereafter   - She reports that the Azathioprine makes her stomach upset   - Given all medical records are in Tanzanian, ideally would have Tanzanian in person  transcribe all the documents into our system in English for accuracy; per my discussion with  we do not have in person  available at this time  - will consult GI for evaluation of enteritis as well as possibly definitive evaluation for inflammatory bowel disease as suggested by ER team with possible colonoscopy; as well as help with current management  - per my discussion with pharmacy, we have oral prednisolone liquid available, otherwise prednisolone is a 1:1 conversion with prednisone per pharmacy - will discuss with attending/GI about what steroid regimen to continue  - given GI upset with Azathioprine, will hold off on ordering pending GI eval and discussion with attending   - continue PPI daily and calcium supplement

## 2022-03-20 NOTE — CONSULT NOTE ADULT - SUBJECTIVE AND OBJECTIVE BOX
General Surgery Consult Note  Attending: Dr. Tom Bonilla  Service: B Team Surgery    HPI:  41F with history of anemia, gastritis, ulcerative colitis and recent rectus diastasis repair presents with abdominal pain and wound evaluation. She says she went to multiple hospitals in the [facility] area and "no one could do anything for my problem" so she went to [facility]. they did surgery- perhaps repair of a hernia or rectus diastasis.  She also appears to be taking medicine for ulcerative colitis. She is on (meds via translation on google) nexium, azathioprine, paracetamol, prednisolone phloroglucinol (antispasmotic), l\kalinor retard (used for calcium replacement). Her pain is now in her abdomen and back. She denies fever and she has lost more than 70 lbs over the last year. She has not been able to follow with GI because she had [employer] insurance, now switching to [employer], won't be active until next month. She denies N/V/HA/ fever/ chest pain/ SOB/ dysuria/ urgency/ vaginal dc/ extremity issue. Surgery consulted for wound evaluation and likely seroma seen on CT abdomen.       PAST MEDICAL & SURGICAL HISTORY:  Anemia  Gastritis  H/O:       ALLERGIES:  Allergies    No Known Allergies    Intolerances        SOCIAL HISTORY:    FAMILY HISTORY:  FAMILY HISTORY:  Family history of anemia (Father, Sibling, Child)        PHYSICAL EXAM:  General: NAD, resting comfortably  HEENT: NC/AT, normal hearing  Pulmonary: normal resp effort  Cardiovascular: RRR  Abdominal: soft, ND/NT, well healed midline incision  Extremities: WWP, no clubbing/cyanosis/edema  Neuro: A/O x 3, normal sensation, no focal deficits  Pulses: palpable distal pulses    VITAL SIGNS:  Vital Signs Last 24 Hrs  T(C): 36.8 (20 Mar 2022 02:15), Max: 36.8 (20 Mar 2022 02:15)  T(F): 98.2 (20 Mar 2022 02:15), Max: 98.2 (20 Mar 2022 02:15)  HR: 55 (20 Mar 2022 02:15) (55 - 66)  BP: 124/81 (20 Mar 2022 02:15) (97/70 - 124/81)  BP(mean): --  RR: 16 (20 Mar 2022 02:15) (16 - 16)  SpO2: 100% (20 Mar 2022 02:15) (100% - 100%)    I&O's Summary      LABS:                        9.9    9.59  )-----------( 484      ( 19 Mar 2022 20:56 )             29.3     -    139  |  105  |  10  ----------------------------<  106<H>  3.5   |  22  |  0.61    Ca    8.7      19 Mar 2022 20:56    TPro  5.4<L>  /  Alb  3.1<L>  /  TBili  <0.2  /  DBili  x   /  AST  7   /  ALT  <5  /  AlkPhos  44        Urinalysis Basic - ( 20 Mar 2022 01:33 )    Color: Yellow / Appearance: Clear / S.030 / pH: x  Gluc: x / Ketone: Negative  / Bili: Negative / Urobili: 3 mg/dL   Blood: x / Protein: Trace / Nitrite: Negative   Leuk Esterase: Negative / RBC: 1 /HPF / WBC 3 /HPF   Sq Epi: x / Non Sq Epi: 2 /HPF / Bacteria: Negative      CAPILLARY BLOOD GLUCOSE        LIVER FUNCTIONS - ( 19 Mar 2022 20:56 )  Alb: 3.1 g/dL / Pro: 5.4 g/dL / ALK PHOS: 44 U/L / ALT: <5 U/L / AST: 7 U/L / GGT: x             CULTURES:      RADIOLOGY & ADDITIONAL STUDIES:      < from: CT Abdomen and Pelvis w/ IV Cont (22 @ 23:36) >  FINDINGS:  LOWER CHEST: Right middle lobe calcified granuloma.    LIVER: Hypoattenuation along the falciform ligament likely reflecting   fatty infiltration.  BILE DUCTS: Normal caliber.  GALLBLADDER: Within normal limits.  SPLEEN: Within normal limits.  PANCREAS: Within normal limits.  ADRENALS: Within normal limits.  KIDNEYS/URETERS: Within normal limits.    BLADDER: Minimally distended.  REPRODUCTIVE ORGANS: Uterus and adnexa within normal limits.    BOWEL: No bowel obstruction. Appendix is normal. Mild distention of the   cecum, ascending colon and transverse colon with air and stool. Mild wall   thickening of ileal loops. PERITONEUM: Small free pelvic fluid.  VESSELS: Within normal limits.  RETROPERITONEUM/LYMPH NODES: No lymphadenopathy.  ABDOMINAL WALL: Apparent interval repair of previously noted diastases of   the rectus muscles and previously seen small fat-containing umbilical   hernia. Subcutaneous stranding in the ventral abdominal wall. Left   parasagittal simple appearing fluid collection measuring 1.6 cm (AP) x   7.0 cm (TV) x  6.4 cm (CC).  BONES: Degenerative changes.    IMPRESSION:    1. Enteritis.  2. Postsurgical changes in the ventral abdominal wall. 1.6 x 7.0 x 5.6 cm   simple appearing fluid collection in the left ventral abdominal wall most   likely reflects seroma. Recommend clinical correlation for superimposed   infection.    < end of copied text >        
  Chief Complaint:  Patient is a 41y old  Female who presents with a chief complaint of Abdominal Pain (20 Mar 2022 08:27)      HPI: 41F PMHx anemia, gastritis, reported Crohn's disease (per patient Dx 2021 in [facility]) on prednisolone and azathioprine, and recent rectus diastasis and umbilical hernia repair 10 days ago on 3/10/22 in [facility], now presents with ongoing abdominal pain. Of note, patient has her medical records with her and all are from [facility] and in Latvian, and her meds are from [facility] and different names/formularies. She reports her last endoscopy being in 2021 when she was diagnosed with Crohn's disease. She was having ongoing pain and had an umbilical hernia and rectus diastasis repair on 3/10/22 and post op developed abd pain worse with sitting upright and with eating. No a/w N/V/D, fevers/chills. She describes pain as diffuse all over her abdomen, radiating to back, 10/10 on pain scale at its worst, constant, pressure like, feels bloated when eats, feels like her breathing is restricted from taking a full breath as everything feels tight. She denies having any recent bloody BMs but has had bloody BMs in the past (cannot recall when last episode was). In the ED, she had CT scan that showed post op seroma and enteritis (ileal loops thickened). She was afebrile with stable vital signs. GI consulted for h/o Crohn's disease.    Allergies:  No Known Allergies      Home Medications:    Hospital Medications:  acetaminophen     Tablet .. 650 milliGRAM(s) Oral every 6 hours PRN  calcium carbonate   1250 mG (OsCal) 1 Tablet(s) Oral two times a day  HYDROmorphone   Tablet 1 milliGRAM(s) Oral every 6 hours PRN  influenza   Vaccine 0.5 milliLiter(s) IntraMuscular once  lactulose Syrup 10 Gram(s) Oral daily PRN  pantoprazole    Tablet 40 milliGRAM(s) Oral before breakfast  potassium chloride    Tablet ER 20 milliEquivalent(s) Oral once  sodium chloride 0.9%. 1000 milliLiter(s) IV Continuous <Continuous>      PMHX/PSHX:  Anemia    Gastritis    Crohn&#x27;s disease    No significant past surgical history    H/O:     H/O umbilical hernia repair        Family history:  No pertinent family history in first degree relatives    Family history of anemia (Father, Sibling, Child)     Denies any family history of GI-related disease or cancers.    Social History:   ETOH: denies  Tobacco: denies  Illicit drug use: denies    ROS: 14 point ROS negative unless otherwise stated in HPI      Vital Signs:  Vital Signs Last 24 Hrs  T(C): 36.8 (20 Mar 2022 11:54), Max: 36.8 (20 Mar 2022 02:15)  T(F): 98.2 (20 Mar 2022 11:54), Max: 98.3 (20 Mar 2022 03:29)  HR: 55 (20 Mar 2022 11:54) (54 - 66)  BP: 105/68 (20 Mar 2022 11:54) (97/70 - 124/81)  BP(mean): --  RR: 18 (20 Mar 2022 11:54) (16 - 18)  SpO2: 100% (20 Mar 2022 11:54) (99% - 100%)  Daily Height in cm: 182.88 (19 Mar 2022 18:57)    Daily     PHYSICAL EXAM:     GENERAL:  Appears stated age, well-groomed, well-nourished, no distress  HEENT:  NC/AT,  conjunctivae clear and pink  CHEST:  Full & symmetric excursion, no increased effort, breath sounds clear  HEART:  Regular rhythm, S1, S2, no murmur/rub/S3/S4  ABDOMEN:  Soft, +diffusely-tender, non-distended, +bowel sounds, +well healing midline surgical incision  EXTREMITIES:  no cyanosis,clubbing or edema  SKIN:  No rash/erythema/ecchymoses/petechiae/wounds/abscess/warm/dry  NEURO:  Alert, orientedx3      LABS:                        9.0    7.09  )-----------( 416      ( 20 Mar 2022 10:57 )             28.1     03-20    134<L>  |  102  |  8   ----------------------------<  94  3.4<L>   |  23  |  0.51    Ca    8.1<L>      20 Mar 2022 10:57  Phos  3.3     03-20  Mg     1.70     03-20    TPro  5.0<L>  /  Alb  2.8<L>  /  TBili  <0.2  /  DBili  x   /  AST  5   /  ALT  <5<L>  /  AlkPhos  34<L>  03-20    LIVER FUNCTIONS - ( 20 Mar 2022 10:57 )  Alb: 2.8 g/dL / Pro: 5.0 g/dL / ALK PHOS: 34 U/L / ALT: <5 U/L / AST: 5 U/L / GGT: x             Urinalysis Basic - ( 20 Mar 2022 01:33 )    Color: Yellow / Appearance: Clear / S.030 / pH: x  Gluc: x / Ketone: Negative  / Bili: Negative / Urobili: 3 mg/dL   Blood: x / Protein: Trace / Nitrite: Negative   Leuk Esterase: Negative / RBC: 1 /HPF / WBC 3 /HPF   Sq Epi: x / Non Sq Epi: 2 /HPF / Bacteria: Negative      Amylase Serum--      Lipase serum26       Ammonia--      Imaging:       ACC: [id number] EXAM:  CT ABDOMEN AND PELVIS IC                          PROCEDURE DATE:  2022          INTERPRETATION:  CLINICAL INFORMATION: Abdominal pain status post   surgery. Per report patient went to [facility] for surgery, perhaps repair  of a hernia or rectus diastasis.    COMPARISON: CT abdomen pelvis 2021.    CONTRAST/COMPLICATIONS:  IV Contrast: Omnipaque 350  90 cc administered   10 cc discarded  Oral Contrast: NONE  Complications: None reported at time of study completion    PROCEDURE:  CT of the Abdomen and Pelvis was performed.  Sagittal and coronal reformats were performed.    FINDINGS:  LOWER CHEST: Right middle lobe calcified granuloma.    LIVER: Hypoattenuation along the falciform ligament likely reflecting   fatty infiltration.  BILE DUCTS: Normal caliber.  GALLBLADDER: Within normal limits.  SPLEEN: Within normal limits.  PANCREAS: Within normal limits.  ADRENALS: Within normal limits.  KIDNEYS/URETERS: Within normal limits.    BLADDER: Minimally distended.  REPRODUCTIVE ORGANS: Uterus and adnexa within normal limits.    BOWEL: No bowel obstruction. Appendix is normal. Mild distention of the   cecum, ascending colon and transverse colon with air and stool. Mild wall   thickening of ileal loops. PERITONEUM: Small free pelvic fluid.  VESSELS: Within normal limits.  RETROPERITONEUM/LYMPH NODES: No lymphadenopathy.  ABDOMINAL WALL: Apparent interval repair of previously noted diastases of   the rectus muscles and previously seen small fat-containing umbilical   hernia. Subcutaneous stranding in the ventral abdominal wall. Left   parasagittal simple appearing fluid collection measuring 1.6 cm (AP) x   7.0 cm (TV) x  6.4 cm (CC).  BONES: Degenerative changes.    IMPRESSION:    1. Enteritis.  2. Postsurgical changes in the ventral abdominal wall. 1.6 x 7.0 x 5.6 cm   simple appearing fluid collection in the left ventral abdominal wall most   likely reflects seroma. Recommend clinical correlation for superimposed   infection.

## 2022-03-20 NOTE — H&P ADULT - RS GEN PE MLT RESP DETAILS PC
normal/airway patent/breath sounds equal/good air movement/respirations non-labored/clear to auscultation bilaterally/no intercostal retractions/no rales/no rhonchi/no wheezes

## 2022-03-20 NOTE — H&P ADULT - PROBLEM SELECTOR PLAN 3
- Hgb appears to be at baseline   - used to be on iron supplement, no longer taking  - check iron studies  - trend CBC daily, no evidence of active bleeding

## 2022-03-20 NOTE — CHART NOTE - NSCHARTNOTEFT_GEN_A_CORE
Patient seen and examined in ED on AM rounds  Recent diastasis recti and umbilical hernia repaired in [facility] on 3/10  Complains of abdominal discomfort  Tolerating diet and having GI function  Abdomen soft, NTND  Midline incision healing well with suture loops at superior/inferior aspects  Labs and imaging reviewed. Likely seroma in operative bed. No evidence for hematoma/abscess  No surgical intervention required at this time  Care per primary team  Please call if any additional questions/concerns    Italo Still, PGY5  [id number]

## 2022-03-20 NOTE — CONSULT NOTE ADULT - ASSESSMENT
ASSESSMENT:  41F with history of anemia, gastritis, ulcerative colitis and recent rectus diastasis repair presents with abdominal pain and wound evaluation. Surgery consulted for wound evaluation and likely seroma seen on CT abdomen.     PLAN:    - To be discussed with Dr. Tom Boinlla    [id number]   ASSESSMENT:  41F with history of anemia, gastritis, ulcerative colitis and recent rectus diastasis repair presents with abdominal pain and wound evaluation. Surgery consulted for wound evaluation and likely seroma seen on CT abdomen.     PLAN:    - No acute indication for surgical admission or intervention  - Wound appears well healing without signs of infection, likely seroma seen on CT  - GI consult for IBD  - Medicine admission  - Will follow  - Discussed with Dr. Tom Bonilla    [id number]

## 2022-03-20 NOTE — CONSULT NOTE ADULT - CONSULT REQUESTED DATE/TIME
20-Mar-2022 03:29
20-Mar-2022 12:29
Implemented All Universal Safety Interventions:  [place] to call system. Call bell, personal items and telephone within reach. Instruct patient to call for assistance. Room bathroom lighting operational. Non-slip footwear when patient is off stretcher. Physically safe environment: no spills, clutter or unnecessary equipment. Stretcher in lowest position, wheels locked, appropriate side rails in place.

## 2022-03-20 NOTE — H&P ADULT - NEGATIVE NEUROLOGICAL SYMPTOMS
no transient paralysis/no weakness/no paresthesias/no generalized seizures/no focal seizures/no syncope/no loss of sensation/no difficulty walking/no headache/no loss of consciousness/no confusion

## 2022-03-20 NOTE — H&P ADULT - NSICDXPASTSURGICALHX_GEN_ALL_CORE_FT
PAST SURGICAL HISTORY:  H/O umbilical hernia repair with rectus diastasis repair 3/10/22 in [facility]    H/O:

## 2022-03-21 LAB
ALBUMIN SERPL ELPH-MCNC: 2.7 G/DL — LOW (ref 3.3–5)
ALP SERPL-CCNC: 40 U/L — SIGNIFICANT CHANGE UP (ref 40–120)
ALT FLD-CCNC: <5 U/L — LOW (ref 4–33)
ANION GAP SERPL CALC-SCNC: 8 MMOL/L — SIGNIFICANT CHANGE UP (ref 7–14)
AST SERPL-CCNC: 5 U/L — SIGNIFICANT CHANGE UP (ref 4–32)
BILIRUB SERPL-MCNC: <0.2 MG/DL — SIGNIFICANT CHANGE UP (ref 0.2–1.2)
BUN SERPL-MCNC: 6 MG/DL — LOW (ref 7–23)
CALCIUM SERPL-MCNC: 8.1 MG/DL — LOW (ref 8.4–10.5)
CHLORIDE SERPL-SCNC: 103 MMOL/L — SIGNIFICANT CHANGE UP (ref 98–107)
CO2 SERPL-SCNC: 26 MMOL/L — SIGNIFICANT CHANGE UP (ref 22–31)
CREAT SERPL-MCNC: 0.54 MG/DL — SIGNIFICANT CHANGE UP (ref 0.5–1.3)
CRP SERPL-MCNC: 8.2 MG/L — HIGH
EGFR: 119 ML/MIN/1.73M2 — SIGNIFICANT CHANGE UP
FERRITIN SERPL-MCNC: 17 NG/ML — SIGNIFICANT CHANGE UP (ref 15–150)
GLUCOSE SERPL-MCNC: 82 MG/DL — SIGNIFICANT CHANGE UP (ref 70–99)
HCT VFR BLD CALC: 28.3 % — LOW (ref 34.5–45)
HGB BLD-MCNC: 9.4 G/DL — LOW (ref 11.5–15.5)
IRON SATN MFR SERPL: 12 UG/DL — LOW (ref 30–160)
IRON SATN MFR SERPL: 5 % — LOW (ref 14–50)
MCHC RBC-ENTMCNC: 21.8 PG — LOW (ref 27–34)
MCHC RBC-ENTMCNC: 33.2 GM/DL — SIGNIFICANT CHANGE UP (ref 32–36)
MCV RBC AUTO: 65.7 FL — LOW (ref 80–100)
NRBC # BLD: 0 /100 WBCS — SIGNIFICANT CHANGE UP
NRBC # FLD: 0 K/UL — SIGNIFICANT CHANGE UP
PLATELET # BLD AUTO: 417 K/UL — HIGH (ref 150–400)
POTASSIUM SERPL-MCNC: 3.7 MMOL/L — SIGNIFICANT CHANGE UP (ref 3.5–5.3)
POTASSIUM SERPL-SCNC: 3.7 MMOL/L — SIGNIFICANT CHANGE UP (ref 3.5–5.3)
PROT SERPL-MCNC: 4.8 G/DL — LOW (ref 6–8.3)
RBC # BLD: 4.31 M/UL — SIGNIFICANT CHANGE UP (ref 3.8–5.2)
RBC # FLD: 20.5 % — HIGH (ref 10.3–14.5)
SODIUM SERPL-SCNC: 137 MMOL/L — SIGNIFICANT CHANGE UP (ref 135–145)
TIBC SERPL-MCNC: 235 UG/DL — SIGNIFICANT CHANGE UP (ref 220–430)
UIBC SERPL-MCNC: 223 UG/DL — SIGNIFICANT CHANGE UP (ref 110–370)
WBC # BLD: 7.48 K/UL — SIGNIFICANT CHANGE UP (ref 3.8–10.5)
WBC # FLD AUTO: 7.48 K/UL — SIGNIFICANT CHANGE UP (ref 3.8–10.5)

## 2022-03-21 PROCEDURE — 99233 SBSQ HOSP IP/OBS HIGH 50: CPT

## 2022-03-21 PROCEDURE — 99232 SBSQ HOSP IP/OBS MODERATE 35: CPT | Mod: GC

## 2022-03-21 RX ORDER — BUDESONIDE, MICRONIZED 100 %
9 POWDER (GRAM) MISCELLANEOUS DAILY
Refills: 0 | Status: DISCONTINUED | OUTPATIENT
Start: 2022-03-21 | End: 2022-03-22

## 2022-03-21 RX ORDER — ENOXAPARIN SODIUM 100 MG/ML
40 INJECTION SUBCUTANEOUS EVERY 24 HOURS
Refills: 0 | Status: DISCONTINUED | OUTPATIENT
Start: 2022-03-21 | End: 2022-03-22

## 2022-03-21 RX ORDER — FERROUS SULFATE 325(65) MG
325 TABLET ORAL DAILY
Refills: 0 | Status: DISCONTINUED | OUTPATIENT
Start: 2022-03-21 | End: 2022-03-22

## 2022-03-21 RX ADMIN — HYDROMORPHONE HYDROCHLORIDE 1 MILLIGRAM(S): 2 INJECTION INTRAMUSCULAR; INTRAVENOUS; SUBCUTANEOUS at 22:06

## 2022-03-21 RX ADMIN — Medication 9 MILLIGRAM(S): at 15:18

## 2022-03-21 RX ADMIN — HYDROMORPHONE HYDROCHLORIDE 1 MILLIGRAM(S): 2 INJECTION INTRAMUSCULAR; INTRAVENOUS; SUBCUTANEOUS at 10:58

## 2022-03-21 RX ADMIN — ENOXAPARIN SODIUM 40 MILLIGRAM(S): 100 INJECTION SUBCUTANEOUS at 22:07

## 2022-03-21 RX ADMIN — Medication 1 TABLET(S): at 18:28

## 2022-03-21 RX ADMIN — PANTOPRAZOLE SODIUM 40 MILLIGRAM(S): 20 TABLET, DELAYED RELEASE ORAL at 06:43

## 2022-03-21 RX ADMIN — Medication 325 MILLIGRAM(S): at 15:20

## 2022-03-21 RX ADMIN — HYDROMORPHONE HYDROCHLORIDE 1 MILLIGRAM(S): 2 INJECTION INTRAMUSCULAR; INTRAVENOUS; SUBCUTANEOUS at 11:58

## 2022-03-21 RX ADMIN — Medication 1 TABLET(S): at 06:44

## 2022-03-21 RX ADMIN — HYDROMORPHONE HYDROCHLORIDE 1 MILLIGRAM(S): 2 INJECTION INTRAMUSCULAR; INTRAVENOUS; SUBCUTANEOUS at 23:10

## 2022-03-21 NOTE — PROGRESS NOTE ADULT - ASSESSMENT
41F with hx of Crohn's disease (dx 12/2021 in [facility]) on prednisolone and azathioprine, recent rectus diastasis/umbilical hernia repair 10 days ago (3/2022 in [facility]) who presents with abdominal pain, CTAP with likely seroma/post surgical changes (no intervention per surgery).

## 2022-03-21 NOTE — PROGRESS NOTE ADULT - ASSESSMENT
41F PMHx anemia, gastritis, reported Crohn's disease (per patient Dx 12/2021 in [facility]) on prednisolone and azathioprine, and recent rectus diastasis and umbilical hernia repair 10 days ago on 3/10/22 in [facility], now presents with ongoing abdominal pain. Of note, patient has her medical records with her and all are from [facility] and in French, and her meds are from [facility] and different names/formularies. She reports her last endoscopy being in December 2021 when she was diagnosed with Crohn's disease. She was having ongoing pain and had an umbilical hernia and rectus diastasis repair on 3/10/22 and post op developed abd pain worse with sitting upright and with eating. No a/w N/V/D, fevers/chills. She describes pain as diffuse all over her abdomen, radiating to back, 10/10 on pain scale at its worst, constant, pressure like, feels bloated when eats, feels like her breathing is restricted from taking a full breath as everything feels tight. She denies having any recent bloody BMs but has had bloody BMs in the past (cannot recall when last episode was). In the ED, she had CT scan that showed post op seroma and enteritis (ileal loops thickened). She was afebrile with stable vital signs. GI consulted for h/o Crohn's disease.    Impression:  #post-surgical seroma in setting of recent rectus diastasis and umbilical hernia repair on 3/10/22 in [facility]  #Crohn's disease- on Azathioprine and prednisolone (pt states it was started 3 weeks ago prior to recent abd surgery by a physician in [facility])    Recommendations:  - surgical management of seroma  - pain control per primary team  - if having diarrhea, send GI PCR and C diff   - send CRP, ESR fecal calprotectin   - consider inpt vs outpt MRE for eval of small bowel Crohn's disease   - will need full out patient re-staging w/ egd/colonoscopy/imaging   - translation of records if possible   - will need outpatient follow up for management of Crohns Disease   - Follow up in Gastroenterology Clinic: [phone] ([facility] at [address] [place] [address]), or [phone] (Fac[facility] at [address] [place] [address])       **THIS NOTE IS NOT FINALIZED UNTIL SIGNED BY THE ATTENDING**      Joann Castelan MD  GI Fellow, PGY-4  Available via Microsoft Teams    NON-URGENT CONSULTS:  Please email [email or web address]  AT NIGHT AND ON WEEKENDS:  Contact on-call GI fellow via answering service ([phone]) from 5pm-8am and on weekends/holidays  MONDAY-FRIDAY 8AM-5PM:  Pager# [phone] ([facility]) or [phone] ([facility])  GI Phone# [phone] ([facility])       41F PMHx anemia, gastritis, reported Crohn's disease (per patient Dx 12/2021 in [facility]) on prednisolone and azathioprine, and recent rectus diastasis and umbilical hernia repair 10 days ago on 3/10/22 in [facility], now presents with ongoing abdominal pain. Of note, patient has her medical records with her and all are from [facility] and in [place], and her meds are from [facility] and different names/formularies. She reports her last endoscopy being in December 2021 when she was diagnosed with Crohn's disease. She was having ongoing pain and had an umbilical hernia and rectus diastasis repair on 3/10/22 and post op developed abd pain worse with sitting upright and with eating. No a/w N/V/D, fevers/chills. She describes pain as diffuse all over her abdomen, radiating to back, 10/10 on pain scale at its worst, constant, pressure like, feels bloated when eats, feels like her breathing is restricted from taking a full breath as everything feels tight. She denies having any recent bloody BMs but has had bloody BMs in the past (cannot recall when last episode was). In the ED, she had CT scan that showed post op seroma and enteritis (ileal loops thickened). She was afebrile with stable vital signs. GI consulted for h/o Crohn's disease.    Impression:  #post-surgical seroma in setting of recent rectus diastasis and umbilical hernia repair on 3/10/22 in [facility]  #Crohn's disease- on Azathioprine and prednisolone (pt states it was started 3 weeks ago prior to recent abd surgery by a physician in [facility])    Recommendations:  - start Entocort (budesonide ileal release) at 9 mg daily   - surgical management of seroma  - pain control per primary team  - if having diarrhea, send GI PCR and C diff   - send CRP, ESR fecal calprotectin   - consider inpt vs outpt MRE for eval of small bowel Crohn's disease   - will need full out patient re-staging w/ egd/colonoscopy/imaging   - translation of records if possible   - will need outpatient follow up for management of Crohns Disease   - Follow up in Gastroenterology Clinic: [phone] ([facility] at [address] [place] [address]), or [phone] (Faculty Practice at [address] [place] [address])       **THIS NOTE IS NOT FINALIZED UNTIL SIGNED BY THE ATTENDING**      Joann Castelan MD  GI Fellow, PGY-4  Available via Microsoft Teams    NON-URGENT CONSULTS:  Please email [email or web address]  AT NIGHT AND ON WEEKENDS:  Contact on-call GI fellow via answering service ([phone]) from 5pm-8am and on weekends/holidays  MONDAY-FRIDAY 8AM-5PM:  Pager# [phone] ([facility]) or [phone] ([facility])  GI Phone# [phone] ([facility])

## 2022-03-22 ENCOUNTER — TRANSCRIPTION ENCOUNTER (OUTPATIENT)
Age: 42
End: 2022-03-22

## 2022-03-22 VITALS
OXYGEN SATURATION: 98 % | DIASTOLIC BLOOD PRESSURE: 64 MMHG | TEMPERATURE: 99 F | HEART RATE: 66 BPM | SYSTOLIC BLOOD PRESSURE: 98 MMHG | RESPIRATION RATE: 17 BRPM

## 2022-03-22 LAB
ALBUMIN SERPL ELPH-MCNC: 3.1 G/DL — LOW (ref 3.3–5)
ALP SERPL-CCNC: 39 U/L — LOW (ref 40–120)
ALT FLD-CCNC: 5 U/L — SIGNIFICANT CHANGE UP (ref 4–33)
ANION GAP SERPL CALC-SCNC: 8 MMOL/L — SIGNIFICANT CHANGE UP (ref 7–14)
AST SERPL-CCNC: 7 U/L — SIGNIFICANT CHANGE UP (ref 4–32)
BILIRUB DIRECT SERPL-MCNC: <0.2 MG/DL — SIGNIFICANT CHANGE UP (ref 0–0.3)
BILIRUB INDIRECT FLD-MCNC: SIGNIFICANT CHANGE UP MG/DL (ref 0–1)
BILIRUB SERPL-MCNC: <0.2 MG/DL — SIGNIFICANT CHANGE UP (ref 0.2–1.2)
BUN SERPL-MCNC: 6 MG/DL — LOW (ref 7–23)
CALCIUM SERPL-MCNC: 8.7 MG/DL — SIGNIFICANT CHANGE UP (ref 8.4–10.5)
CHLORIDE SERPL-SCNC: 103 MMOL/L — SIGNIFICANT CHANGE UP (ref 98–107)
CO2 SERPL-SCNC: 26 MMOL/L — SIGNIFICANT CHANGE UP (ref 22–31)
CREAT SERPL-MCNC: 0.48 MG/DL — LOW (ref 0.5–1.3)
CRP SERPL-MCNC: 19.4 MG/L — HIGH
EGFR: 123 ML/MIN/1.73M2 — SIGNIFICANT CHANGE UP
ERYTHROCYTE [SEDIMENTATION RATE] IN BLOOD: 12 MM/HR — SIGNIFICANT CHANGE UP (ref 4–25)
GLUCOSE SERPL-MCNC: 92 MG/DL — SIGNIFICANT CHANGE UP (ref 70–99)
HCT VFR BLD CALC: 28.6 % — LOW (ref 34.5–45)
HGB BLD-MCNC: 9.4 G/DL — LOW (ref 11.5–15.5)
MAGNESIUM SERPL-MCNC: 1.8 MG/DL — SIGNIFICANT CHANGE UP (ref 1.6–2.6)
MCHC RBC-ENTMCNC: 21.4 PG — LOW (ref 27–34)
MCHC RBC-ENTMCNC: 32.9 GM/DL — SIGNIFICANT CHANGE UP (ref 32–36)
MCV RBC AUTO: 65 FL — LOW (ref 80–100)
NRBC # BLD: 0 /100 WBCS — SIGNIFICANT CHANGE UP
NRBC # FLD: 0 K/UL — SIGNIFICANT CHANGE UP
PHOSPHATE SERPL-MCNC: 3.3 MG/DL — SIGNIFICANT CHANGE UP (ref 2.5–4.5)
PLATELET # BLD AUTO: 426 K/UL — HIGH (ref 150–400)
POTASSIUM SERPL-MCNC: 3.6 MMOL/L — SIGNIFICANT CHANGE UP (ref 3.5–5.3)
POTASSIUM SERPL-SCNC: 3.6 MMOL/L — SIGNIFICANT CHANGE UP (ref 3.5–5.3)
PROT SERPL-MCNC: 5.4 G/DL — LOW (ref 6–8.3)
RBC # BLD: 4.4 M/UL — SIGNIFICANT CHANGE UP (ref 3.8–5.2)
RBC # FLD: 20.3 % — HIGH (ref 10.3–14.5)
SODIUM SERPL-SCNC: 137 MMOL/L — SIGNIFICANT CHANGE UP (ref 135–145)
WBC # BLD: 5.94 K/UL — SIGNIFICANT CHANGE UP (ref 3.8–10.5)
WBC # FLD AUTO: 5.94 K/UL — SIGNIFICANT CHANGE UP (ref 3.8–10.5)

## 2022-03-22 PROCEDURE — 99239 HOSP IP/OBS DSCHRG MGMT >30: CPT

## 2022-03-22 PROCEDURE — 99232 SBSQ HOSP IP/OBS MODERATE 35: CPT | Mod: GC

## 2022-03-22 RX ORDER — BUDESONIDE, MICRONIZED 100 %
3 POWDER (GRAM) MISCELLANEOUS
Qty: 90 | Refills: 0
Start: 2022-03-22 | End: 2022-04-20

## 2022-03-22 RX ADMIN — PANTOPRAZOLE SODIUM 40 MILLIGRAM(S): 20 TABLET, DELAYED RELEASE ORAL at 05:55

## 2022-03-22 RX ADMIN — HYDROMORPHONE HYDROCHLORIDE 1 MILLIGRAM(S): 2 INJECTION INTRAMUSCULAR; INTRAVENOUS; SUBCUTANEOUS at 10:29

## 2022-03-22 RX ADMIN — HYDROMORPHONE HYDROCHLORIDE 1 MILLIGRAM(S): 2 INJECTION INTRAMUSCULAR; INTRAVENOUS; SUBCUTANEOUS at 11:00

## 2022-03-22 RX ADMIN — HYDROMORPHONE HYDROCHLORIDE 1 MILLIGRAM(S): 2 INJECTION INTRAMUSCULAR; INTRAVENOUS; SUBCUTANEOUS at 16:52

## 2022-03-22 RX ADMIN — Medication 325 MILLIGRAM(S): at 15:09

## 2022-03-22 RX ADMIN — Medication 1 TABLET(S): at 05:54

## 2022-03-22 RX ADMIN — Medication 9 MILLIGRAM(S): at 05:54

## 2022-03-22 NOTE — PROGRESS NOTE ADULT - PROBLEM SELECTOR PLAN 5
- DVT ppx: SCD's given patient is ambulatory and low risk   - DISPO: Home. Pending further GI recs, improvement of abdominal pain, tolerating PO diet.     Plan discussed with ACP Vikram.
- DVT ppx: SCD's given patient is ambulatory and low risk   - DISPO: Home. Discussed with GI - ok to dc on budesonide, resume azathioprine and prednisolone on dc. Outpatient GI followup. Plan discussed with patient. Total ~37 mins spent dc planning.

## 2022-03-22 NOTE — DISCHARGE NOTE NURSING/CASE MANAGEMENT/SOCIAL WORK - NSDCPEFALRISK_GEN_ALL_CORE
For information on Fall & Injury Prevention, visit: [email or web address] OR  [email or web address] OR  https://www.cdc.gov/steadi/patient.html

## 2022-03-22 NOTE — DISCHARGE NOTE NURSING/CASE MANAGEMENT/SOCIAL WORK - NSDCFUADDAPPT_GEN_ALL_CORE_FT
Please have outpatient GI followup for followup diagnostic evaluation (CT or MR enterography) and likely repeat colonoscopy    Follow up in Gastroenterology Clinic: [phone] ([facility]), or [phone] (Faculty Practice at [address] [place] [address])

## 2022-03-22 NOTE — PROGRESS NOTE ADULT - SUBJECTIVE AND OBJECTIVE BOX
Jo-Ann Causey MD  [facility] Division of [facility]  Pager [phone] (M-F 8AM-5PM)  Other Times: [phone]    Patient is a 41y old  Female who presents with a chief complaint of Abdominal Pain (22 Mar 2022 10:08)    SUBJECTIVE / OVERNIGHT EVENTS: patient feeling better, pain controlled, tolerating PO diet     MEDICATIONS  (STANDING):  buDESOnide    EC Capsule 9 milliGRAM(s) Oral daily  calcium carbonate   1250 mG (OsCal) 1 Tablet(s) Oral two times a day  enoxaparin Injectable 40 milliGRAM(s) SubCutaneous every 24 hours  ferrous    sulfate 325 milliGRAM(s) Oral daily  influenza   Vaccine 0.5 milliLiter(s) IntraMuscular once  pantoprazole    Tablet 40 milliGRAM(s) Oral before breakfast    MEDICATIONS  (PRN):  acetaminophen     Tablet .. 650 milliGRAM(s) Oral every 6 hours PRN Mild Pain (1 - 3), Moderate Pain (4 - 6)  HYDROmorphone   Tablet 1 milliGRAM(s) Oral every 6 hours PRN Severe Pain (7 - 10)  lactulose Syrup 10 Gram(s) Oral daily PRN constipation      PHYSICAL EXAM:  Vital Signs Last 24 Hrs  T(C): 37.4 (22 Mar 2022 13:04), Max: 37.4 (22 Mar 2022 13:04)  T(F): 99.4 (22 Mar 2022 13:04), Max: 99.4 (22 Mar 2022 13:04)  HR: 66 (22 Mar 2022 13:04) (65 - 70)  BP: 98/64 (22 Mar 2022 13:04) (98/64 - 103/67)  RR: 17 (22 Mar 2022 13:04) (17 - 17)  SpO2: 98% (22 Mar 2022 13:04) (98% - 99%)    CONSTITUTIONAL: NAD, well-developed, well-groomed  RESPIRATORY: Normal respiratory effort; lungs are clear to auscultation bilaterally  CARDIOVASCULAR: Regular rate and rhythm, normal S1 and S2, no murmur/rub/gallop; No lower extremity edema  GASTROINTESTINAL: Nontender to palpation, normoactive bowel sounds, no rebound/guarding; No hepatosplenomegaly  MUSCULOSKELETAL:  no clubbing or cyanosis of digits; no joint swelling or tenderness to palpation  NEUROLOGY: non-focal; no gross sensory deficits   PSYCH: A+O to person, place, and time; affect appropriate  SKIN: midline abdominal surgical scar healing well     LABS:                        9.4    5.94  )-----------( 426      ( 22 Mar 2022 07:46 )             28.6     03-22    137  |  103  |  6<L>  ----------------------------<  92  3.6   |  26  |  0.48<L>    Ca    8.7      22 Mar 2022 07:46  Phos  3.3     03-22  Mg     1.80     03-22    TPro  5.4<L>  /  Alb  3.1<L>  /  TBili  <0.2  /  DBili  <0.2  /  AST  7   /  ALT  5   /  AlkPhos  39<L>  03-22                RADIOLOGY & ADDITIONAL TESTS:  Results Reviewed:   Imaging Personally Reviewed:  Electrocardiogram Personally Reviewed:    COORDINATION OF CARE:  Care Discussed with Consultants/Other Providers [Y/N]:  Prior or Outpatient Records Reviewed [Y/N]:  
  Chief Complaint:  Patient is a 41y old  Female who presents with a chief complaint of Abdominal Pain (20 Mar 2022 12:29)      Interval Events: Reports continued abd pain but no fever/chills, N/V.      Hospital Medications:  acetaminophen     Tablet .. 650 milliGRAM(s) Oral every 6 hours PRN  calcium carbonate   1250 mG (OsCal) 1 Tablet(s) Oral two times a day  HYDROmorphone   Tablet 1 milliGRAM(s) Oral every 6 hours PRN  influenza   Vaccine 0.5 milliLiter(s) IntraMuscular once  lactulose Syrup 10 Gram(s) Oral daily PRN  pantoprazole    Tablet 40 milliGRAM(s) Oral before breakfast  sodium chloride 0.9%. 1000 milliLiter(s) IV Continuous <Continuous>      PMHX/PSHX:  Anemia    Gastritis    Crohn&#x27;s disease    No significant past surgical history    H/O:     H/O umbilical hernia repair            ROS: 14 point ROS negative unless otherwise stated in subjective      PHYSICAL EXAM:     GENERAL:  Appears stated age, well-groomed, well-nourished, no distress  HEENT:  NC/AT,  conjunctivae clear and pink  CHEST:  Full & symmetric excursion, no increased effort, breath sounds clear  HEART:  Regular rhythm, S1, S2, no murmur/rub/S3/S4  ABDOMEN:  Soft, +diffusely-tender, non-distended, +bowel sounds, +well healing midline surgical incision  EXTREMITIES:  no cyanosis,clubbing or edema  SKIN:  No rash/erythema/ecchymoses/petechiae/wounds/abscess/warm/dry  NEURO:  Alert, orientedx3      Vital Signs:  Vital Signs Last 24 Hrs  T(C): 36.9 (21 Mar 2022 06:43), Max: 36.9 (21 Mar 2022 06:43)  T(F): 98.5 (21 Mar 2022 06:43), Max: 98.5 (21 Mar 2022 06:43)  HR: 64 (21 Mar 2022 06:43) (55 - 64)  BP: 102/65 (21 Mar 2022 06:43) (102/65 - 108/70)  BP(mean): --  RR: 17 (21 Mar 2022 06:43) (17 - 18)  SpO2: 99% (21 Mar 2022 06:43) (99% - 100%)  Daily     Daily     LABS:                        9.4    7.48  )-----------( 417      ( 21 Mar 2022 08:02 )             28.3     03-    137  |  103  |  6<L>  ----------------------------<  82  3.7   |  26  |  0.54    Ca    8.1<L>      21 Mar 2022 08:02  Phos  3.3     03-20  Mg     1.70     -20    TPro  4.8<L>  /  Alb  2.7<L>  /  TBili  <0.2  /  DBili  x   /  AST  5   /  ALT  <5<L>  /  AlkPhos  40  03-21    LIVER FUNCTIONS - ( 21 Mar 2022 08:02 )  Alb: 2.7 g/dL / Pro: 4.8 g/dL / ALK PHOS: 40 U/L / ALT: <5 U/L / AST: 5 U/L / GGT: x             Urinalysis Basic - ( 20 Mar 2022 01:33 )    Color: Yellow / Appearance: Clear / S.030 / pH: x  Gluc: x / Ketone: Negative  / Bili: Negative / Urobili: 3 mg/dL   Blood: x / Protein: Trace / Nitrite: Negative   Leuk Esterase: Negative / RBC: 1 /HPF / WBC 3 /HPF   Sq Epi: x / Non Sq Epi: 2 /HPF / Bacteria: Negative          Imaging: No new abdominal imaging            
  Chief Complaint:  Patient is a 41y old  Female who presents with a chief complaint of Abdominal Pain (22 Mar 2022 09:10)      Interval Events: no new overnight events.    Allergies:  No Known Allergies      Hospital Medications:  acetaminophen     Tablet .. 650 milliGRAM(s) Oral every 6 hours PRN  buDESOnide    EC Capsule 9 milliGRAM(s) Oral daily  calcium carbonate   1250 mG (OsCal) 1 Tablet(s) Oral two times a day  enoxaparin Injectable 40 milliGRAM(s) SubCutaneous every 24 hours  ferrous    sulfate 325 milliGRAM(s) Oral daily  HYDROmorphone   Tablet 1 milliGRAM(s) Oral every 6 hours PRN  influenza   Vaccine 0.5 milliLiter(s) IntraMuscular once  lactulose Syrup 10 Gram(s) Oral daily PRN  pantoprazole    Tablet 40 milliGRAM(s) Oral before breakfast      PMHX/PSHX:  Anemia    Gastritis    Crohn&#x27;s disease    No significant past surgical history    H/O:     H/O umbilical hernia repair        Family history:  No pertinent family history in first degree relatives    Family history of anemia (Father, Sibling, Child)        ROS: As per HPI, 14-point ROS negative otherwise.    General:  No wt loss, fevers, chills, night sweats, fatigue,   Eyes:  Good vision, no reported pain  ENT:  No sore throat, pain, runny nose, dysphagia  CV:  No pain, palpitations, hypo/hypertension  Resp:  No dyspnea, cough, tachypnea, wheezing  GI:  See HPI  :  No pain, bleeding, incontinence, nocturia  Muscle:  No pain, weakness  Neuro:  No weakness, tingling, memory problems  Psych:  No fatigue, insomnia, mood problems, depression  Endocrine:  No polyuria, polydipsia, cold/heat intolerance  Heme:  No petechiae, ecchymosis, easy bruisability  Skin:  No rash, edema      PHYSICAL EXAM:     Vital Signs:  Vital Signs Last 24 Hrs  T(C): 36.9 (22 Mar 2022 05:52), Max: 37.2 (21 Mar 2022 12:37)  T(F): 98.4 (22 Mar 2022 05:52), Max: 99 (21 Mar 2022 12:37)  HR: 70 (22 Mar 2022 05:52) (62 - 70)  BP: 100/66 (22 Mar 2022 05:52) (100/66 - 103/67)  BP(mean): --  RR: 17 (22 Mar 2022 05:52) (17 - 18)  SpO2: 99% (22 Mar 2022 05:52) (98% - 100%)  Daily     Daily     GENERAL:  appears comfortable, no acute distress  HEENT:  NC/AT,  conjunctivae clear, sclera -anicteric  CHEST:  no increased effort  HEART:  Regular rate and rhythm  ABDOMEN:  Soft, non-tender, non-distended,  no masses ,no hepato-splenomegaly,   EXTREMITIES:  no cyanosis, clubbing or edema  SKIN:  No rash/erythema/ecchymoses/petechiae/wounds  NEURO:  Alert, oriented    LABS:                        9.4    5.94  )-----------( 426      ( 22 Mar 2022 07:46 )             28.6         137  |  103  |  6<L>  ----------------------------<  92  3.6   |  26  |  0.48<L>    Ca    8.7      22 Mar 2022 07:46  Phos  3.3     -  Mg     1.80         TPro  5.4<L>  /  Alb  3.1<L>  /  TBili  <0.2  /  DBili  <0.2  /  AST  7   /  ALT  5   /  AlkPhos  39<L>      LIVER FUNCTIONS - ( 22 Mar 2022 07:46 )  Alb: 3.1 g/dL / Pro: 5.4 g/dL / ALK PHOS: 39 U/L / ALT: 5 U/L / AST: 7 U/L / GGT: x                   Imaging:          
Jo-Ann Causey MD  [facility] Division of Hospital Medicine  Pager [phone] (M-F 8AM-5PM)  Other Times: [phone]    Patient is a 41y old  Female who presents with a chief complaint of Abdominal Pain (21 Mar 2022 09:09)    SUBJECTIVE / OVERNIGHT EVENTS: Patient tolerating clear diet, not ready to advance to regular diet     MEDICATIONS  (STANDING):  buDESOnide    EC Capsule 9 milliGRAM(s) Oral daily  calcium carbonate   1250 mG (OsCal) 1 Tablet(s) Oral two times a day  ferrous    sulfate 325 milliGRAM(s) Oral daily  influenza   Vaccine 0.5 milliLiter(s) IntraMuscular once  pantoprazole    Tablet 40 milliGRAM(s) Oral before breakfast  sodium chloride 0.9%. 1000 milliLiter(s) (75 mL/Hr) IV Continuous <Continuous>    MEDICATIONS  (PRN):  acetaminophen     Tablet .. 650 milliGRAM(s) Oral every 6 hours PRN Mild Pain (1 - 3), Moderate Pain (4 - 6)  HYDROmorphone   Tablet 1 milliGRAM(s) Oral every 6 hours PRN Severe Pain (7 - 10)  lactulose Syrup 10 Gram(s) Oral daily PRN constipation      PHYSICAL EXAM:  Vital Signs Last 24 Hrs  T(C): 37.2 (21 Mar 2022 12:37), Max: 37.2 (21 Mar 2022 12:37)  T(F): 99 (21 Mar 2022 12:37), Max: 99 (21 Mar 2022 12:37)  HR: 62 (21 Mar 2022 12:37) (62 - 64)  BP: 102/70 (21 Mar 2022 12:37) (102/65 - 107/72)  RR: 18 (21 Mar 2022 12:37) (17 - 18)  SpO2: 100% (21 Mar 2022 12:37) (99% - 100%)    CONSTITUTIONAL: NAD, well-developed, well-groomed  RESPIRATORY: Normal respiratory effort; lungs are clear to auscultation bilaterally  CARDIOVASCULAR: Regular rate and rhythm, normal S1 and S2, no murmur/rub/gallop; No lower extremity edema  GASTROINTESTINAL: Nontender to palpation, normoactive bowel sounds, no rebound/guarding; No hepatosplenomegaly  MUSCULOSKELETAL:  no clubbing or cyanosis of digits; no joint swelling or tenderness to palpation  NEUROLOGY: non-focal; no gross sensory deficits   PSYCH: A+O to person, place, and time; affect appropriate  SKIN: No rashes; warm     LABS:                        9.4    7.48  )-----------( 417      ( 21 Mar 2022 08:02 )             28.3     03-21    137  |  103  |  6<L>  ----------------------------<  82  3.7   |  26  |  0.54    Ca    8.1<L>      21 Mar 2022 08:02  Phos  3.3     03-20  Mg     1.70     03-20    TPro  4.8<L>  /  Alb  2.7<L>  /  TBili  <0.2  /  DBili  x   /  AST  5   /  ALT  <5<L>  /  AlkPhos  40  03-21          Urinalysis Basic - ( 20 Mar 2022 01:33 )    Color: Yellow / Appearance: Clear / S.030 / pH: x  Gluc: x / Ketone: Negative  / Bili: Negative / Urobili: 3 mg/dL   Blood: x / Protein: Trace / Nitrite: Negative   Leuk Esterase: Negative / RBC: 1 /HPF / WBC 3 /HPF   Sq Epi: x / Non Sq Epi: 2 /HPF / Bacteria: Negative          RADIOLOGY & ADDITIONAL TESTS:  Results Reviewed:   Imaging Personally Reviewed:  Electrocardiogram Personally Reviewed:    COORDINATION OF CARE:  Care Discussed with Consultants/Other Providers [Y/N]:  Prior or Outpatient Records Reviewed [Y/N]:

## 2022-03-22 NOTE — DISCHARGE NOTE PROVIDER - NSDCCPCAREPLAN_GEN_ALL_CORE_FT
PRINCIPAL DISCHARGE DIAGNOSIS  Diagnosis: Abdominal pain  Assessment and Plan of Treatment: You were admitted for abdominal pain. You had a recent rectus diastasis and umbilical hernia repair in [facility] on 3/10/22. CT scan of your abdomen and pelvis showed post surgical changes with simple fluid collection in left ventral likely reflects a fluid filled area called a seroma. Surgery and Gastroenterology were consulted who said no acute intervention and to start patient on budesonide 9 mg once a day. Diet was advanced as tolerated. You were given IV fluids and pain regimen. Please follow up with your primary care physician and gastroenterologist after discharge for continued monitoring and management.      SECONDARY DISCHARGE DIAGNOSES  Diagnosis: Crohn's disease  Assessment and Plan of Treatment: Please continue budesonide as directed. Please follow up with your primary care physician and gastroenterologist after discharge for continued monitoring and management.     PRINCIPAL DISCHARGE DIAGNOSIS  Diagnosis: Abdominal pain  Assessment and Plan of Treatment: You were admitted for abdominal pain. You had a recent rectus diastasis and umbilical hernia repair in [facility] on 3/10/22. CT scan of your abdomen and pelvis showed post surgical changes with simple fluid collection in left ventral likely reflects a fluid filled area called a seroma. Surgery and Gastroenterology were consulted who said no acute intervention and to start patient on budesonide 9 mg once a day. Diet was advanced as tolerated. You were given IV fluids and pain regimen. Please follow up with your primary care physician and gastroenterologist after discharge for continued monitoring and management. Continue home medications as prescribed.      SECONDARY DISCHARGE DIAGNOSES  Diagnosis: Crohn's disease  Assessment and Plan of Treatment: Please continue budesonide as directed. Please follow up with your primary care physician and gastroenterologist after discharge for continued monitoring and management.     PRINCIPAL DISCHARGE DIAGNOSIS  Diagnosis: Abdominal pain  Assessment and Plan of Treatment: You were admitted for abdominal pain. You had a recent rectus diastasis and umbilical hernia repair in [facility] on 3/10/22. CT scan of your abdomen and pelvis showed post surgical changes with simple fluid collection in left ventral likely reflects a fluid filled area called a seroma. Surgery and Gastroenterology were consulted who said no acute intervention and to start patient on budesonide 9 mg once a day. Diet was advanced as tolerated. You were given IV fluids and pain regimen. Please follow up with your primary care physician and gastroenterologist after discharge for continued monitoring and management. Continue home medications as prescribed.  IMPRESSION:  1. Enteritis.  2. Postsurgical changes in the ventral abdominal wall. 1.6 x 7.0 x 5.6 cm   simple appearing fluid collection in the left ventral abdominal wall most   likely reflects seroma. Recommend clinical correlation for superimposed   infection.      SECONDARY DISCHARGE DIAGNOSES  Diagnosis: Crohn's disease  Assessment and Plan of Treatment: Please continue budesonide as directed. Please follow up with your primary care physician and gastroenterologist after discharge for continued monitoring and management.

## 2022-03-22 NOTE — PROGRESS NOTE ADULT - ASSESSMENT
41F PMHx anemia, gastritis, reported Crohn's disease (per patient Dx 12/2021 in [facility]) on prednisolone and azathioprine, and recent rectus diastasis and umbilical hernia repair 10 days ago on 3/10/22 in [facility], now presents with ongoing abdominal pain. Of note, patient has her medical records with her and all are from [facility] and in [place], and her meds are from [facility] and different names/formularies. She reports her last endoscopy being in December 2021 when she was diagnosed with Crohn's disease. She was having ongoing pain and had an umbilical hernia and rectus diastasis repair on 3/10/22 and post op developed abd pain worse with sitting upright and with eating. No a/w N/V/D, fevers/chills. She describes pain as diffuse all over her abdomen, radiating to back, 10/10 on pain scale at its worst, constant, pressure like, feels bloated when eats, feels like her breathing is restricted from taking a full breath as everything feels tight. She denies having any recent bloody BMs but has had bloody BMs in the past (cannot recall when last episode was). In the ED, she had CT scan that showed post op seroma and enteritis (ileal loops thickened). She was afebrile with stable vital signs. GI consulted for h/o Crohn's disease.    Impression:  #post-surgical seroma in setting of recent rectus diastasis and umbilical hernia repair on 3/10/22 in [facility]  #Crohn's disease- on Azathioprine and prednisolone (pt states it was started 3 weeks ago prior to recent abd surgery by a physician in [facility])    Recommendations:  - continue with Entocort (budesonide ileal release) at 9 mg daily   - surgical management of seroma  - pain control per primary team  - consider outpt MRE for eval of small bowel Crohn's disease   - will need full out patient re-staging w/ egd/colonoscopy/imaging   - will need outpatient follow up for management of Crohns Disease   - Follow up in Gastroenterology Clinic: [phone] ([facility] at [address] [place] [address]), or [phone] (Faculty Practice at [address] [place] [address])   - no objection to discharge and outpatient f/u

## 2022-03-22 NOTE — DISCHARGE NOTE NURSING/CASE MANAGEMENT/SOCIAL WORK - PATIENT PORTAL LINK FT
You can access the FollowMyHealth Patient Portal offered by [facility] by registering at the following website: [email or web address]. By joining [facility]’s FollowMyHealth portal, you will also be able to view your health information using other applications (apps) compatible with our system.

## 2022-03-22 NOTE — DISCHARGE NOTE PROVIDER - NSDCMRMEDTOKEN_GEN_ALL_CORE_FT
acetaminophen: Doliprane 1000mg PRN (appears to be acetaminophen)  azaTHIOprine 50 mg oral tablet: 1 tab(s) orally 2 times a day (patient reports taking twice per day, but gives her GI upset)  calcium carbonate: Calperos 500mg BID - appears to be calcium supplement  Electrolyte: Kalinor Retard 600mg TID (possibly a mineral/potassium preparation for supplementation)  esomeprazole 40 mg oral delayed release capsule: Inexium 40mg - 1 cap(s) orally once a day  lactulose: Duphalac 10g/15mL - appears to be lactulose   prednisoLONE: Vitapred 20mg - Prednisolone: patient reports currently taking 3 tabs (60mg) daily for 3 more weeks (5 weeks of 60mg), with plan to follow taper as outlined by surgeon theafter    acetaminophen: Doliprane 1000mg PRN (appears to be acetaminophen)  azaTHIOprine 50 mg oral tablet: 1 tab(s) orally 2 times a day (patient reports taking twice per day, but gives her GI upset)  calcium carbonate: Calperos 500mg BID - appears to be calcium supplement  Electrolyte: Kalinor Retard 600mg TID (possibly a mineral/potassium preparation for supplementation)  Entocort EC 3 mg oral delayed release capsule: 3 cap(s) orally once a day (in the morning)  esomeprazole 40 mg oral delayed release capsule: Inexium 40mg - 1 cap(s) orally once a day  lactulose: Duphalac 10g/15mL - appears to be lactulose   prednisoLONE: Vitapred 20mg - Prednisolone: patient reports currently taking 3 tabs (60mg) daily for 3 more weeks (5 weeks of 60mg), with plan to follow taper as outlined by surgeon theafter

## 2022-03-22 NOTE — DISCHARGE NOTE NURSING/CASE MANAGEMENT/SOCIAL WORK - NSTRANSFERBELONGINGSRESP_GEN_A_NUR
1205 - Pt is scheduled for colon class on 7/5/18. There is an order in the chart for GI consultation. Pt called, call dropped. Pt called lm on vm that class has been cancelled. PSR will call to schedule consult.      Signatures   Electronically signed by : Lois Howard R.N.; Jul  3 2018 12:12PM CST English yes

## 2022-03-22 NOTE — PROGRESS NOTE ADULT - ATTENDING COMMENTS
Feels much better today. Abdominal pain significantly decreased. Reports no fever, nausea or vomiting. Abdominal pain on admission most consistent with postsurgical and an incisional pain which is improving. PE/labs as noted. Recommendations as noted regarding recent diagnosis Crohn's enteritis-discharge on budesonide IR 9 milligrams daily. Low-residue diet. Avoid NSAIDs. Patient aware of need for outpatient GI followup.
History as noted. Apparently recent diagnosis of Crohn's enteritis. Subsequently had umbilical hernia and diastasis recti repair in [facility] 10 days ago., Current complaint appears to be incisional pain. From standpoint of Crohn's enteritis advise initiating treatment with budesonide 9 mg daily. Further evaluation regarding abdominal pain and detection of seroma as per surgery service. Patient advised of need for outpatient GI followup for followup diagnostic evaluation (CT or MR enterography) and likely repeat colonoscopy. Further treatment to be planned accordingly. Advise submitting HBV serology and QTF. Monitor serial CBC, liver enzymes and CRP. Submit stool for fecal calproetectin. In event of diarrhea: stool studies for GI PCR, culture and Clostridium difficile.

## 2022-03-22 NOTE — DISCHARGE NOTE PROVIDER - HOSPITAL COURSE
41F PMHx anemia, gastritis, reported Crohn's disease (per patient Dx 12/2021 in [facility]) on prednisolone and azathioprine, and recent rectus diastasis and umbilical hernia repair 10 days ago on 3/10/22 in [facility] by Dr. Selin Garcia, now presents with ongoing abdominal pain. Of note, patient has her medical records with her and all are from [facility] and in [place], and her meds are from [facility] and different names/formularies. She reports a long history of abdominal pain since 2010. She states she went to 4 different hospitals over the years and no one would intervene or fix the problem. Finally, she sought care in [facility]. She reports her last endoscopy being in December 2021 when she was diagnosed with Chron's disease. She was having ongoing pain and had an umblical hernia and rectus diastasis repair on 3/10/22 and has been doing ok post op except for ongoing pain. She describes pain as diffuse all over her abdomen, radiating to back, 10/10 on pain scale at its worst, constant, pressure like, feels bloated when eats, feels like her breathing is restricted from taking a full breath as everything feels tight. She denies fever, chills, N/V/D. She does endorse constipation and takes lactulose. She denies having any blood in stool and has been able to eat and drink ok. In the ED, she had CT scan that showed post op seroma and enteritis (ileal loops thickened). She was afebrile with stable vital signs. She was given 1L NS bolus, dilaudid IV, toradol, and tylenol for pain. Surgery was consulted and recommended no surgical intervention.     Patient was admitted for abdominal pain. Patient had recent rectus diastasis and umbilical hernia repair in [facility] on 3/10/22. CT A/P showed post surgical changes with simple fluid collection in left ventral likely reflects seroma. Surgery and GI were consulted who said no acute intervention and to start patient on budesonide 9 mg qd. Diet was advanced as tolerated. Patient was given IV fluids and pain regimen.     On ___ this case was reviewed with  ____, the patient is medically stable and optimized for discharge. All medications were reviewed and prescriptions were sent to mutually agreed upon pharmacy.                   41F PMHx anemia, gastritis, reported Crohn's disease (per patient Dx 12/2021 in [facility]) on prednisolone and azathioprine, and recent rectus diastasis and umbilical hernia repair 10 days ago on 3/10/22 in [facility] by Dr. Selin Garcia, now presents with ongoing abdominal pain. Of note, patient has her medical records with her and all are from [facility] and in [place], and her meds are from [facility] and different names/formularies. She reports a long history of abdominal pain since 2010. She states she went to 4 different hospitals over the years and no one would intervene or fix the problem. Finally, she sought care in [facility]. She reports her last endoscopy being in December 2021 when she was diagnosed with Chron's disease. She was having ongoing pain and had an umblical hernia and rectus diastasis repair on 3/10/22 and has been doing ok post op except for ongoing pain. She describes pain as diffuse all over her abdomen, radiating to back, 10/10 on pain scale at its worst, constant, pressure like, feels bloated when eats, feels like her breathing is restricted from taking a full breath as everything feels tight. She denies fever, chills, N/V/D. She does endorse constipation and takes lactulose. She denies having any blood in stool and has been able to eat and drink ok. In the ED, she had CT scan that showed post op seroma and enteritis (ileal loops thickened). She was afebrile with stable vital signs. She was given 1L NS bolus, dilaudid IV, toradol, and tylenol for pain. Surgery was consulted and recommended no surgical intervention.     Patient was admitted for abdominal pain. Patient had recent rectus diastasis and umbilical hernia repair in [facility] on 3/10/22. CT A/P showed post surgical changes with simple fluid collection in left ventral likely reflects seroma. Surgery and GI were consulted who said no acute intervention and to start patient on budesonide 9 mg qd. Diet was advanced as tolerated. Patient was given IV fluids and pain regimen. Patient to continue on same GI med regimen.     On 3/22/22 this case was reviewed with Dr. Causey, the patient is medically stable and optimized for discharge. All medications were reviewed and prescriptions were sent to mutually agreed upon pharmacy.

## 2022-03-22 NOTE — DISCHARGE NOTE PROVIDER - CARE PROVIDER_API CALL
Maciel Heller)  Gastroenterology; Internal Medicine  [address] [place] [id number]  Phone: [phone]  Fax: [phone]  Follow Up Time:

## 2022-03-22 NOTE — PROGRESS NOTE ADULT - ASSESSMENT
41F with hx of Crohn's disease (dx 12/2021 in [facility]) on prednisolone and azathioprine, recent rectus diastasis/umbilical hernia repair 10 days ago (3/2022 in [facility]) who presents with abdominal pain, CTAP with likely seroma/post surgical changes (no intervention per surgery).

## 2022-03-22 NOTE — DISCHARGE NOTE PROVIDER - NSDCFUADDAPPT_GEN_ALL_CORE_FT
Please have outpatient GI followup for followup diagnostic evaluation (CT or MR enterography) and likely repeat colonoscopy Please have outpatient GI followup for followup diagnostic evaluation (CT or MR enterography) and likely repeat colonoscopy    Follow up in Gastroenterology Clinic: [phone] ([facility]), or [phone] (Faculty Practice at [address] [place] [address])

## 2022-03-22 NOTE — PROGRESS NOTE ADULT - PROBLEM SELECTOR PLAN 1
Improved   - s/p recent [facility]astasis [facility]  - CTAP with post surgical changes, simple fluid collection in left ventral likely reflects seroma  - appreciate surgery eval: no intervention recommended  - appreciate GI eval: management of Crohn's as below   - advanced diet to regular   - pain control with tylenol for mild - moderate pain and Dilaudid 1mg PO q6h PRN severe pain for now
Patient with chronic abdominal pain for over 10 years  - s/p recent rectus diastasis and umbilical hernia repair in [facility] on 3/10/22  - CTAP with post surgical changes, simple fluid collection in left ventral likely reflects seroma  - appreciate surgery eval: no intervention recommended  - appreciate GI eval: management of Crohn's as below   - clear liquid diet, advance as tolerated  - pain control with tylenol for mild - moderate pain and Dilaudid 1mg PO q6h PRN severe pain for now  - gentle IVF with NS @ 75/hr

## 2022-03-22 NOTE — PROGRESS NOTE ADULT - PROBLEM SELECTOR PLAN 2
Reported diagnosis of Crohn's disease in December 2021 in [facility]   - she has [place]ander meds at bedside that include Azathioprine 50mg and Vitapred (prednisolone 20mg).  - currently has been on week 3 of 5 prednisolone 60mg daily, followed by plan for slow taper thereafter   - appreciate GI eval: hold prednisolone/azathioprine, start budesonide 9mg daily
Reported diagnosis of Crohn's disease in December 2021 in [facility]   - she has [place] meds at bedside that include Azathioprine 50mg and Vitapred (prednisolone 20mg).  - currently has been on week 3 of 5 prednisolone 60mg daily, followed by plan for slow taper thereafter   - appreciate GI eval: start budesonide 9mg daily, d/w GI fellow can resume Azathioprine and Vitapred on dc

## 2022-03-22 NOTE — PROGRESS NOTE ADULT - PROBLEM SELECTOR PLAN 3
Hb 9.4  - reviewed iron studies  - started on ferrous sulfate daily   - trend CBC
Hb 9.4  - reviewed iron studies  - started on ferrous sulfate daily   - trend CBC

## 2022-03-23 LAB
GAMMA INTERFERON BACKGROUND BLD IA-ACNC: 0.57 IU/ML — SIGNIFICANT CHANGE UP
M TB IFN-G BLD-IMP: POSITIVE
M TB IFN-G CD4+ BCKGRND COR BLD-ACNC: 0.76 IU/ML — SIGNIFICANT CHANGE UP
M TB IFN-G CD4+CD8+ BCKGRND COR BLD-ACNC: 0.81 IU/ML — SIGNIFICANT CHANGE UP
QUANT TB PLUS MITOGEN MINUS NIL: 8.62 IU/ML — SIGNIFICANT CHANGE UP

## 2022-03-27 VITALS
TEMPERATURE: 98 F | OXYGEN SATURATION: 100 % | DIASTOLIC BLOOD PRESSURE: 80 MMHG | SYSTOLIC BLOOD PRESSURE: 110 MMHG | HEIGHT: 72 IN | HEART RATE: 78 BPM | RESPIRATION RATE: 16 BRPM

## 2022-03-27 LAB
ALBUMIN SERPL ELPH-MCNC: 3.3 G/DL — SIGNIFICANT CHANGE UP (ref 3.3–5)
ALP SERPL-CCNC: 43 U/L — SIGNIFICANT CHANGE UP (ref 40–120)
ALT FLD-CCNC: 6 U/L — SIGNIFICANT CHANGE UP (ref 4–33)
ANION GAP SERPL CALC-SCNC: 8 MMOL/L — SIGNIFICANT CHANGE UP (ref 7–14)
AST SERPL-CCNC: 7 U/L — SIGNIFICANT CHANGE UP (ref 4–32)
BASOPHILS # BLD AUTO: 0.08 K/UL — SIGNIFICANT CHANGE UP (ref 0–0.2)
BASOPHILS NFR BLD AUTO: 1 % — SIGNIFICANT CHANGE UP (ref 0–2)
BILIRUB SERPL-MCNC: <0.2 MG/DL — SIGNIFICANT CHANGE UP (ref 0.2–1.2)
BUN SERPL-MCNC: 8 MG/DL — SIGNIFICANT CHANGE UP (ref 7–23)
CALCIUM SERPL-MCNC: 8.9 MG/DL — SIGNIFICANT CHANGE UP (ref 8.4–10.5)
CHLORIDE SERPL-SCNC: 106 MMOL/L — SIGNIFICANT CHANGE UP (ref 98–107)
CO2 SERPL-SCNC: 25 MMOL/L — SIGNIFICANT CHANGE UP (ref 22–31)
CREAT SERPL-MCNC: 0.49 MG/DL — LOW (ref 0.5–1.3)
D DIMER BLD IA.RAPID-MCNC: 422 NG/ML DDU — HIGH
EGFR: 122 ML/MIN/1.73M2 — SIGNIFICANT CHANGE UP
EOSINOPHIL # BLD AUTO: 0 K/UL — SIGNIFICANT CHANGE UP (ref 0–0.5)
EOSINOPHIL NFR BLD AUTO: 0 % — SIGNIFICANT CHANGE UP (ref 0–6)
GLUCOSE SERPL-MCNC: 113 MG/DL — HIGH (ref 70–99)
HCT VFR BLD CALC: 31.9 % — LOW (ref 34.5–45)
HGB BLD-MCNC: 10.3 G/DL — LOW (ref 11.5–15.5)
IANC: 6.82 K/UL — SIGNIFICANT CHANGE UP (ref 1.8–7.4)
LYMPHOCYTES # BLD AUTO: 0.24 K/UL — LOW (ref 1–3.3)
LYMPHOCYTES # BLD AUTO: 3 % — LOW (ref 13–44)
MCHC RBC-ENTMCNC: 21.3 PG — LOW (ref 27–34)
MCHC RBC-ENTMCNC: 32.3 GM/DL — SIGNIFICANT CHANGE UP (ref 32–36)
MCV RBC AUTO: 65.9 FL — LOW (ref 80–100)
MONOCYTES # BLD AUTO: 0.16 K/UL — SIGNIFICANT CHANGE UP (ref 0–0.9)
MONOCYTES NFR BLD AUTO: 2 % — SIGNIFICANT CHANGE UP (ref 2–14)
NEUTROPHILS # BLD AUTO: 7.1 K/UL — SIGNIFICANT CHANGE UP (ref 1.8–7.4)
NEUTROPHILS NFR BLD AUTO: 89 % — HIGH (ref 43–77)
PLATELET # BLD AUTO: 483 K/UL — HIGH (ref 150–400)
POTASSIUM SERPL-MCNC: 4.1 MMOL/L — SIGNIFICANT CHANGE UP (ref 3.5–5.3)
POTASSIUM SERPL-SCNC: 4.1 MMOL/L — SIGNIFICANT CHANGE UP (ref 3.5–5.3)
PROT SERPL-MCNC: 5.6 G/DL — LOW (ref 6–8.3)
RBC # BLD: 4.84 M/UL — SIGNIFICANT CHANGE UP (ref 3.8–5.2)
RBC # FLD: 20.4 % — HIGH (ref 10.3–14.5)
SODIUM SERPL-SCNC: 139 MMOL/L — SIGNIFICANT CHANGE UP (ref 135–145)
TROPONIN T, HIGH SENSITIVITY RESULT: <6 NG/L — SIGNIFICANT CHANGE UP
TROPONIN T, HIGH SENSITIVITY RESULT: <6 NG/L — SIGNIFICANT CHANGE UP
WBC # BLD: 7.89 K/UL — SIGNIFICANT CHANGE UP (ref 3.8–10.5)
WBC # FLD AUTO: 7.89 K/UL — SIGNIFICANT CHANGE UP (ref 3.8–10.5)

## 2022-03-27 PROCEDURE — 71046 X-RAY EXAM CHEST 2 VIEWS: CPT | Mod: 26

## 2022-03-27 PROCEDURE — 93010 ELECTROCARDIOGRAM REPORT: CPT

## 2022-03-27 PROCEDURE — 99285 EMERGENCY DEPT VISIT HI MDM: CPT | Mod: 25

## 2022-03-27 RX ORDER — KETOROLAC TROMETHAMINE 30 MG/ML
30 SYRINGE (ML) INJECTION ONCE
Refills: 0 | Status: DISCONTINUED | OUTPATIENT
Start: 2022-03-27 | End: 2022-03-27

## 2022-03-27 RX ADMIN — Medication 30 MILLIGRAM(S): at 20:06

## 2022-03-27 NOTE — ED PROVIDER NOTE - PROGRESS NOTE DETAILS
JAZMYN Rolon: Discussed case with surgery, will consult, states they have multiple other consults to see prior. JAZMYN Rolon: Pt was seen by Surgery, declines to admit at this time, will admit to medicine for serial abdominal exams, management of possible new splenic infarcts and increasing abdominal seroma.
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
## 2022-03-27 NOTE — ED PROVIDER NOTE - NSICDXPASTSURGICALHX_GEN_ALL_CORE_FT
PAST SURGICAL HISTORY:  H/O umbilical hernia repair with rectus diastasis repair 3/10/22 in [facility]    H/O:
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
## 2022-03-27 NOTE — ED PROVIDER NOTE - NS ED ATTENDING STATEMENT MOD
This was a shared visit with the STEFAN. I reviewed and verified the documentation and independently performed the documented:

## 2022-03-27 NOTE — ED PROVIDER NOTE - CARE PLAN
Principal Discharge DX:	Abdominal pain  Secondary Diagnosis:	Chest pain   1 Principal Discharge DX:	Splenic infarct  Secondary Diagnosis:	Chest pain  Secondary Diagnosis:	Abdominal wall seroma
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
## 2022-03-27 NOTE — ED PROVIDER NOTE - GASTROINTESTINAL, MLM
Abdomen soft, non-tender, no rebound, no focal tenderness. Healing surgical scars without discharge, sutures in place.

## 2022-03-27 NOTE — ED ADULT NURSE NOTE - NSFALLRSKHARMRISK_ED_ALL_ED
Patient Seen in: [facility] Emergency Department      History   Patient presents with:  Cardiac Arrest    Stated Complaint: cardiac arrest    HPI/Subjective:   HPI    64 yo male presents to the ED in Caridopulmonary arrest. Pt was reportedly found b GFR, -American 48 (*)      (*)      (*)     Total Protein 6.1 (*)     Albumin 3.0 (*)     All other components within normal limits   TROPONIN I - Abnormal; Notable for the following components:    Troponin 0.086 (*)     All other comp DRAW LAVENDER   RAINBOW DRAW LIGHT GREEN   RAINBOW DRAW GOLD                                MDM      64 yo male no known pmh now presents to the ED 2/2 cardiopulmonary arrest. Pt intubated on arrival, no sedation.  PEA on arrival. Please see nursing documen no

## 2022-03-27 NOTE — ED PROVIDER NOTE - ATTENDING CONTRIBUTION TO CARE
Afebrile. Awake and Alert. Lungs CTA. Heart RRR. Abdomen soft NTND, surgical wounds c/d/i. CN II-XII grossly intact. Moves all extremities without lateralization.    Atraumatic shoulder pain  r/o ACS: No personal ACS risk factors  r/o PE

## 2022-03-28 ENCOUNTER — INPATIENT (INPATIENT)
Facility: HOSPITAL | Age: 42
LOS: 1 days | Discharge: HOME | End: 2022-03-30
Attending: STUDENT IN AN ORGANIZED HEALTH CARE EDUCATION/TRAINING PROGRAM | Admitting: STUDENT IN AN ORGANIZED HEALTH CARE EDUCATION/TRAINING PROGRAM
Payer: MEDICAID

## 2022-03-28 DIAGNOSIS — K91.872 POSTPROCEDURAL SEROMA OF A DIGESTIVE SYSTEM ORGAN OR STRUCTURE FOLLOWING A DIGESTIVE SYSTEM PROCEDURE: ICD-10-CM

## 2022-03-28 DIAGNOSIS — K50.90 CROHN'S DISEASE, UNSPECIFIED, WITHOUT COMPLICATIONS: ICD-10-CM

## 2022-03-28 DIAGNOSIS — A15.9 RESPIRATORY TUBERCULOSIS UNSPECIFIED: ICD-10-CM

## 2022-03-28 DIAGNOSIS — D73.5 INFARCTION OF SPLEEN: ICD-10-CM

## 2022-03-28 DIAGNOSIS — Z98.891 HISTORY OF UTERINE SCAR FROM PREVIOUS SURGERY: Chronic | ICD-10-CM

## 2022-03-28 DIAGNOSIS — K29.70 GASTRITIS, UNSPECIFIED, WITHOUT BLEEDING: ICD-10-CM

## 2022-03-28 DIAGNOSIS — Z98.890 OTHER SPECIFIED POSTPROCEDURAL STATES: Chronic | ICD-10-CM

## 2022-03-28 DIAGNOSIS — D64.9 ANEMIA, UNSPECIFIED: ICD-10-CM

## 2022-03-28 DIAGNOSIS — Z29.9 ENCOUNTER FOR PROPHYLACTIC MEASURES, UNSPECIFIED: ICD-10-CM

## 2022-03-28 DIAGNOSIS — M25.512 PAIN IN LEFT SHOULDER: ICD-10-CM

## 2022-03-28 DIAGNOSIS — M25.511 PAIN IN RIGHT SHOULDER: ICD-10-CM

## 2022-03-28 LAB
B PERT DNA SPEC QL NAA+PROBE: SIGNIFICANT CHANGE UP
B PERT+PARAPERT DNA PNL SPEC NAA+PROBE: SIGNIFICANT CHANGE UP
BORDETELLA PARAPERTUSSIS (RAPRVP): SIGNIFICANT CHANGE UP
C PNEUM DNA SPEC QL NAA+PROBE: SIGNIFICANT CHANGE UP
FLUAV SUBTYP SPEC NAA+PROBE: SIGNIFICANT CHANGE UP
FLUBV RNA SPEC QL NAA+PROBE: SIGNIFICANT CHANGE UP
HADV DNA SPEC QL NAA+PROBE: SIGNIFICANT CHANGE UP
HCOV 229E RNA SPEC QL NAA+PROBE: SIGNIFICANT CHANGE UP
HCOV HKU1 RNA SPEC QL NAA+PROBE: SIGNIFICANT CHANGE UP
HCOV NL63 RNA SPEC QL NAA+PROBE: SIGNIFICANT CHANGE UP
HCOV OC43 RNA SPEC QL NAA+PROBE: SIGNIFICANT CHANGE UP
HIV 1+2 AB+HIV1 P24 AG SERPL QL IA: SIGNIFICANT CHANGE UP
HMPV RNA SPEC QL NAA+PROBE: SIGNIFICANT CHANGE UP
HPIV1 RNA SPEC QL NAA+PROBE: SIGNIFICANT CHANGE UP
HPIV2 RNA SPEC QL NAA+PROBE: SIGNIFICANT CHANGE UP
HPIV3 RNA SPEC QL NAA+PROBE: SIGNIFICANT CHANGE UP
HPIV4 RNA SPEC QL NAA+PROBE: SIGNIFICANT CHANGE UP
M PNEUMO DNA SPEC QL NAA+PROBE: SIGNIFICANT CHANGE UP
RAPID RVP RESULT: SIGNIFICANT CHANGE UP
RSV RNA SPEC QL NAA+PROBE: SIGNIFICANT CHANGE UP
RV+EV RNA SPEC QL NAA+PROBE: SIGNIFICANT CHANGE UP
SARS-COV-2 RNA SPEC QL NAA+PROBE: SIGNIFICANT CHANGE UP

## 2022-03-28 PROCEDURE — 74177 CT ABD & PELVIS W/CONTRAST: CPT | Mod: 26,MA

## 2022-03-28 PROCEDURE — 71275 CT ANGIOGRAPHY CHEST: CPT | Mod: 26,MA

## 2022-03-28 PROCEDURE — 99223 1ST HOSP IP/OBS HIGH 75: CPT | Mod: GC

## 2022-03-28 RX ORDER — HEXAVITAMINS
300 TABLET ORAL DAILY
Refills: 0 | Status: DISCONTINUED | OUTPATIENT
Start: 2022-03-28 | End: 2022-03-30

## 2022-03-28 RX ORDER — CALCIUM CARBONATE 500(1250)
1 TABLET ORAL
Refills: 0 | Status: DISCONTINUED | OUTPATIENT
Start: 2022-03-28 | End: 2022-03-30

## 2022-03-28 RX ORDER — FERROUS SULFATE 325(65) MG
325 TABLET ORAL DAILY
Refills: 0 | Status: DISCONTINUED | OUTPATIENT
Start: 2022-03-28 | End: 2022-03-30

## 2022-03-28 RX ORDER — ACETAMINOPHEN 500 MG
650 TABLET ORAL EVERY 6 HOURS
Refills: 0 | Status: DISCONTINUED | OUTPATIENT
Start: 2022-03-28 | End: 2022-03-30

## 2022-03-28 RX ORDER — PANTOPRAZOLE SODIUM 20 MG/1
40 TABLET, DELAYED RELEASE ORAL
Refills: 0 | Status: DISCONTINUED | OUTPATIENT
Start: 2022-03-28 | End: 2022-03-30

## 2022-03-28 RX ORDER — INFLUENZA VIRUS VACCINE 15; 15; 15; 15 UG/.5ML; UG/.5ML; UG/.5ML; UG/.5ML
0.5 SUSPENSION INTRAMUSCULAR ONCE
Refills: 0 | Status: DISCONTINUED | OUTPATIENT
Start: 2022-03-28 | End: 2022-03-30

## 2022-03-28 RX ORDER — CIPROFLOXACIN LACTATE 400MG/40ML
500 VIAL (ML) INTRAVENOUS EVERY 12 HOURS
Refills: 0 | Status: DISCONTINUED | OUTPATIENT
Start: 2022-03-28 | End: 2022-03-30

## 2022-03-28 RX ORDER — AZATHIOPRINE 100 MG/1
50 TABLET ORAL
Refills: 0 | Status: DISCONTINUED | OUTPATIENT
Start: 2022-03-28 | End: 2022-03-28

## 2022-03-28 RX ORDER — PREDNISOLONE 5 MG
60 TABLET ORAL DAILY
Refills: 0 | Status: DISCONTINUED | OUTPATIENT
Start: 2022-03-28 | End: 2022-03-28

## 2022-03-28 RX ORDER — CIPROFLOXACIN LACTATE 400MG/40ML
750 VIAL (ML) INTRAVENOUS EVERY 12 HOURS
Refills: 0 | Status: DISCONTINUED | OUTPATIENT
Start: 2022-03-28 | End: 2022-03-28

## 2022-03-28 RX ORDER — LACTULOSE 10 G/15ML
10 SOLUTION ORAL DAILY
Refills: 0 | Status: DISCONTINUED | OUTPATIENT
Start: 2022-03-28 | End: 2022-03-30

## 2022-03-28 RX ORDER — BUDESONIDE, MICRONIZED 100 %
9 POWDER (GRAM) MISCELLANEOUS DAILY
Refills: 0 | Status: DISCONTINUED | OUTPATIENT
Start: 2022-03-28 | End: 2022-03-30

## 2022-03-28 RX ORDER — METRONIDAZOLE 500 MG
500 TABLET ORAL EVERY 8 HOURS
Refills: 0 | Status: DISCONTINUED | OUTPATIENT
Start: 2022-03-28 | End: 2022-03-30

## 2022-03-28 RX ADMIN — Medication 500 MILLIGRAM(S): at 22:31

## 2022-03-28 RX ADMIN — Medication 500 MILLIGRAM(S): at 14:38

## 2022-03-28 RX ADMIN — PANTOPRAZOLE SODIUM 40 MILLIGRAM(S): 20 TABLET, DELAYED RELEASE ORAL at 11:48

## 2022-03-28 RX ADMIN — Medication 650 MILLIGRAM(S): at 08:22

## 2022-03-28 RX ADMIN — Medication 650 MILLIGRAM(S): at 09:22

## 2022-03-28 RX ADMIN — Medication 500 MILLIGRAM(S): at 18:35

## 2022-03-28 RX ADMIN — Medication 300 MILLIGRAM(S): at 18:35

## 2022-03-28 RX ADMIN — Medication 1 TABLET(S): at 18:49

## 2022-03-28 RX ADMIN — Medication 650 MILLIGRAM(S): at 22:30

## 2022-03-28 RX ADMIN — Medication 9 MILLIGRAM(S): at 18:35

## 2022-03-28 RX ADMIN — Medication 325 MILLIGRAM(S): at 11:48
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
## 2022-03-28 NOTE — H&P ADULT - PROBLEM SELECTOR PLAN 2
-unclear etiology, consider septic emboli however patient not meeting sepsis criteria, unclear source if septic emboli; TTE 12/21 unremarkable, no murmur on exam  -f/u BCx  -monitor CBC, abdominal exam

## 2022-03-28 NOTE — PATIENT PROFILE ADULT - FUNCTIONAL ASSESSMENT - BASIC MOBILITY 1.
PRE-OP DIAGNOSIS:  Symptomatic cholelithiasis 27-Sep-2021 14:15:35  Lisa Morrell   4 = No assist / stand by assistance

## 2022-03-28 NOTE — H&P ADULT - NSHPREVIEWOFSYSTEMS_GEN_ALL_CORE
REVIEW OF SYSTEMS:    CONSTITUTIONAL: No weakness, fevers or chills  EYES/ENT: No visual changes;  No vertigo or throat pain   NECK: No pain or stiffness  RESPIRATORY: No cough, wheezing, hemoptysis; No shortness of breath  CARDIOVASCULAR: No chest pain or palpitations  GASTROINTESTINAL: No abdominal or epigastric pain. No nausea, vomiting, or hematemesis; No diarrhea or constipation. No melena or hematochezia.  BACK: No CVA tenderness  GENITOURINARY: No dysuria, frequency or hematuria  NEUROLOGICAL: No numbness or weakness  SKIN: No itching, rashes REVIEW OF SYSTEMS:    CONSTITUTIONAL: No weakness, fevers or chills  EYES/ENT: No visual changes;  No vertigo or throat pain   NECK: No pain or stiffness  RESPIRATORY: No cough, wheezing, hemoptysis; No shortness of breath  CARDIOVASCULAR: No chest pain (now resolved) or palpitations  GASTROINTESTINAL: +diffuse abdominal pain. No nausea, vomiting, or hematemesis; No diarrhea or constipation. No melena or hematochezia.  BACK: No CVA tenderness  GENITOURINARY: No dysuria, frequency or hematuria  NEUROLOGICAL: No numbness or weakness  SKIN: No itching, rashes

## 2022-03-28 NOTE — H&P ADULT - PROBLEM SELECTOR PLAN 4
-recently diagnosed biopsy proven Crohn's disease from [facility]  -patient currently denies bloody bowel movement, denies diarrhea, has chronic constipation on lactulose, continue prn while inpatient   -patient was started on prednisolone in early March, 60mg daily for 5 weeks with plan for taper outpatient, discharged on both prednisolone and budesonide (60mg prednisolone equivalent to 18mg budesonide), continue inpatient   -started on 9mg daily budesonide last admission, continue inpatient  -on azathioprine at home, temporarily hold in setting of infectious workup, also quant positive in setting of possible latent TB -recently diagnosed biopsy proven Crohn's disease from [facility]  -patient currently denies bloody bowel movement, denies diarrhea, has chronic constipation on lactulose, continue prn while inpatient   -patient was started on prednisolone in early March, 60mg daily for 5 weeks with plan for taper outpatient, discharged on both prednisolone and budesonide (60mg prednisolone equivalent to 18mg budesonide), continue inpatient   -started on 9mg daily budesonide last admission, continue inpatient  -c/w PPI while on steroids   -on azathioprine at home, temporarily hold in setting of infectious workup, also quant positive in setting of possible latent TB -recently diagnosed biopsy proven Crohn's disease from [facility]  -patient currently denies bloody bowel movement, denies diarrhea, has chronic constipation on lactulose, continue prn while inpatient   -patient was started on prednisolone in early March, 60mg daily for 5 weeks with plan for taper outpatient, discharged on both prednisolone and budesonide (60mg prednisolone equivalent to 18mg budesonide), continue inpatient   -started on 9mg daily budesonide last admission, continue inpatient  -c/w PPI while on steroids   -on azathioprine at home, temporarily hold in setting of infectious workup, also quant positive in setting of possible latent TB; patient also not taking at home 2/2 GI side effects

## 2022-03-28 NOTE — H&P ADULT - PROBLEM SELECTOR PLAN 5
-QuantiFeron positive, CT chest with calcified granuloma, c/f latent TB.   -will start treatment with isoniazid for 6-9 months  -in setting of recently diagnosed Crohn's disease, f/u outpatient with GI before starting biologic therapy in setting of latent TB

## 2022-03-28 NOTE — H&P ADULT - NSHPPHYSICALEXAM_GEN_ALL_CORE
Vital Signs Last 24 Hrs  T(C): 36.7 (28 Mar 2022 05:10), Max: 37.1 (28 Mar 2022 00:23)  T(F): 98.1 (28 Mar 2022 05:10), Max: 98.7 (28 Mar 2022 00:23)  HR: 62 (28 Mar 2022 05:10) (60 - 78)  BP: 104/66 (28 Mar 2022 05:10) (104/66 - 113/88)  BP(mean): --  RR: 16 (28 Mar 2022 05:10) (16 - 17)  SpO2: 100% (28 Mar 2022 05:10) (100% - 100%)    PHYSICAL EXAM:  GENERAL: No acute distress, well-developed  HEAD:  Atraumatic, Normocephalic  EYES: EOMI, PERRLA, conjunctiva and sclera clear  NECK: Supple, no lymphadenopathy, no JVD  CHEST/LUNG: CTAB; No wheezes, rales, or rhonchi  HEART: Regular rate and rhythm; No murmurs, rubs, or gallops  ABDOMEN: Soft, non-tender, non-distended; normal bowel sounds, no organomegaly  EXTREMITIES:  2+ peripheral pulses b/l, No clubbing, cyanosis, or edema  NEUROLOGY: A&O x 3, no focal deficits  SKIN: No rashes or lesions  PSYCH: normal behavior, normal affect, normal speech Vital Signs Last 24 Hrs  T(C): 36.7 (28 Mar 2022 05:10), Max: 37.1 (28 Mar 2022 00:23)  T(F): 98.1 (28 Mar 2022 05:10), Max: 98.7 (28 Mar 2022 00:23)  HR: 62 (28 Mar 2022 05:10) (60 - 78)  BP: 104/66 (28 Mar 2022 05:10) (104/66 - 113/88)  BP(mean): --  RR: 16 (28 Mar 2022 05:10) (16 - 17)  SpO2: 100% (28 Mar 2022 05:10) (100% - 100%)    PHYSICAL EXAM:  GENERAL: No acute distress, well-developed  HEAD:  Atraumatic, Normocephalic  EYES: EOMI, PERRLA, conjunctiva and sclera clear  NECK: Supple, no lymphadenopathy, no JVD  CHEST/LUNG: CTAB; No wheezes, rales, or rhonchi  HEART: Regular rate and rhythm; No murmurs, rubs, or gallops  ABDOMEN: Soft, diffusely tender to palpation, surgical scar intact, well healing, non-distended; normal bowel sounds, no organomegaly  EXTREMITIES:  2+ peripheral pulses b/l, No clubbing, cyanosis, or edema  NEUROLOGY: A&O x 3, no focal deficits  SKIN: No rashes or lesions  PSYCH: normal behavior, normal affect, normal speech
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
## 2022-03-28 NOTE — H&P ADULT - PROBLEM SELECTOR PROBLEM 2
Postprocedural seroma of a digestive system organ or structure following a digestive system procedure Splenic infarct
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
## 2022-03-28 NOTE — H&P ADULT - NSICDXPASTSURGICALHX_GEN_ALL_CORE_FT
PAST SURGICAL HISTORY:  H/O umbilical hernia repair with rectus diastasis repair 3/10/22 in [facility]    H/O:

## 2022-03-28 NOTE — H&P ADULT - PROBLEM SELECTOR PLAN 7
-c/w PPI, giselle while on steroids -c/w PPI, patient also on steroids as above, continue for GI ppx
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
## 2022-03-28 NOTE — PATIENT PROFILE ADULT - FALL HARM RISK - UNIVERSAL INTERVENTIONS
Bed in lowest position, wheels locked, appropriate side rails in place/Call bell, personal items and telephone in reach/Instruct patient to call for assistance before getting out of bed or chair/Non-slip footwear when patient is out of bed/[place] to call system/Physically safe environment - no spills, clutter or unnecessary equipment/Purposeful Proactive Rounding/Room/bathroom lighting operational, light cord in reach
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
## 2022-03-28 NOTE — H&P ADULT - NSHPLABSRESULTS_GEN_ALL_CORE
10.3   7.89  )-----------( 483      ( 27 Mar 2022 20:07 )             31.9   03-27    139  |  106  |  8   ----------------------------<  113<H>  4.1   |  25  |  0.49<L>    Ca    8.9      27 Mar 2022 20:07    TPro  5.6<L>  /  Alb  3.3  /  TBili  <0.2  /  DBili  x   /  AST  7   /  ALT  6   /  AlkPhos  43  03-27    Troponin T, High Sensitivity Result: <6: Troponin T, High Sensitivity Result: <6: D-Dimer Assay, Quantitative: 422: A< from: CT Angio Chest PE Protocol w/ IV Cont (03.28.22 @ 00:43) >    IMPRESSION:  CHEST:  No pulmonary embolism or other acute intrathoracic pathology.    ABDOMEN/PELVIS:  Large anterior abdominal wall seroma increased in size from 3/19/2022.   Surrounding inflammatory changes within the abdominal wall may represent   superimposed infection.    Peripheral hypodensities in the spleen are concerning for new splenic   infarcts.    Several mildly prominent fluid-filled loops of small bowel in the left   hemiabdomen without discrete transition point, may represent an early or   developing partial small bowel obstruction, recommend correlation patient   clinical signs and symptoms, surgical consult, and consider interval   repeat CT scanning, as clinically warranted.    New hyperattenuating structure within a dilated loop of small bowel in   the left upper quadrant is indeterminant and could represent ingested   material/bezoar.    < end of copied text >    < from: Transthoracic Echocardiogram (12.12.18 @ 09:21) >    DIMENSIONS:  Dimensions:     Normal Values:  LA:     3.5 cm    2.0 - 4.0 cm  Ao:     3.3 cm    2.0 - 3.8 cm  SEPTUM: 0.8 cm    0.6 - 1.2 cm  PWT:    0.8 cm    0.6 - 1.1 cm  LVIDd:  5.2 cm    3.0 - 5.6 cm  LVIDs:  3.5 cm    1.8 - 4.0 cm  Derived Variables:  LVMI: 64 g/m2  RWT: 0.30  Fractional short: 33 %  Ejection Fraction (Teicholtz): 61 %  ------------------------------------------------------------------------  OBSERVATIONS:  Mitral Valve: Normal mitral valve. Minimal mitral  regurgitation.  Aortic Root: Normal aortic root.  Aortic Valve: Normal trileaflet aortic valve. Minimal  aortic regurgitation.  Left Atrium: Normal left atrium.LA volume index = 23  cc/m2.  Left Ventricle: Normal left ventricular systolic function.  No segmental wall motion abnormalities. Normal left  ventricular internal dimensions and wall thicknesses.  Right Heart: Normal right atrium. Normal right ventricular  size and function. Normal tricuspid valve. Minimal  tricuspid regurgitation. Normal pulmonic valve. Minimal  pulmonic regurgitation.  Pericardium/PleuraNormal pericardium with no pericardial  effusion.  ------------------------------------------------------------------------  CONCLUSIONS:  1. Normal mitral valve. Minimal mitral regurgitation.  2. Normal left ventricular internal dimensions and wall  thicknesses.  3. Normal left ventricular systolic function. No segmental  wall motion abnormalities.  4. Normal right ventricular size and function.    < end of copied text > 10.3   7.89  )-----------( 483      ( 27 Mar 2022 20:07 )             31.9   03-27    139  |  106  |  8   ----------------------------<  113<H>  4.1   |  25  |  0.49<L>    Ca    8.9      27 Mar 2022 20:07    TPro  5.6<L>  /  Alb  3.3  /  TBili  <0.2  /  DBili  x   /  AST  7   /  ALT  6   /  AlkPhos  43  03-27    Troponin T, High Sensitivity Result: <6: Troponin T, High Sensitivity Result: <6: D-Dimer Assay, Quantitative: 422: A< from: CT Angio Chest PE Protocol w/ IV Cont (03.28.22 @ 00:43) >    IMPRESSION:  CHEST:  No pulmonary embolism or other acute intrathoracic pathology.    ABDOMEN/PELVIS:  Large anterior abdominal wall seroma increased in size from 3/19/2022.   Surrounding inflammatory changes within the abdominal wall may represent   superimposed infection.    Peripheral hypodensities in the spleen are concerning for new splenic   infarcts.    Several mildly prominent fluid-filled loops of small bowel in the left   hemiabdomen without discrete transition point, may represent an early or   developing partial small bowel obstruction, recommend correlation patient   clinical signs and symptoms, surgical consult, and consider interval   repeat CT scanning, as clinically warranted.    New hyperattenuating structure within a dilated loop of small bowel in   the left upper quadrant is indeterminant and could represent ingested   material/bezoar.    EKG personally reviewed: Sinus bradycardia @59 bpm, TWI in V3, unchanged from prior EKG    CXR personally reviewed: Clear lungs

## 2022-03-28 NOTE — H&P ADULT - PROBLEM SELECTOR PLAN 3
-enlarging seroma, surgery onboard, preliminary recommend no acute intervention  -given time since procedure, would expect improving seroma, will confirm with surgery no changes to management   -Also noted on CT nonspecific fluid filled loops with no transition point and hyperattenuating structure in small bowel loop, not concerning for SBO, PO challenge per surgery   -serial abdominal exams
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
## 2022-03-28 NOTE — H&P ADULT - PROBLEM SELECTOR PLAN 8
-SW eval for safe d/c, also to eval if able to obtain new medications (patient still with home medications from [facility])  -SCDs, patient's IMPROVE score 0

## 2022-03-28 NOTE — H&P ADULT - PROBLEM SELECTOR PROBLEM 3
Left shoulder pain Postprocedural seroma of a digestive system organ or structure following a digestive system procedure

## 2022-03-29 LAB
ALBUMIN SERPL ELPH-MCNC: 3.1 G/DL — LOW (ref 3.3–5)
ALP SERPL-CCNC: 40 U/L — SIGNIFICANT CHANGE UP (ref 40–120)
ALT FLD-CCNC: 5 U/L — SIGNIFICANT CHANGE UP (ref 4–33)
ANION GAP SERPL CALC-SCNC: 12 MMOL/L — SIGNIFICANT CHANGE UP (ref 7–14)
AST SERPL-CCNC: 6 U/L — SIGNIFICANT CHANGE UP (ref 4–32)
BASOPHILS # BLD AUTO: 0.06 K/UL — SIGNIFICANT CHANGE UP (ref 0–0.2)
BASOPHILS NFR BLD AUTO: 1.1 % — SIGNIFICANT CHANGE UP (ref 0–2)
BILIRUB SERPL-MCNC: 0.2 MG/DL — SIGNIFICANT CHANGE UP (ref 0.2–1.2)
BUN SERPL-MCNC: 10 MG/DL — SIGNIFICANT CHANGE UP (ref 7–23)
CALCIUM SERPL-MCNC: 8.5 MG/DL — SIGNIFICANT CHANGE UP (ref 8.4–10.5)
CHLORIDE SERPL-SCNC: 104 MMOL/L — SIGNIFICANT CHANGE UP (ref 98–107)
CO2 SERPL-SCNC: 24 MMOL/L — SIGNIFICANT CHANGE UP (ref 22–31)
CREAT SERPL-MCNC: 0.6 MG/DL — SIGNIFICANT CHANGE UP (ref 0.5–1.3)
EGFR: 116 ML/MIN/1.73M2 — SIGNIFICANT CHANGE UP
EOSINOPHIL # BLD AUTO: 0.18 K/UL — SIGNIFICANT CHANGE UP (ref 0–0.5)
EOSINOPHIL NFR BLD AUTO: 3.2 % — SIGNIFICANT CHANGE UP (ref 0–6)
ERYTHROCYTE [SEDIMENTATION RATE] IN BLOOD: 6 MM/HR — SIGNIFICANT CHANGE UP (ref 4–25)
GLUCOSE SERPL-MCNC: 79 MG/DL — SIGNIFICANT CHANGE UP (ref 70–99)
HCT VFR BLD CALC: 29.4 % — LOW (ref 34.5–45)
HGB BLD-MCNC: 9.5 G/DL — LOW (ref 11.5–15.5)
IANC: 2.98 K/UL — SIGNIFICANT CHANGE UP (ref 1.8–7.4)
IMM GRANULOCYTES NFR BLD AUTO: 0.5 % — SIGNIFICANT CHANGE UP (ref 0–1.5)
LYMPHOCYTES # BLD AUTO: 2.07 K/UL — SIGNIFICANT CHANGE UP (ref 1–3.3)
LYMPHOCYTES # BLD AUTO: 36.3 % — SIGNIFICANT CHANGE UP (ref 13–44)
MAGNESIUM SERPL-MCNC: 1.7 MG/DL — SIGNIFICANT CHANGE UP (ref 1.6–2.6)
MCHC RBC-ENTMCNC: 21.4 PG — LOW (ref 27–34)
MCHC RBC-ENTMCNC: 32.3 GM/DL — SIGNIFICANT CHANGE UP (ref 32–36)
MCV RBC AUTO: 66.4 FL — LOW (ref 80–100)
MONOCYTES # BLD AUTO: 0.39 K/UL — SIGNIFICANT CHANGE UP (ref 0–0.9)
MONOCYTES NFR BLD AUTO: 6.8 % — SIGNIFICANT CHANGE UP (ref 2–14)
NEUTROPHILS # BLD AUTO: 2.98 K/UL — SIGNIFICANT CHANGE UP (ref 1.8–7.4)
NEUTROPHILS NFR BLD AUTO: 52.1 % — SIGNIFICANT CHANGE UP (ref 43–77)
NRBC # BLD: 0 /100 WBCS — SIGNIFICANT CHANGE UP
NRBC # FLD: 0.02 K/UL — HIGH
PHOSPHATE SERPL-MCNC: 3.6 MG/DL — SIGNIFICANT CHANGE UP (ref 2.5–4.5)
PLATELET # BLD AUTO: 404 K/UL — HIGH (ref 150–400)
POTASSIUM SERPL-MCNC: 3.6 MMOL/L — SIGNIFICANT CHANGE UP (ref 3.5–5.3)
POTASSIUM SERPL-SCNC: 3.6 MMOL/L — SIGNIFICANT CHANGE UP (ref 3.5–5.3)
PROT SERPL-MCNC: 5.4 G/DL — LOW (ref 6–8.3)
RBC # BLD: 4.43 M/UL — SIGNIFICANT CHANGE UP (ref 3.8–5.2)
RBC # FLD: 20.2 % — HIGH (ref 10.3–14.5)
SODIUM SERPL-SCNC: 140 MMOL/L — SIGNIFICANT CHANGE UP (ref 135–145)
WBC # BLD: 5.71 K/UL — SIGNIFICANT CHANGE UP (ref 3.8–10.5)
WBC # FLD AUTO: 5.71 K/UL — SIGNIFICANT CHANGE UP (ref 3.8–10.5)

## 2022-03-29 PROCEDURE — 99232 SBSQ HOSP IP/OBS MODERATE 35: CPT | Mod: GC

## 2022-03-29 RX ORDER — MAGNESIUM SULFATE 500 MG/ML
2 VIAL (ML) INJECTION ONCE
Refills: 0 | Status: COMPLETED | OUTPATIENT
Start: 2022-03-29 | End: 2022-03-29

## 2022-03-29 RX ORDER — POTASSIUM CHLORIDE 20 MEQ
20 PACKET (EA) ORAL ONCE
Refills: 0 | Status: COMPLETED | OUTPATIENT
Start: 2022-03-29 | End: 2022-03-29

## 2022-03-29 RX ORDER — OXYCODONE HYDROCHLORIDE 5 MG/1
5 TABLET ORAL ONCE
Refills: 0 | Status: DISCONTINUED | OUTPATIENT
Start: 2022-03-29 | End: 2022-03-29

## 2022-03-29 RX ADMIN — OXYCODONE HYDROCHLORIDE 5 MILLIGRAM(S): 5 TABLET ORAL at 23:00

## 2022-03-29 RX ADMIN — Medication 325 MILLIGRAM(S): at 12:02

## 2022-03-29 RX ADMIN — Medication 25 GRAM(S): at 09:21

## 2022-03-29 RX ADMIN — Medication 650 MILLIGRAM(S): at 19:52

## 2022-03-29 RX ADMIN — Medication 9 MILLIGRAM(S): at 05:59

## 2022-03-29 RX ADMIN — Medication 500 MILLIGRAM(S): at 12:01

## 2022-03-29 RX ADMIN — Medication 1 TABLET(S): at 17:19

## 2022-03-29 RX ADMIN — Medication 500 MILLIGRAM(S): at 05:59

## 2022-03-29 RX ADMIN — PANTOPRAZOLE SODIUM 40 MILLIGRAM(S): 20 TABLET, DELAYED RELEASE ORAL at 06:19

## 2022-03-29 RX ADMIN — Medication 1 TABLET(S): at 05:59

## 2022-03-29 RX ADMIN — Medication 650 MILLIGRAM(S): at 19:23

## 2022-03-29 RX ADMIN — Medication 20 MILLIEQUIVALENT(S): at 09:21

## 2022-03-29 RX ADMIN — OXYCODONE HYDROCHLORIDE 5 MILLIGRAM(S): 5 TABLET ORAL at 23:45

## 2022-03-29 RX ADMIN — Medication 500 MILLIGRAM(S): at 17:19

## 2022-03-29 RX ADMIN — Medication 300 MILLIGRAM(S): at 12:26

## 2022-03-29 RX ADMIN — Medication 500 MILLIGRAM(S): at 22:06

## 2022-03-29 NOTE — PROGRESS NOTE ADULT - PROBLEM SELECTOR PROBLEM 3
Postprocedural seroma of a digestive system organ or structure following a digestive system procedure Gastritis

## 2022-03-29 NOTE — PROGRESS NOTE ADULT - PROBLEM SELECTOR PLAN 4
-recently diagnosed biopsy proven Crohn's disease from [facility]  -patient currently denies bloody bowel movement, denies diarrhea, has chronic constipation on lactulose, continue prn while inpatient   -patient was started on prednisolone in early March, 60mg daily for 5 weeks with plan for taper outpatient, discharged on both prednisolone and budesonide (60mg prednisolone equivalent to 18mg budesonide), continue inpatient   -started on 9mg daily budesonide last admission, continue inpatient  -c/w PPI while on steroids   -on azathioprine at home, temporarily hold in setting of infectious workup, also quant positive in setting of possible latent TB; patient also not taking at home 2/2 GI side effects -enlarging seroma, surgery onboard, preliminary recommend no acute intervention  -given time since procedure, would expect improving seroma, will confirm with surgery no changes to management   -Also noted on CT nonspecific fluid filled loops with no transition point and hyperattenuating structure in small bowel loop, not concerning for SBO, PO challenge per surgery   -serial abdominal exams

## 2022-03-29 NOTE — PROGRESS NOTE ADULT - PROBLEM SELECTOR PLAN 8
-SW eval for safe d/c, also to eval if able to obtain new medications (patient still with home medications from [facility])  -SCDs, patient's IMPROVE score 0

## 2022-03-29 NOTE — PROGRESS NOTE ADULT - PROBLEM SELECTOR PLAN 3
-enlarging seroma, surgery onboard, preliminary recommend no acute intervention  -given time since procedure, would expect improving seroma, will confirm with surgery no changes to management   -Also noted on CT nonspecific fluid filled loops with no transition point and hyperattenuating structure in small bowel loop, not concerning for SBO, PO challenge per surgery   -serial abdominal exams -patient with epigastric/chest pain (see above for workup), burning, worse after eating  -c/w PPI, patient also on steroids as above, continue for GI ppx  -although already on PPI, f/u H pylori stool antigen

## 2022-03-29 NOTE — PROGRESS NOTE ADULT - PROBLEM SELECTOR PLAN 6
-microcytic anemia, previous workup c/w iron deficiency anemia  -c/w ferrous sulfate -QuantiFeron positive, CT chest with calcified granuloma, c/f latent TB.   -will start treatment with isoniazid for 6-9 months  -in setting of recently diagnosed Crohn's disease, f/u outpatient with GI before starting biologic therapy in setting of latent TB

## 2022-03-29 NOTE — PROGRESS NOTE ADULT - PROBLEM SELECTOR PROBLEM 4
Crohn's disease Postprocedural seroma of a digestive system organ or structure following a digestive system procedure

## 2022-03-29 NOTE — PROGRESS NOTE ADULT - SUBJECTIVE AND OBJECTIVE BOX
Authored by Alecia Mukherjee MD, PGY2  PATIENT:  BIGG BUSH  [id number]    CHIEF COMPLAINT:  Patient is a 41y old  Female who presents with a chief complaint of Chest pain (28 Mar 2022 07:19)      INTERVAL HISTORY OVERNIGHT EVENTS:        MEDICATIONS:  MEDICATIONS  (STANDING):  buDESOnide    EC Capsule 9 milliGRAM(s) Oral daily  calcium carbonate   1250 mG (OsCal) 1 Tablet(s) Oral two times a day  ciprofloxacin     Tablet 500 milliGRAM(s) Oral every 12 hours  ferrous    sulfate 325 milliGRAM(s) Oral daily  influenza   Vaccine 0.5 milliLiter(s) IntraMuscular once  isoniazid 300 milliGRAM(s) Oral daily  metroNIDAZOLE    Tablet 500 milliGRAM(s) Oral every 8 hours  pantoprazole    Tablet 40 milliGRAM(s) Oral before breakfast    MEDICATIONS  (PRN):  acetaminophen     Tablet .. 650 milliGRAM(s) Oral every 6 hours PRN Mild Pain (1 - 3)  lactulose Syrup 10 Gram(s) Oral daily PRN constipation      ALLERGIES:  Allergies    No Known Allergies    Intolerances        OBJECTIVE:  ICU Vital Signs Last 24 Hrs  T(C): 36.4 (29 Mar 2022 05:29), Max: 36.9 (28 Mar 2022 14:19)  T(F): 97.6 (29 Mar 2022 05:29), Max: 98.4 (28 Mar 2022 14:19)  HR: 56 (29 Mar 2022 05:29) (52 - 74)  BP: 100/60 (29 Mar 2022 05:29) (85/54 - 108/79)  BP(mean): --  ABP: --  ABP(mean): --  RR: 18 (29 Mar 2022 05:29) (16 - 20)  SpO2: 100% (29 Mar 2022 05:29) (99% - 100%)        PHYSICAL EXAMINATION:  GENERAL: No acute distress, well-developed  HEAD:  Atraumatic, Normocephalic  EYES: EOMI, PERRLA, conjunctiva and sclera clear  NECK: Supple, no lymphadenopathy, no JVD  CHEST/LUNG: CTAB; No wheezes, rales, or rhonchi  HEART: Regular rate and rhythm; No murmurs, rubs, or gallops  ABDOMEN: Soft, diffusely tender to palpation, surgical scar intact, well healing, non-distended; normal bowel sounds, no organomegaly  EXTREMITIES:  2+ peripheral pulses b/l, No clubbing, cyanosis, or edema  NEUROLOGY: A&O x 3, no focal deficits  SKIN: No rashes or lesions  PSYCH: normal behavior, normal affect, normal speech  LABS:                          10.3   7.89  )-----------( 483      ( 27 Mar 2022 20:07 )             31.9     03-27    139  |  106  |  8   ----------------------------<  113<H>  4.1   |  25  |  0.49<L>    Ca    8.9      27 Mar 2022 20:07    TPro  5.6<L>  /  Alb  3.3  /  TBili  <0.2  /  DBili  x   /  AST  7   /  ALT  6   /  AlkPhos  43  03-27    LIVER FUNCTIONS - ( 27 Mar 2022 20:07 )  Alb: 3.3 g/dL / Pro: 5.6 g/dL / ALK PHOS: 43 U/L / ALT: 6 U/L / AST: 7 U/L / GGT: x                       TELEMETRY:     EKG:     IMAGING:       Authored by Alecia Mukherjee MD, PGY2  PATIENT:  BIGG BUSH  [id number]    CHIEF COMPLAINT:  Patient is a 41y old  Female who presents with a chief complaint of Chest pain (28 Mar 2022 07:19)      INTERVAL HISTORY OVERNIGHT EVENTS: Patient endorses chest/epigastric discomfort, burning, worsens after eating, also still diffuse abdominal pain. Has been having stools daily, no nausea or vomiting, no fevers.        MEDICATIONS:  MEDICATIONS  (STANDING):  buDESOnide    EC Capsule 9 milliGRAM(s) Oral daily  calcium carbonate   1250 mG (OsCal) 1 Tablet(s) Oral two times a day  ciprofloxacin     Tablet 500 milliGRAM(s) Oral every 12 hours  ferrous    sulfate 325 milliGRAM(s) Oral daily  influenza   Vaccine 0.5 milliLiter(s) IntraMuscular once  isoniazid 300 milliGRAM(s) Oral daily  metroNIDAZOLE    Tablet 500 milliGRAM(s) Oral every 8 hours  pantoprazole    Tablet 40 milliGRAM(s) Oral before breakfast    MEDICATIONS  (PRN):  acetaminophen     Tablet .. 650 milliGRAM(s) Oral every 6 hours PRN Mild Pain (1 - 3)  lactulose Syrup 10 Gram(s) Oral daily PRN constipation      ALLERGIES:  Allergies    No Known Allergies    Intolerances        OBJECTIVE:  ICU Vital Signs Last 24 Hrs  T(C): 36.4 (29 Mar 2022 05:29), Max: 36.9 (28 Mar 2022 14:19)  T(F): 97.6 (29 Mar 2022 05:29), Max: 98.4 (28 Mar 2022 14:19)  HR: 56 (29 Mar 2022 05:29) (52 - 74)  BP: 100/60 (29 Mar 2022 05:29) (85/54 - 108/79)  BP(mean): --  ABP: --  ABP(mean): --  RR: 18 (29 Mar 2022 05:29) (16 - 20)  SpO2: 100% (29 Mar 2022 05:29) (99% - 100%)        PHYSICAL EXAMINATION:  GENERAL: No acute distress, well-developed  HEAD:  Atraumatic, Normocephalic  EYES: EOMI, PERRLA, conjunctiva and sclera clear  NECK: Supple, no lymphadenopathy, no JVD  CHEST/LUNG: CTAB; No wheezes, rales, or rhonchi  HEART: Regular rate and rhythm; No murmurs, rubs, or gallops  ABDOMEN: Soft, diffusely tender to palpation, surgical scar intact, well healing, non-distended; normal bowel sounds, no organomegaly  EXTREMITIES:  2+ peripheral pulses b/l, No clubbing, cyanosis, or edema  NEUROLOGY: A&O x 3, no focal deficits  SKIN: No rashes or lesions  PSYCH: normal behavior, normal affect, normal speech  LABS:                          10.3   7.89  )-----------( 483      ( 27 Mar 2022 20:07 )             31.9     03-27    139  |  106  |  8   ----------------------------<  113<H>  4.1   |  25  |  0.49<L>    Ca    8.9      27 Mar 2022 20:07    TPro  5.6<L>  /  Alb  3.3  /  TBili  <0.2  /  DBili  x   /  AST  7   /  ALT  6   /  AlkPhos  43  03-27    LIVER FUNCTIONS - ( 27 Mar 2022 20:07 )  Alb: 3.3 g/dL / Pro: 5.6 g/dL / ALK PHOS: 43 U/L / ALT: 6 U/L / AST: 7 U/L / GGT: x                       TELEMETRY:     EKG:     IMAGING:       Authored by Alecia Mukherjee MD, PGY2  PATIENT:  BIGG BUSH  [id number]    CHIEF COMPLAINT:  Patient is a 41y old  Female who presents with a chief complaint of Chest pain (28 Mar 2022 07:19)      INTERVAL HISTORY OVERNIGHT EVENTS: Patient endorses chest/epigastric discomfort, burning, worsens after eating, also still diffuse abdominal pain. Has been having stools daily, no nausea or vomiting, no fevers.        MEDICATIONS:  MEDICATIONS  (STANDING):  buDESOnide    EC Capsule 9 milliGRAM(s) Oral daily  calcium carbonate   1250 mG (OsCal) 1 Tablet(s) Oral two times a day  ciprofloxacin     Tablet 500 milliGRAM(s) Oral every 12 hours  ferrous    sulfate 325 milliGRAM(s) Oral daily  influenza   Vaccine 0.5 milliLiter(s) IntraMuscular once  isoniazid 300 milliGRAM(s) Oral daily  metroNIDAZOLE    Tablet 500 milliGRAM(s) Oral every 8 hours  pantoprazole    Tablet 40 milliGRAM(s) Oral before breakfast    MEDICATIONS  (PRN):  acetaminophen     Tablet .. 650 milliGRAM(s) Oral every 6 hours PRN Mild Pain (1 - 3)  lactulose Syrup 10 Gram(s) Oral daily PRN constipation      ALLERGIES:  Allergies    No Known Allergies    Intolerances        OBJECTIVE:  ICU Vital Signs Last 24 Hrs  T(C): 36.4 (29 Mar 2022 05:29), Max: 36.9 (28 Mar 2022 14:19)  T(F): 97.6 (29 Mar 2022 05:29), Max: 98.4 (28 Mar 2022 14:19)  HR: 56 (29 Mar 2022 05:29) (52 - 74)  BP: 100/60 (29 Mar 2022 05:29) (85/54 - 108/79)  BP(mean): --  ABP: --  ABP(mean): --  RR: 18 (29 Mar 2022 05:29) (16 - 20)  SpO2: 100% (29 Mar 2022 05:29) (99% - 100%)        PHYSICAL EXAMINATION:  GENERAL: No acute distress, well-developed, conversant but guarded  HEAD:  Atraumatic, Normocephalic  EYES: EOMI, PERRLA, conjunctiva and sclera clear  NECK: Supple, no lymphadenopathy, no JVD  CHEST/LUNG: CTAB; No wheezes, rales, or rhonchi  HEART: Regular rate and rhythm; No murmurs, rubs, or gallops  ABDOMEN: Soft, diffusely tender to palpation, surgical scar intact, well healing, non-distended; normal bowel sounds, no organomegaly  EXTREMITIES:  2+ peripheral pulses b/l, No clubbing, cyanosis, or edema  NEUROLOGY: A&O x 3, no focal deficits  SKIN: No rashes or lesions  PSYCH: normal behavior, normal affect, normal speech  LABS:                          10.3   7.89  )-----------( 483      ( 27 Mar 2022 20:07 )             31.9     03-27    139  |  106  |  8   ----------------------------<  113<H>  4.1   |  25  |  0.49<L>    Ca    8.9      27 Mar 2022 20:07    TPro  5.6<L>  /  Alb  3.3  /  TBili  <0.2  /  DBili  x   /  AST  7   /  ALT  6   /  AlkPhos  43  03-27    LIVER FUNCTIONS - ( 27 Mar 2022 20:07 )  Alb: 3.3 g/dL / Pro: 5.6 g/dL / ALK PHOS: 43 U/L / ALT: 6 U/L / AST: 7 U/L / GGT: x                       TELEMETRY:     EKG:     IMAGING:
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
## 2022-03-29 NOTE — PROGRESS NOTE ADULT - PROBLEM SELECTOR PLAN 7
-c/w PPI, patient also on steroids as above, continue for GI ppx -microcytic anemia, previous workup c/w iron deficiency anemia  -c/w ferrous sulfate

## 2022-03-29 NOTE — PROGRESS NOTE ADULT - PROBLEM SELECTOR PLAN 5
-QuantiFeron positive, CT chest with calcified granuloma, c/f latent TB.   -will start treatment with isoniazid for 6-9 months  -in setting of recently diagnosed Crohn's disease, f/u outpatient with GI before starting biologic therapy in setting of latent TB -recently diagnosed biopsy proven Crohn's disease from [facility]  -patient currently denies bloody bowel movement, denies diarrhea, has chronic constipation on lactulose, continue prn while inpatient   -patient was started on prednisolone in early March, 60mg daily for 5 weeks with plan for taper outpatient, discharged on both prednisolone and budesonide (60mg prednisolone equivalent to 18mg budesonide), continue inpatient   -started on 9mg daily budesonide last admission, continue inpatient, monitor for adrenal insufficiency   -c/w PPI while on steroids   -on azathioprine at home, temporarily hold in setting of infectious workup, also quant positive in setting of possible latent TB; patient also not taking at home 2/2 GI side effects

## 2022-03-30 ENCOUNTER — TRANSCRIPTION ENCOUNTER (OUTPATIENT)
Age: 42
End: 2022-03-30

## 2022-03-30 VITALS
HEART RATE: 65 BPM | TEMPERATURE: 98 F | OXYGEN SATURATION: 100 % | DIASTOLIC BLOOD PRESSURE: 63 MMHG | SYSTOLIC BLOOD PRESSURE: 101 MMHG | RESPIRATION RATE: 17 BRPM

## 2022-03-30 LAB
ALBUMIN SERPL ELPH-MCNC: 3.2 G/DL — LOW (ref 3.3–5)
ALP SERPL-CCNC: 41 U/L — SIGNIFICANT CHANGE UP (ref 40–120)
ALT FLD-CCNC: <5 U/L — LOW (ref 4–33)
ANION GAP SERPL CALC-SCNC: 9 MMOL/L — SIGNIFICANT CHANGE UP (ref 7–14)
AST SERPL-CCNC: 6 U/L — SIGNIFICANT CHANGE UP (ref 4–32)
BASOPHILS # BLD AUTO: 0.05 K/UL — SIGNIFICANT CHANGE UP (ref 0–0.2)
BASOPHILS NFR BLD AUTO: 0.9 % — SIGNIFICANT CHANGE UP (ref 0–2)
BILIRUB SERPL-MCNC: <0.2 MG/DL — SIGNIFICANT CHANGE UP (ref 0.2–1.2)
BUN SERPL-MCNC: 9 MG/DL — SIGNIFICANT CHANGE UP (ref 7–23)
CALCIUM SERPL-MCNC: 8.8 MG/DL — SIGNIFICANT CHANGE UP (ref 8.4–10.5)
CHLORIDE SERPL-SCNC: 103 MMOL/L — SIGNIFICANT CHANGE UP (ref 98–107)
CO2 SERPL-SCNC: 25 MMOL/L — SIGNIFICANT CHANGE UP (ref 22–31)
CREAT SERPL-MCNC: 0.61 MG/DL — SIGNIFICANT CHANGE UP (ref 0.5–1.3)
EGFR: 116 ML/MIN/1.73M2 — SIGNIFICANT CHANGE UP
EOSINOPHIL # BLD AUTO: 0.21 K/UL — SIGNIFICANT CHANGE UP (ref 0–0.5)
EOSINOPHIL NFR BLD AUTO: 3.8 % — SIGNIFICANT CHANGE UP (ref 0–6)
GLUCOSE SERPL-MCNC: 89 MG/DL — SIGNIFICANT CHANGE UP (ref 70–99)
HCT VFR BLD CALC: 31.3 % — LOW (ref 34.5–45)
HGB BLD-MCNC: 10.2 G/DL — LOW (ref 11.5–15.5)
IANC: 2.6 K/UL — SIGNIFICANT CHANGE UP (ref 1.8–7.4)
IMM GRANULOCYTES NFR BLD AUTO: 0.5 % — SIGNIFICANT CHANGE UP (ref 0–1.5)
LYMPHOCYTES # BLD AUTO: 2.2 K/UL — SIGNIFICANT CHANGE UP (ref 1–3.3)
LYMPHOCYTES # BLD AUTO: 40.1 % — SIGNIFICANT CHANGE UP (ref 13–44)
MAGNESIUM SERPL-MCNC: 1.8 MG/DL — SIGNIFICANT CHANGE UP (ref 1.6–2.6)
MCHC RBC-ENTMCNC: 21.7 PG — LOW (ref 27–34)
MCHC RBC-ENTMCNC: 32.6 GM/DL — SIGNIFICANT CHANGE UP (ref 32–36)
MCV RBC AUTO: 66.6 FL — LOW (ref 80–100)
MONOCYTES # BLD AUTO: 0.39 K/UL — SIGNIFICANT CHANGE UP (ref 0–0.9)
MONOCYTES NFR BLD AUTO: 7.1 % — SIGNIFICANT CHANGE UP (ref 2–14)
NEUTROPHILS # BLD AUTO: 2.6 K/UL — SIGNIFICANT CHANGE UP (ref 1.8–7.4)
NEUTROPHILS NFR BLD AUTO: 47.6 % — SIGNIFICANT CHANGE UP (ref 43–77)
NRBC # BLD: 0 /100 WBCS — SIGNIFICANT CHANGE UP
NRBC # FLD: 0 K/UL — SIGNIFICANT CHANGE UP
PHOSPHATE SERPL-MCNC: 3.3 MG/DL — SIGNIFICANT CHANGE UP (ref 2.5–4.5)
PLATELET # BLD AUTO: 383 K/UL — SIGNIFICANT CHANGE UP (ref 150–400)
POTASSIUM SERPL-MCNC: 3.5 MMOL/L — SIGNIFICANT CHANGE UP (ref 3.5–5.3)
POTASSIUM SERPL-SCNC: 3.5 MMOL/L — SIGNIFICANT CHANGE UP (ref 3.5–5.3)
PROT SERPL-MCNC: 5.6 G/DL — LOW (ref 6–8.3)
RBC # BLD: 4.7 M/UL — SIGNIFICANT CHANGE UP (ref 3.8–5.2)
RBC # FLD: 20.2 % — HIGH (ref 10.3–14.5)
SODIUM SERPL-SCNC: 137 MMOL/L — SIGNIFICANT CHANGE UP (ref 135–145)
WBC # BLD: 5.48 K/UL — SIGNIFICANT CHANGE UP (ref 3.8–10.5)
WBC # FLD AUTO: 5.48 K/UL — SIGNIFICANT CHANGE UP (ref 3.8–10.5)

## 2022-03-30 PROCEDURE — 99239 HOSP IP/OBS DSCHRG MGMT >30: CPT | Mod: GC

## 2022-03-30 RX ORDER — ELECTROLYTES/DEXTROSE
0 SOLUTION, ORAL ORAL
Qty: 0 | Refills: 0 | DISCHARGE

## 2022-03-30 RX ORDER — PANTOPRAZOLE SODIUM 20 MG/1
40 TABLET, DELAYED RELEASE ORAL
Refills: 0 | Status: DISCONTINUED | OUTPATIENT
Start: 2022-03-30 | End: 2022-03-30

## 2022-03-30 RX ORDER — PREDNISOLONE 5 MG
0 TABLET ORAL
Qty: 0 | Refills: 0 | DISCHARGE

## 2022-03-30 RX ORDER — BUDESONIDE, MICRONIZED 100 %
3 POWDER (GRAM) MISCELLANEOUS
Qty: 90 | Refills: 0
Start: 2022-03-30 | End: 2022-04-28

## 2022-03-30 RX ORDER — CALCIUM CARBONATE 500(1250)
1 TABLET ORAL
Qty: 60 | Refills: 0
Start: 2022-03-30 | End: 2022-04-28

## 2022-03-30 RX ORDER — CALCIUM CARBONATE 500(1250)
0 TABLET ORAL
Qty: 0 | Refills: 0 | DISCHARGE

## 2022-03-30 RX ORDER — POTASSIUM CHLORIDE 20 MEQ
20 PACKET (EA) ORAL ONCE
Refills: 0 | Status: COMPLETED | OUTPATIENT
Start: 2022-03-30 | End: 2022-03-30

## 2022-03-30 RX ORDER — ESOMEPRAZOLE MAGNESIUM 40 MG/1
1 CAPSULE, DELAYED RELEASE ORAL
Qty: 0 | Refills: 0 | DISCHARGE

## 2022-03-30 RX ORDER — PANTOPRAZOLE SODIUM 20 MG/1
1 TABLET, DELAYED RELEASE ORAL
Qty: 30 | Refills: 1
Start: 2022-03-30 | End: 2022-05-28

## 2022-03-30 RX ORDER — METRONIDAZOLE 500 MG
1 TABLET ORAL
Qty: 6 | Refills: 0
Start: 2022-03-30 | End: 2022-03-31

## 2022-03-30 RX ORDER — ACETAMINOPHEN 500 MG
2 TABLET ORAL
Qty: 56 | Refills: 0
Start: 2022-03-30 | End: 2022-04-05

## 2022-03-30 RX ORDER — CIPROFLOXACIN LACTATE 400MG/40ML
1 VIAL (ML) INTRAVENOUS
Qty: 4 | Refills: 0
Start: 2022-03-30 | End: 2022-03-31

## 2022-03-30 RX ORDER — PANTOPRAZOLE SODIUM 20 MG/1
1 TABLET, DELAYED RELEASE ORAL
Qty: 60 | Refills: 1
Start: 2022-03-30 | End: 2022-05-28

## 2022-03-30 RX ORDER — HEXAVITAMINS
1 TABLET ORAL
Qty: 30 | Refills: 6
Start: 2022-03-30 | End: 2022-10-25

## 2022-03-30 RX ORDER — FERROUS SULFATE 325(65) MG
1 TABLET ORAL
Qty: 30 | Refills: 3
Start: 2022-03-30 | End: 2022-07-27

## 2022-03-30 RX ORDER — ACETAMINOPHEN 500 MG
0 TABLET ORAL
Qty: 0 | Refills: 0 | DISCHARGE

## 2022-03-30 RX ORDER — AZATHIOPRINE 100 MG/1
1 TABLET ORAL
Qty: 0 | Refills: 0 | DISCHARGE

## 2022-03-30 RX ORDER — LACTULOSE 10 G/15ML
0 SOLUTION ORAL
Qty: 0 | Refills: 0 | DISCHARGE

## 2022-03-30 RX ORDER — LACTULOSE 10 G/15ML
15 SOLUTION ORAL
Qty: 450 | Refills: 0
Start: 2022-03-30 | End: 2022-04-28

## 2022-03-30 RX ADMIN — Medication 325 MILLIGRAM(S): at 11:47

## 2022-03-30 RX ADMIN — Medication 1 TABLET(S): at 05:55

## 2022-03-30 RX ADMIN — Medication 9 MILLIGRAM(S): at 05:56

## 2022-03-30 RX ADMIN — Medication 500 MILLIGRAM(S): at 13:02

## 2022-03-30 RX ADMIN — Medication 300 MILLIGRAM(S): at 11:46

## 2022-03-30 RX ADMIN — Medication 20 MILLIEQUIVALENT(S): at 09:43

## 2022-03-30 RX ADMIN — Medication 500 MILLIGRAM(S): at 05:55

## 2022-03-30 RX ADMIN — PANTOPRAZOLE SODIUM 40 MILLIGRAM(S): 20 TABLET, DELAYED RELEASE ORAL at 05:55

## 2022-03-30 NOTE — PROGRESS NOTE ADULT - PROBLEM SELECTOR PLAN 5
-recently diagnosed biopsy proven Crohn's disease from [facility]  -patient currently denies bloody bowel movement, denies diarrhea, has chronic constipation on lactulose, continue prn while inpatient   -patient was started on prednisolone in early March, 60mg daily for 5 weeks with plan for taper outpatient, discharged on both prednisolone and budesonide (60mg prednisolone equivalent to 18mg budesonide)  -started on 9mg daily budesonide last admission, continue inpatient, with hold redundant prednisilone, curbsided GI who agreed to continue only on budesonide, monitor for adrenal insufficiency   -c/w PPI while on steroids   -on azathioprine at home, temporarily hold in setting of infectious workup, also quant positive in setting of possible latent TB; patient also not taking at home 2/2 GI side effects

## 2022-03-30 NOTE — PROGRESS NOTE ADULT - PROBLEM SELECTOR PLAN 3
See Initial Evaluation in the POC section     -patient with epigastric/chest pain (see above for workup), burning, worse after eating  -c/w PPI, patient also on steroids as above, continue for GI ppx  -although already on PPI, f/u H pylori stool antigen, can f/u results outpatient -unclear etiology, consider septic emboli however patient not meeting sepsis criteria, unclear source if septic emboli; TTE 12/21 unremarkable, no murmur on exam  -f/u BCx, from 3/28 NGTD   -monitor CBC, abdominal exam

## 2022-03-30 NOTE — DISCHARGE NOTE NURSING/CASE MANAGEMENT/SOCIAL WORK - PATIENT PORTAL LINK FT
You can access the FollowMyHealth Patient Portal offered by [facility] by registering at the following website: [email or web address]. By joining [employer]’s FollowMyHealth portal, you will also be able to view your health information using other applications (apps) compatible with our system.

## 2022-03-30 NOTE — PROGRESS NOTE ADULT - PROBLEM SELECTOR PLAN 4
-enlarging seroma, surgery onboard, preliminarily recommend no acute intervention  -given time since procedure, would expect improving seroma, will confirm with surgery no changes to management   -Also noted on CT nonspecific fluid filled loops with no transition point and hyperattenuating structure in small bowel loop, not concerning for SBO, PO challenge per surgery, tolerating regular diet; per surgery enlarging seroma still expected at this time, anticipate resolution   -serial abdominal exams

## 2022-03-30 NOTE — DISCHARGE NOTE PROVIDER - NSDCCPCAREPLAN_GEN_ALL_CORE_FT
PRINCIPAL DISCHARGE DIAGNOSIS  Diagnosis: Gastritis  Assessment and Plan of Treatment: Gastritis is inflammation of the stomach, you have burning chest/upper abominal pain consistent with acid reflux. Continue with pantoprazole. Take this medication 30 minutes prior to your first meal of the day. We sent some stool studies (H pylori) to see if this organism is contributing to these symptoms. Avoid lying down after eating (stay upright for 30 minutes to 1 hour after eating) and avoid spicy food, tomato based food, limit caffeine intake. Please follow up with a gastroenterologist outpatient for continued follow up.      SECONDARY DISCHARGE DIAGNOSES  Diagnosis: Chest pain  Assessment and Plan of Treatment: You came in with chest pain and associated shoulder pain. Cardiac workup was normal (EKG, cardiac enzymes) were normal decreasing the suspicion that the heart is causing these symptoms. CT chest was performed which ruled out not to miss diagnosis of clot in the lungs (like a pulmonary embolism) Your symptoms are likely related to gastritis/acid reflux as above.    Diagnosis: Splenic infarct  Assessment and Plan of Treatment: CT scan of the abdomen revealed some areas of decreased blood flow to the spleen. No infectious source was identified as a reason for these findings. Please follow up with your outpatient primary care doctor and gastroenterologist for continued monitoring.    Diagnosis: Abdominal wall seroma  Assessment and Plan of Treatment: You were found to have a post-surgical fluid collection in the abdominal wall; surgery was consulted, and recommended no acute intervention; it is expected this seroma will resolve on its on. If you have worsening abdominal pain, increased redness or purulent drainage from surgical incision site, fevers, please report to the ED.    Diagnosis: Crohn's disease  Assessment and Plan of Treatment: You have recently diagnosed Crohn's disease, which can cause abdominal pain, chronic diarrhea vs constipation. Please continue with budesonide 9mg daily (steroid which decreases inflammation). Please follow with your GI doctor for continued management.    Diagnosis: TB (tuberculosis)  Assessment and Plan of Treatment: You had positive TB result and CT chest findings consistent with latent TB. Continue with isoniazid for at least 6 months. Please follow with your PCP for continued monitoring.

## 2022-03-30 NOTE — PROGRESS NOTE ADULT - PROBLEM SELECTOR PROBLEM 4
Postprocedural seroma of a digestive system organ or structure following a digestive system procedure

## 2022-03-30 NOTE — ED PROVIDER NOTE - NS ED MD DISPO DIVISION OBS
[FreeTextEntry1] : Complete skin examination is negative for malignancy; Multiple new concerns were addressed and discussed.\par Therapeutic options and their risks and benefits; along with multiple diagnostic possibilities were discussed at length;\par risks and benefits of skin biopsy and/or other further study were discussed;\par \par The patient has a history of significant sun exposure.  Continue annual exams. \par \par Nevi on back;  Hx irritation, now improved; No treatment is required unless this lesion becomes symptomatic.  MADELYN

## 2022-03-30 NOTE — PROGRESS NOTE ADULT - PROBLEM SELECTOR PLAN 8
- eval for safe d/c, also to eval if able to obtain new medications (patient still with home medications from [facility]) (per SW can send Rx to [facility])  -SCDs, patient's IMPROVE score 0 -SW eval for safe d/c, also to eval if able to obtain new medications (patient still with home medications from [facility]) (per SW can send Rx to [facility]); patient exploring options with [facility]  -SCDs, patient's IMPROVE score 0

## 2022-03-30 NOTE — PROVIDER CONTACT NOTE (OTHER) - ASSESSMENT
Patient AxOx4. Patient states that she feels light headed and is having a headache
Patient had an episode of emesis.

## 2022-03-30 NOTE — PROVIDER CONTACT NOTE (OTHER) - ACTION/TREATMENT ORDERED:
Provider Nicolas Hines made aware, will come to see the patient
MD saw patient, no new interventions ordered at this time

## 2022-03-30 NOTE — DISCHARGE NOTE NURSING/CASE MANAGEMENT/SOCIAL WORK - NSDCPEFALRISK_GEN_ALL_CORE
For information on Fall & Injury Prevention, visit: [email or web address] OR  [email or web address] OR  https://www.cdc.gov/steadi/patient.html
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
## 2022-03-30 NOTE — DISCHARGE NOTE PROVIDER - CARE PROVIDER_API CALL
Tonya De La Cruz   Internal Medicine  [address] [place] [address]  Phone: [phone]  Fax: [phone]  Scheduled Appointment: 05/04/2022 09:00 AM
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
## 2022-03-30 NOTE — PROGRESS NOTE ADULT - SUBJECTIVE AND OBJECTIVE BOX
Authored by Alecia Mukherjee MD, PGY2  PATIENT:  BIGG BUSH  [id number]    CHIEF COMPLAINT:  Patient is a 41y old  Female who presents with a chief complaint of Chest pain (29 Mar 2022 08:11)      INTERVAL HISTORY OVERNIGHT EVENTS: Patient c/o abdominal pain overnight, received oxycodone.           MEDICATIONS:  MEDICATIONS  (STANDING):  buDESOnide    EC Capsule 9 milliGRAM(s) Oral daily  calcium carbonate   1250 mG (OsCal) 1 Tablet(s) Oral two times a day  ciprofloxacin     Tablet 500 milliGRAM(s) Oral every 12 hours  ferrous    sulfate 325 milliGRAM(s) Oral daily  influenza   Vaccine 0.5 milliLiter(s) IntraMuscular once  isoniazid 300 milliGRAM(s) Oral daily  metroNIDAZOLE    Tablet 500 milliGRAM(s) Oral every 8 hours  pantoprazole    Tablet 40 milliGRAM(s) Oral before breakfast    MEDICATIONS  (PRN):  acetaminophen     Tablet .. 650 milliGRAM(s) Oral every 6 hours PRN Mild Pain (1 - 3)  lactulose Syrup 10 Gram(s) Oral daily PRN constipation      ALLERGIES:  Allergies    No Known Allergies    Intolerances        OBJECTIVE:  ICU Vital Signs Last 24 Hrs  T(C): 36.4 (30 Mar 2022 05:50), Max: 37.1 (29 Mar 2022 21:05)  T(F): 97.6 (30 Mar 2022 05:50), Max: 98.8 (29 Mar 2022 21:05)  HR: 65 (30 Mar 2022 05:50) (65 - 82)  BP: 101/63 (30 Mar 2022 05:50) (92/61 - 132/89)  BP(mean): --  ABP: --  ABP(mean): --  RR: 17 (30 Mar 2022 05:50) (17 - 18)  SpO2: 100% (30 Mar 2022 05:50) (99% - 100%)          I&O's Summary    29 Mar 2022 07:01  -  30 Mar 2022 07:00  --------------------------------------------------------  IN: 550 mL / OUT: 0 mL / NET: 550 mL        PHYSICAL EXAMINATION:  GENERAL: No acute distress, well-developed, conversant but guarded  HEAD:  Atraumatic, Normocephalic  EYES: EOMI, PERRLA, conjunctiva and sclera clear  NECK: Supple, no lymphadenopathy, no JVD  CHEST/LUNG: CTAB; No wheezes, rales, or rhonchi  HEART: Regular rate and rhythm; No murmurs, rubs, or gallops  ABDOMEN: Soft, diffusely tender to palpation, surgical scar intact, well healing, non-distended; normal bowel sounds, no organomegaly  EXTREMITIES:  2+ peripheral pulses b/l, No clubbing, cyanosis, or edema  NEUROLOGY: A&O x 3, no focal deficits  SKIN: No rashes or lesions  PSYCH: normal behavior, normal affect, normal speech    LABS:                          9.5    5.71  )-----------( 404      ( 29 Mar 2022 07:25 )             29.4     03-29    140  |  104  |  10  ----------------------------<  79  3.6   |  24  |  0.60    Ca    8.5      29 Mar 2022 07:25  Phos  3.6     03-29  Mg     1.70     03-29    TPro  5.4<L>  /  Alb  3.1<L>  /  TBili  0.2  /  DBili  x   /  AST  6   /  ALT  5   /  AlkPhos  40  03-29    LIVER FUNCTIONS - ( 29 Mar 2022 07:25 )  Alb: 3.1 g/dL / Pro: 5.4 g/dL / ALK PHOS: 40 U/L / ALT: 5 U/L / AST: 6 U/L / GGT: x                       TELEMETRY:     EKG:     IMAGING:       Authored by Alecia Mukherjee MD, PGY2  PATIENT:  BIGG BUSH  [id number]    CHIEF COMPLAINT:  Patient is a 41y old  Female who presents with a chief complaint of Chest pain (29 Mar 2022 08:11)      INTERVAL HISTORY OVERNIGHT EVENTS: Patient c/o abdominal pain overnight, received oxycodone. Patient states she has burning epigastric pain, worsens after eating.           MEDICATIONS:  MEDICATIONS  (STANDING):  buDESOnide    EC Capsule 9 milliGRAM(s) Oral daily  calcium carbonate   1250 mG (OsCal) 1 Tablet(s) Oral two times a day  ciprofloxacin     Tablet 500 milliGRAM(s) Oral every 12 hours  ferrous    sulfate 325 milliGRAM(s) Oral daily  influenza   Vaccine 0.5 milliLiter(s) IntraMuscular once  isoniazid 300 milliGRAM(s) Oral daily  metroNIDAZOLE    Tablet 500 milliGRAM(s) Oral every 8 hours  pantoprazole    Tablet 40 milliGRAM(s) Oral before breakfast    MEDICATIONS  (PRN):  acetaminophen     Tablet .. 650 milliGRAM(s) Oral every 6 hours PRN Mild Pain (1 - 3)  lactulose Syrup 10 Gram(s) Oral daily PRN constipation      ALLERGIES:  Allergies    No Known Allergies    Intolerances        OBJECTIVE:  ICU Vital Signs Last 24 Hrs  T(C): 36.4 (30 Mar 2022 05:50), Max: 37.1 (29 Mar 2022 21:05)  T(F): 97.6 (30 Mar 2022 05:50), Max: 98.8 (29 Mar 2022 21:05)  HR: 65 (30 Mar 2022 05:50) (65 - 82)  BP: 101/63 (30 Mar 2022 05:50) (92/61 - 132/89)  BP(mean): --  ABP: --  ABP(mean): --  RR: 17 (30 Mar 2022 05:50) (17 - 18)  SpO2: 100% (30 Mar 2022 05:50) (99% - 100%)          I&O's Summary    29 Mar 2022 07:01  -  30 Mar 2022 07:00  --------------------------------------------------------  IN: 550 mL / OUT: 0 mL / NET: 550 mL        PHYSICAL EXAMINATION:  GENERAL: No acute distress, well-developed, conversant but guarded  HEAD:  Atraumatic, Normocephalic  EYES: EOMI, PERRLA, conjunctiva and sclera clear  NECK: Supple, no lymphadenopathy, no JVD  CHEST/LUNG: CTAB; No wheezes, rales, or rhonchi  HEART: Regular rate and rhythm; No murmurs, rubs, or gallops  ABDOMEN: Soft, diffusely tender to palpation, surgical scar intact, well healing, non-distended; normal bowel sounds, no organomegaly  EXTREMITIES:  2+ peripheral pulses b/l, No clubbing, cyanosis, or edema  NEUROLOGY: A&O x 3, no focal deficits  SKIN: No rashes or lesions  PSYCH: normal behavior, normal affect, normal speech    LABS:                          9.5    5.71  )-----------( 404      ( 29 Mar 2022 07:25 )             29.4     03-29    140  |  104  |  10  ----------------------------<  79  3.6   |  24  |  0.60    Ca    8.5      29 Mar 2022 07:25  Phos  3.6     03-29  Mg     1.70     03-29    TPro  5.4<L>  /  Alb  3.1<L>  /  TBili  0.2  /  DBili  x   /  AST  6   /  ALT  5   /  AlkPhos  40  03-29    LIVER FUNCTIONS - ( 29 Mar 2022 07:25 )  Alb: 3.1 g/dL / Pro: 5.4 g/dL / ALK PHOS: 40 U/L / ALT: 5 U/L / AST: 6 U/L / GGT: x                       TELEMETRY:     EKG:     IMAGING:

## 2022-03-30 NOTE — DISCHARGE NOTE PROVIDER - NSDCMRMEDTOKEN_GEN_ALL_CORE_FT
acetaminophen: Doliprane 1000mg PRN (appears to be acetaminophen)  calcium carbonate: Calperos 500mg BID - appears to be calcium supplement  Electrolyte: Kalinor Retard 600mg TID (possibly a mineral/potassium preparation for supplementation)  Entocort EC 3 mg oral delayed release capsule: 3 cap(s) orally once a day (in the morning)  esomeprazole 40 mg oral delayed release capsule: Inexium 40mg - 1 cap(s) orally once a day  lactulose: Duphalac 10g/15mL - appears to be lactulose   prednisoLONE: Vitapred 20mg - Prednisolone: patient reports currently taking 3 tabs (60mg) daily for 3 more weeks (5 weeks of 60mg), with plan to follow taper as outlined by surgeon theafter    acetaminophen 325 mg oral tablet: 2 tab(s) orally every 6 hours, As needed, Mild Pain (1 - 3)  calcium carbonate 1250 mg (500 mg elemental calcium) oral tablet: 1 tab(s) orally 2 times a day  ciprofloxacin 500 mg oral tablet: 1 tab(s) orally every 12 hours  Electrolyte: Kalinor Retard 600mg TID (possibly a mineral/potassium preparation for supplementation)  Entocort EC 3 mg oral delayed release capsule: 3 cap(s) orally once a day (in the morning)  ferrous sulfate 325 mg (65 mg elemental iron) oral tablet: 1 tab(s) orally once a day  isoniazid 300 mg oral tablet: 1 tab(s) orally once a day  lactulose 10 g/15 mL oral syrup: 15 milliliter(s) orally once a day, As needed, constipation  metroNIDAZOLE 500 mg oral tablet: 1 tab(s) orally every 8 hours  pantoprazole 40 mg oral delayed release tablet: 1 tab(s) orally once a day 30 minutes prior to breakfast   acetaminophen 325 mg oral tablet: 2 tab(s) orally every 6 hours, As needed, Mild Pain (1 - 3)  calcium carbonate 1250 mg (500 mg elemental calcium) oral tablet: 1 tab(s) orally 2 times a day  ciprofloxacin 500 mg oral tablet: 1 tab(s) orally every 12 hours  Entocort EC 3 mg oral delayed release capsule: 3 cap(s) orally once a day (in the morning)  ferrous sulfate 325 mg (65 mg elemental iron) oral tablet: 1 tab(s) orally once a day  isoniazid 300 mg oral tablet: 1 tab(s) orally once a day  lactulose 10 g/15 mL oral syrup: 15 milliliter(s) orally once a day, As needed, constipation  metroNIDAZOLE 500 mg oral tablet: 1 tab(s) orally every 8 hours  pantoprazole 40 mg oral delayed release tablet: 1 tab(s) orally 2 times a day for one week, then decrease to once daily; take 30 minutes prior to breakfast

## 2022-04-02 LAB
CULTURE RESULTS: SIGNIFICANT CHANGE UP
CULTURE RESULTS: SIGNIFICANT CHANGE UP
SPECIMEN SOURCE: SIGNIFICANT CHANGE UP
SPECIMEN SOURCE: SIGNIFICANT CHANGE UP

## 2022-05-07 ENCOUNTER — INPATIENT (INPATIENT)
Facility: HOSPITAL | Age: 42
LOS: 0 days | Discharge: HOME | End: 2022-05-08
Attending: SURGERY | Admitting: SURGERY
Payer: MEDICAID

## 2022-05-07 VITALS
DIASTOLIC BLOOD PRESSURE: 58 MMHG | OXYGEN SATURATION: 100 % | RESPIRATION RATE: 18 BRPM | SYSTOLIC BLOOD PRESSURE: 113 MMHG | HEART RATE: 91 BPM | TEMPERATURE: 97 F | HEIGHT: 72 IN

## 2022-05-07 DIAGNOSIS — Z98.890 OTHER SPECIFIED POSTPROCEDURAL STATES: Chronic | ICD-10-CM

## 2022-05-07 DIAGNOSIS — Z98.891 HISTORY OF UTERINE SCAR FROM PREVIOUS SURGERY: Chronic | ICD-10-CM

## 2022-05-07 PROCEDURE — 99285 EMERGENCY DEPT VISIT HI MDM: CPT

## 2022-05-07 NOTE — ED ADULT TRIAGE NOTE - CHIEF COMPLAINT QUOTE
abd pain    pt has hx of intermittent abd pain, crohn's disease and s/p umbilical repair in 3/22 in [facility].  c/o upper abd pain for a few days and now has b/l leg and generalized  body aches.  denies nausea, vomiting .  had loose bm but is medication- unsure of stool softener or laxative.  reports abd is distended and not passing flatus as much.  appears uncomfortable.

## 2022-05-08 ENCOUNTER — TRANSCRIPTION ENCOUNTER (OUTPATIENT)
Age: 42
End: 2022-05-08

## 2022-05-08 VITALS
HEART RATE: 62 BPM | OXYGEN SATURATION: 100 % | DIASTOLIC BLOOD PRESSURE: 81 MMHG | RESPIRATION RATE: 16 BRPM | SYSTOLIC BLOOD PRESSURE: 121 MMHG

## 2022-05-08 DIAGNOSIS — L02.211 CUTANEOUS ABSCESS OF ABDOMINAL WALL: ICD-10-CM

## 2022-05-08 LAB
ALBUMIN SERPL ELPH-MCNC: 3.1 G/DL — LOW (ref 3.3–5)
ALP SERPL-CCNC: 46 U/L — SIGNIFICANT CHANGE UP (ref 40–120)
ALT FLD-CCNC: 10 U/L — SIGNIFICANT CHANGE UP (ref 4–33)
ANION GAP SERPL CALC-SCNC: 7 MMOL/L — SIGNIFICANT CHANGE UP (ref 7–14)
APPEARANCE UR: CLEAR — SIGNIFICANT CHANGE UP
AST SERPL-CCNC: 7 U/L — SIGNIFICANT CHANGE UP (ref 4–32)
B PERT DNA SPEC QL NAA+PROBE: SIGNIFICANT CHANGE UP
B PERT+PARAPERT DNA PNL SPEC NAA+PROBE: SIGNIFICANT CHANGE UP
BACTERIA # UR AUTO: NEGATIVE — SIGNIFICANT CHANGE UP
BASOPHILS # BLD AUTO: 0.05 K/UL — SIGNIFICANT CHANGE UP (ref 0–0.2)
BASOPHILS NFR BLD AUTO: 0.5 % — SIGNIFICANT CHANGE UP (ref 0–2)
BILIRUB SERPL-MCNC: <0.2 MG/DL — SIGNIFICANT CHANGE UP (ref 0.2–1.2)
BILIRUB UR-MCNC: NEGATIVE — SIGNIFICANT CHANGE UP
BORDETELLA PARAPERTUSSIS (RAPRVP): SIGNIFICANT CHANGE UP
BUN SERPL-MCNC: 12 MG/DL — SIGNIFICANT CHANGE UP (ref 7–23)
C PNEUM DNA SPEC QL NAA+PROBE: SIGNIFICANT CHANGE UP
CALCIUM SERPL-MCNC: 8.5 MG/DL — SIGNIFICANT CHANGE UP (ref 8.4–10.5)
CHLORIDE SERPL-SCNC: 105 MMOL/L — SIGNIFICANT CHANGE UP (ref 98–107)
CO2 SERPL-SCNC: 27 MMOL/L — SIGNIFICANT CHANGE UP (ref 22–31)
COLOR SPEC: YELLOW — SIGNIFICANT CHANGE UP
CREAT SERPL-MCNC: 0.61 MG/DL — SIGNIFICANT CHANGE UP (ref 0.5–1.3)
DIFF PNL FLD: ABNORMAL
EGFR: 116 ML/MIN/1.73M2 — SIGNIFICANT CHANGE UP
EOSINOPHIL # BLD AUTO: 0.01 K/UL — SIGNIFICANT CHANGE UP (ref 0–0.5)
EOSINOPHIL NFR BLD AUTO: 0.1 % — SIGNIFICANT CHANGE UP (ref 0–6)
EPI CELLS # UR: 4 /HPF — SIGNIFICANT CHANGE UP (ref 0–5)
FLUAV SUBTYP SPEC NAA+PROBE: SIGNIFICANT CHANGE UP
FLUBV RNA SPEC QL NAA+PROBE: SIGNIFICANT CHANGE UP
GLUCOSE SERPL-MCNC: 112 MG/DL — HIGH (ref 70–99)
GLUCOSE UR QL: ABNORMAL
HADV DNA SPEC QL NAA+PROBE: SIGNIFICANT CHANGE UP
HCG SERPL-ACNC: <5 MIU/ML — SIGNIFICANT CHANGE UP
HCOV 229E RNA SPEC QL NAA+PROBE: SIGNIFICANT CHANGE UP
HCOV HKU1 RNA SPEC QL NAA+PROBE: SIGNIFICANT CHANGE UP
HCOV NL63 RNA SPEC QL NAA+PROBE: SIGNIFICANT CHANGE UP
HCOV OC43 RNA SPEC QL NAA+PROBE: SIGNIFICANT CHANGE UP
HCT VFR BLD CALC: 28.6 % — LOW (ref 34.5–45)
HGB BLD-MCNC: 9 G/DL — LOW (ref 11.5–15.5)
HIV 1+2 AB+HIV1 P24 AG SERPL QL IA: SIGNIFICANT CHANGE UP
HMPV RNA SPEC QL NAA+PROBE: SIGNIFICANT CHANGE UP
HPIV1 RNA SPEC QL NAA+PROBE: SIGNIFICANT CHANGE UP
HPIV2 RNA SPEC QL NAA+PROBE: SIGNIFICANT CHANGE UP
HPIV3 RNA SPEC QL NAA+PROBE: SIGNIFICANT CHANGE UP
HPIV4 RNA SPEC QL NAA+PROBE: SIGNIFICANT CHANGE UP
HYALINE CASTS # UR AUTO: 4 /LPF — SIGNIFICANT CHANGE UP (ref 0–7)
IANC: 6.72 K/UL — SIGNIFICANT CHANGE UP (ref 1.8–7.4)
IMM GRANULOCYTES NFR BLD AUTO: 3.6 % — HIGH (ref 0–1.5)
KETONES UR-MCNC: ABNORMAL
LEUKOCYTE ESTERASE UR-ACNC: NEGATIVE — SIGNIFICANT CHANGE UP
LIDOCAIN IGE QN: 25 U/L — SIGNIFICANT CHANGE UP (ref 7–60)
LYMPHOCYTES # BLD AUTO: 1.65 K/UL — SIGNIFICANT CHANGE UP (ref 1–3.3)
LYMPHOCYTES # BLD AUTO: 17.5 % — SIGNIFICANT CHANGE UP (ref 13–44)
M PNEUMO DNA SPEC QL NAA+PROBE: SIGNIFICANT CHANGE UP
MCHC RBC-ENTMCNC: 21.5 PG — LOW (ref 27–34)
MCHC RBC-ENTMCNC: 31.5 GM/DL — LOW (ref 32–36)
MCV RBC AUTO: 68.4 FL — LOW (ref 80–100)
MONOCYTES # BLD AUTO: 0.66 K/UL — SIGNIFICANT CHANGE UP (ref 0–0.9)
MONOCYTES NFR BLD AUTO: 7 % — SIGNIFICANT CHANGE UP (ref 2–14)
NEUTROPHILS # BLD AUTO: 6.72 K/UL — SIGNIFICANT CHANGE UP (ref 1.8–7.4)
NEUTROPHILS NFR BLD AUTO: 71.3 % — SIGNIFICANT CHANGE UP (ref 43–77)
NITRITE UR-MCNC: NEGATIVE — SIGNIFICANT CHANGE UP
NRBC # BLD: 0 /100 WBCS — SIGNIFICANT CHANGE UP
NRBC # FLD: 0.07 K/UL — HIGH
PH UR: 6.5 — SIGNIFICANT CHANGE UP (ref 5–8)
PLATELET # BLD AUTO: 360 K/UL — SIGNIFICANT CHANGE UP (ref 150–400)
POTASSIUM SERPL-MCNC: 3.8 MMOL/L — SIGNIFICANT CHANGE UP (ref 3.5–5.3)
POTASSIUM SERPL-SCNC: 3.8 MMOL/L — SIGNIFICANT CHANGE UP (ref 3.5–5.3)
PROT SERPL-MCNC: 4.9 G/DL — LOW (ref 6–8.3)
PROT UR-MCNC: ABNORMAL
RAPID RVP RESULT: SIGNIFICANT CHANGE UP
RBC # BLD: 4.18 M/UL — SIGNIFICANT CHANGE UP (ref 3.8–5.2)
RBC # FLD: 20.9 % — HIGH (ref 10.3–14.5)
RBC CASTS # UR COMP ASSIST: SIGNIFICANT CHANGE UP /HPF (ref 0–4)
RSV RNA SPEC QL NAA+PROBE: SIGNIFICANT CHANGE UP
RV+EV RNA SPEC QL NAA+PROBE: SIGNIFICANT CHANGE UP
SARS-COV-2 RNA SPEC QL NAA+PROBE: SIGNIFICANT CHANGE UP
SODIUM SERPL-SCNC: 139 MMOL/L — SIGNIFICANT CHANGE UP (ref 135–145)
SP GR SPEC: 1.04 — SIGNIFICANT CHANGE UP (ref 1–1.05)
UROBILINOGEN FLD QL: ABNORMAL
WBC # BLD: 9.43 K/UL — SIGNIFICANT CHANGE UP (ref 3.8–10.5)
WBC # FLD AUTO: 9.43 K/UL — SIGNIFICANT CHANGE UP (ref 3.8–10.5)
WBC UR QL: SIGNIFICANT CHANGE UP /HPF (ref 0–5)

## 2022-05-08 PROCEDURE — 74177 CT ABD & PELVIS W/CONTRAST: CPT | Mod: 26,MA

## 2022-05-08 RX ORDER — ENOXAPARIN SODIUM 100 MG/ML
40 INJECTION SUBCUTANEOUS EVERY 24 HOURS
Refills: 0 | Status: DISCONTINUED | OUTPATIENT
Start: 2022-05-08 | End: 2022-05-08

## 2022-05-08 RX ORDER — MORPHINE SULFATE 50 MG/1
4 CAPSULE, EXTENDED RELEASE ORAL ONCE
Refills: 0 | Status: DISCONTINUED | OUTPATIENT
Start: 2022-05-08 | End: 2022-05-08

## 2022-05-08 RX ORDER — PIPERACILLIN AND TAZOBACTAM 4; .5 G/20ML; G/20ML
3.38 INJECTION, POWDER, LYOPHILIZED, FOR SOLUTION INTRAVENOUS ONCE
Refills: 0 | Status: COMPLETED | OUTPATIENT
Start: 2022-05-08 | End: 2022-05-08

## 2022-05-08 RX ORDER — PIPERACILLIN AND TAZOBACTAM 4; .5 G/20ML; G/20ML
3.38 INJECTION, POWDER, LYOPHILIZED, FOR SOLUTION INTRAVENOUS EVERY 8 HOURS
Refills: 0 | Status: DISCONTINUED | OUTPATIENT
Start: 2022-05-08 | End: 2022-05-08

## 2022-05-08 RX ORDER — FAMOTIDINE 10 MG/ML
20 INJECTION INTRAVENOUS ONCE
Refills: 0 | Status: COMPLETED | OUTPATIENT
Start: 2022-05-08 | End: 2022-05-08

## 2022-05-08 RX ORDER — SODIUM CHLORIDE 9 MG/ML
1000 INJECTION INTRAMUSCULAR; INTRAVENOUS; SUBCUTANEOUS ONCE
Refills: 0 | Status: COMPLETED | OUTPATIENT
Start: 2022-05-08 | End: 2022-05-08

## 2022-05-08 RX ORDER — ACETAMINOPHEN 500 MG
650 TABLET ORAL ONCE
Refills: 0 | Status: COMPLETED | OUTPATIENT
Start: 2022-05-08 | End: 2022-05-08

## 2022-05-08 RX ADMIN — MORPHINE SULFATE 4 MILLIGRAM(S): 50 CAPSULE, EXTENDED RELEASE ORAL at 03:44

## 2022-05-08 RX ADMIN — PIPERACILLIN AND TAZOBACTAM 200 GRAM(S): 4; .5 INJECTION, POWDER, LYOPHILIZED, FOR SOLUTION INTRAVENOUS at 08:44

## 2022-05-08 RX ADMIN — Medication 30 MILLILITER(S): at 02:56

## 2022-05-08 RX ADMIN — FAMOTIDINE 20 MILLIGRAM(S): 10 INJECTION INTRAVENOUS at 02:56

## 2022-05-08 RX ADMIN — SODIUM CHLORIDE 1000 MILLILITER(S): 9 INJECTION INTRAMUSCULAR; INTRAVENOUS; SUBCUTANEOUS at 02:05

## 2022-05-08 RX ADMIN — MORPHINE SULFATE 4 MILLIGRAM(S): 50 CAPSULE, EXTENDED RELEASE ORAL at 02:56

## 2022-05-08 NOTE — ED ADULT NURSE NOTE - CHIEF COMPLAINT QUOTE
abd pain    pt has hx of intermittent abd pain, crohn's disease and s/p umbilical repair in 3/22 in [facility].  c/o upper abd pain for a few days and now has b/l leg and generalized  body aches.  denies nausea, vomiting .  had loose bm but is medication- unsure of stool softener or laxative.  reports abd is distended and not passing flatus as much.  appears uncomfortable.

## 2022-05-08 NOTE — CHART NOTE - NSCHARTNOTEFT_GEN_A_CORE
Patient was admitted for drainage of an abdominal wall abscess after an umbilical hernia repair. The collection was rim enhancing on CT with surrounding inflammatory changes but she was afebrile without leukocytosis and had no erythema over the collection with only slight tenderness. The fluid was aspirated and was clear serous fluid. No drainage was performed. The patient was discharged from the ED.    PRANAV Braxton PGY 3  B Team Surgery  Pager [phone]

## 2022-05-08 NOTE — ED PROVIDER NOTE - ATTENDING CONTRIBUTION TO CARE
40 y/o F with h/o Crohn's disease (not on meds), previous hernia repair in [facility] in March 2022 here with abdominal pain.  Pt reports waxing and waning chronic abd pain for months, has been hospitalized at [facility] as well as at an OSH recently with reported negative work-ups.  Pt had been scheduled to follow-up with GI after both hospitalizations, but states she missed the appointments.  Pt reports 1 week of diffuse abd pain, worse on the right.  No fever, cp, sob, n/v/c/d, urinary sxs.  She has been taking a prescribed medication for pain, but does not recall the name, last taken at 12pm noon.  Pt lying in stretcher, awake and alert, nontoxic.  AF/VSS.  Lungs cta bl.  Cards nl S1/S2, RRR, no MRG.  Abd soft diffusely tender, no rebound or guarding, no distension.  Plan for labs, ua, ucg, ct abd/pel given surgical hx and h/o IBD - eval for obstruction, colitis, other intraabd complication.

## 2022-05-08 NOTE — DISCHARGE NOTE NURSING/CASE MANAGEMENT/SOCIAL WORK - NSDCPEFALRISK_GEN_ALL_CORE
For information on Fall & Injury Prevention, visit: [email or web address] OR  [email or web address] OR  https://www.cdc.gov/steadi/patient.html

## 2022-05-08 NOTE — DISCHARGE NOTE PROVIDER - NSDCCPCAREPLAN_GEN_ALL_CORE_FT
PRINCIPAL DISCHARGE DIAGNOSIS  Diagnosis: Abdominal wall seroma  Assessment and Plan of Treatment:

## 2022-05-08 NOTE — DISCHARGE NOTE PROVIDER - HOSPITAL COURSE
41F w/ hx of Crohn's disease on steroids, ventral hernia repair with mesh in [facility] (March 10, 2022), presented with pain around her incision. She has had intermittent pain since her surgery. She presented to [facility] ED on March 28 with pain and had an abdominal wall collection without signs of infection.     In the ED she was afebrile, hemodynamically normal, WBC 9, CT showed rim enhancing abdominal wall collection with surrounding inflammatory changes.    The collection was aspirated in the ED and was found to be serous fluid. No purulence was seen and she had no erythema with only mild tenderness.    After aspiration of the collection she was ready for discharge in stable condition.

## 2022-05-08 NOTE — ED PROVIDER NOTE - PROGRESS NOTE DETAILS
Ford, PGY3 - pt signed out to me - CT showing abd wall abscess. surgery paged for consult Ford, PGY3 - surgery consulted, will come see pt

## 2022-05-08 NOTE — H&P ADULT - HISTORY OF PRESENT ILLNESS
41F w/ hx of Crohn's disease on steroids, ventral hernia repair with mesh in [facility] (March 10, 2022), presenting with pain around her incision. She has had intermittent pain since her surgery. She presented to [facility] ED on March 28 with pain and had an abdominal wall collection without signs of infection. For the past week her pain has been worse. Denies fevers, chills, nausea, emesis, constipation, or diarrhea.    In the ED she is afebrile, hemodynamically normal, WBC 9, CT shows rim enhancing abdominal wall collection with surrounding inflammatory changes.

## 2022-05-08 NOTE — H&P ADULT - NSHPLABSRESULTS_GEN_ALL_CORE
T(C): 36.5 (05-08-22 @ 05:17), Max: 36.5 (05-08-22 @ 05:17)  HR: 62 (05-08-22 @ 08:47) (57 - 91)  BP: 121/81 (05-08-22 @ 08:47) (108/67 - 121/81)  RR: 16 (05-08-22 @ 08:47) (16 - 18)  SpO2: 100% (05-08-22 @ 08:47) (100% - 100%)    LABS:                        9.0    9.43  )-----------( 360      ( 08 May 2022 02:22 )             28.6     08 May 2022 02:22    139    |  105    |  12     ----------------------------<  112    3.8     |  27     |  0.61     Ca    8.5        08 May 2022 02:22    TPro  4.9    /  Alb  3.1    /  TBili  <0.2   /  DBili  x      /  AST  7      /  ALT  10     /  AlkPhos  46     08 May 2022 02:22        CT Abdomen and Pelvis w/ IV Cont (05.08.22 @ 05:10)    FINDINGS:    LOWER CHEST: Within normal limits.  LIVER: Focal fatty infiltration along the falciform ligament noted.  BILE DUCTS: Normal caliber.  GALLBLADDER: Contracted gallbladder.  SPLEEN: Within normal limits.  PANCREAS: Within normal limits.  ADRENALS: Within normal limits.  KIDNEYS/URETERS: Within normal limits.  BLADDER: Within normal limits.  REPRODUCTIVE ORGANS: Uterus and adnexa within normal limits.    BOWEL: No bowel obstruction. Appendix is not visualized. No evidence of   inflammation in the pericecal region.  PERITONEUM: No free air.  VESSELS: Within normal limits.  RETROPERITONEUM/LYMPH NODES: No lymphadenopathy.    ABDOMINAL WALL: Redemonstrated anterior abdominal wall fluid collection,   measuring approximately 2.1 x 6.7 x 6.2 cm (AP x TR x CC) and appears   slightly decreased in size from 3/28/2022. This fluid collection is now   more organized and demonstrate peripheral rim enhancement with   surrounding inflammatory changes.  BONES: Degenerative changes.    IMPRESSION:    Slightly decreased in size redemonstrated anterior abdominal wall fluid   collection with peripheral rim enhancement and surrounding inflammatory   changes, most consistent with abscess and surrounding cellulitis.

## 2022-05-08 NOTE — DISCHARGE NOTE NURSING/CASE MANAGEMENT/SOCIAL WORK - PATIENT PORTAL LINK FT
You can access the FollowMyHealth Patient Portal offered by [facility] by registering at the following website: [email or web address]. By joining [employer]’s FollowMyHealth portal, you will also be able to view your health information using other applications (apps) compatible with our system.

## 2022-05-08 NOTE — ED ADULT NURSE NOTE - CHIEF COMPLAINT
[FreeTextEntry7] : incision CDi with dressing intact
The patient is a 41y Female complaining of abdominal pain.

## 2022-05-08 NOTE — H&P ADULT - ASSESSMENT
41F w/ hx of Crohn's disease on steroids, ventral hernia repair with mesh in [facility] (March 10, 2022), presenting with abdominal wall abscess.    - Admit to surgery  - IV abx  - Will drain abscess at bedside  - Discussed with attending, Dr. Irene GARCIA Team / Acute Care Surgery  Pager [phone]

## 2022-05-08 NOTE — ED PROVIDER NOTE - CLINICAL SUMMARY MEDICAL DECISION MAKING FREE TEXT BOX
Eval for intraabdominal pathology including abscess, viral illness, UTI. labs, ct, IVF, pain mgmt. Dispo based on results and reassessment.

## 2022-05-08 NOTE — ED ADULT NURSE NOTE - OBJECTIVE STATEMENT
Patient came in with the complaints of abdominal pain. Patient with history of chron' s and umbilical hernia repair. No other complaints. Specimens collected and sent. Fluids infusing. Patient tolerating well. No distress noted. Nursing care continues

## 2022-05-08 NOTE — ED PROVIDER NOTE - OBJECTIVE STATEMENT
42yo F with pmhx of crohns disease[place] presents with diffuse abdominal pain and body ahces of couple of weeks. No fever, chills, chest pain, shortness of breath, cough, nausea, vomiting, diarrhea. Last BM yesterday - no blood or black stools. States she went to outside hospital when sxs started, the colonoscopy was negative - including the biopsies.  Has been taking something that was prescribed by the surgeon for the pain - not sure about the name. No change in appetite.

## 2022-05-08 NOTE — ED PROVIDER NOTE - PRINCIPAL DIAGNOSIS
Note dated September 12, 2019: We will await the Oncotype results and unless it is over 25 we will plan for Arimidex for the next 5 years.  If her Oncotype is over 25 recommend 4 doses of TC.  Follow-up in 3 months to see her tolerance of Arimidex assuming her scores below 25.  Baseline bone density done.   Abdominal wall abscess

## 2022-05-08 NOTE — DISCHARGE NOTE PROVIDER - CARE PROVIDER_API CALL
Maciel Alston)  Surgery; Surgical Critical Care  [address] [place] [address]  Phone: [phone]  Fax: [phone]  Follow Up Time:

## 2022-05-08 NOTE — ED ADULT NURSE REASSESSMENT NOTE - NS ED NURSE REASSESS COMMENT FT1
report received from SALEEM Greer, pt waiting for surgery consult. Pt medicated as ordered. Will continue to monitor.
Pt resting comfortably in stretcher, IV line blew while at CT. New 20G IV place to RAC, CT fulfilled awaiting results

## 2022-05-08 NOTE — H&P ADULT - NSICDXPASTSURGICALHX_GEN_ALL_CORE_FT
PAST SURGICAL HISTORY:  H/O umbilical hernia repair with rectus diastasis repair 3/10/22 in [facility]    H/O:

## 2022-05-08 NOTE — DISCHARGE NOTE PROVIDER - NSDCMRMEDTOKEN_GEN_ALL_CORE_FT
amoxicillin-clavulanate 875 mg-125 mg oral tablet: 1 tab(s) orally 2 times a day   ferrous sulfate 325 mg (65 mg elemental iron) oral tablet: 1 tab(s) orally once a day  isoniazid 300 mg oral tablet: 1 tab(s) orally once a day  prednisoLONE: 20 milligram(s) orally once a day

## 2022-05-08 NOTE — H&P ADULT - GASTROINTESTINAL COMMENTS
Soft, mild tenderness and fluctuance around umbilicus, no erythema, no drainage, incision well healed

## 2022-05-08 NOTE — ED PROVIDER NOTE - PHYSICAL EXAMINATION
Gen: NAD  Head: NC/NT  Eyes: anicteric  ENT: airway patent, mmm   CV: RRR, +S1/S2   Resp: CTAB, symmetric breath sounds   GI:  , abdomen soft non-distended, +diffuse TTP  no rebound  Back: no CVA tenderness  Extremities - no edema  Neuro: A&Ox4

## 2022-05-09 LAB
CULTURE RESULTS: SIGNIFICANT CHANGE UP
SPECIMEN SOURCE: SIGNIFICANT CHANGE UP

## 2022-05-09 NOTE — H&P ADULT - NSHPPOADEEPVENOUSTHROMB_GEN_A_CORE
Speech Acute Treatment                             .                       RECOMMENDATIONS:    1. Continue general solids, thin liquids. Straws okay.  2. Pills whole with puree or liquids as tolerated  3. D/C speech therapy; tolerating baseline diet     ASSESSMENT:  Patient was seen on 9ST for swallow treatment. Pt was seen sitting upright in chair, alert and cooperative consuming lunch of general and thin liquids. Pt with adequate oral phase. No oral residue noted. Swallow timely fairly timely with complete laryngeal elevation. No overt SS of aspiration across consistencies. Patient currently tolerating baseline diet of general and thin liquids.  No further acute speech therapy for swallowing recommended.    Admitting complaint: Severe aortic stenosis  N/A     OBJECTIVE:  See below for current functional status overview.  See Speech flowsheets for full details regarding the speech therapy provided.    SLP Identified Barriers to Discharge: none    Recommendations for Discharge: SLP: none    FREQUENCY:  Frequency: D/C speech therapy    EDUCATION:   On this date, the patient educated on  progress this session, goals, recs and plan.  The response to education: Needs reinforcement  Swallow Treatment Data Overview Last 24 hours     Subjective  Subjective Comments: Pt sitting upright in chair, with noon meal (02/06/17 1300)    Observation  Observation Comments: RN approved session (02/06/17 1300)    Therapeutic Feeding  Liquids: Thin liquids (02/06/17 1300)  Solids: General consistency (02/06/17 1300)    Thin Liquids  Quantity: 4 ounces (02/06/17 1300)  Presentation: Straw (02/06/17 1300)  Oral Phase: Intact (02/06/17 1300)  Pharyngeal Phase: Delayed swallow (02/06/17 1300)  Comments: No overt SS of aspiration (02/06/17 1300)    General Consistency  Quantity: grilled ham and cheese (02/06/17 1300)  Assist with Feeding: Independent (02/06/17 1300)  Oral Phase: Intact (02/06/17 1300)  Pharyngeal Phase: Delayed swallow 
(02/06/17 1300)  Comments: no oral residue (02/06/17 1300)    All Speech Therapy Goals:    SLP Goals  Short Term Goals to be Reviewed on : 02/08/17 (02/01/17 1700)  STG are the Same as Discharge Goals: Yes (02/01/17 1700)  Goal Agreement: Family/significant other/caregiver agrees;Patient agrees with goals and treatment plan (02/01/17 1700)    Swallowing Short Term Goal 1  Swallowing STG 1: Pt will tolerate general solids and thin liquids with <10% s/s aspiration and stable lungs/temps (02/01/17 1700)  Swallowing Discharge Goal 1: Pt will tolerate least restrictive diet (02/01/17 1700)    SLP Time Spent: 35 min (02/06/17 1300)            
no
09-May-2022 10:38

## 2022-05-18 ENCOUNTER — EMERGENCY (EMERGENCY)
Facility: HOSPITAL | Age: 42
LOS: 1 days | Discharge: HOME | End: 2022-05-18
Attending: STUDENT IN AN ORGANIZED HEALTH CARE EDUCATION/TRAINING PROGRAM | Admitting: EMERGENCY MEDICINE
Payer: MEDICAID

## 2022-05-18 VITALS
DIASTOLIC BLOOD PRESSURE: 65 MMHG | SYSTOLIC BLOOD PRESSURE: 105 MMHG | RESPIRATION RATE: 16 BRPM | OXYGEN SATURATION: 100 % | HEIGHT: 72 IN | HEART RATE: 90 BPM | TEMPERATURE: 98 F

## 2022-05-18 DIAGNOSIS — Z98.891 HISTORY OF UTERINE SCAR FROM PREVIOUS SURGERY: Chronic | ICD-10-CM

## 2022-05-18 DIAGNOSIS — Z98.890 OTHER SPECIFIED POSTPROCEDURAL STATES: Chronic | ICD-10-CM

## 2022-05-18 PROCEDURE — 71046 X-RAY EXAM CHEST 2 VIEWS: CPT | Mod: 26

## 2022-05-18 PROCEDURE — 93010 ELECTROCARDIOGRAM REPORT: CPT

## 2022-05-18 PROCEDURE — 99285 EMERGENCY DEPT VISIT HI MDM: CPT | Mod: 25

## 2022-05-18 RX ORDER — FAMOTIDINE 10 MG/ML
20 INJECTION INTRAVENOUS ONCE
Refills: 0 | Status: COMPLETED | OUTPATIENT
Start: 2022-05-18 | End: 2022-05-18

## 2022-05-18 RX ORDER — ACETAMINOPHEN 500 MG
1000 TABLET ORAL ONCE
Refills: 0 | Status: COMPLETED | OUTPATIENT
Start: 2022-05-18 | End: 2022-05-18

## 2022-05-18 RX ADMIN — Medication 400 MILLIGRAM(S): at 23:08

## 2022-05-18 RX ADMIN — FAMOTIDINE 20 MILLIGRAM(S): 10 INJECTION INTRAVENOUS at 23:08

## 2022-05-18 NOTE — ED PROVIDER NOTE - CLINICAL SUMMARY MEDICAL DECISION MAKING FREE TEXT BOX
41yF w/pmhx ventral hernia repair 2 months ago in [facility], Crohns disease, gastritis, anemia presenting with abdominal pain, headache, chest pain and back pain. Pt was recently seen at [facility] with abdominal wall fluid collection, aspirated by surgery. On exam pt is well appearing, mild periumbilical abdominal tenderness, no midline spinal tenderness, no chest pain at present. Concern for re-collection in abdominal well given persistent pain. Plan: cbc/cmp/lipase, CT abd/pelvis, EKG/CXR, trop, low suspicion ACS will r/o with high sensitivity trop, GI cocktail/pain medication. will reassess.

## 2022-05-18 NOTE — ED PROVIDER NOTE - PROGRESS NOTE DETAILS
JAZMYN Valles: CT showing "Persistent small fluid collection in the left ventral abdominal wall from the level of the umbilicus, slightly decreased in size from the prior exam.". Discussed with surgery, will see pt in the ED Adan, PGY2: Patient received as sign out, initially presented with abdominal pain. Ct showing abdominal wall collection, decreased from previous. pending surgery eval prior to dispo. Pt improved, belly soft nontender, no rebound/guarding Sue LERMA: (Back Charting) Pt signed out to my colleague Dr. Chapman at the usual time, pending labs, imaging and clinical reassessment.

## 2022-05-18 NOTE — ED PROVIDER NOTE - OBJECTIVE STATEMENT
41yF w/pmhx ventral hernia repair 2 months ago in [facility], Crohns disease, gastritis, anemia presenting with abdominal pain, headache, chest pain and back pain. Pt was recently seen at [facility] with abdominal wall fluid collection. Pt reports worsening generalized abdominal pain with nausea the past few days. States she has chest pain, at times left sided, at times right sided, intermittent, non exertional, pressure like. Associated at times she feels short of breath. 41yF w/pmhx ventral hernia repair 2 months ago in [facility], Crohns disease, gastritis, anemia presenting with abdominal pain, headache, chest pain and back pain. Pt was recently seen at [facility] with abdominal wall fluid collection, aspirated by surgery. Pt reports worsening generalized abdominal pain with nausea the past few days. States she has chest pain, at times left sided, at times right sided, intermittent, non exertional, pressure like. Associated at times she feels short of breath. Pt denies fever/chills, vomiting, diarrhea, weakness, dizziness, palpitations, syncope, numbness/tingling, bowel or bladder incontinence, cough, rhinorrhea, recent travel or illness or any other concerns. 41yF w/pmhx ventral hernia repair 2 months ago in [facility], Crohns disease, gastritis, anemia presenting with abdominal pain, headache, chest pain and back pain. Pt was recently seen at [facility] with abdominal wall fluid collection, aspirated by surgery, recently completed Augmentin. Pt reports worsening generalized abdominal pain with nausea the past few days. States she has chest pain, at times left sided, at times right sided, intermittent, non exertional, pressure like. Associated at times she feels short of breath. Pt denies fever/chills, vomiting, diarrhea, weakness, dizziness, palpitations, syncope, numbness/tingling, bowel or bladder incontinence, cough, rhinorrhea, recent travel or illness or any other concerns.

## 2022-05-18 NOTE — ED ADULT NURSE NOTE - OBJECTIVE STATEMENT
Patient A/Ox4, ambulatory, p/w multiple complaints. Patient states she has had abdominal, back and chest pain, and headache x days. Patient seen at [facility] recently with abdominal fluid collection. Endorses worsening abdominal pain and nausea x days. Endorses intermittent, non exertional chest pain with associated SOB. Denies fever, chills, syncope, dizziness and urinary symptoms. IV in place, bed in lowest position.

## 2022-05-18 NOTE — ED PROVIDER NOTE - ATTENDING APP SHARED VISIT CONTRIBUTION OF CARE
I have personally performed a face to face medical and diagnostic evaluation of the patient. I have discussed with and reviewed the Resident's and/or ACP's and/or Medical/PA/NP student's note and agree with the History, ROS, Physical Exam and MDM unless otherwise indicated. A brief summary of my personal evaluation and impression can be found below.    41F w hx of ventral hernia repair x2 months ago in [place] crohns gastritis presents with a cc of multiple medical complaints notes diffuse abdominal discomfort, mild headache, back pain that she thinks is related to her abdominal discomfort, back pain is diffuse also a/w intermittent chest pain and SOB, non exertional constant, did not speak to PMD about sx, has been having sx for x2 weeks has had multiple recent visits to [facility] similar complaints found to have abdominal wall fluid collection in past drained bu surgery also w possible prior enteritis and evolving bowel obstructions? NO fever. No urinary symptoms. No bowel symptoms. No bleeding.     All other ROS negative, except as above and as per HPI and ROS section.    VITALS: Initial triage and subsequent vitals have been reviewed by me.  GEN APPEARANCE: WDWN, alert, non-toxic, NAD  HEAD: Atraumatic.  EYES: PERRLa, EOMI, vision grossly intact.   NECK: Supple  CV: RRR, S1S2, no c/r/m/g. Cap refill <2 seconds. No bruits.   LUNGS: CTAB. No abnormal breath sounds.  ABDOMEN: Soft, NTND. No guarding or rebound.   MSK/EXT: No spinal or extremity point tenderness. No CVA ttp. Pelvis stable. No peripheral edema.  NEURO: Alert, follows commands.  Speech normal. Sensation and motor normal x4 extremities. LE 5/5 b/l no saddle anesthesia   SKIN: Warm, dry and intact. No rash.  PSYCH: Appropriate    Plan/MDM: 41F w hx of ventral hernia repair x2 months ago in [place] crohns gastritis presents with a cc of multiple medical complaints notes diffuse abdominal discomfort, mild headache, back pain that she thinks is related to her abdominal discomfort, back pain is diffuse also a/w intermittent chest pain and SOB, non exertional constant, did not speak to PMD about sx, has been having sx for x2 weeks has had multiple recent visits to [facility] similar complaints found to have abdominal wall fluid collection in past drained bu surgery also w possible prior enteritis and evolving bowel obstructions? exam vss non toxic PE as above ddx unclear etiology of pts sx consider possible bowel pathology enteritis or sequale of prior fluid collection? noted pt has multiple cts in past however will get ct again today check labs meds as needed ekg and cardiacs, reassess.

## 2022-05-18 NOTE — ED ADULT NURSE NOTE - CHIEF COMPLAINT QUOTE
pt c/o headache and abd pain x 2 weeks. c/o generalized weakness. +nausea denies fever/chills.   s/p hernia repair 2 months ago in [place].

## 2022-05-18 NOTE — ED PROVIDER NOTE - NSICDXPASTSURGICALHX_GEN_ALL_CORE_FT
PAST SURGICAL HISTORY:  H/O umbilical hernia repair with rectus diastasis repair 3/10/22 in [facility]    H/O:

## 2022-05-18 NOTE — ED ADULT TRIAGE NOTE - CHIEF COMPLAINT QUOTE
pt c/o headache and abd pain x 2 weeks. c/o generalized weakness. +nausea denies fever/chills.   s/p hernia repair 2 months ago in [place].

## 2022-05-18 NOTE — ED PROVIDER NOTE - PATIENT PORTAL LINK FT
You can access the FollowMyHealth Patient Portal offered by [facility] by registering at the following website: [email or web address]. By joining [facility]’s FollowMyHealth portal, you will also be able to view your health information using other applications (apps) compatible with our system.

## 2022-05-18 NOTE — ED PROVIDER NOTE - NSFOLLOWUPINSTRUCTIONS_ED_ALL_ED_FT
Followup with Gastroenterology and Primary care within the next few days.  Take your regular medications as prescribed.  If worse pain, fever, nausea, vomiting, chest pain, headache, or back pain return to the ER.  Drink plenty of fluid.

## 2022-05-19 VITALS
SYSTOLIC BLOOD PRESSURE: 113 MMHG | OXYGEN SATURATION: 100 % | HEART RATE: 65 BPM | TEMPERATURE: 98 F | RESPIRATION RATE: 18 BRPM | DIASTOLIC BLOOD PRESSURE: 71 MMHG

## 2022-05-19 LAB
ALBUMIN SERPL ELPH-MCNC: 3.3 G/DL — SIGNIFICANT CHANGE UP (ref 3.3–5)
ALP SERPL-CCNC: 39 U/L — LOW (ref 40–120)
ALT FLD-CCNC: 8 U/L — SIGNIFICANT CHANGE UP (ref 4–33)
ANION GAP SERPL CALC-SCNC: 10 MMOL/L — SIGNIFICANT CHANGE UP (ref 7–14)
APPEARANCE UR: CLEAR — SIGNIFICANT CHANGE UP
AST SERPL-CCNC: 8 U/L — SIGNIFICANT CHANGE UP (ref 4–32)
BASOPHILS # BLD AUTO: 0.09 K/UL — SIGNIFICANT CHANGE UP (ref 0–0.2)
BASOPHILS NFR BLD AUTO: 0.9 % — SIGNIFICANT CHANGE UP (ref 0–2)
BILIRUB SERPL-MCNC: <0.2 MG/DL — SIGNIFICANT CHANGE UP (ref 0.2–1.2)
BILIRUB UR-MCNC: NEGATIVE — SIGNIFICANT CHANGE UP
BUN SERPL-MCNC: 11 MG/DL — SIGNIFICANT CHANGE UP (ref 7–23)
CALCIUM SERPL-MCNC: 9.2 MG/DL — SIGNIFICANT CHANGE UP (ref 8.4–10.5)
CHLORIDE SERPL-SCNC: 103 MMOL/L — SIGNIFICANT CHANGE UP (ref 98–107)
CO2 SERPL-SCNC: 25 MMOL/L — SIGNIFICANT CHANGE UP (ref 22–31)
COLOR SPEC: SIGNIFICANT CHANGE UP
CREAT SERPL-MCNC: 0.65 MG/DL — SIGNIFICANT CHANGE UP (ref 0.5–1.3)
DIFF PNL FLD: NEGATIVE — SIGNIFICANT CHANGE UP
EGFR: 114 ML/MIN/1.73M2 — SIGNIFICANT CHANGE UP
EOSINOPHIL # BLD AUTO: 0 K/UL — SIGNIFICANT CHANGE UP (ref 0–0.5)
EOSINOPHIL NFR BLD AUTO: 0 % — SIGNIFICANT CHANGE UP (ref 0–6)
GLUCOSE SERPL-MCNC: 134 MG/DL — HIGH (ref 70–99)
GLUCOSE UR QL: ABNORMAL
HCT VFR BLD CALC: 29.8 % — LOW (ref 34.5–45)
HGB BLD-MCNC: 9.5 G/DL — LOW (ref 11.5–15.5)
IANC: 8.87 K/UL — HIGH (ref 1.8–7.4)
KETONES UR-MCNC: NEGATIVE — SIGNIFICANT CHANGE UP
LEUKOCYTE ESTERASE UR-ACNC: NEGATIVE — SIGNIFICANT CHANGE UP
LIDOCAIN IGE QN: 21 U/L — SIGNIFICANT CHANGE UP (ref 7–60)
LYMPHOCYTES # BLD AUTO: 0.74 K/UL — LOW (ref 1–3.3)
LYMPHOCYTES # BLD AUTO: 7.1 % — LOW (ref 13–44)
MCHC RBC-ENTMCNC: 21.6 PG — LOW (ref 27–34)
MCHC RBC-ENTMCNC: 31.9 GM/DL — LOW (ref 32–36)
MCV RBC AUTO: 67.9 FL — LOW (ref 80–100)
MONOCYTES # BLD AUTO: 0.09 K/UL — SIGNIFICANT CHANGE UP (ref 0–0.9)
MONOCYTES NFR BLD AUTO: 0.9 % — LOW (ref 2–14)
NEUTROPHILS # BLD AUTO: 9.17 K/UL — HIGH (ref 1.8–7.4)
NEUTROPHILS NFR BLD AUTO: 88.5 % — HIGH (ref 43–77)
NITRITE UR-MCNC: NEGATIVE — SIGNIFICANT CHANGE UP
PH UR: 7 — SIGNIFICANT CHANGE UP (ref 5–8)
PLATELET # BLD AUTO: 470 K/UL — HIGH (ref 150–400)
POTASSIUM SERPL-MCNC: 4.2 MMOL/L — SIGNIFICANT CHANGE UP (ref 3.5–5.3)
POTASSIUM SERPL-SCNC: 4.2 MMOL/L — SIGNIFICANT CHANGE UP (ref 3.5–5.3)
PROT SERPL-MCNC: 5.4 G/DL — LOW (ref 6–8.3)
PROT UR-MCNC: ABNORMAL
RBC # BLD: 4.39 M/UL — SIGNIFICANT CHANGE UP (ref 3.8–5.2)
RBC # FLD: 20.7 % — HIGH (ref 10.3–14.5)
SODIUM SERPL-SCNC: 138 MMOL/L — SIGNIFICANT CHANGE UP (ref 135–145)
SP GR SPEC: 1.02 — SIGNIFICANT CHANGE UP (ref 1–1.05)
TROPONIN T, HIGH SENSITIVITY RESULT: <6 NG/L — SIGNIFICANT CHANGE UP
UROBILINOGEN FLD QL: SIGNIFICANT CHANGE UP
WBC # BLD: 10.36 K/UL — SIGNIFICANT CHANGE UP (ref 3.8–10.5)
WBC # FLD AUTO: 10.36 K/UL — SIGNIFICANT CHANGE UP (ref 3.8–10.5)

## 2022-05-19 PROCEDURE — 74177 CT ABD & PELVIS W/CONTRAST: CPT | Mod: 26,MA

## 2022-05-19 PROCEDURE — 93971 EXTREMITY STUDY: CPT | Mod: 26,LT

## 2022-05-19 NOTE — CONSULT NOTE ADULT - ASSESSMENT
41F w/ hx of Crohn's disease on steroids, ventral hernia repair with mesh in [facility] (March 10, 2022), c/b seroma s/p aspiration presenting with abdominal pain and generalized malaise and decrease seroma size.     PLAN:   - No surgical intervention. Seroma does not appear infected. Aspiration/drainage risk infecting the mesh  - Large stool burden - likely source of abdominal discomfort recommend increasing Miralax starting senna, colace  - Pepcid helped patient abdominal discomfort in the ED- suspect possible peptic ulcer   - General malaise might be caused by chronic prednisolone use.   - Long history of Crohn's disease will require GI follow up for medical management.   - Plan discussed with Attending, Dr. Steavn Robledo MD, PGY 3  B Team Surgery  [phone]     41F w/ hx of Crohn's disease on steroids, ventral hernia repair with mesh in [facility] (March 10, 2022), c/b seroma s/p aspiration presenting with abdominal pain and generalized malaise and decrease seroma size.     PLAN:   - No surgical intervention. Seroma does not appear infected. Aspiration/drainage risk infecting the mesh.  - Large stool burden - likely source of abdominal discomfort recommend increasing Miralax starting senna, colace.  - Pepcid helped patient abdominal discomfort in the ED- suspect a component of peptic ulcer disease.   - General malaise might be caused by chronic prednisolone use.   - Long history of Crohn's disease will require GI follow up for medical management.   - Plan discussed with Attending, Dr. Stevan Robledo MD, PGY 3  B Team Surgery  [phone]

## 2022-05-19 NOTE — CONSULT NOTE ADULT - SUBJECTIVE AND OBJECTIVE BOX
GENERAL SURGERY CONSULT NOTE  --------------------------------------------------------------------------------------------  HPI:  41F w/ hx of Crohn's disease on steroids, ventral hernia repair with mesh in [facility] (March 10, 2022), c/b seroma s/p aspiration presenting with abdominal pain and generalized malaise. On chart review patient was seen in the ED on  for seroma and was sent home, she presented again on May 8th with CT showing possible developing abscess. At that time she underwent bedside aspiration, however only serous fluid was found and procedure was aborted and patient was sent home on Augmentin. Patient presented again today with generalized abdominal pain, nausea, SOB, CP, headache, and dizziness. Patient able to tolerate diet and passing gas but is constipated. She reports abdominal pain feeling better after getting Pepcid.       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 and etoh use    ALLERGIES: No Known Allergies      HOME MEDICATIONS:     CURRENT MEDICATIONS  MEDICATIONS (STANDING):   MEDICATIONS (PRN):  --------------------------------------------------------------------------------------------    Vitals:   T(C): 36.8 (22 @ 04:26), Max: 36.8 (22 @ 04:26)  HR: 64 (22 @ 04:26) (64 - 90)  BP: 99/55 (22 @ 04:26) (99/55 - 105/65)  RR: 18 (22 @ 04:26) (16 - 18)  SpO2: 100% (22 @ 04:26) (100% - 100%)  CAPILLARY BLOOD GLUCOSE          Height (cm): 182.9 ( @ 18:55)    PHYSICAL EXAM:   General: NAD, Lying in bed comfortably  Neuro: Alert and answering questions appropriately   HEENT: Grossly normal, EOMI  Resp: Good effort, no signs of respiratory distress  GI/Abd: Soft, NT/ND, no rebound/guarding, no masses palpated, no erythema or drainage from ventral seroma.   Vascular: All 4 extremities warm  Musculoskeletal: All 4 extremities moving spontaneously, no limitations  --------------------------------------------------------------------------------------------    LABS  CBC ( 23:01)                              9.5<L>                         10.36   )----------------(  470<H>     88.5<H>% Neutrophils, 7.1<L>% Lymphocytes, ANC: 9.17<H>                              29.8<L>    BMP ( 23:01)             138     |  103     |  11    		Ca++ --      Ca 9.2                ---------------------------------( 134<H>		Mg --                 4.2     |  25      |  0.65  			Ph --        LFTs ( @ 23:01)      TPro 5.4<L> / Alb 3.3 / TBili <0.2 / DBili -- / AST 8 / ALT 8 / AlkPhos 39<L>            --------------------------------------------------------------------------------------------    MICROBIOLOGY  Urinalysis ( @ 23:24):     Color: Light Yellow / Appearance: Clear / S.023 / pH: 7.0 / Gluc: 200 mg/dL<!> / Ketones: Negative / Bili: Negative / Urobili: <2 mg/dL / Protein :Trace<!> / Nitrites: Negative / Leuk.Est: Negative / RBC: 1 / WBC: 2 / Sq Epi:  / Non Sq Epi: 1 / Bacteria Negative         --------------------------------------------------------------------------------------------    IMAGING  < from: CT Abdomen and Pelvis w/ IV Cont (22 @ 03:11) >  IMPRESSION:  Persistent small fluid collection in the left ventral abdominal wall from   the level of the umbilicus, slightly decreased in size from the prior   exam. Decreased adjacent subcutaneous fat stranding and overlying skin   thickening.    No acute pathology within the abdomen or pelvis.    --- End of Report ---      < end of copied text >      --------------------------------------------------------------------------------------------   GENERAL SURGERY CONSULT NOTE  --------------------------------------------------------------------------------------------  HPI:  41F w/ hx of Crohn's disease on steroids, ventral hernia repair with mesh in [facility] (March 10, 2022), c/b seroma s/p aspiration presenting with abdominal pain and generalized malaise. On chart review patient was seen in the ED on  for seroma and was sent home, she presented again on May 8th with CT showing possible developing abscess. At that time she underwent bedside aspiration, however only serous fluid was found and procedure was aborted and patient was sent home on Augmentin. Patient presented again today with generalized abdominal pain, nausea, SOB, CP, headache, and dizziness. Patient able to tolerate diet and passing gas but is constipated. She reports abdominal pain feeling better after getting Pepcid in the ED.       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    ALLERGIES: No Known Allergies      HOME MEDICATIONS:     CURRENT MEDICATIONS  MEDICATIONS (STANDING):   MEDICATIONS (PRN):  --------------------------------------------------------------------------------------------    Vitals:   T(C): 36.8 (22 @ 04:26), Max: 36.8 (22 @ 04:26)  HR: 64 (22 @ 04:26) (64 - 90)  BP: 99/55 (22 @ 04:26) (99/55 - 105/65)  RR: 18 (22 @ 04:26) (16 - 18)  SpO2: 100% (22 @ 04:26) (100% - 100%)  CAPILLARY BLOOD GLUCOSE          Height (cm): 182.9 ( @ 18:55)    PHYSICAL EXAM:   General: NAD, Lying in bed comfortably  Neuro: Alert and answering questions appropriately   HEENT: Grossly normal, EOMI  Resp: Good effort, no signs of respiratory distress  GI/Abd: Soft, NT/ND, no rebound/guarding, no masses palpated, no erythema or drainage from ventral seroma.   Vascular: All 4 extremities warm  Musculoskeletal: All 4 extremities moving spontaneously, no limitations  --------------------------------------------------------------------------------------------    LABS  CBC ( 23:01)                              9.5<L>                         10.36   )----------------(  470<H>     88.5<H>% Neutrophils, 7.1<L>% Lymphocytes, ANC: 9.17<H>                              29.8<L>    BMP ( 23:01)             138     |  103     |  11    		Ca++ --      Ca 9.2                ---------------------------------( 134<H>		Mg --                 4.2     |  25      |  0.65  			Ph --        LFTs ( 23:01)      TPro 5.4<L> / Alb 3.3 / TBili <0.2 / DBili -- / AST 8 / ALT 8 / AlkPhos 39<L>            --------------------------------------------------------------------------------------------    MICROBIOLOGY  Urinalysis ( @ 23:24):     Color: Light Yellow / Appearance: Clear / S.023 / pH: 7.0 / Gluc: 200 mg/dL<!> / Ketones: Negative / Bili: Negative / Urobili: <2 mg/dL / Protein :Trace<!> / Nitrites: Negative / Leuk.Est: Negative / RBC: 1 / WBC: 2 / Sq Epi:  / Non Sq Epi: 1 / Bacteria Negative         --------------------------------------------------------------------------------------------    IMAGING  < from: CT Abdomen and Pelvis w/ IV Cont (22 @ 03:11) >  IMPRESSION:  Persistent small fluid collection in the left ventral abdominal wall from   the level of the umbilicus, slightly decreased in size from the prior   exam. Decreased adjacent subcutaneous fat stranding and overlying skin   thickening.    No acute pathology within the abdomen or pelvis.    --- End of Report ---      < end of copied text >      --------------------------------------------------------------------------------------------

## 2022-05-20 LAB
CULTURE RESULTS: SIGNIFICANT CHANGE UP
SPECIMEN SOURCE: SIGNIFICANT CHANGE UP

## 2022-05-31 ENCOUNTER — APPOINTMENT (OUTPATIENT)
Dept: GASTROENTEROLOGY | Facility: CLINIC | Age: 42
End: 2022-05-31
Payer: MEDICAID

## 2022-05-31 VITALS
OXYGEN SATURATION: 98 % | TEMPERATURE: 97.1 F | HEIGHT: 68 IN | HEART RATE: 85 BPM | RESPIRATION RATE: 17 BRPM | DIASTOLIC BLOOD PRESSURE: 70 MMHG | SYSTOLIC BLOOD PRESSURE: 105 MMHG | WEIGHT: 167 LBS | BODY MASS INDEX: 25.31 KG/M2

## 2022-05-31 DIAGNOSIS — R42 DIZZINESS AND GIDDINESS: ICD-10-CM

## 2022-05-31 DIAGNOSIS — K59.00 CONSTIPATION, UNSPECIFIED: ICD-10-CM

## 2022-05-31 DIAGNOSIS — R10.9 UNSPECIFIED ABDOMINAL PAIN: ICD-10-CM

## 2022-05-31 DIAGNOSIS — R14.0 ABDOMINAL DISTENSION (GASEOUS): ICD-10-CM

## 2022-05-31 DIAGNOSIS — R11.0 NAUSEA: ICD-10-CM

## 2022-05-31 DIAGNOSIS — A15.9 RESPIRATORY TUBERCULOSIS UNSPECIFIED: ICD-10-CM

## 2022-05-31 PROCEDURE — 99204 OFFICE O/P NEW MOD 45 MIN: CPT

## 2022-05-31 RX ORDER — TRAMADOL HYDROCHLORIDE AND ACETAMINOPHEN 37.5; 325 MG/1; MG/1
37.5-325 TABLET, FILM COATED ORAL
Qty: 30 | Refills: 0 | Status: ACTIVE | COMMUNITY
Start: 2022-05-31 | End: 1900-01-01

## 2022-05-31 RX ORDER — AMOXICILLIN AND CLAVULANATE POTASSIUM 875; 125 MG/1; MG/1
875-125 TABLET, COATED ORAL
Qty: 14 | Refills: 0 | Status: ACTIVE | COMMUNITY
Start: 2022-05-08

## 2022-05-31 RX ORDER — PREDNISONE 10 MG/1
10 TABLET ORAL
Qty: 126 | Refills: 0 | Status: ACTIVE | COMMUNITY
Start: 2022-04-20

## 2022-05-31 RX ORDER — DOCUSATE SODIUM 100 MG/1
100 CAPSULE ORAL
Qty: 60 | Refills: 1 | Status: ACTIVE | COMMUNITY
Start: 2022-05-31 | End: 1900-01-01

## 2022-05-31 RX ORDER — BUDESONIDE 3 MG/1
3 CAPSULE, COATED PELLETS ORAL
Qty: 90 | Refills: 0 | Status: ACTIVE | COMMUNITY
Start: 2022-03-22

## 2022-05-31 RX ORDER — LIDOCAINE 5% 700 MG/1
5 PATCH TOPICAL
Qty: 30 | Refills: 0 | Status: ACTIVE | COMMUNITY
Start: 2022-04-20

## 2022-06-01 NOTE — ASSESSMENT
[FreeTextEntry1] : Attending Attestation \par \par Crohns Disease \par \par As per Dr Mcclain patient will have a MR Enterography The risks benefits alternatives and complications of the procedure/s were explained to the patient at\par length. The patient was agreeable and we will proceed. She will begin to take Tramadol for pain. Medication reviewed side effects and adverse reactions. \par \par Iron Deficiency Anemia\par Blood work ordered to be done at [facility] results pending. \par \par As per Dr Mcclain she will begin taking Ferrous sulfate daily. Medication reviewed side effects and adverse reactions. \par \par Constipation \par The causes of constipation were discussed at length We discussed : Eat three meals each day. Do not\par skip meals. Gradually increase the amount of FIBER in your diet. Choose more whole grain breads,\par cereals and rice. Select more raw fruits and vegetables eat the peel, if appropriate. Read food labels\par and look for the "dietary fiber" content of foods. Good sources have 2 grams of fiber or more. Drink six\par to eight glasses of water each day. Limit highly refined and processed foods.\par She will begin taking Colace. Medication reviewed side effects and adverse reactions. \par \par Tuberculosis \par \par Pulmonary doctor referral  generated with contact information provided to a doc in [facility]\par \par Time spent with patient 45 min \par \par Patient will f/u in office in 2 weeks with Dr Mcclain\par \par Patient verbalized understanding of all information provided. All questions answered and reviewed. \par \par \par

## 2022-06-01 NOTE — HISTORY OF PRESENT ILLNESS
[Heartburn] : denies heartburn [Nausea] : nausea worsened [Vomiting] : denies vomiting [Diarrhea] : denies diarrhea [Constipation] : constipation worsened [Yellow Skin Or Eyes (Jaundice)] : denies jaundice [Abdominal Pain] : improved abdominal pain [Abdominal Swelling] : denies abdominal swelling [Rectal Pain] : denies rectal pain [Abdominal Surgery] : abdominal surgery [GERD] : no gastroesophageal reflux disease [Hiatus Hernia] : no hiatus hernia [Peptic Ulcer Disease] : no peptic ulcer disease [Pancreatitis] : no pancreatitis [Cholelithiasis] : no cholelithiasis [Kidney Stone] : no kidney stone [Inflammatory Bowel Disease] : no inflammatory bowel disease [Irritable Bowel Syndrome] : no irritable bowel syndrome [Diverticulitis] : no diverticulitis [Alcohol Abuse] : no alcohol abuse [Malignancy] : no malignancy [Appendectomy] : no appendectomy [Cholecystectomy] : no cholecystectomy [de-identified] : 41yF Andorran speaking  phone used ID # [id number] presents with complaints of hx of Crohns, nausea, and constipation. She went to [facility] ER on   5/18/22 with complaints of w/pmhx ventral [facility] ago [facility], Crohns disease, gastritis, anemia presenting with abdominal pain, headache, chest pain and back pain. Pt was recently seen at [facility] with abdominal\par wall fluid collection, aspirated by surgery, recently completed Augmentin. Pt\par reports worsening generalized abdominal pain with nausea the past few days.\par States she has chest pain, at times left sided, at times right sided,\par intermittent, non exertional, pressure like. Associated at times she feels\par short of breath. Pt denies fever/chills, vomiting, diarrhea, weakness,\par dizziness, palpitations, syncope, numbness/tingling, bowel or bladder\par incontinence, cough, rhinorrhea, recent travel or illness or any other\par concerns.\par She states she had a colonoscopy procedure done in April at [place] and was dx with Crohns at that time. She was started on Predinsone for a unoffical dx of Crohns in March 2022 of 60 mg she is currently taking 40 mg PO of Predisone. Blood work indicates she is positive for TB she is due to start INH. Chest Xray done 5/19 that was negative.\par \par Ct Scan of the Abdomen and Pelvis May 8 2022 impression: Slightly decreased in size redemonstrated anterior abdominal wall fluid \par collection with peripheral rim enhancement and surrounding inflammatory \par changes, most consistent with abscess and surrounding cellulitis.\par \par Ct Scan of the Abdomen and Pelvis May 18 2022 impression: Persistent small fluid collection in the left ventral abdominal wall from \par the level of the umbilicus, slightly decreased in size from the prior \par exam. Decreased adjacent subcutaneous fat stranding and overlying skin \par thickening.No acute pathology within the abdomen or pelvis.\par

## 2022-06-01 NOTE — PHYSICAL EXAM
[General Appearance - Alert] : alert [General Appearance - In No Acute Distress] : in no acute distress [Sclera] : the sclera and conjunctiva were normal [PERRL With Normal Accommodation] : pupils were equal in size, round, and reactive to light [Extraocular Movements] : extraocular movements were intact [Outer Ear] : the ears and nose were normal in appearance [Oropharynx] : the oropharynx was normal [Neck Appearance] : the appearance of the neck was normal [Neck Cervical Mass (___cm)] : no neck mass was observed [Jugular Venous Distention Increased] : there was no jugular-venous distention [Thyroid Diffuse Enlargement] : the thyroid was not enlarged [Thyroid Nodule] : there were no palpable thyroid nodules [] : no respiratory distress [Auscultation Breath Sounds / Voice Sounds] : lungs were clear to auscultation bilaterally [Heart Rate And Rhythm] : heart rate was normal and rhythm regular [Heart Sounds] : normal S1 and S2 [Heart Sounds Gallop] : no gallops [Murmurs] : no murmurs [Heart Sounds Pericardial Friction Rub] : no pericardial rub [Flat] : flat [Normal] : normal to percussion [Diffuse] : there was tenderness diffusely on palpation [Cervical Lymph Nodes Enlarged Posterior Bilaterally] : posterior cervical [Cervical Lymph Nodes Enlarged Anterior Bilaterally] : anterior cervical [Supraclavicular Lymph Nodes Enlarged Bilaterally] : supraclavicular [Axillary Lymph Nodes Enlarged Bilaterally] : axillary [Femoral Lymph Nodes Enlarged Bilaterally] : femoral [Inguinal Lymph Nodes Enlarged Bilaterally] : inguinal [No CVA Tenderness] : no ~M costovertebral angle tenderness [No Spinal Tenderness] : no spinal tenderness [Abnormal Walk] : normal gait [Nail Clubbing] : no clubbing  or cyanosis of the fingernails [Musculoskeletal - Swelling] : no joint swelling seen [Motor Tone] : muscle strength and tone were normal [Deep Tendon Reflexes (DTR)] : deep tendon reflexes were 2+ and symmetric [Sensation] : the sensory exam was normal to light touch and pinprick [No Focal Deficits] : no focal deficits [Oriented To Time, Place, And Person] : oriented to person, place, and time [Impaired Insight] : insight and judgment were intact [Affect] : the affect was normal [Firm] : not firm [Rigid] : not rigid [Rebound] : no rebound [Guarding] : no guarding [Hernandez's] : a negative Hernandez's sign

## 2022-06-14 ENCOUNTER — APPOINTMENT (OUTPATIENT)
Dept: GASTROENTEROLOGY | Facility: CLINIC | Age: 42
End: 2022-06-14

## 2022-06-22 NOTE — H&P ADULT - NSHPSOURCEINFORD_GEN_ALL_CORE
"YOB: 2019  Location: [facility]  Location Address: [address] [place] [address], [facility], [address] [place] [address]  Location Phone: [phone]    Chief Complaint   Patient presents with   • tonsils       History of Present Illness  Norbert Porras is a 18 m.o. male.  Norbert Porras is here for follow up of ENT complaints. The patient has had problems with snoring, frequent nasal congestion, and frequent illnesses  Patient finished antibiotics the beginning of this week and finished oral steroids last week   He is currently on flonase, singulair and pepcid   Mother thinks symptoms have improved at this point and she has only noticed the \"gasping\" episodes once since that visit  Mother has cut back milk/dairy, but has not been able to substitute   He is consuming milk once per day     XR Neck Soft Tissue (2022 15:37)       Past Medical History:   Diagnosis Date   • GERD (gastroesophageal reflux disease)        Past Surgical History:   Procedure Laterality Date   • CIRCUMCISION         Outpatient Medications Marked as Taking for the 22 encounter (Office Visit) with Domingo Willett MD   Medication Sig Dispense Refill   • famotidine (Pepcid) 40 mg/5 mL suspension Take 1.3 mL by mouth 2 (Two) Times a Day for 30 days. 150 mL 1   • fluticasone (Flonase) 50 MCG/ACT nasal spray 2 sprays into the nostril(s) as directed by provider Daily. 16 g 4   • montelukast (Singulair) 4 MG chewable tablet Chew 1 tablet Every Night. 30 tablet 11       Patient has no known allergies.    Family History   Problem Relation Age of Onset   • Hypertension Maternal Grandfather         Copied from mother's family history at birth   • Hypertension Maternal Grandmother         Copied from mother's family history at birth       Social History     Socioeconomic History   • Marital status: Single   Tobacco Use   • Smoking status: Never Smoker   • Smokeless tobacco: Never Used   • Tobacco comment: not exposed   Vaping Use   • " Patient/Chart(s) Vaping Use: Never used   Substance and Sexual Activity   • Alcohol use: Never   • Drug use: Never   • Sexual activity: Never       Review of Systems   Constitutional: Negative.    HENT: Positive for congestion.         Admits snoring      Eyes: Negative.    Respiratory: Positive for choking.    Cardiovascular: Negative.    Gastrointestinal: Negative.    Genitourinary: Negative.    Musculoskeletal: Negative.    Neurological: Negative.        Vitals:    06/23/22 1501   Temp: 97.5 °F (36.4 °C)       There is no height or weight on file to calculate BMI.    Objective     Physical Exam  Vitals reviewed.   Constitutional:       General: He is active.      Appearance: Normal appearance. He is well-developed.   HENT:      Head: Normocephalic.      Right Ear: Hearing, tympanic membrane, ear canal and external ear normal.      Left Ear: Hearing, tympanic membrane, ear canal and external ear normal.      Nose: Nose normal.      Mouth/Throat:      Lips: Pink.      Mouth: Mucous membranes are moist.      Pharynx: Uvula midline.      Tonsils: 3+ on the right. 3+ on the left.   Neurological:      Mental Status: He is alert.         Assessment & Plan   Diagnoses and all orders for this visit:    1. Adenoid hypertrophy (Primary)  -     Case Request; Standing  -     COVID PRE-OP / PRE-PROCEDURE SCREENING ORDER (NO ISOLATION) - Swab, Nasopharynx; Future  -     Case Request    2. Laryngopharyngeal reflux    3. Snoring  -     Case Request; Standing  -     COVID PRE-OP / PRE-PROCEDURE SCREENING ORDER (NO ISOLATION) - Swab, Nasopharynx; Future  -     Case Request    4. Enlarged tonsils    Other orders  -     Follow Anesthesia Guidelines / Standing Orders; Future  -     Obtain Informed Consent  -     Provide Patient With Instructions on NPO Status    Risks vs benefits of adenoidectomy discussed with parent; parent wishes to proceed   Continue reflux medications as discussed  Eliminate milk/dairy   Continue flonase     ADENOIDECTOMY  (N/A)  Orders Placed This Encounter   Procedures   • COVID PRE-OP / PRE-PROCEDURE SCREENING ORDER (NO ISOLATION) - Swab, Nasopharynx     Standing Status:   Future     Standing Expiration Date:   6/23/2023     Order Specific Question:   Please select your location:     Answer:   [facility]     Order Specific Question:   COVID Screening Order:     Answer:   In-House: APTIMA PANTHER, 24 HR TAT [[id number]]     Order Specific Question:   Previously tested for COVID-19?     Answer:   Unknown     Order Specific Question:   Employed in healthcare setting?     Answer:   Unknown     Order Specific Question:   Symptomatic for COVID-19 as defined by CDC?     Answer:   Unknown     Order Specific Question:   Hospitalized for COVID-19?     Answer:   No     Order Specific Question:   Admitted to ICU for COVID-19?     Answer:   No     Order Specific Question:   Resident in a congregate (group) care setting?     Answer:   Unknown     Order Specific Question:   Release to patient     Answer:   Immediate   • Follow Anesthesia Guidelines / Standing Orders     Standing Status:   Future   • Obtain Informed Consent     Order Specific Question:   Informed Consent Given For     Answer:   ADENOIDECTOMY   • Provide Patient With Instructions on NPO Status     Return for post op.       Patient Instructions   Risks vs benefits of adenoidectomy discussed with parent; parent wishes to proceed     For the best response, use your nasal sprays every day without skipping doses. It may take several weeks before the full effect is acheived.

## 2022-06-28 ENCOUNTER — APPOINTMENT (OUTPATIENT)
Dept: GASTROENTEROLOGY | Facility: CLINIC | Age: 42
End: 2022-06-28
Payer: MEDICAID

## 2022-06-28 VITALS
TEMPERATURE: 98.1 F | BODY MASS INDEX: 26.83 KG/M2 | SYSTOLIC BLOOD PRESSURE: 103 MMHG | HEIGHT: 68 IN | DIASTOLIC BLOOD PRESSURE: 71 MMHG | OXYGEN SATURATION: 98 % | WEIGHT: 177 LBS | HEART RATE: 71 BPM

## 2022-06-28 DIAGNOSIS — Z09 ENCOUNTER FOR FOLLOW-UP EXAMINATION AFTER COMPLETED TREATMENT FOR CONDITIONS OTHER THAN MALIGNANT NEOPLASM: ICD-10-CM

## 2022-06-28 DIAGNOSIS — D50.9 IRON DEFICIENCY ANEMIA, UNSPECIFIED: ICD-10-CM

## 2022-06-28 DIAGNOSIS — K50.90 CROHN'S DISEASE, UNSPECIFIED, W/OUT COMPLICATIONS: ICD-10-CM

## 2022-06-28 DIAGNOSIS — R76.12 NONSPECIFIC REACTION TO CELL MEDIATED IMMUNITY MEASUREMENT OF GAMMA INTERFERON ANTIGEN RESPONSE W/OUT ACTIVE TUBERCULOSIS: ICD-10-CM

## 2022-06-28 DIAGNOSIS — K59.09 OTHER CONSTIPATION: ICD-10-CM

## 2022-06-28 PROCEDURE — 99214 OFFICE O/P EST MOD 30 MIN: CPT

## 2022-06-28 RX ORDER — FERROUS SULFATE TAB EC 324 MG (65 MG FE EQUIVALENT) 324 (65 FE) MG
324 (65 FE) TABLET DELAYED RESPONSE ORAL
Qty: 60 | Refills: 2 | Status: ACTIVE | COMMUNITY
Start: 2022-05-31 | End: 1900-01-01

## 2022-06-28 RX ORDER — PANTOPRAZOLE 40 MG/1
40 TABLET, DELAYED RELEASE ORAL
Qty: 30 | Refills: 3 | Status: ACTIVE | COMMUNITY
Start: 2022-02-25 | End: 1900-01-01

## 2022-06-28 RX ORDER — FAMOTIDINE 20 MG/1
20 TABLET, FILM COATED ORAL
Qty: 30 | Refills: 3 | Status: ACTIVE | COMMUNITY
Start: 2022-05-19

## 2022-06-28 RX ORDER — CAMPHOR, MENTHOL 11; 16 G/100ML; G/100ML
7.5 SPRAY TOPICAL
Qty: 1 | Refills: 1 | Status: ACTIVE | COMMUNITY
Start: 2022-06-28 | End: 1900-01-01

## 2022-06-28 NOTE — ASSESSMENT
[FreeTextEntry1] : Patient with recent diagnosis of Crohn's disease.  She is off steroids and is on azathioprine 100 mg/day.  She was scheduled for an MR enterography which she did not go for.\par She refused blood work.\par Iron supplements were prescribed.\par She needs to have thio purine metabolites checked to make sure she does not develop toxicity to the azathioprine.\par \par Patient is noncompliant with her work-up and treatment.

## 2022-06-28 NOTE — HISTORY OF PRESENT ILLNESS
[FreeTextEntry1] : 41yF Czech speaking female presents with complaints of hx of Crohns, nausea, and constipation. She went to [facility] ER on 5/18/22 with complaints of w/pmhx ventral hernia repair 2 months ago in [facility], Crohns disease, gastritis, anemia presenting with abdominal pain, headache, chest pain and back pain. Pt was recently seen at [facility] with abdominal\par wall fluid collection, aspirated by surgery, recently completed Augmentin. Pt\par reports worsening generalized abdominal pain with nausea the past few days.\par States she has chest pain, at times left sided, at times right sided,\par intermittent, non exertional, pressure like. Associated at times she feels\par short of breath. Pt denies fever/chills, vomiting, diarrhea, weakness,\par dizziness, palpitations, syncope, numbness/tingling, bowel or bladder\par incontinence, cough, rhinorrhea, recent travel or illness or any other\par concerns.\par She states she had a colonoscopy procedure done in April at [facility] and was dx with Crohns at that time. She was started on Predinsone for a unoffical dx of Crohns in March 2022 of 60 mg she is currently taking 40 mg PO of Predisone. Blood work indicates she is positive for TB she is due to start INH. Chest Xray done 5/19 that was negative.\par \par Ct Scan of the Abdomen and Pelvis May 8 2022 impression: Slightly decreased in size redemonstrated anterior abdominal wall fluid \par collection with peripheral rim enhancement and surrounding inflammatory \par changes, most consistent with abscess and surrounding cellulitis.\par \par Ct Scan of the Abdomen and Pelvis May 18 2022 impression: Persistent small fluid collection in the left ventral abdominal wall from \par the level of the umbilicus, slightly decreased in size from the prior \par exam. Decreased adjacent subcutaneous fat stranding and overlying skin \par thickening.No acute pathology within the abdomen or pelvis.\par \par Patient still complains of abdominal pain.  She is off prednisone and is not taking iron supplements.  She is on azathioprine 100 mg/day which seems to be helping some of her IBD symptoms.  She does complain of constipation and abdominal pain in multiple sites.\par \par Blood work revealed a CRP of 3.5, albumin of 2.2 that improved to 3.0.  She had a ferritin of 4.9 and an iron of 13 with 5% iron saturation.  H/H was 9.4/29.9, MCV 66.  Quantiferon gold is positive.  Hepatitis B surface antigen is negative.\par

## 2022-09-15 ENCOUNTER — EMERGENCY (EMERGENCY)
Facility: HOSPITAL | Age: 42
LOS: 1 days | Discharge: HOME | End: 2022-09-15
Attending: EMERGENCY MEDICINE | Admitting: EMERGENCY MEDICINE

## 2022-09-15 VITALS
DIASTOLIC BLOOD PRESSURE: 63 MMHG | OXYGEN SATURATION: 98 % | RESPIRATION RATE: 20 BRPM | TEMPERATURE: 99 F | SYSTOLIC BLOOD PRESSURE: 114 MMHG | HEART RATE: 71 BPM | HEIGHT: 72 IN

## 2022-09-15 DIAGNOSIS — Z98.891 HISTORY OF UTERINE SCAR FROM PREVIOUS SURGERY: Chronic | ICD-10-CM

## 2022-09-15 DIAGNOSIS — Z98.890 OTHER SPECIFIED POSTPROCEDURAL STATES: Chronic | ICD-10-CM

## 2022-09-15 PROCEDURE — 99285 EMERGENCY DEPT VISIT HI MDM: CPT

## 2022-09-15 NOTE — ED ADULT TRIAGE NOTE - CHIEF COMPLAINT QUOTE
Pt with PMH of Crohn's disease c/o abdominal pain and nausea since last week. Denies fever or vomiting or diarrhea. PMH of Low iron, incisional hernia repair in 03/22.

## 2022-09-16 VITALS
RESPIRATION RATE: 16 BRPM | DIASTOLIC BLOOD PRESSURE: 66 MMHG | OXYGEN SATURATION: 100 % | HEART RATE: 60 BPM | SYSTOLIC BLOOD PRESSURE: 107 MMHG | TEMPERATURE: 98 F

## 2022-09-16 LAB
ALBUMIN SERPL ELPH-MCNC: 3.7 G/DL — SIGNIFICANT CHANGE UP (ref 3.3–5)
ALP SERPL-CCNC: 62 U/L — SIGNIFICANT CHANGE UP (ref 40–120)
ALT FLD-CCNC: 6 U/L — SIGNIFICANT CHANGE UP (ref 4–33)
ANION GAP SERPL CALC-SCNC: 11 MMOL/L — SIGNIFICANT CHANGE UP (ref 7–14)
APPEARANCE UR: ABNORMAL
AST SERPL-CCNC: 18 U/L — SIGNIFICANT CHANGE UP (ref 4–32)
BASOPHILS # BLD AUTO: 0.05 K/UL — SIGNIFICANT CHANGE UP (ref 0–0.2)
BASOPHILS NFR BLD AUTO: 0.9 % — SIGNIFICANT CHANGE UP (ref 0–2)
BILIRUB SERPL-MCNC: <0.2 MG/DL — SIGNIFICANT CHANGE UP (ref 0.2–1.2)
BILIRUB UR-MCNC: NEGATIVE — SIGNIFICANT CHANGE UP
BUN SERPL-MCNC: 6 MG/DL — LOW (ref 7–23)
CALCIUM SERPL-MCNC: 9.2 MG/DL — SIGNIFICANT CHANGE UP (ref 8.4–10.5)
CHLORIDE SERPL-SCNC: 102 MMOL/L — SIGNIFICANT CHANGE UP (ref 98–107)
CO2 SERPL-SCNC: 23 MMOL/L — SIGNIFICANT CHANGE UP (ref 22–31)
COLOR SPEC: ABNORMAL
CREAT SERPL-MCNC: 0.62 MG/DL — SIGNIFICANT CHANGE UP (ref 0.5–1.3)
DIFF PNL FLD: NEGATIVE — SIGNIFICANT CHANGE UP
EGFR: 115 ML/MIN/1.73M2 — SIGNIFICANT CHANGE UP
EOSINOPHIL # BLD AUTO: 0.34 K/UL — SIGNIFICANT CHANGE UP (ref 0–0.5)
EOSINOPHIL NFR BLD AUTO: 6 % — SIGNIFICANT CHANGE UP (ref 0–6)
GLUCOSE SERPL-MCNC: 93 MG/DL — SIGNIFICANT CHANGE UP (ref 70–99)
GLUCOSE UR QL: NEGATIVE — SIGNIFICANT CHANGE UP
HCG SERPL-ACNC: <5 MIU/ML — SIGNIFICANT CHANGE UP
HCT VFR BLD CALC: 27.3 % — LOW (ref 34.5–45)
HGB BLD-MCNC: 7.9 G/DL — LOW (ref 11.5–15.5)
IANC: 3.08 K/UL — SIGNIFICANT CHANGE UP (ref 1.8–7.4)
KETONES UR-MCNC: NEGATIVE — SIGNIFICANT CHANGE UP
LEUKOCYTE ESTERASE UR-ACNC: NEGATIVE — SIGNIFICANT CHANGE UP
LIDOCAIN IGE QN: 19 U/L — SIGNIFICANT CHANGE UP (ref 7–60)
LYMPHOCYTES # BLD AUTO: 0.97 K/UL — LOW (ref 1–3.3)
LYMPHOCYTES # BLD AUTO: 17.2 % — SIGNIFICANT CHANGE UP (ref 13–44)
MCHC RBC-ENTMCNC: 16.8 PG — LOW (ref 27–34)
MCHC RBC-ENTMCNC: 28.9 GM/DL — LOW (ref 32–36)
MCV RBC AUTO: 58 FL — LOW (ref 80–100)
MONOCYTES # BLD AUTO: 0.34 K/UL — SIGNIFICANT CHANGE UP (ref 0–0.9)
MONOCYTES NFR BLD AUTO: 6 % — SIGNIFICANT CHANGE UP (ref 2–14)
NEUTROPHILS # BLD AUTO: 3.73 K/UL — SIGNIFICANT CHANGE UP (ref 1.8–7.4)
NEUTROPHILS NFR BLD AUTO: 66.4 % — SIGNIFICANT CHANGE UP (ref 43–77)
NITRITE UR-MCNC: NEGATIVE — SIGNIFICANT CHANGE UP
PH UR: 5.5 — SIGNIFICANT CHANGE UP (ref 5–8)
PLATELET # BLD AUTO: 393 K/UL — SIGNIFICANT CHANGE UP (ref 150–400)
POTASSIUM SERPL-MCNC: 3.6 MMOL/L — SIGNIFICANT CHANGE UP (ref 3.5–5.3)
POTASSIUM SERPL-SCNC: 3.6 MMOL/L — SIGNIFICANT CHANGE UP (ref 3.5–5.3)
PROT SERPL-MCNC: 6.6 G/DL — SIGNIFICANT CHANGE UP (ref 6–8.3)
PROT UR-MCNC: ABNORMAL
RBC # BLD: 4.71 M/UL — SIGNIFICANT CHANGE UP (ref 3.8–5.2)
RBC # FLD: 22.3 % — HIGH (ref 10.3–14.5)
SARS-COV-2 RNA SPEC QL NAA+PROBE: SIGNIFICANT CHANGE UP
SODIUM SERPL-SCNC: 136 MMOL/L — SIGNIFICANT CHANGE UP (ref 135–145)
SP GR SPEC: 1.03 — SIGNIFICANT CHANGE UP (ref 1.01–1.05)
UROBILINOGEN FLD QL: SIGNIFICANT CHANGE UP
WBC # BLD: 5.62 K/UL — SIGNIFICANT CHANGE UP (ref 3.8–10.5)
WBC # FLD AUTO: 5.62 K/UL — SIGNIFICANT CHANGE UP (ref 3.8–10.5)

## 2022-09-16 PROCEDURE — 74177 CT ABD & PELVIS W/CONTRAST: CPT | Mod: 26,MA

## 2022-09-16 RX ORDER — ACETAMINOPHEN 500 MG
1000 TABLET ORAL ONCE
Refills: 0 | Status: COMPLETED | OUTPATIENT
Start: 2022-09-16 | End: 2022-09-16

## 2022-09-16 RX ORDER — SODIUM CHLORIDE 9 MG/ML
1000 INJECTION INTRAMUSCULAR; INTRAVENOUS; SUBCUTANEOUS ONCE
Refills: 0 | Status: COMPLETED | OUTPATIENT
Start: 2022-09-16 | End: 2022-09-16

## 2022-09-16 RX ADMIN — SODIUM CHLORIDE 1000 MILLILITER(S): 9 INJECTION INTRAMUSCULAR; INTRAVENOUS; SUBCUTANEOUS at 02:17

## 2022-09-16 RX ADMIN — Medication 400 MILLIGRAM(S): at 02:16

## 2022-09-16 NOTE — ED PROVIDER NOTE - OBJECTIVE STATEMENT
41f presents to the ed with abd pain. Started 1m ago, waxes/wanes, located diffuse throughout abd, a/w dec appetite, reports had bad pain back in april/may, seen at [facility] and here, had ext w/u at [facility] there and given medication but not sure what kind and not sure what diagnosis was. Reports over last week pain became more persistent andmore severe, a/w loose nonbloody stools. No fevers, ha, cp, sob, vomiting, dysuria, hematuria. Reports h/o crohns dx not currently on meds for it.

## 2022-09-16 NOTE — ED PROVIDER NOTE - NS ED ROS FT
ROS:  GENERAL: No fever, no chills  EYES: no change in vision  HEENT: no trouble swallowing, no trouble speaking  CARDIAC: no chest pain  PULMONARY: no cough, no shortness of breath  GI: + abdominal pain,+ vomiting, no diarrhea, no constipation  : No dysuria, no frequency, no change in appearance, or odor of urine  SKIN: no rashes  NEURO: no headache, no weakness  MSK: No joint pain

## 2022-09-16 NOTE — ED PROVIDER NOTE - PHYSICAL EXAMINATION
GEN - NAD; well appearing; A+O x3   HEAD - NC/AT   EYES- PERRL, EOMI  ENT: Airway patent, dry mm, Oral cavity and pharynx normal. No inflammation, swelling, exudate, or lesions.    NECK: Neck supple  PULMONARY - CTA b/l, symmetric breath sounds.   CARDIAC -s1s2, RRR, no M,G,R  ABDOMEN - +BS, ND, +ttp to left mid and lower abd, soft, no masses   BACK - no CVA tenderness, Normal  spine   EXTREMITIES - FROM, symmetric pulses, capillary refill < 2 seconds, no edema   SKIN - no rash or bruising   NEUROLOGIC - alert, speech clear, no focal deficits  PSYCH -nl mood/affect, nl insight.

## 2022-09-16 NOTE — ED ADULT NURSE NOTE - NSICDXPASTSURGICALHX_GEN_ALL_CORE_FT
PAST SURGICAL HISTORY:  H/O umbilical hernia repair with rectus diastasis repair 3/10/22 in [facility]    H/O:

## 2022-09-16 NOTE — ED PROVIDER NOTE - NS ED ATTENDING STATEMENT MOD
This was a shared visit with the STFEAN. I reviewed and verified the documentation and independently performed the documented:

## 2022-09-16 NOTE — ED PROVIDER NOTE - PROGRESS NOTE DETAILS
JAZMYN Loomis: Pt reassessed, feeling well, abdomen soft non-distended non-tender. Advised of negative work-up and discussed return precautions. Pt provided with report of labs/CT and follow-up clinic listed in discharge papers. All questions answered, pt verbalized understanding.

## 2022-09-16 NOTE — ED PROVIDER NOTE - CLINICAL SUMMARY MEDICAL DECISION MAKING FREE TEXT BOX
41f presents to the ed with abd pain x1 m waxing/waning-diffusely through abd-most tender on left side of abd-worse over last 1 week, a/w diarrhea x 1w, h/o crohns not currently on meds. Plan for labs, ua/cx, ct a/p, pain control-eval for diff including but not limited to anemia/elec disturbance, uti, organ dysfunction, colitis, diverticulitis.

## 2022-09-16 NOTE — ED PROVIDER NOTE - PATIENT PORTAL LINK FT
You can access the FollowMyHealth Patient Portal offered by [facility] by registering at the following website: [email or web address]. By joining [facility]’s FollowMyHealth portal, you will also be able to view your health information using other applications (apps) compatible with our system.

## 2022-09-16 NOTE — ED PROVIDER NOTE - NSICDXPASTSURGICALHX_GEN_ALL_CORE_FT
PAST SURGICAL HISTORY:  H/O umbilical hernia repair with rectus diastasis repair 3/10/22 in [facility]    H/O:

## 2022-09-16 NOTE — ED PROVIDER NOTE - NSFOLLOWUPCLINICS_GEN_ALL_ED_FT
[facility]  Gastroenterology  [address] [place] [address] [address]  [place] [address]  Phone: [phone]  Fax:

## 2022-09-18 LAB
CULTURE RESULTS: SIGNIFICANT CHANGE UP
SPECIMEN SOURCE: SIGNIFICANT CHANGE UP

## 2023-05-11 NOTE — ED ADULT TRIAGE NOTE - PAIN RATING/NUMBER SCALE (0-10): REST
----- Message from Isela Olmedo sent at 5/11/2023  7:25 AM CDT -----  Regarding: Platelet count history  Contact: [phone]  Hi Dr. Cortez, I’ve uploaded results from my previous tests for a Platelet count consult with another hematologist. They concluded that it is a tendency of my body to have increased platelet, as they tested for but couldn’t find any genetic or other issues related to increased platelets. I’ve uploaded all the tests results for you to see in the [facility] tito on March 1st, 2023. Can you please take a look and let me know? Thanks so much!   6

## 2023-05-12 NOTE — ED ADULT TRIAGE NOTE - ARRIVAL FROM
Called patient to inform him of the prior authorization approval Left voicemail for patient to call clinic back.    Ysabel Montejo LPN on 5/12/2023 at 11:44 AM     Home

## 2023-08-04 NOTE — ED PROVIDER NOTE - DOMESTIC TRAVEL HIGH RISK QUESTION
Follows with neurology as an outpatient  Uses wheelchair mostly for ambulation  PT/OT eval  Outpatient follow-up with neurology  Continue steroid taper as documented in outpatient neurology notes No

## 2023-08-14 NOTE — CONSULT NOTE ADULT - CONSULT REQUESTED DATE/TIME
19-May-2022 08:24 Methotrexate Counseling:  Patient counseled regarding adverse effects of methotrexate including but not limited to nausea, vomiting, abnormalities in liver function tests. Patients may develop mouth sores, rash, diarrhea, and abnormalities in blood counts. The patient understands that monitoring is required including LFT's and blood counts.  There is a rare possibility of scarring of the liver and lung problems that can occur when taking methotrexate. Persistent nausea, loss of appetite, pale stools, dark urine, cough, and shortness of breath should be reported immediately. Patient advised to discontinue methotrexate treatment at least three months before attempting to become pregnant.  I discussed the need for folate supplements while taking methotrexate.  These supplements can decrease side effects during methotrexate treatment. The patient verbalized understanding of the proper use and possible adverse effects of methotrexate.  All of the patient's questions and concerns were addressed.

## 2023-08-30 NOTE — ED ADULT TRIAGE NOTE - MODE OF ARRIVAL
REASON FOR FOLLOWUP/CHIEF COMPLAINT:  Lung cancer    HISTORY OF PRESENT ILLNESS:   She is hoping to go home today or tomorrow.  Denies bleeding.  Neurology has seen her about the dizziness.  CT of the head and neck have been done.  Await reading.  Neurology requested this to see if she has recurrent carotid stenosis.    Past Medical History, Past Surgical History, Social History, Family History have been reviewed and are without significant changes except as mentioned.    Review of Systems   Review of Systems   Constitutional:  Negative for activity change.   HENT:  Negative for nosebleeds and trouble swallowing.    Respiratory:  Negative for shortness of breath and wheezing.    Cardiovascular:  Negative for chest pain and palpitations.   Gastrointestinal:  Negative for constipation, diarrhea and nausea.   Genitourinary:  Negative for dysuria and hematuria.   Musculoskeletal:  Negative for arthralgias and myalgias.   Skin:  Negative for rash and wound.   Neurological:  Negative for seizures and syncope.   Hematological:  Negative for adenopathy. Does not bruise/bleed easily.   Psychiatric/Behavioral:  Negative for confusion.      Medications:  The current medication list was reviewed in the EMR    ALLERGIES:    Allergies   Allergen Reactions    Del-Mycin [Erythromycin] Hives    Gentamicin Hives    Ibuprofen Nausea And Vomiting    Latex Dermatitis     GLOVES    Metronidazole Hives    Ofloxacin Hives and Nausea Only    Penicillins Hives    Tetracycline Hives    Adhesive Tape Dermatitis     BANDAIDS    Aspirin GI Intolerance     Vomiting can take EC    Codeine Nausea And Vomiting              Vitals:    08/30/23 0550 08/30/23 0734 08/30/23 0838 08/30/23 1315   BP:  139/50 131/69 150/57   BP Location:  Right arm  Right arm   Patient Position:  Lying  Lying   Pulse: 92 86 87 87   Resp: 16 16  18   Temp:  98.2 °F (36.8 °C) 99.3 °F (37.4 °C) 98.8 °F (37.1 °C)   TempSrc:  Oral  Oral   SpO2: 96% 94%  98%   Weight:        Height:         Physical Exam    CONSTITUTIONAL:  Vital signs reviewed.  No distress, looks comfortable.  EYES:  Conjunctivae and lids unremarkable.  PERRLA  EARS, NOSE, MOUTH, THROAT:  Ears and nose appear unremarkable.  Lips, teeth, gums appear unremarkable.  RESPIRATORY:  Normal respiratory effort.  Lungs clear to auscultation bilaterally.  CARDIOVASCULAR:  Normal S1, S2.  No murmurs, rubs or gallops.  No significant lower extremity edema.  GASTROINTESTINAL: Abdomen appears unremarkable.  Nontender.  No hepatomegaly.  No splenomegaly.  NEURO: Cranial nerves 2-12 grossly intact.  No focal deficits.  Appears to have equal strength all 4 extremities.  MUSCULOSKELETAL:  Unremarkable digits/nails.  No cyanosis or clubbing.  SKIN:  Warm.  No rashes.  PSYCHIATRIC:  Normal judgment and insight.  Normal mood and affect.        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  Patient is not felt to be a surgical candidate and combined chemotherapy and radiation.   Guardant 360 assay positive for KRAS mutation and TP53 mutation.  9/20/2022 1st dose of weekly carboplatin/taxol.   She completed 6 cycles of weekly CarboTaxol 10/25/2022.  Radiation therapy completed 10/27/2022  First dose durvalumab delivered 11/28/2022.   Durvalumab on hold since May 2023  She can discuss if there are any remaining treatment options with Dr. Lagunas in the  office.  Arranging PET scan and follow-up with Dr. Lagunas around the timing of her motorcycle trip     2.  Tumor is strongly PD-L1 positive greater than 95% (although the patient may not be a great candidate for immunotherapy in the future due to her rheumatoid arthritis).     3.  Other comorbidities including vascular disease with previous carotid   endarterectomy surgeries.  She has no known history of stroke or myocardial infarction.      5.  Rheumatoid arthritis: Patient previously on Celebrex, but discontinued due to hemoptysis.  Patient started on prednisone 20 mg daily prescribed to manage her arthritis pain.  Prednisone has since been decreased to 10 mg daily.     6.  Development of brain metastases in May 2023.  Patient underwent resection of the dominant mass in the right occipital area by Dr. Cho of neurosurgery.  She received post op radiation therapy to the resection bed and to 2 smaller lesions with SRS under the direction of Dr. Mckenna Moreira at [facility].  Her posttreatment MRI of the brain from 7/9/2023 as noted above shows no new sites of disease, improved edema, and no definite recurrence in the resection bed.  MRI brain 8/27/2023: No evidence of tumor recurrence.     7.  Severe shingles outbreak of left upper extremity with severe pain.  The shingles outbreak has dried up and she has completed her acyclovir treatment.  She is still having pain and numbness in the left hand and is undergoing physical therapy and Occupational Therapy.  She reports this pain is finally subsiding.     8.  Concern 8/23/2023 for recurrent tumor in the right upper lobe based on CT of the cervical spine performed on an ER visit 8/4/2023.  Plans made for PET/CT and MRI brain.     9.  Campylobacter infection with sepsis  admitted 8/26/2023 with worsening equilibrium and weakness in her left leg.  Follow-up exams included normal EKG, CT head with right occipital postoperative changes and encephalomalacia  without intercranial hemorrhage or midline shift.  She met criteria for potential septicemia and patient admitted for treatment include IV antibiotic therapies, IV hydration and additional scanning.  CT studies consistent with long segment colitis from transverse to sigmoid colon, associated diverticulitis, indeterminate right renal lesions up to 5.2 cm, subtle asymmetric right basilar groundglass opacifications consistent with atypical pneumonia.  Additional studies include negative findings for C. difficile, positive for Campylobacter on GI panel, MRI of the brain pending.  Per the hospitalist note from today, this is doing well and anticipating home with home health versus skilled nursing facility in 1 to 2 days.    *Dizziness  Neurology is following.  They have ordered a CT of the head and neck angiogram to look for recurrent carotid stenosis.  Await those results.    *Anemia  Hb 7.9.  Knowing she will be discharged likely today or tomorrow, transfuse 1 unit PRBC.    Plan  Sees Dr. Lagunas as an outpatient.  He last saw her in the office, 8/23/2023.  He planned MRI of the brain and PET scan and to see her again to review the above to discuss if any additional treatment options are available.  PET is scheduled on Tuesday, 8/31/2023.  MRI brain was done on 8/27/2023   Note CT abdomen pelvis with contrast done 8/26/2023 with no clear evidence of progression of disease.  She will be out of town on a motorcycle trip (riding in a side car), 9/13/2023 - 9/18/2023.  Therefore, it would likely be best to do the PET scan and appointment Dr. Lagunas sometime after that.  I let the office know  Transfuse 1 unit PRBC    Chart reviewed and summarized including neurology note and hospitalist note.  Also discussed with neurology.   Walk in

## 2024-01-11 NOTE — ED PROVIDER NOTE - GENITOURINARY VAGINAL DISCHARGE
"Subjective   Patient ID: Rui Shah is a 68 y.o. male who presents for Tinnitus.    HPI   Hx of tinnitus but worse in last 2 weeks.  Reports different sound along with sinus pressure and drainage.  No fever or chills.      Has significant neuropathy.  Feels like feet are always cold, had PVR done one year ago and was normal.  Did not tolerated gabapentin in the past.    Review of Systems  Negative unless noted in HPI    Objective   /80 (BP Location: Right arm, Patient Position: Sitting, BP Cuff Size: Adult)   Pulse 103   Ht 1.854 m (6' 1\")   Wt 101 kg (223 lb 9.6 oz)   SpO2 97%   BMI 29.50 kg/m²     Physical Exam  Constitutional:       Appearance: Normal appearance.   HENT:      Right Ear: A middle ear effusion is present. Tympanic membrane is retracted.      Left Ear: A middle ear effusion is present. Tympanic membrane is retracted.      Nose:      Right Sinus: Frontal sinus tenderness present.      Left Sinus: Frontal sinus tenderness present.   Cardiovascular:      Rate and Rhythm: Normal rate and regular rhythm.   Pulmonary:      Effort: Pulmonary effort is normal.      Breath sounds: Normal breath sounds.   Neurological:      Mental Status: He is alert.         Assessment/Plan   Problem List Items Addressed This Visit             ICD-10-CM    Acute frontal sinusitis - Primary J01.10     Sinusitis resulting in eustachian tube dysfunction and worsened tinnitus  Recommend symptom treatment and monitor  If symptoms fail to improve in next 3-5 days then can start antibiotic as prescribed  Follow up for any changing symptoms         Relevant Medications    azithromycin (Zithromax) 250 mg tablet    azelastine (Astelin) 137 mcg (0.1 %) nasal spray    Peripheral polyneuropathy G62.9     Recommend establishing with podiatry for regular foot care and monitoring  Follow up if difficulty with establishing         Relevant Orders    Referral to Podiatry     Other Visit Diagnoses         Codes    Benign " prostatic hyperplasia, unspecified whether lower urinary tract symptoms present     N40.0    Relevant Medications    tamsulosin (Flomax) 0.4 mg 24 hr capsule                none

## 2024-02-02 ENCOUNTER — INPATIENT (INPATIENT)
Facility: HOSPITAL | Age: 44
LOS: 2 days | Discharge: HOME | End: 2024-02-05
Attending: STUDENT IN AN ORGANIZED HEALTH CARE EDUCATION/TRAINING PROGRAM | Admitting: STUDENT IN AN ORGANIZED HEALTH CARE EDUCATION/TRAINING PROGRAM
Payer: MEDICAID

## 2024-02-02 VITALS
SYSTOLIC BLOOD PRESSURE: 113 MMHG | RESPIRATION RATE: 18 BRPM | HEART RATE: 81 BPM | TEMPERATURE: 98 F | DIASTOLIC BLOOD PRESSURE: 67 MMHG | OXYGEN SATURATION: 98 %

## 2024-02-02 DIAGNOSIS — I26.99 OTHER PULMONARY EMBOLISM WITHOUT ACUTE COR PULMONALE: ICD-10-CM

## 2024-02-02 DIAGNOSIS — K52.9 NONINFECTIVE GASTROENTERITIS AND COLITIS, UNSPECIFIED: ICD-10-CM

## 2024-02-02 DIAGNOSIS — Z98.890 OTHER SPECIFIED POSTPROCEDURAL STATES: Chronic | ICD-10-CM

## 2024-02-02 DIAGNOSIS — Z29.9 ENCOUNTER FOR PROPHYLACTIC MEASURES, UNSPECIFIED: ICD-10-CM

## 2024-02-02 DIAGNOSIS — D64.9 ANEMIA, UNSPECIFIED: ICD-10-CM

## 2024-02-02 DIAGNOSIS — Z22.7 LATENT TUBERCULOSIS: ICD-10-CM

## 2024-02-02 DIAGNOSIS — R09.89 OTHER SPECIFIED SYMPTOMS AND SIGNS INVOLVING THE CIRCULATORY AND RESPIRATORY SYSTEMS: ICD-10-CM

## 2024-02-02 DIAGNOSIS — Z98.891 HISTORY OF UTERINE SCAR FROM PREVIOUS SURGERY: Chronic | ICD-10-CM

## 2024-02-02 LAB
ALBUMIN SERPL ELPH-MCNC: 3.8 G/DL — SIGNIFICANT CHANGE UP (ref 3.3–5)
ALP SERPL-CCNC: 78 U/L — SIGNIFICANT CHANGE UP (ref 40–120)
ALT FLD-CCNC: <5 U/L — SIGNIFICANT CHANGE UP (ref 4–33)
ANION GAP SERPL CALC-SCNC: 13 MMOL/L — SIGNIFICANT CHANGE UP (ref 7–14)
ANISOCYTOSIS BLD QL: SIGNIFICANT CHANGE UP
APTT BLD: 28.3 SEC — SIGNIFICANT CHANGE UP (ref 24.5–35.6)
APTT BLD: 63.2 SEC — HIGH (ref 24.5–35.6)
APTT BLD: 88.4 SEC — HIGH (ref 24.5–35.6)
AST SERPL-CCNC: 11 U/L — SIGNIFICANT CHANGE UP (ref 4–32)
AT III ACT/NOR PPP CHRO: 92 % — SIGNIFICANT CHANGE UP (ref 85–135)
BASOPHILS # BLD AUTO: 0.07 K/UL — SIGNIFICANT CHANGE UP (ref 0–0.2)
BASOPHILS NFR BLD AUTO: 0.9 % — SIGNIFICANT CHANGE UP (ref 0–2)
BILIRUB SERPL-MCNC: <0.2 MG/DL — SIGNIFICANT CHANGE UP (ref 0.2–1.2)
BLD GP AB SCN SERPL QL: NEGATIVE — SIGNIFICANT CHANGE UP
BUN SERPL-MCNC: 7 MG/DL — SIGNIFICANT CHANGE UP (ref 7–23)
CALCIUM SERPL-MCNC: 8.7 MG/DL — SIGNIFICANT CHANGE UP (ref 8.4–10.5)
CHLORIDE SERPL-SCNC: 104 MMOL/L — SIGNIFICANT CHANGE UP (ref 98–107)
CO2 SERPL-SCNC: 23 MMOL/L — SIGNIFICANT CHANGE UP (ref 22–31)
CREAT SERPL-MCNC: 0.6 MG/DL — SIGNIFICANT CHANGE UP (ref 0.5–1.3)
EGFR: 115 ML/MIN/1.73M2 — SIGNIFICANT CHANGE UP
ELLIPTOCYTES BLD QL SMEAR: SLIGHT — SIGNIFICANT CHANGE UP
EOSINOPHIL # BLD AUTO: 0.14 K/UL — SIGNIFICANT CHANGE UP (ref 0–0.5)
EOSINOPHIL NFR BLD AUTO: 1.8 % — SIGNIFICANT CHANGE UP (ref 0–6)
FERRITIN SERPL-MCNC: 9 NG/ML — LOW (ref 15–150)
FOLATE SERPL-MCNC: 16 NG/ML — SIGNIFICANT CHANGE UP (ref 3.1–17.5)
GIANT PLATELETS BLD QL SMEAR: PRESENT — SIGNIFICANT CHANGE UP
GLUCOSE SERPL-MCNC: 101 MG/DL — HIGH (ref 70–99)
HCT VFR BLD CALC: 24.4 % — LOW (ref 34.5–45)
HCYS SERPL-MCNC: 5.4 UMOL/L — SIGNIFICANT CHANGE UP
HGB BLD-MCNC: 7.3 G/DL — LOW (ref 11.5–15.5)
HYPOCHROMIA BLD QL: SIGNIFICANT CHANGE UP
IANC: 4.13 K/UL — SIGNIFICANT CHANGE UP (ref 1.8–7.4)
INR BLD: 1.05 RATIO — SIGNIFICANT CHANGE UP (ref 0.85–1.18)
INR BLD: 1.09 RATIO — SIGNIFICANT CHANGE UP (ref 0.85–1.18)
IRON SATN MFR SERPL: 13 UG/DL — LOW (ref 30–160)
IRON SATN MFR SERPL: 14 UG/DL — LOW (ref 30–160)
IRON SATN MFR SERPL: 4 % — LOW (ref 14–50)
IRON SATN MFR SERPL: 4 % — LOW (ref 14–50)
LYMPHOCYTES # BLD AUTO: 2.25 K/UL — SIGNIFICANT CHANGE UP (ref 1–3.3)
LYMPHOCYTES # BLD AUTO: 28.3 % — SIGNIFICANT CHANGE UP (ref 13–44)
MCHC RBC-ENTMCNC: 16.4 PG — LOW (ref 27–34)
MCHC RBC-ENTMCNC: 29.9 GM/DL — LOW (ref 32–36)
MCV RBC AUTO: 55 FL — LOW (ref 80–100)
MICROCYTES BLD QL: SIGNIFICANT CHANGE UP
MONOCYTES # BLD AUTO: 0.84 K/UL — SIGNIFICANT CHANGE UP (ref 0–0.9)
MONOCYTES NFR BLD AUTO: 10.6 % — SIGNIFICANT CHANGE UP (ref 2–14)
NEUTROPHILS # BLD AUTO: 4.57 K/UL — SIGNIFICANT CHANGE UP (ref 1.8–7.4)
NEUTROPHILS NFR BLD AUTO: 55.7 % — SIGNIFICANT CHANGE UP (ref 43–77)
NEUTS BAND # BLD: 1.8 % — SIGNIFICANT CHANGE UP (ref 0–6)
OVALOCYTES BLD QL SMEAR: SLIGHT — SIGNIFICANT CHANGE UP
PLAT MORPH BLD: NORMAL — SIGNIFICANT CHANGE UP
PLATELET # BLD AUTO: 390 K/UL — SIGNIFICANT CHANGE UP (ref 150–400)
PLATELET COUNT - ESTIMATE: NORMAL — SIGNIFICANT CHANGE UP
POIKILOCYTOSIS BLD QL AUTO: SIGNIFICANT CHANGE UP
POLYCHROMASIA BLD QL SMEAR: SLIGHT — SIGNIFICANT CHANGE UP
POTASSIUM SERPL-MCNC: 3.1 MMOL/L — LOW (ref 3.5–5.3)
POTASSIUM SERPL-SCNC: 3.1 MMOL/L — LOW (ref 3.5–5.3)
PROT SERPL-MCNC: 7.1 G/DL — SIGNIFICANT CHANGE UP (ref 6–8.3)
PROTHROM AB SERPL-ACNC: 11.8 SEC — SIGNIFICANT CHANGE UP (ref 9.5–13)
PROTHROM AB SERPL-ACNC: 12.3 SEC — SIGNIFICANT CHANGE UP (ref 9.5–13)
RBC # BLD: 4.44 M/UL — SIGNIFICANT CHANGE UP (ref 3.8–5.2)
RBC # FLD: 23.3 % — HIGH (ref 10.3–14.5)
RBC BLD AUTO: ABNORMAL
RH IG SCN BLD-IMP: POSITIVE — SIGNIFICANT CHANGE UP
SMUDGE CELLS # BLD: PRESENT — SIGNIFICANT CHANGE UP
SODIUM SERPL-SCNC: 140 MMOL/L — SIGNIFICANT CHANGE UP (ref 135–145)
TIBC SERPL-MCNC: 336 UG/DL — SIGNIFICANT CHANGE UP (ref 220–430)
TIBC SERPL-MCNC: 338 UG/DL — SIGNIFICANT CHANGE UP (ref 220–430)
TROPONIN T, HIGH SENSITIVITY RESULT: <6 NG/L — SIGNIFICANT CHANGE UP
UIBC SERPL-MCNC: 322 UG/DL — SIGNIFICANT CHANGE UP (ref 110–370)
UIBC SERPL-MCNC: 325 UG/DL — SIGNIFICANT CHANGE UP (ref 110–370)
VARIANT LYMPHS # BLD: 0.9 % — SIGNIFICANT CHANGE UP (ref 0–6)
VIT B12 SERPL-MCNC: 440 PG/ML — SIGNIFICANT CHANGE UP (ref 200–900)
WBC # BLD: 7.94 K/UL — SIGNIFICANT CHANGE UP (ref 3.8–10.5)
WBC # FLD AUTO: 7.94 K/UL — SIGNIFICANT CHANGE UP (ref 3.8–10.5)

## 2024-02-02 PROCEDURE — 99223 1ST HOSP IP/OBS HIGH 75: CPT

## 2024-02-02 PROCEDURE — 99285 EMERGENCY DEPT VISIT HI MDM: CPT | Mod: 25

## 2024-02-02 PROCEDURE — 74177 CT ABD & PELVIS W/CONTRAST: CPT | Mod: 26,MA

## 2024-02-02 PROCEDURE — 71275 CT ANGIOGRAPHY CHEST: CPT | Mod: 26,MA

## 2024-02-02 RX ORDER — POTASSIUM CHLORIDE 20 MEQ
40 PACKET (EA) ORAL ONCE
Refills: 0 | Status: COMPLETED | OUTPATIENT
Start: 2024-02-02 | End: 2024-02-02

## 2024-02-02 RX ORDER — ONDANSETRON 8 MG/1
4 TABLET, FILM COATED ORAL ONCE
Refills: 0 | Status: COMPLETED | OUTPATIENT
Start: 2024-02-02 | End: 2024-02-02

## 2024-02-02 RX ORDER — HEPARIN SODIUM 5000 [USP'U]/ML
INJECTION INTRAVENOUS; SUBCUTANEOUS
Qty: 25000 | Refills: 0 | Status: DISCONTINUED | OUTPATIENT
Start: 2024-02-02 | End: 2024-02-04

## 2024-02-02 RX ORDER — FAMOTIDINE 10 MG/ML
20 INJECTION INTRAVENOUS ONCE
Refills: 0 | Status: COMPLETED | OUTPATIENT
Start: 2024-02-02 | End: 2024-02-02

## 2024-02-02 RX ORDER — APIXABAN 2.5 MG/1
1 TABLET, FILM COATED ORAL
Qty: 60 | Refills: 0
Start: 2024-02-02 | End: 2024-03-02

## 2024-02-02 RX ORDER — RIVAROXABAN 15 MG-20MG
2 KIT ORAL
Qty: 60 | Refills: 0
Start: 2024-02-02 | End: 2024-03-02

## 2024-02-02 RX ORDER — IRON SUCROSE 20 MG/ML
200 INJECTION, SOLUTION INTRAVENOUS EVERY 24 HOURS
Refills: 0 | Status: DISCONTINUED | OUTPATIENT
Start: 2024-02-02 | End: 2024-02-02

## 2024-02-02 RX ORDER — ACETAMINOPHEN 500 MG
1000 TABLET ORAL ONCE
Refills: 0 | Status: COMPLETED | OUTPATIENT
Start: 2024-02-02 | End: 2024-02-02

## 2024-02-02 RX ORDER — HEPARIN SODIUM 5000 [USP'U]/ML
4000 INJECTION INTRAVENOUS; SUBCUTANEOUS EVERY 6 HOURS
Refills: 0 | Status: DISCONTINUED | OUTPATIENT
Start: 2024-02-02 | End: 2024-02-04

## 2024-02-02 RX ORDER — FERROUS SULFATE 325(65) MG
325 TABLET ORAL DAILY
Refills: 0 | Status: DISCONTINUED | OUTPATIENT
Start: 2024-02-02 | End: 2024-02-03

## 2024-02-02 RX ORDER — HEPARIN SODIUM 5000 [USP'U]/ML
8500 INJECTION INTRAVENOUS; SUBCUTANEOUS ONCE
Refills: 0 | Status: COMPLETED | OUTPATIENT
Start: 2024-02-02 | End: 2024-02-02

## 2024-02-02 RX ORDER — HEPARIN SODIUM 5000 [USP'U]/ML
8500 INJECTION INTRAVENOUS; SUBCUTANEOUS EVERY 6 HOURS
Refills: 0 | Status: DISCONTINUED | OUTPATIENT
Start: 2024-02-02 | End: 2024-02-04

## 2024-02-02 RX ORDER — FAMOTIDINE 10 MG/ML
20 INJECTION INTRAVENOUS DAILY
Refills: 0 | Status: DISCONTINUED | OUTPATIENT
Start: 2024-02-02 | End: 2024-02-05

## 2024-02-02 RX ORDER — INFLUENZA VIRUS VACCINE 15; 15; 15; 15 UG/.5ML; UG/.5ML; UG/.5ML; UG/.5ML
0.5 SUSPENSION INTRAMUSCULAR ONCE
Refills: 0 | Status: DISCONTINUED | OUTPATIENT
Start: 2024-02-02 | End: 2024-02-05

## 2024-02-02 RX ORDER — POTASSIUM CHLORIDE 20 MEQ
10 PACKET (EA) ORAL ONCE
Refills: 0 | Status: COMPLETED | OUTPATIENT
Start: 2024-02-02 | End: 2024-02-02

## 2024-02-02 RX ORDER — LIDOCAINE 4 G/100G
10 CREAM TOPICAL ONCE
Refills: 0 | Status: COMPLETED | OUTPATIENT
Start: 2024-02-02 | End: 2024-02-02

## 2024-02-02 RX ORDER — PREDNISOLONE 5 MG
20 TABLET ORAL
Qty: 0 | Refills: 0 | DISCHARGE

## 2024-02-02 RX ADMIN — HEPARIN SODIUM 1800 UNIT(S)/HR: 5000 INJECTION INTRAVENOUS; SUBCUTANEOUS at 15:40

## 2024-02-02 RX ADMIN — ONDANSETRON 4 MILLIGRAM(S): 8 TABLET, FILM COATED ORAL at 03:00

## 2024-02-02 RX ADMIN — Medication 400 MILLIGRAM(S): at 07:12

## 2024-02-02 RX ADMIN — Medication 1000 MILLIGRAM(S): at 08:15

## 2024-02-02 RX ADMIN — HEPARIN SODIUM 1800 UNIT(S)/HR: 5000 INJECTION INTRAVENOUS; SUBCUTANEOUS at 16:46

## 2024-02-02 RX ADMIN — HEPARIN SODIUM 8500 UNIT(S): 5000 INJECTION INTRAVENOUS; SUBCUTANEOUS at 09:22

## 2024-02-02 RX ADMIN — HEPARIN SODIUM 1800 UNIT(S)/HR: 5000 INJECTION INTRAVENOUS; SUBCUTANEOUS at 16:48

## 2024-02-02 RX ADMIN — LIDOCAINE 10 MILLILITER(S): 4 CREAM TOPICAL at 04:12

## 2024-02-02 RX ADMIN — Medication 325 MILLIGRAM(S): at 19:29

## 2024-02-02 RX ADMIN — HEPARIN SODIUM 1800 UNIT(S)/HR: 5000 INJECTION INTRAVENOUS; SUBCUTANEOUS at 19:29

## 2024-02-02 RX ADMIN — Medication 30 MILLILITER(S): at 04:14

## 2024-02-02 RX ADMIN — Medication 40 MILLIEQUIVALENT(S): at 04:13

## 2024-02-02 RX ADMIN — Medication 100 MILLIEQUIVALENT(S): at 04:30

## 2024-02-02 RX ADMIN — HEPARIN SODIUM 1800 UNIT(S)/HR: 5000 INJECTION INTRAVENOUS; SUBCUTANEOUS at 09:20

## 2024-02-02 RX ADMIN — HEPARIN SODIUM 1800 UNIT(S)/HR: 5000 INJECTION INTRAVENOUS; SUBCUTANEOUS at 23:51

## 2024-02-02 RX ADMIN — FAMOTIDINE 20 MILLIGRAM(S): 10 INJECTION INTRAVENOUS at 03:00

## 2024-02-02 NOTE — ED ADULT NURSE REASSESSMENT NOTE - NS ED NURSE REASSESS COMMENT FT1
Assumed care of pt from Munira MONGE. Pt is AAOX4. Tele monitor in place,NSR. Heparin drip infusing. pt denies pain at this time. Advised we are awaiting bed placement. Will continue to monitor and assess
Pt A+Ox4.  Pt awaiting bed placement in hospital.  pt in NAD.  Lungs clear, equal and unlabored, abdomen soft, skin intact.  Fall precautions maintained.  Pt started on Heparin gtt as ordered.  Cardiac monitor in place.  Will continue to monitor.

## 2024-02-02 NOTE — PATIENT PROFILE ADULT - FUNCTIONAL ASSESSMENT - BASIC MOBILITY 1.
9 m old female born full term c section , with no PMHx and no PSHx , drinking similac advanced every 2-3 hours and stage 1 baby food presents to the ED c/o loose bowel movements x1 week 2-3 times per day. Patient usually only has a bowel movement once a day. Per parents, patient had full diarrhea yesterday. Patient started vomiting today and had 5 episodes of NBNB vomit. Patient's vaccines are UTD. Patient denies any sick contacts. Patient denies fever or any other acute complaints, NKDA
4 = No assist / stand by assistance

## 2024-02-02 NOTE — H&P ADULT - PROBLEM SELECTOR PLAN 1
Incidentally found to have PE on CT A/P with confirmation on CTA Chest on admission. Patient satting well on RA without radiographic evidence of RV strain.   - May have remote history of DVT with reported left leg swelling  - May be iso Crohnes disease vs primary hypercoagulability disorder given age    > c/w heparin ggt now given Hgb low 7.3, will plan to transition to Lovenox or DOAC if Hgb stablize   > will send hypercoagulability w/u.

## 2024-02-02 NOTE — H&P ADULT - ATTENDING COMMENTS
42 yo female w/ pmhx of Crohns disease, ventral hernia s/p repair, and latent TB presenting with chest pain and abdomen pain x several months. Pt found to have PE and enteritis on CT  #PE- Started Hep gtt, will transition to DOAC after price check and if H/H remains stable   #Anemia- Hb 7.3; Baseline around 8. Suspect AOCD. Check Fe studies, retic. Pt denies any blood in stool, no melena. Monitor H/H. Transfuse prn Hb<7  #Enteritis: Seen on CT. Pt reports pain has improved. Denies any nausea/vomiting/diarrhea. Low suspicion for Crohn's flare   #Latent TB- Pt did not complete treatment. f/u outpt with pcp

## 2024-02-02 NOTE — H&P ADULT - PROBLEM SELECTOR PLAN 4
Per previous GI notes, was Quant Gold positive and CXR at the time was negative. Initially started on Isoniazid but did not complete full course.  - CT Chest negative for any lesions.     > outpatient follow up for long term latent TB treatment and monitoring

## 2024-02-02 NOTE — H&P ADULT - PROBLEM SELECTOR PLAN 2
P/w abdominal pain without N/V/D, CT A/P showing enteritis. Symptoms improving with Maalox and pepcid.  - Unlikely to be Crohns flare at this time given lack of diarrhea  - Not currently on therapy for Crohns    > will continue with supportive care with Maalox and Pepcid  > consider GI consult if had developing diarrhea with symptoms c/f Crohns flare. Will provide and reinforce GI follow up as an outpatient

## 2024-02-02 NOTE — H&P ADULT - ASSESSMENT
44 yo female w/ pmhx of Crohns disease, ventral hernia s/p repair, and latent TB, p/w abdominal and chest pain with imaging finding c/w RLL subsegmental PE and enteritis

## 2024-02-02 NOTE — H&P ADULT - NSHPLABSRESULTS_GEN_ALL_CORE
LABS:                         7.3    7.94  )-----------( 390      ( 02 Feb 2024 01:30 )             24.4     02-02    140  |  104  |  7   ----------------------------<  101<H>  3.1<L>   |  23  |  0.60    Ca    8.7      02 Feb 2024 02:50    TPro  7.1  /  Alb  3.8  /  TBili  <0.2  /  DBili  x   /  AST  11  /  ALT  <5  /  AlkPhos  78  02-02    PT/INR - ( 02 Feb 2024 07:06 )   PT: 11.8 sec;   INR: 1.05 ratio         PTT - ( 02 Feb 2024 07:06 )  PTT:28.3 sec  Urinalysis Basic - ( 02 Feb 2024 02:50 )    Color: x / Appearance: x / SG: x / pH: x  Gluc: 101 mg/dL / Ketone: x  / Bili: x / Urobili: x   Blood: x / Protein: x / Nitrite: x   Leuk Esterase: x / RBC: x / WBC x   Sq Epi: x / Non Sq Epi: x / Bacteria: x            RADIOLOGY, EKG & ADDITIONAL TESTS: Reviewed.

## 2024-02-02 NOTE — ED ADULT TRIAGE NOTE - CHIEF COMPLAINT QUOTE
Patient c/o abdominal pain, diarrhea and weakness x 1 week. Denies fevers, chills and N/V. Hx. Crohn's disease.

## 2024-02-02 NOTE — H&P ADULT - PROBLEM SELECTOR PLAN 3
Anemic to 7.3 on admission, baseline 8-10. Likely anemia of chronic disease vs MAGDALENO with poor absorption iso Crohns    > ordering iron studies, type and screen  > Transfuse if Hgb < 7

## 2024-02-02 NOTE — ED ADULT NURSE REASSESSMENT NOTE - NSFALLHARMRISKINTERV_ED_ALL_ED
Assistance with ambulation/Communicate risk of Fall with Harm to all staff, patient, and family/Provide visual cue: red socks, yellow wristband, yellow gown, etc/Reinforce activity limits and safety measures with patient and family/Bed in lowest position, wheels locked, appropriate side rails in place/Call bell, personal items and telephone in reach/Instruct patient to call for assistance before getting out of bed/chair/stretcher/Non-slip footwear applied when patient is off stretcher/[place] to call system/Physically safe environment - no spills, clutter or unnecessary equipment/Purposeful Proactive Rounding/Room/bathroom lighting operational, light cord in reach

## 2024-02-02 NOTE — H&P ADULT - NSHPPHYSICALEXAM_GEN_ALL_CORE
.  VITAL SIGNS:  T(C): 36.9 (02-02-24 @ 08:45), Max: 37.2 (02-02-24 @ 03:47)  T(F): 98.4 (02-02-24 @ 08:45), Max: 98.9 (02-02-24 @ 03:47)  HR: 66 (02-02-24 @ 08:45) (66 - 81)  BP: 103/76 (02-02-24 @ 08:45) (103/76 - 113/67)  BP(mean): --  RR: 18 (02-02-24 @ 08:45) (18 - 18)  SpO2: 99% (02-02-24 @ 08:45) (98% - 99%)  Wt(kg): --    PHYSICAL EXAM:  GENERAL: NAD, well-groomed, well-developed  HEAD:  Atraumatic, Normocephalic  EYES: EOMI, PERRLA, conjunctiva and sclera clear  ENMT: No tonsillar erythema, exudates, or enlargement; Moist mucous membranes, Good dentition, No lesions  NECK: Supple, No JVD, Normal thyroid  CHEST/LUNG: Clear to percussion bilaterally; No rales, rhonchi, wheezing, or rubs  HEART: Regular rate and rhythm; No murmurs, rubs, or gallops  ABDOMEN: Soft, Nontender, Nondistended; Bowel sounds present  VASCULAR:  2+ Peripheral Pulses, No clubbing, cyanosis, or edema  LYMPH: No lymphadenopathy noted  SKIN: No rashes or lesions  NERVOUS SYSTEM:  Alert & Oriented X3, Good concentration;

## 2024-02-02 NOTE — H&P ADULT - NSHPREVIEWOFSYSTEMS_GEN_ALL_CORE
General: no weakness, no fatigue, no change in weight  HEENT: No blurry vision, no congestion, no rhinorrhea, no ear pain, no throat pain, no odynophagia,   Respiratory: No cough, no shortness of breath  Cardiac: +chest pain no palpitations  GI: +abdominal pain, no nausea, no vomiting, no constipation, no diarrhea  : No dysuria, no hematuria  MSK: No swelling in extremities, no arthralgias, no back pain  Neuro: No headache, no dizziness  Skin: No rashes

## 2024-02-02 NOTE — ED PROVIDER NOTE - CLINICAL SUMMARY MEDICAL DECISION MAKING FREE TEXT BOX
43F with PMH Crohn's disease (previously on steroids, has not taken medications for over a year) and ventral hernia repair with mesh in [facility] (Mar 2022, c/b seroma s/p aspiration) presenting with subacute abdominal pain x months, worsened in the past week. Pt also reports chest pain that occurs with abdominal pain, diarrhea/constipation, nausea, and occasional room-spinning dizziness. Pt states she took tylenol for pain with mild relief. States abdominal pain and diarrhea worsen with food, still eating but states she is eating less. Denies fever, chills, shortness of breath, dysuria, hematuria, hematochezia. 43F with PMH Crohn's disease (previously on steroids, has not taken medications in over a year) and ventral hernia repair in [facility] (Mar 2022, c/b seroma s/p aspiration) presenting with subacute abdominal pain x months, worsened in the past week. Reports chest pain that occurs with abdominal pain, diarrhea/constipation, nausea, and occasional room-spinning dizziness. Denies fever, chills, shortness of breath, dysuria, hematuria, hematochezia. Pt is afebrile and hemodynamically stable. Differential includes but not limited to gastroenteritis, constipation, gastritis. Plan: labs, lipase, troponin, UA, hcg, CT abdomen and pelvis, famotidine, maalox, zofran and reassess. Anticipate discharge home pending lab results and symptoms. 43F with PMH Crohn's disease (previously on steroids, has not taken medications in over a year) and ventral hernia repair in [facility] (Mar 2022, c/b seroma s/p aspiration) presenting with subacute abdominal pain x months, worsened in the past week. Reports chest pain that occurs with abdominal pain, diarrhea/constipation, nausea, and occasional room-spinning dizziness. Denies fever, chills, shortness of breath, dysuria, hematuria, hematochezia. Pt is afebrile and hemodynamically stable. On exam abdomen is soft, non-tender. Differential includes but not limited to gastroenteritis, constipation, gastritis. Plan: labs, lipase, troponin, UA, hcg, CT abdomen and pelvis, famotidine, maalox, zofran and reassess. Anticipate discharge home pending lab results and symptoms.

## 2024-02-02 NOTE — H&P ADULT - NSICDXPASTSURGICALHX_GEN_ALL_CORE_FT
PAST SURGICAL HISTORY:  H/O umbilical hernia repair with rectus diastasis repair 3/10/22 in [facility]    H/O:

## 2024-02-02 NOTE — ED PROVIDER NOTE - PHYSICAL EXAMINATION
PHYSICAL EXAM:    GENERAL: NAD  HEENT: Atraumatic  CHEST/LUNG: Lungs clear to auscultation bilaterally  HEART: Regular rate and rhythm  ABDOMEN: Soft, nontender. No tenderness or erythema at site of hernia surgical scar.  EXTREMITIES: Extremities warm  PSYCH: A&Ox3  SKIN: No obvious rashes or lesions  NEUROLOGY: Strength and sensation intact in all extremities.

## 2024-02-02 NOTE — H&P ADULT - HISTORY OF PRESENT ILLNESS
42 yo female w/ pmhx of Crohns disease, ventral hernia s/p repair, and latent TB. Patient endorses at least 2 months of intermittent abdominal and chest pain that has been acutely worsening since this week. Also endorses intermittent weeklong episodes of watery diarrhea occuring 5x per day last week, not resolved. Patient does not endorse any active SOB. Reports pleuritic chest pain and worsens with movement. Abdominal pain is generalized, crampy sensation. Denies any N/V. No recent episodes of emesis. Denies any dysuria, hemtochezia, melena. Of note, reports she is currently on no medications and does not follow up with GI or PCP. Previous notes indicates last GI visit was in 6/2022, where she was started on Isoniazid for +Quant Gold and negtaive CXR. Patient reports she self d/c Isoniazid for leg swelling that occurred 1-2 months after taking. Denies any current leg swelling, LE edema, or calf pain. Has only tried tylenol for the abdominal pain with some releif. Reports releif with Maalox and pepcid.     ED Course: CT A/P and CTA Chest obtained, started on a Heparin ggt, given Maalox and pepcid.

## 2024-02-02 NOTE — ED ADULT NURSE NOTE - OBJECTIVE STATEMENT
44 yo F AAOx4 received to 10B p/w generalized abd pain x months, "worse pain" over the past few days- pt reports hx crohns- no distress- #20 placed to LAC, pt pending CT scan

## 2024-02-02 NOTE — ED PROVIDER NOTE - PROGRESS NOTE DETAILS
[place] PGY-1  CT abdomen and pelvis with evidence of PE per radiology. Will obtain CT chest for further eval per recommendations. Anticipate admission to medicine pending CT chest. Bishop LERMA: Confirmed pulmonary embolism (received call from radiologist for dedicated CT pulm angio). heparin started, admitted.

## 2024-02-02 NOTE — PATIENT PROFILE ADULT - FALL HARM RISK - UNIVERSAL INTERVENTIONS
Bed in lowest position, wheels locked, appropriate side rails in place/Call bell, personal items and telephone in reach/Instruct patient to call for assistance before getting out of bed or chair/Non-slip footwear when patient is out of bed/[place] to call system/Physically safe environment - no spills, clutter or unnecessary equipment/Purposeful Proactive Rounding/Room/bathroom lighting operational, light cord in reach

## 2024-02-02 NOTE — PATIENT PROFILE ADULT - HISTORY OF COVID-19 VACCINATION
Remains in ICU. IVF per orders.  Feet in HOB; surgical dressing in place. Afebrile, VS WDL. Kerns draining adequate urine.  C/o pain X 1; PRN medications admin per orders.  C/o Nausea and vomiting. Neuro checks q4h completed. NPO with ice chips tolerating well. One time dose of phenergan admin X 1 with full relied of systems.  POC reviewed with pt; expressed understanding.      Problem: Wound  Goal: Optimal Wound Healing  Outcome: Ongoing, Progressing     Problem: Diabetes Comorbidity  Goal: Blood Glucose Level Within Desired Range  Outcome: Ongoing, Progressing     Problem: Electrolyte Imbalance (Acute Kidney Injury/Impairment)  Goal: Serum Electrolyte Balance  Outcome: Ongoing, Progressing     Problem: Fluid Imbalance (Acute Kidney Injury/Impairment)  Goal: Optimal Fluid Balance  Outcome: Ongoing, Progressing     Problem: Hematologic Alteration (Acute Kidney Injury/Impairment)  Goal: Hemoglobin, Hematocrit and Platelets Within Normal Range  Outcome: Ongoing, Progressing     Problem: Oral Intake Inadequate (Acute Kidney Injury/Impairment)  Goal: Optimal Nutrition Intake  Outcome: Ongoing, Progressing     Problem: Renal Function Impairment (Acute Kidney Injury/Impairment)  Goal: Effective Renal Function  Outcome: Ongoing, Progressing     Problem: Adult Inpatient Plan of Care  Goal: Plan of Care Review  Outcome: Ongoing, Progressing  Goal: Patient-Specific Goal (Individualization)  Outcome: Ongoing, Progressing  Goal: Absence of Hospital-Acquired Illness or Injury  Outcome: Ongoing, Progressing  Goal: Optimal Comfort and Wellbeing  Outcome: Ongoing, Progressing  Goal: Readiness for Transition of Care  Outcome: Ongoing, Progressing  Goal: Rounds/Family Conference  Outcome: Ongoing, Progressing     Problem: Infection  Goal: Infection Symptom Resolution  Outcome: Ongoing, Progressing     Problem: Skin Injury Risk Increased  Goal: Skin Health and Integrity  Outcome: Ongoing, Progressing      Yes

## 2024-02-02 NOTE — ED PROVIDER NOTE - OBJECTIVE STATEMENT
43F with PMH Crohn's disease (previously on steroids, has not taken medications for over a year) and ventral hernia repair with mesh in [facility] (Mar 2022, c/b seroma s/p aspiration) presenting with subacute abdominal pain x months, worsened in the past week. Pt also reports chest pain that occurs with abdominal pain, diarrhea/constipation, nausea, and occasional room-spinning dizziness. Pt states she took tylenol for pain with mild relief. States abdominal pain and diarrhea worsen with food, still eating but states she is eating less. Denies fever, chills, shortness of breath, dysuria, hematuria, hematochezia. 43F with PMH Crohn's disease (previously on steroids, has not taken medications in over a year) and ventral hernia repair with mesh in [facility] (Mar 2022, c/b seroma s/p aspiration) presenting with subacute abdominal pain x months, worsened in the past week. Pt also reports chest pain that occurs with abdominal pain, diarrhea/constipation, nausea, and occasional room-spinning dizziness. Pt states she took tylenol for pain with mild relief. States abdominal pain and diarrhea worsen with food, still eating but states she is eating less. Denies fever, chills, shortness of breath, dysuria, hematuria, hematochezia.

## 2024-02-02 NOTE — ED PROVIDER NOTE - ATTENDING CONTRIBUTION TO CARE
43 year old with crohn's presents with abd pain for months. Will obtain cbc to rule out anemia. Will obtain CMP to rule out electrolyte abnormalities, liver failure or renal failure. ua for uti. ct for appendicitis vs crohn's flare vs colitis vs sbo.

## 2024-02-03 ENCOUNTER — TRANSCRIPTION ENCOUNTER (OUTPATIENT)
Age: 44
End: 2024-02-03

## 2024-02-03 LAB
ALBUMIN SERPL ELPH-MCNC: 3.3 G/DL — SIGNIFICANT CHANGE UP (ref 3.3–5)
ALP SERPL-CCNC: 67 U/L — SIGNIFICANT CHANGE UP (ref 40–120)
ALT FLD-CCNC: 5 U/L — SIGNIFICANT CHANGE UP (ref 4–33)
ANION GAP SERPL CALC-SCNC: 14 MMOL/L — SIGNIFICANT CHANGE UP (ref 7–14)
AST SERPL-CCNC: 13 U/L — SIGNIFICANT CHANGE UP (ref 4–32)
BASOPHILS # BLD AUTO: 0.07 K/UL — SIGNIFICANT CHANGE UP (ref 0–0.2)
BASOPHILS NFR BLD AUTO: 1.1 % — SIGNIFICANT CHANGE UP (ref 0–2)
BILIRUB SERPL-MCNC: 0.2 MG/DL — SIGNIFICANT CHANGE UP (ref 0.2–1.2)
BLD GP AB SCN SERPL QL: NEGATIVE — SIGNIFICANT CHANGE UP
BUN SERPL-MCNC: 6 MG/DL — LOW (ref 7–23)
CALCIUM SERPL-MCNC: 8.4 MG/DL — SIGNIFICANT CHANGE UP (ref 8.4–10.5)
CHLORIDE SERPL-SCNC: 104 MMOL/L — SIGNIFICANT CHANGE UP (ref 98–107)
CO2 SERPL-SCNC: 21 MMOL/L — LOW (ref 22–31)
CREAT SERPL-MCNC: 0.58 MG/DL — SIGNIFICANT CHANGE UP (ref 0.5–1.3)
EGFR: 116 ML/MIN/1.73M2 — SIGNIFICANT CHANGE UP
EOSINOPHIL # BLD AUTO: 0.26 K/UL — SIGNIFICANT CHANGE UP (ref 0–0.5)
EOSINOPHIL NFR BLD AUTO: 4.2 % — SIGNIFICANT CHANGE UP (ref 0–6)
GLUCOSE SERPL-MCNC: 98 MG/DL — SIGNIFICANT CHANGE UP (ref 70–99)
HCT VFR BLD CALC: 23.8 % — LOW (ref 34.5–45)
HCT VFR BLD CALC: 31.2 % — LOW (ref 34.5–45)
HGB BLD-MCNC: 7 G/DL — CRITICAL LOW (ref 11.5–15.5)
HGB BLD-MCNC: 9.1 G/DL — LOW (ref 11.5–15.5)
IANC: 2.85 K/UL — SIGNIFICANT CHANGE UP (ref 1.8–7.4)
IMM GRANULOCYTES NFR BLD AUTO: 0.5 % — SIGNIFICANT CHANGE UP (ref 0–0.9)
LYMPHOCYTES # BLD AUTO: 2.39 K/UL — SIGNIFICANT CHANGE UP (ref 1–3.3)
LYMPHOCYTES # BLD AUTO: 38.7 % — SIGNIFICANT CHANGE UP (ref 13–44)
MAGNESIUM SERPL-MCNC: 1.9 MG/DL — SIGNIFICANT CHANGE UP (ref 1.6–2.6)
MCHC RBC-ENTMCNC: 16.4 PG — LOW (ref 27–34)
MCHC RBC-ENTMCNC: 17.2 PG — LOW (ref 27–34)
MCHC RBC-ENTMCNC: 29.2 GM/DL — LOW (ref 32–36)
MCHC RBC-ENTMCNC: 29.4 GM/DL — LOW (ref 32–36)
MCV RBC AUTO: 55.7 FL — LOW (ref 80–100)
MCV RBC AUTO: 59.1 FL — LOW (ref 80–100)
MONOCYTES # BLD AUTO: 0.58 K/UL — SIGNIFICANT CHANGE UP (ref 0–0.9)
MONOCYTES NFR BLD AUTO: 9.4 % — SIGNIFICANT CHANGE UP (ref 2–14)
NEUTROPHILS # BLD AUTO: 2.85 K/UL — SIGNIFICANT CHANGE UP (ref 1.8–7.4)
NEUTROPHILS NFR BLD AUTO: 46.1 % — SIGNIFICANT CHANGE UP (ref 43–77)
NRBC # BLD: 0 /100 WBCS — SIGNIFICANT CHANGE UP (ref 0–0)
NRBC # BLD: 0 /100 WBCS — SIGNIFICANT CHANGE UP (ref 0–0)
NRBC # FLD: 0.02 K/UL — HIGH (ref 0–0)
NRBC # FLD: 0.06 K/UL — HIGH (ref 0–0)
PHOSPHATE SERPL-MCNC: 3 MG/DL — SIGNIFICANT CHANGE UP (ref 2.5–4.5)
PLATELET # BLD AUTO: 290 K/UL — SIGNIFICANT CHANGE UP (ref 150–400)
PLATELET # BLD AUTO: 375 K/UL — SIGNIFICANT CHANGE UP (ref 150–400)
POTASSIUM SERPL-MCNC: 3.4 MMOL/L — LOW (ref 3.5–5.3)
POTASSIUM SERPL-SCNC: 3.4 MMOL/L — LOW (ref 3.5–5.3)
PROT SERPL-MCNC: 6.3 G/DL — SIGNIFICANT CHANGE UP (ref 6–8.3)
RBC # BLD: 4.27 M/UL — SIGNIFICANT CHANGE UP (ref 3.8–5.2)
RBC # BLD: 4.27 M/UL — SIGNIFICANT CHANGE UP (ref 3.8–5.2)
RBC # BLD: 5.28 M/UL — HIGH (ref 3.8–5.2)
RBC # FLD: 23.8 % — HIGH (ref 10.3–14.5)
RBC # FLD: 26.2 % — HIGH (ref 10.3–14.5)
RETICS #: 57.6 K/UL — SIGNIFICANT CHANGE UP (ref 25–125)
RETICS/RBC NFR: 1.4 % — SIGNIFICANT CHANGE UP (ref 0.5–2.5)
RH IG SCN BLD-IMP: POSITIVE — SIGNIFICANT CHANGE UP
SODIUM SERPL-SCNC: 139 MMOL/L — SIGNIFICANT CHANGE UP (ref 135–145)
WBC # BLD: 18.38 K/UL — HIGH (ref 3.8–10.5)
WBC # BLD: 6.18 K/UL — SIGNIFICANT CHANGE UP (ref 3.8–10.5)
WBC # FLD AUTO: 18.38 K/UL — HIGH (ref 3.8–10.5)
WBC # FLD AUTO: 6.18 K/UL — SIGNIFICANT CHANGE UP (ref 3.8–10.5)

## 2024-02-03 PROCEDURE — 99233 SBSQ HOSP IP/OBS HIGH 50: CPT

## 2024-02-03 PROCEDURE — 86078 PHYS BLOOD BANK SERV REACTJ: CPT

## 2024-02-03 RX ORDER — IRON SUCROSE 20 MG/ML
200 INJECTION, SOLUTION INTRAVENOUS EVERY 24 HOURS
Refills: 0 | Status: DISCONTINUED | OUTPATIENT
Start: 2024-02-03 | End: 2024-02-03

## 2024-02-03 RX ORDER — IRON SUCROSE 20 MG/ML
200 INJECTION, SOLUTION INTRAVENOUS EVERY 24 HOURS
Refills: 0 | Status: DISCONTINUED | OUTPATIENT
Start: 2024-02-03 | End: 2024-02-05

## 2024-02-03 RX ORDER — ACETAMINOPHEN 500 MG
650 TABLET ORAL ONCE
Refills: 0 | Status: COMPLETED | OUTPATIENT
Start: 2024-02-03 | End: 2024-02-03

## 2024-02-03 RX ORDER — POTASSIUM CHLORIDE 20 MEQ
40 PACKET (EA) ORAL ONCE
Refills: 0 | Status: COMPLETED | OUTPATIENT
Start: 2024-02-03 | End: 2024-02-03

## 2024-02-03 RX ADMIN — HEPARIN SODIUM 1800 UNIT(S)/HR: 5000 INJECTION INTRAVENOUS; SUBCUTANEOUS at 00:34

## 2024-02-03 RX ADMIN — HEPARIN SODIUM 1800 UNIT(S)/HR: 5000 INJECTION INTRAVENOUS; SUBCUTANEOUS at 19:56

## 2024-02-03 RX ADMIN — Medication 325 MILLIGRAM(S): at 11:24

## 2024-02-03 RX ADMIN — HEPARIN SODIUM 1800 UNIT(S)/HR: 5000 INJECTION INTRAVENOUS; SUBCUTANEOUS at 07:34

## 2024-02-03 RX ADMIN — HEPARIN SODIUM 1800 UNIT(S)/HR: 5000 INJECTION INTRAVENOUS; SUBCUTANEOUS at 13:57

## 2024-02-03 RX ADMIN — IRON SUCROSE 110 MILLIGRAM(S): 20 INJECTION, SOLUTION INTRAVENOUS at 19:04

## 2024-02-03 RX ADMIN — Medication 650 MILLIGRAM(S): at 20:30

## 2024-02-03 RX ADMIN — Medication 40 MILLIEQUIVALENT(S): at 11:23

## 2024-02-03 RX ADMIN — Medication 650 MILLIGRAM(S): at 19:02

## 2024-02-03 NOTE — DISCHARGE NOTE PROVIDER - HOSPITAL COURSE
HPI:  42 yo female w/ pmhx of Crohns disease, ventral hernia s/p repair, and latent TB. Patient endorses at least 2 months of intermittent abdominal and chest pain that has been acutely worsening since this week. Also endorses intermittent weeklong episodes of watery diarrhea occuring 5x per day last week, not resolved. Patient does not endorse any active SOB. Reports pleuritic chest pain and worsens with movement. Abdominal pain is generalized, crampy sensation. Denies any N/V. No recent episodes of emesis. Denies any dysuria, hemtochezia, melena. Of note, reports she is currently on no medications and does not follow up with GI or PCP. Previous notes indicates last GI visit was in 6/2022, where she was started on Isoniazid for +Quant Gold and negtaive CXR. Patient reports she self d/c Isoniazid for leg swelling that occurred 1-2 months after taking. Denies any current leg swelling, LE edema, or calf pain. Has only tried tylenol for the abdominal pain with some releif. Reports releif with Maalox and pepcid.     ED Course: CT A/P and CTA Chest obtained, started on a Heparin ggt, given Maalox and pepcid.  (02 Feb 2024 11:45)    Hospital Course:  Patient was admitted to medicine for further treatment of pulmonary embolism and enteritis. Supportive care with Maalox and famotidine were provided for enteritis with improvement in abdominal pain. Patient was started on a Heparin ggt for pulmonary embolism given acute anemia to 7.3 on admission. Patient was given 1u pRBC for treatment of symptomatic anemia on 2/3. Hypercoagulability work-up was sent.     Patient was transitioned to Eliquis on 2/4 given stabilization of hemoglobin and no signs or symptoms of acute bleeding.       Important Medication Changes and Reason:  Eliquis 10mg BID for _______ followed by Eliquis 5mg BID    Active or Pending Issues Requiring Follow-up:  Follow up with GI for further management of Crohn's disease  Follow up with PCP for further management of Pulmonary embolism and results of labs for Hypercoagulability work-up.    Advanced Directives:   [ x ] Full code  [ ] DNR  [ ] Hospice    Discharge Diagnoses: Pulmonary Embolism         HPI:  42 yo female w/ pmhx of Crohns disease, ventral hernia s/p repair, and latent TB. Patient endorses at least 2 months of intermittent abdominal and chest pain that has been acutely worsening since this week. Also endorses intermittent weeklong episodes of watery diarrhea occuring 5x per day last week, not resolved. Patient does not endorse any active SOB. Reports pleuritic chest pain and worsens with movement. Abdominal pain is generalized, crampy sensation. Denies any N/V. No recent episodes of emesis. Denies any dysuria, hemtochezia, melena. Of note, reports she is currently on no medications and does not follow up with GI or PCP. Previous notes indicates last GI visit was in 6/2022, where she was started on Isoniazid for +Quant Gold and negtaive CXR. Patient reports she self d/c Isoniazid for leg swelling that occurred 1-2 months after taking. Denies any current leg swelling, LE edema, or calf pain. Has only tried tylenol for the abdominal pain with some releif. Reports releif with Maalox and pepcid.     ED Course: CT A/P and CTA Chest obtained, started on a Heparin ggt, given Maalox and pepcid.  (02 Feb 2024 11:45)    Hospital Course:  Patient was admitted to medicine for further treatment of pulmonary embolism and enteritis. Supportive care with Maalox and famotidine were provided for enteritis with improvement in abdominal pain. Patient was started on a Heparin ggt for pulmonary embolism given acute anemia to 7.3 on admission. Patient was given 1u pRBC for treatment of symptomatic anemia on 2/3. 2.5 hours after transfusion was started, patient developed a fever and transfusion was stopped. Transfusion reaction was reported to blood bank.     Hypercoagulability work-up was sent.     Patient was transitioned to Eliquis on 2/4 given stabilization of hemoglobin and no signs or symptoms of acute bleeding.       Important Medication Changes and Reason:  Eliquis 10mg BID for _______ followed by Eliquis 5mg BID    Active or Pending Issues Requiring Follow-up:  Follow up with GI for further management of Crohn's disease  Follow up with PCP for further management of Pulmonary embolism and results of labs for Hypercoagulability work-up.    Advanced Directives:   [ x ] Full code  [ ] DNR  [ ] Hospice    Discharge Diagnoses: Pulmonary Embolism         HPI:  42 yo female w/ pmhx of Crohns disease, ventral hernia s/p repair, and latent TB. Patient endorses at least 2 months of intermittent abdominal and chest pain that has been acutely worsening since this week. Also endorses intermittent weeklong episodes of watery diarrhea occuring 5x per day last week, not resolved. Patient does not endorse any active SOB. Reports pleuritic chest pain and worsens with movement. Abdominal pain is generalized, crampy sensation. Denies any N/V. No recent episodes of emesis. Denies any dysuria, hemtochezia, melena. Of note, reports she is currently on no medications and does not follow up with GI or PCP. Previous notes indicates last GI visit was in 6/2022, where she was started on Isoniazid for +Quant Gold and negtaive CXR. Patient reports she self d/c Isoniazid for leg swelling that occurred 1-2 months after taking. Denies any current leg swelling, LE edema, or calf pain. Has only tried tylenol for the abdominal pain with some releif. Reports releif with Maalox and pepcid.     ED Course: CT A/P and CTA Chest obtained, started on a Heparin ggt, given Maalox and pepcid.  (02 Feb 2024 11:45)    Hospital Course:  Patient was admitted to medicine for further treatment of pulmonary embolism and enteritis. Supportive care with Maalox and famotidine were provided for enteritis with improvement in abdominal pain. Patient was started on a Heparin ggt for pulmonary embolism given acute anemia to 7.3 on admission. Patient was given 1u pRBC for treatment of symptomatic anemia on 2/3. 2.5 hours after transfusion was started, patient developed a fever and transfusion was stopped. Transfusion reaction was reported to blood bank.     Hypercoagulability work-up was sent.     Patient was transitioned to Eliquis on 2/4 given stabilization of hemoglobin and no signs or symptoms of acute bleeding. Patient was started on a loading dose of 10mg BID to take through 2/10.      Important Medication Changes and Reason:  Eliquis 10mg BID for 1 week followed by Eliquis 5mg BID for 3 months for treatment of DVT    Active or Pending Issues Requiring Follow-up:  Follow up with GI for further management of Crohn's disease  Follow up with PCP for further management of Pulmonary embolism and results of labs for Hypercoagulability work-up.    Advanced Directives:   [ x ] Full code  [ ] DNR  [ ] Hospice    Discharge Diagnoses: Pulmonary Embolism         HPI:  44 yo female w/ pmhx of Crohns disease, ventral hernia s/p repair, and latent TB. Patient endorses at least 2 months of intermittent abdominal and chest pain that has been acutely worsening since this week. Also endorses intermittent weeklong episodes of watery diarrhea occuring 5x per day last week, not resolved. Patient does not endorse any active SOB. Reports pleuritic chest pain and worsens with movement. Abdominal pain is generalized, crampy sensation. Denies any N/V. No recent episodes of emesis. Denies any dysuria, hemtochezia, melena. Of note, reports she is currently on no medications and does not follow up with GI or PCP. Previous notes indicates last GI visit was in 6/2022, where she was started on Isoniazid for +Quant Gold and negtaive CXR. Patient reports she self d/c Isoniazid for leg swelling that occurred 1-2 months after taking. Denies any current leg swelling, LE edema, or calf pain. Has only tried tylenol for the abdominal pain with some releif. Reports releif with Maalox and pepcid.     ED Course: CT A/P and CTA Chest obtained, started on a Heparin ggt, given Maalox and pepcid.  (02 Feb 2024 11:45)    Hospital Course:  Patient was admitted to medicine for further treatment of pulmonary embolism and enteritis. Supportive care with Maalox and famotidine were provided for enteritis with improvement in abdominal pain. Patient was started on a Heparin ggt for pulmonary embolism given acute anemia to 7.3 on admission. Patient was given 1u pRBC for treatment of symptomatic anemia on 2/3. 2.5 hours after transfusion was started, patient developed a fever and transfusion was stopped. Transfusion reaction was reported to blood bank, and it was deemed a likely febrile, non-hemolytic transfusion reaction.    Hypercoagulability work-up was sent.     Patient was transitioned to Eliquis on 2/4 given stabilization of hemoglobin and no signs or symptoms of acute bleeding. Patient was started on a loading dose of 10mg BID to take through 2/10.    Important Medication Changes and Reason:  Eliquis 10mg BID for 1 week followed by Eliquis 5mg BID for at least 3 months for treatment of DVT    Active or Pending Issues Requiring Follow-up:  Follow up with GI for further management of Crohn's disease  Follow up with PCP for further management of Pulmonary embolism and results of labs for Hypercoagulability work-up.    Advanced Directives:   [ x ] Full code  [ ] DNR  [ ] Hospice    Discharge Diagnoses: Pulmonary Embolism

## 2024-02-03 NOTE — DISCHARGE NOTE PROVIDER - NSFOLLOWUPCLINICS_GEN_ALL_ED_FT
[facility]  Gastroenterology  [address] [place] [id number]  Phone: [phone]  Fax:   Follow Up Time: 2 weeks     [facility]  Gastroenterology  [address] [place] [address] [address]  [place] [address]  Phone: [phone]  Fax:   Follow Up Time: 2 weeks    [facility] [place]  Internal Medicine  [address] [place] [address]  Phone: [phone]  Fax: [phone]  Follow Up Time: 2 weeks

## 2024-02-03 NOTE — DISCHARGE NOTE PROVIDER - NSDCCPCAREPLAN_GEN_ALL_CORE_FT
PRINCIPAL DISCHARGE DIAGNOSIS  Diagnosis: Pulmonary embolism  Assessment and Plan of Treatment: You were incidentally found to have a pulmonary embolism, which is a blood clot within the lungs. People with Crohn's disease are at greater risk for developing clots but further work-up for a disorder that makes you more prone to developing blood clots were sent. The results are currently pending.  You should continue to take the blood thinner Eliquis as an outpatient as perscribed. You will require treatment with blood thinner for at least 3 months.   Please follow up with your primary care doctor for further labs results and management of your blood thinner. You are at an increased risk of bleeding while taking a blood thinner. If you experience any falls or high your head, you should go to the hospital to be further evaluated.      SECONDARY DISCHARGE DIAGNOSES  Diagnosis: Enteritis  Assessment and Plan of Treatment: You were found to have enteritis on your CT A/P. This is inflammation of the small bowel and may or maynot be related to your Crohn's disease. You were treated with medications called Maalox and famotidine.   You can continue to take over the counter medication for this and your symptoms should resolve over time. You should follow up with a GI doctor for further managment of your Crohn's disease    Diagnosis: Crohn disease  Assessment and Plan of Treatment: You have been previously diagnosed with Crohn's disease. It is important that you follow up with a GI doctor for management of this disease. If you have multiple flares with episodes of diarrhea, this can lead to many complicantions including bowel obstruction, development of strictures, or potentially other life threatening complications if your disease is not well controlled.    Diagnosis: Latent tuberculosis  Assessment and Plan of Treatment: You were found to have a positive Quantiferon Gold in 2022 but did not complete a full course of Isoniazid. You should follow up with your primary care provider to determine if you require further treatment.   If you develop any symptoms of cough, bloody sputum, night sweats, fevers, or chest pain, please return to the emergency room for further management.     PRINCIPAL DISCHARGE DIAGNOSIS  Diagnosis: Pulmonary embolism  Assessment and Plan of Treatment: You were incidentally found to have a pulmonary embolism, which is a blood clot within the lungs. People with Crohn's disease are at greater risk for developing clots but further work-up for a disorder that makes you more prone to developing blood clots were sent. The results are currently pending.  You should continue to take the blood thinner Eliquis as an outpatient as perscribed. You will require treatment with blood thinner for at least 3 months.   You will need to take Eliquis 10mg twice a day for the first 7 days (14 doses) followed by Eliquis 5mg twice a day for at least 3 months. You were started on Eliquis on 2/4. You should continue to take Eliquis 10mg twice a day through 2/10. Starting on 2/11, you should take Eliquis 5mg twice a day.   Please follow up with your primary care doctor for further labs results and management of your blood thinner. You are at an increased risk of bleeding while taking a blood thinner. If you experience any falls or high your head, you should go to the hospital to be further evaluated.      SECONDARY DISCHARGE DIAGNOSES  Diagnosis: Enteritis  Assessment and Plan of Treatment: You were found to have enteritis on your CT A/P. This is inflammation of the small bowel and may or maynot be related to your Crohn's disease. You were treated with medications called Maalox and famotidine.   You can continue to take over the counter medication for this and your symptoms should resolve over time. You should follow up with a GI doctor for further managment of your Crohn's disease    Diagnosis: Crohn disease  Assessment and Plan of Treatment: You have been previously diagnosed with Crohn's disease. It is important that you follow up with a GI doctor for management of this disease. If you have multiple flares with episodes of diarrhea, this can lead to many complicantions including bowel obstruction, development of strictures, or potentially other life threatening complications if your disease is not well controlled.    Diagnosis: Latent tuberculosis  Assessment and Plan of Treatment: You were found to have a positive Quantiferon Gold in 2022 but did not complete a full course of Isoniazid. You should follow up with your primary care provider to determine if you require further treatment.   If you develop any symptoms of cough, bloody sputum, night sweats, fevers, or chest pain, please return to the emergency room for further management.

## 2024-02-03 NOTE — PROVIDER CONTACT NOTE (OTHER) - ASSESSMENT
patient complained of feeling like she had a fever. patient 2.5 hours into blood transfusion. all other vital signs stable. patient denied back pain, chills, chest pain, N/V/D, SOB. no hives, diaphoresis observed. patient complained of feeling like she had a fever. temp 100.2. patient 2.5 hours into blood transfusion. all other vital signs stable. patient denied back pain, chills, chest pain, N/V/D, SOB. no hives, diaphoresis observed.

## 2024-02-03 NOTE — PROGRESS NOTE ADULT - PROBLEM SELECTOR PLAN 1
Incidentally found to have PE on CT A/P with confirmation on CTA Chest on admission. Patient satting well on RA without radiographic evidence of RV strain.   - May have remote history of DVT with reported left leg swelling  - May be iso Crohnes disease vs primary hypercoagulability disorder given age    > c/w heparin ggt now given Hgb 7.0, generally stable, plan to transition to Eliquis (insurance covers) tomorrow  > f/u hypercoagulability w/u.

## 2024-02-03 NOTE — PROGRESS NOTE ADULT - SUBJECTIVE AND OBJECTIVE BOX
Internal Medicine Daily Progress Note  Emmanuel Melo MD  Internal Medicine PGY-1  Available on Teams    |:::::::::::::::::::::::::::| SUBJECTIVE |:::::::::::::::::::::::::::|  PROGRESS NOTE:   Patient is a 43y old  Female who presents with a chief complaint of Abdominal and Chest pain (02 Feb 2024 11:45)      SUBJECTIVE / OVERNIGHT EVENTS: No acute overnight events.      |:::::::::::::::::::::::::::| OBJECTIVE |:::::::::::::::::::::::::::|    MEDICATIONS  (STANDING):  heparin  Infusion.  Unit(s)/Hr (18 mL/Hr) IV Continuous <Continuous>  influenza   Vaccine 0.5 milliLiter(s) IntraMuscular once  iron sucrose IVPB 200 milliGRAM(s) IV Intermittent every 24 hours    MEDICATIONS  (PRN):  aluminum hydroxide/magnesium hydroxide/simethicone Suspension 30 milliLiter(s) Oral every 4 hours PRN Dyspepsia  famotidine    Tablet 20 milliGRAM(s) Oral daily PRN Abdominal pain  heparin   Injectable 8500 Unit(s) IV Push every 6 hours PRN For aPTT less than 40  heparin   Injectable 4000 Unit(s) IV Push every 6 hours PRN For aPTT between 40 - 57      CAPILLARY BLOOD GLUCOSE        I&O's Summary      PHYSICAL EXAM:  Vital Signs Last 24 Hrs  T(C): 36.8 (03 Feb 2024 12:36), Max: 36.8 (02 Feb 2024 21:21)  T(F): 98.2 (03 Feb 2024 12:36), Max: 98.3 (02 Feb 2024 21:21)  HR: 69 (03 Feb 2024 12:36) (64 - 74)  BP: 100/59 (03 Feb 2024 12:36) (100/59 - 115/68)  BP(mean): --  RR: 17 (03 Feb 2024 12:36) (17 - 18)  SpO2: 99% (03 Feb 2024 12:36) (99% - 100%)    Parameters below as of 03 Feb 2024 12:36  Patient On (Oxygen Delivery Method): room air        CONSTITUTIONAL: NAD, well-developed  HEENT: PERRLA, EOMI, moist mucous membranes.  NECK: Supple. No JVD.  RESPIRATORY: Normal respiratory effort, Lungs CTAB  CARDIOVASCULAR: Regular rate and rhythm with normal S1 and S2. No murmurs.  ABDOMEN: Soft, non-tender, non-distended. Normoactive bowel sounds, no rebound/guarding; No hepatosplenomegaly  EXTREMITIES: 2+ peripheral pulses. No clubbing, cyanosis, or edema.  MUSCULOSKELETAL: No joint swelling or tenderness to palpation  SKIN: No rashes or lesions  NEUROLOGIC: A&Ox3. No focal deficits.  PSYCH: Affect appropriate    LABS:                        7.0    6.18  )-----------( 375      ( 03 Feb 2024 07:18 )             23.8     02-03    139  |  104  |  6<L>  ----------------------------<  98  3.4<L>   |  21<L>  |  0.58    Ca    8.4      03 Feb 2024 07:18  Phos  3.0     02-03  Mg     1.90     02-03    TPro  6.3  /  Alb  3.3  /  TBili  0.2  /  DBili  x   /  AST  13  /  ALT  5   /  AlkPhos  67  02-03    PT/INR - ( 02 Feb 2024 14:46 )   PT: 12.3 sec;   INR: 1.09 ratio         PTT - ( 02 Feb 2024 23:23 )  PTT:63.2 sec      Urinalysis Basic - ( 03 Feb 2024 07:18 )    Color: x / Appearance: x / SG: x / pH: x  Gluc: 98 mg/dL / Ketone: x  / Bili: x / Urobili: x   Blood: x / Protein: x / Nitrite: x   Leuk Esterase: x / RBC: x / WBC x   Sq Epi: x / Non Sq Epi: x / Bacteria: x          RADIOLOGY & ADDITIONAL TESTS:  Results Reviewed:   Imaging Personally Reviewed:  Electrocardiogram Personally Reviewed:    COORDINATION OF CARE:  Care Discussed with Consultants/Other Providers [Y/N]:  Prior or Outpatient Records Reviewed [Y/N]:

## 2024-02-03 NOTE — CHART NOTE - NSCHARTNOTEFT_GEN_A_CORE
Patient was receiving 1u pRBC for Hgb resuscitation.   Prior to transfusion, patient was doing well with no complaints.   Vitals pre transfusion /81 and temp 98.6.   Tolerated approx 260cc of transfusion when she started complaining of headaches and feeling warm.  Transfusion stopped and /89 and temp 100.2. Repeat temp with 30 min temp 100.9.  On exam, patient resting in bed and denies any chest pain, shortness of breath or flank pain. Otherwise feeling well. Having mild headache and some fever symptoms. Ordered for tylenol.  Blood bank notified. Transfusion report filled and repeat T&S drawn and sent to lab. Pending transfusion workup.  Patient to inform if any new developing symptoms.

## 2024-02-03 NOTE — PROVIDER CONTACT NOTE (OTHER) - ASSESSMENT
patient complained of feeling like she had a fever. patient temp 100.9. blood transfusion has been stopped since 1750. patient 2.5 hours into blood transfusion. patient denied back pain, chills, chest pain, N/V/D, SOB. no hives, diaphoresis observed.

## 2024-02-03 NOTE — DISCHARGE NOTE PROVIDER - NSDCFUADDAPPT_GEN_ALL_CORE_FT
Please follow up with your primary care provider at [facility] for management of your pulmonary embolism and other chronic issues.

## 2024-02-03 NOTE — DISCHARGE NOTE PROVIDER - NSDCCPTREATMENT_GEN_ALL_CORE_FT
PRINCIPAL PROCEDURE  Procedure: CTA chest w/w/o contrast  Findings and Treatment:   < end of copied text >  FINDINGS:  LUNGS AND AIRWAYS: Patent central airways.  Lungs are clear.  PLEURA: No pleural effusion.  MEDIASTINUM AND AMOS: No lymphadenopathy.  VESSELS: Right lower lobe segmental pulmonary embolus.  HEART: Heart size is normal. No pericardial effusion. No definite   evidence of right heart strain.  CHEST WALL AND LOWER NECK: Within normal limits.  VISUALIZED UPPER ABDOMEN: Within normal limits.  BONES: Within normal limits.  IMPRESSION:  Right lower lobe segmental pulmonary embolus. No definite evidence of   right heart strain. Follow-up echocardiogram may be obtained as   clinically warranted.  Findings discussed by Dr. Licona of radiology with Dr. Kimura primary   emergency department team at 5:22 AM on 2/2/2024 with read back   confirmation.  --- End of Report ---< from: CT Angio Chest PE Protocol w/ IV Cont (02.02.24 @ 05:17) >        SECONDARY PROCEDURE  Procedure: CT abdomen and pelvis  Findings and Treatment: FINDINGS:  LOWER CHEST: Question filling defect within a right lower lobe pulmonary artery.  LIVER: Hepatomegaly.  BILE DUCTS: Normal caliber.  GALLBLADDER: Withinnormal limits.  SPLEEN: Within normal limits.  PANCREAS: Within normal limits.  ADRENALS: Within normal limits.  KIDNEYS/URETERS: 2 mm nonobstructing left interpole renal calculus. No hydronephrosis.  BLADDER: Within normal limits.  REPRODUCTIVE ORGANS: 3.1 cm right adnexal cyst, which may be stable since   9/16/2022.  BOWEL: No bowel obstruction. Appendix is normal. There are scattered mildly inflamed loops of small bowel in the anterior right abdomen with mild vasa recta engorgement/stranding (series 301-64 through 79, 602-21   through 28).  PERITONEUM: No ascites.  VESSELS: Within normal limits.  RETROPERITONEUM/LYMPH NODES: No lymphadenopathy.  ABDOMINAL WALL: Within normal limits.  BONES: Within normal limits.  IMPRESSION:  Scattered mildly inflamed loops of small bowel, suggestive of an   enteritis, may reflect patient's underlying history of inflammatory bowel disease.  Suspicion for a filling defect within the right lower lobe pulmonary   artery. Dedicated CTA of the chest is recommended for further evaluation.  Findings and follow-up recommendations discussed by Dr. Licona of radiology with Dr. Kimura of the primary emergency department team at   4:35 AM on 2/2/2024  --- End of Report ---< from: CT Abdomen and Pelvis w/ IV Cont (02.02.24 @ 04:29) >

## 2024-02-03 NOTE — DISCHARGE NOTE PROVIDER - NSDCMRMEDTOKEN_GEN_ALL_CORE_FT
apixaban 5 mg oral tablet: 2 tab(s) orally every 12 hours  apixaban 5 mg oral tablet: 1 tab(s) orally every 12 hours Please start taking 1 tablet every 12 hours on 2/11.

## 2024-02-03 NOTE — PROGRESS NOTE ADULT - PROBLEM SELECTOR PLAN 3
Anemic to 7.3 on admission, baseline 8-10. Likely anemia of chronic disease vs MAGDALENO with poor absorption iso Crohns    > ordering iron studies, type and screen  > Transfuse if Hgb < 7. Giving 1u pRBC for symptomatic anemia today (2/3)  > Hgb generally stable

## 2024-02-04 LAB
ALBUMIN SERPL ELPH-MCNC: 3.3 G/DL — SIGNIFICANT CHANGE UP (ref 3.3–5)
ALP SERPL-CCNC: 62 U/L — SIGNIFICANT CHANGE UP (ref 40–120)
ALT FLD-CCNC: 6 U/L — SIGNIFICANT CHANGE UP (ref 4–33)
ANION GAP SERPL CALC-SCNC: 13 MMOL/L — SIGNIFICANT CHANGE UP (ref 7–14)
APTT BLD: 78.6 SEC — HIGH (ref 24.5–35.6)
AST SERPL-CCNC: 12 U/L — SIGNIFICANT CHANGE UP (ref 4–32)
B2 GLYCOPROT1 AB SER QL: NEGATIVE — SIGNIFICANT CHANGE UP
BASOPHILS # BLD AUTO: 0.08 K/UL — SIGNIFICANT CHANGE UP (ref 0–0.2)
BASOPHILS NFR BLD AUTO: 0.7 % — SIGNIFICANT CHANGE UP (ref 0–2)
BILIRUB SERPL-MCNC: 0.4 MG/DL — SIGNIFICANT CHANGE UP (ref 0.2–1.2)
BLD GP AB SCN SERPL QL: NEGATIVE — SIGNIFICANT CHANGE UP
BUN SERPL-MCNC: 3 MG/DL — LOW (ref 7–23)
CALCIUM SERPL-MCNC: 8.7 MG/DL — SIGNIFICANT CHANGE UP (ref 8.4–10.5)
CHLORIDE SERPL-SCNC: 105 MMOL/L — SIGNIFICANT CHANGE UP (ref 98–107)
CO2 SERPL-SCNC: 22 MMOL/L — SIGNIFICANT CHANGE UP (ref 22–31)
CREAT SERPL-MCNC: 0.55 MG/DL — SIGNIFICANT CHANGE UP (ref 0.5–1.3)
EGFR: 117 ML/MIN/1.73M2 — SIGNIFICANT CHANGE UP
EOSINOPHIL # BLD AUTO: 0.22 K/UL — SIGNIFICANT CHANGE UP (ref 0–0.5)
EOSINOPHIL NFR BLD AUTO: 2 % — SIGNIFICANT CHANGE UP (ref 0–6)
GLUCOSE SERPL-MCNC: 91 MG/DL — SIGNIFICANT CHANGE UP (ref 70–99)
HCT VFR BLD CALC: 25.8 % — LOW (ref 34.5–45)
HGB BLD-MCNC: 7.8 G/DL — LOW (ref 11.5–15.5)
IANC: 7.76 K/UL — HIGH (ref 1.8–7.4)
IMM GRANULOCYTES NFR BLD AUTO: 0.8 % — SIGNIFICANT CHANGE UP (ref 0–0.9)
LYMPHOCYTES # BLD AUTO: 19.2 % — SIGNIFICANT CHANGE UP (ref 13–44)
LYMPHOCYTES # BLD AUTO: 2.16 K/UL — SIGNIFICANT CHANGE UP (ref 1–3.3)
MAGNESIUM SERPL-MCNC: 2 MG/DL — SIGNIFICANT CHANGE UP (ref 1.6–2.6)
MCHC RBC-ENTMCNC: 17.2 PG — LOW (ref 27–34)
MCHC RBC-ENTMCNC: 30.2 GM/DL — LOW (ref 32–36)
MCV RBC AUTO: 57 FL — LOW (ref 80–100)
MONOCYTES # BLD AUTO: 0.96 K/UL — HIGH (ref 0–0.9)
MONOCYTES NFR BLD AUTO: 8.5 % — SIGNIFICANT CHANGE UP (ref 2–14)
NEUTROPHILS # BLD AUTO: 7.76 K/UL — HIGH (ref 1.8–7.4)
NEUTROPHILS NFR BLD AUTO: 68.8 % — SIGNIFICANT CHANGE UP (ref 43–77)
NRBC # BLD: 0 /100 WBCS — SIGNIFICANT CHANGE UP (ref 0–0)
NRBC # FLD: 0.04 K/UL — HIGH (ref 0–0)
PHOSPHATE SERPL-MCNC: 2.7 MG/DL — SIGNIFICANT CHANGE UP (ref 2.5–4.5)
PLATELET # BLD AUTO: 382 K/UL — SIGNIFICANT CHANGE UP (ref 150–400)
POTASSIUM SERPL-MCNC: 3.4 MMOL/L — LOW (ref 3.5–5.3)
POTASSIUM SERPL-SCNC: 3.4 MMOL/L — LOW (ref 3.5–5.3)
PROT SERPL-MCNC: 6.2 G/DL — SIGNIFICANT CHANGE UP (ref 6–8.3)
RBC # BLD: 4.53 M/UL — SIGNIFICANT CHANGE UP (ref 3.8–5.2)
RBC # FLD: 25.5 % — HIGH (ref 10.3–14.5)
RH IG SCN BLD-IMP: POSITIVE — SIGNIFICANT CHANGE UP
SODIUM SERPL-SCNC: 140 MMOL/L — SIGNIFICANT CHANGE UP (ref 135–145)
WBC # BLD: 11.27 K/UL — HIGH (ref 3.8–10.5)
WBC # FLD AUTO: 11.27 K/UL — HIGH (ref 3.8–10.5)

## 2024-02-04 PROCEDURE — 99233 SBSQ HOSP IP/OBS HIGH 50: CPT

## 2024-02-04 RX ORDER — APIXABAN 2.5 MG/1
10 TABLET, FILM COATED ORAL EVERY 12 HOURS
Refills: 0 | Status: DISCONTINUED | OUTPATIENT
Start: 2024-02-04 | End: 2024-02-05

## 2024-02-04 RX ORDER — POTASSIUM CHLORIDE 20 MEQ
20 PACKET (EA) ORAL
Refills: 0 | Status: COMPLETED | OUTPATIENT
Start: 2024-02-04 | End: 2024-02-04

## 2024-02-04 RX ADMIN — HEPARIN SODIUM 1800 UNIT(S)/HR: 5000 INJECTION INTRAVENOUS; SUBCUTANEOUS at 05:48

## 2024-02-04 RX ADMIN — Medication 20 MILLIEQUIVALENT(S): at 12:17

## 2024-02-04 RX ADMIN — IRON SUCROSE 110 MILLIGRAM(S): 20 INJECTION, SOLUTION INTRAVENOUS at 18:43

## 2024-02-04 RX ADMIN — HEPARIN SODIUM 1800 UNIT(S)/HR: 5000 INJECTION INTRAVENOUS; SUBCUTANEOUS at 07:17

## 2024-02-04 RX ADMIN — APIXABAN 10 MILLIGRAM(S): 2.5 TABLET, FILM COATED ORAL at 23:20

## 2024-02-04 RX ADMIN — Medication 20 MILLIEQUIVALENT(S): at 10:05

## 2024-02-04 RX ADMIN — HEPARIN SODIUM 1800 UNIT(S)/HR: 5000 INJECTION INTRAVENOUS; SUBCUTANEOUS at 08:46

## 2024-02-04 RX ADMIN — APIXABAN 10 MILLIGRAM(S): 2.5 TABLET, FILM COATED ORAL at 12:17

## 2024-02-04 NOTE — CONSULT NOTE ADULT - ASSESSMENT
42yo w/ PMHx Crohns disease, ventral hernia s/p repair, latent TB who is admitted for abdominal and chest pain as well as diarrhea found to incidentally have PEs. GI consulted for hx of Crohns disease.    #Newly diagnosed PE  #Latent TB  #Hx Crohns disease (diagnosed [facility] 12/2021 per patient)  #anemia  Hx of Crohns dating back to 2021 and previously on azathioprine and prednisolone and w/ reports of plans to switch to biologics ~18 months ago however appears she was lost to follow up after coming off of isoniozid for +quant B. Currently appears sxs are controlled but w/ concern for active dx based on imaging and given her anemia.   Recommendations:  -Please send fecal calprotectin  -Send ESR  -Please attempt to obtain records from [facility]   -Will consider MRE for further evaulation of small bowel  -Ultimately will need Colonoscopy given last 2 years ago    Note incomplete until finalized by attending signature/attestation.    Rachel Joshi  GI/Hepatology Fellow PGY5    NON-URGENT CONSULTS:  Please email [email or web address]  AT NIGHT AND ON WEEKENDS:  Available on Microsoft Teams  [phone] (Long Range Pager)    After 5pm, please contact the on-call GI fellow. [phone]   44yo w/ PMHx Crohns disease, ventral hernia s/p repair, latent TB who is admitted for abdominal and chest pain as well as diarrhea found to incidentally have PEs. GI consulted for hx of Crohns disease.    #Newly diagnosed PE  #Latent TB  #Hx Crohns disease (diagnosed [facility] 12/2021 per patient)  #anemia  Hx of Crohns dating back to 2021 and previously on azathioprine and prednisolone and w/ reports of plans to switch to biologics ~18 months ago however appears she was lost to follow up after coming off of isoniozid for +quant B. 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 Currently appears sxs are controlled but w/ concern for active dx based on imaging and given her anemia.   Recommendations:  -Please send fecal calprotectin  -Send ESR  -Given hx of incompletely treated TB, would obtain ID consult for assistance w/ treatment of TB as this may limit options for crohns treatment moving forawrd  -Please attempt to obtain records from [facility]   -Can obtain CTE vs. MRE while inpatient for further eavl of ileal disease   -Rest of management can likely be deferred to outpt setting; will need close engagement in care as IBD is a chronic condition    Note incomplete until finalized by attending signature/attestation.    Rachel Joshi  GI/Hepatology Fellow PGY5    NON-URGENT CONSULTS:  Please email [email or web address]  AT NIGHT AND ON WEEKENDS:  Available on Microsoft Teams  [phone] (Long Range Pager)    After 5pm, please contact the on-call GI fellow. [phone]

## 2024-02-04 NOTE — CONSULT NOTE ADULT - ATTENDING COMMENTS
Patient seen/staffed with GI fellow on 2/5. Above reviewed and edited as appropriate. Interval events reviewed in chart.     # Abdominal pain, loose stools  # Crohn's disease: Diagnosed in 2021 and previously followed at [facility], but lost to follow up over the last 1-2 years. Previously had been on AZA and prednisolone with plans for transition to biologic therapy, but was awaiting treatment of latent TB (not completed)  # Latent TB pending treatment, partial completion of INH course  # Anemia  # Newly diagnosed PE on Eliquis    --Will benefit from ID consultation regarding management of previously positive Quant Gold and pending therapy for latent TB  --Ultimately would benefit from initiation of biologic therapy, but will need to await clearance from ID perspective. Additionally, given patient's poor follow up and request to establish new GI provider, would defer initiation of biologic therapy to outpatient setting.   --Follow up pending quant gold  --No objection to A/C from GI perspective  --Check stool calprotectin  --Trend CRP  --Will benefit from MRE or CTE to assess disease extent/distribution. Though this can be obtained on outpatient basis, may be beneficial to obtain during hospitalization prior to outpatient GI visit if possible  --If patient would like to follow up with our practice, suggest follow up with Dr. Arthur or Dr. Webb ([phone]). Please reach out if assistance required in expediting visit.     Additional recommendations as above.

## 2024-02-04 NOTE — PROGRESS NOTE ADULT - SUBJECTIVE AND OBJECTIVE BOX
Suman Bonilla, PGY3  Pager [phone] [facility] or [phone] [facility]    Patient is a 43y old  Female who presents with a chief complaint of Abdominal and Chest pain (03 Feb 2024 18:34)      SUBJECTIVE/INTERVAL EVENTS: Patient seen and examined at bedside.  Patient was seen and examined at bedside this morning. Denies any nausea/vomiting/diarrhea, headache, shortness of breath, abdominal pain or chest pain/palpitations. Patient responding appropriately to questions and able to make needs known. Vital signs/imaging/telemetry events reviewed.    No melena or hematochezia per patient.    MEDICATIONS  (STANDING):  heparin  Infusion.  Unit(s)/Hr (18 mL/Hr) IV Continuous <Continuous>  influenza   Vaccine 0.5 milliLiter(s) IntraMuscular once  iron sucrose IVPB 200 milliGRAM(s) IV Intermittent every 24 hours  potassium chloride    Tablet ER 20 milliEquivalent(s) Oral every 2 hours    MEDICATIONS  (PRN):  aluminum hydroxide/magnesium hydroxide/simethicone Suspension 30 milliLiter(s) Oral every 4 hours PRN Dyspepsia  famotidine    Tablet 20 milliGRAM(s) Oral daily PRN Abdominal pain  heparin   Injectable 8500 Unit(s) IV Push every 6 hours PRN For aPTT less than 40  heparin   Injectable 4000 Unit(s) IV Push every 6 hours PRN For aPTT between 40 - 57      VITAL SIGNS:  T(F): 97.9 (02-04-24 @ 05:15), Max: 100.9 (02-03-24 @ 18:30)  HR: 64 (02-04-24 @ 05:15) (64 - 89)  BP: 102/52 (02-04-24 @ 05:15) (100/59 - 134/89)  RR: 16 (02-04-24 @ 05:15) (16 - 18)  SpO2: 99% (02-04-24 @ 05:15) (98% - 100%)    I&O's Summary    03 Feb 2024 07:01  -  04 Feb 2024 07:00  --------------------------------------------------------  IN: 300 mL / OUT: 0 mL / NET: 300 mL      Daily     Daily     PHYSICAL EXAM:  Gen: Alert, NAD  HEENT: NCAT, conjunctiva clear, sclera anicteric, no erythema or exudates in the oropharynx, mmm  Neck: Supple, no JVD  CV: RRR, S1S2, no m/r/g  Resp: CTAB, normal respiratory effort  Abd: Soft, nontender, nondistended, normal bowel sounds  Ext: no edema, no clubbing or cyanosis  Neuro: AOx3, CN2-12 grossly intact, DENISE  SKIN: warm, perfused    LABS:                        7.8    11.27 )-----------( 382      ( 04 Feb 2024 08:36 )             25.8     Hgb Trend: 7.8<--, 9.1<--, 7.0<--, 7.3<--  02-04    140  |  105  |  3<L>  ----------------------------<  91  3.4<L>   |  22  |  0.55    Ca    8.7      04 Feb 2024 08:07  Phos  2.7     02-04  Mg     2.00     02-04    TPro  6.2  /  Alb  3.3  /  TBili  0.4  /  DBili  x   /  AST  12  /  ALT  6   /  AlkPhos  62  02-04    Creatinine Trend: 0.55<--, 0.58<--, 0.60<--  LIVER FUNCTIONS - ( 04 Feb 2024 08:07 )  Alb: 3.3 g/dL / Pro: 6.2 g/dL / ALK PHOS: 62 U/L / ALT: 6 U/L / AST: 12 U/L / GGT: x           PT/INR - ( 02 Feb 2024 14:46 )   PT: 12.3 sec;   INR: 1.09 ratio         PTT - ( 04 Feb 2024 07:00 )  PTT:78.6 sec      Urinalysis Basic - ( 04 Feb 2024 08:07 )    Color: x / Appearance: x / SG: x / pH: x  Gluc: 91 mg/dL / Ketone: x  / Bili: x / Urobili: x   Blood: x / Protein: x / Nitrite: x   Leuk Esterase: x / RBC: x / WBC x   Sq Epi: x / Non Sq Epi: x / Bacteria: x        CAPILLARY BLOOD GLUCOSE          RADIOLOGY & ADDITIONAL TESTS: Reviewed    Imaging Personally Reviewed:    Consultant(s) Notes Reviewed:      Care Discussed with Consultants/Other Providers:

## 2024-02-04 NOTE — PROGRESS NOTE ADULT - PROBLEM SELECTOR PLAN 1
Incidentally found to have PE on CT A/P with confirmation on CTA Chest on admission. Patient satting well on RA without radiographic evidence of RV strain.   - May have remote history of DVT with reported left leg swelling  - May be iso Crohnes disease vs primary hypercoagulability disorder given age    > c/w heparin ggt now given Hgb 7.0, generally stable  > continue with heparin gtt for 2/4 given 7.8 s/p 1u prbc to ensure patient not bleeding  > of note patient did not finish transfusion.   > f/u hypercoagulability w/u. Incidentally found to have PE on CT A/P with confirmation on CTA Chest on admission. Patient satting well on RA without radiographic evidence of RV strain.   - May have remote history of DVT with reported left leg swelling  - May be iso Crohnes disease vs primary hypercoagulability disorder given age    > s/p heparin ggt now given Hgb 7.0, generally stable  > transition to eliquis 2/4 given 7.8 s/p 1u prbc   > of note patient did not finish transfusion.   > f/u hypercoagulability w/u. Incidentally found to have PE on CT A/P with confirmation on CTA Chest on admission. Patient satting well on RA without radiographic evidence of RV strain.   - May have remote history of DVT with reported left leg swelling  - May be iso Crohn's disease vs primary hypercoagulability disorder given age    > s/p heparin ggt now given Hgb 7.0, generally stable  > transition to eliquis 2/4 given 7.8 s/p 1u prbc   > of note patient did not finish transfusion.   > f/u hypercoagulability w/u.

## 2024-02-04 NOTE — CONSULT NOTE ADULT - SUBJECTIVE AND OBJECTIVE BOX
INITIAL GI CONSULTATION  HPI:  44yo w/ PMHx Crohns disease, ventral hernia s/p repair, latent TB who is admitted for abdominal and chest pain as well as diarrhea found to incidentally have PEs. GI consulted for hx of Crohns disease.    Patient reports she was diagnosed 2021 in [facility] and was previously on prednisolone and azathioprine which she reports were stopped ~18 months ago. She notes that she followed at [facility] and plan had been to transition to a biologic however she tested pos for quant gold adn was started on isoniozid which she did not tolerate and was therefore never started on biologic therapy. Since discontinuing meds 18 months ago she reports stability of sxs, typically going to the bathroom once daily and easy to pass without any blood. Presented here w/ diarrhea which she reports has now resolved and did not have any hematochezia or other GI sxs. Her last colonoscopy was reportedly 2 years ago and per patient was normal. Team is asking for GI input given poor outpt follow up.       This admission has remained HD stable. CT showed scattered mildly inflamed loops of small bowel suggestive of enteritis as well as filling defect suggestive of PE. Labs are notable for Hb 7.3 -> 7 s/p 1u pRBCs on 2/3 w/ repeat Hb 9.1 --> 7.8. No bmm in 2 days. CRP 47.7.       ASA/NSAIDs/anticoagulation: Started on AC this admission for PE.        FamHx: ***no h/o GI malignancies known  PMH/PSH:  PAST MEDICAL & SURGICAL HISTORY:  Anemia      Gastritis      Crohn's disease      H/O:       H/O umbilical hernia repair  with rectus diastasis repair 3/10/22 in [facility]          MEDS:  MEDICATIONS  (STANDING):  apixaban 10 milliGRAM(s) Oral every 12 hours  influenza   Vaccine 0.5 milliLiter(s) IntraMuscular once  iron sucrose IVPB 200 milliGRAM(s) IV Intermittent every 24 hours    MEDICATIONS  (PRN):  aluminum hydroxide/magnesium hydroxide/simethicone Suspension 30 milliLiter(s) Oral every 4 hours PRN Dyspepsia  famotidine    Tablet 20 milliGRAM(s) Oral daily PRN Abdominal pain    Allergies    No Known Allergies    Intolerances          ______________________________________________________________________  PHYSICAL EXAM:  T(C): 36.5 (24 @ 12:51), Max: 36.9 (24 @ 20:30)  HR: 72 (24 @ 12:51)  BP: 123/72 (24 @ 12:51)  RR: 18 (24 @ 12:51)  SpO2: 98% (24 @ 12:51)  Wt(kg): --      --------------------------------------------------------  IN:    PRBCs (Packed Red Blood Cells): 300 mL  Total IN: 300 mL    OUT:  Total OUT: 0 mL    Total NET: 300 mL        GEN: NAD, normocephalic  CVS: Normal rate, HD stable  CHEST: No signs of respiratory distress, breathing comfortably, no accessory muscle usage  ABD: soft , nontender, nondistended, bowel sounds present  EXTR: no cyanosis, no clubbing, no edema  NEURO: Awake and alert, conversant  SKIN:  warm;  non icteric      Labs/Imaging reviewed.                        7.8    11. )-----------( 382      ( 2024 08:36 )             25.8     Last Hb:Hemoglobin: 7.8 g/dL (24 @ 08:36)  Hemoglobin: 9.1 g/dL (24 @ 18:57)  Hemoglobin: 7.0 g/dL (24 @ 07:18)               140   |  105   |  3                  Ca: 8.7    BMP:   ----------------------------< 91     M.00  (24 @ 08:07)             3.4    |  22    | 0.55               Ph: 2.7      LFT:     TPro: 6.2 / Alb: 3.3 / TBili: 0.4 / DBili: x / AST: 12 / ALT: 6 / AlkPhos: 62   (24 @ 08:07)    Creatinine: 0.55 mg/dL  Creatinine: 0.58 mg/dL  Creatinine: 0.60 mg/dL      BUN/Cr: Blood Urea Nitrogen: 3 mg/dL (24 @ 08:07)  /Creatinine: 0.55 mg/dL (24 @ 08:07)    AST/ALTAspartate Aminotransferase (AST/SGOT): 12 U/L (24 @ 08:07)  /Alanine Aminotransferase (ALT/SGPT): 6 U/L (24 @ 08:07)    ALP Alkaline Phosphatase: 62 U/L (24 @ 08:07)    T/Dbili /  INR: INR: 1.09 ratio (24 @ 14:46)      EGD/[place]:  Upper Endoscopy:  (19 @ 11:10)  Colonoscopy:  (19 @ 11:09)      Imaging:  CT Abdomen and Pelvis w/ IV Cont:   ACC: [id number] EXAM:  CT ABDOMEN AND PELVIS IC   ORDERED BY: ATSUMI KIMURA     PROCEDURE DATE:  2024          INTERPRETATION:  CLINICAL INFORMATION: Abdominal pain with intermittent   chest pain for approximately one month. History of Crohn's disease.    COMPARISON: CT abdomen pelvis 2022.    CONTRAST/COMPLICATIONS:  IV Contrast: Omnipaque 350  90 cc administered   10 cc discarded  Oral Contrast: NONE  Complications: None reported at time of study completion    PROCEDURE:  CT of the Abdomen and Pelvis was performed.  Sagittal and coronal reformats were performed.    FINDINGS:  LOWER CHEST: Question filling defect within a right lower lobe pulmonary   artery.    LIVER: Hepatomegaly.  BILE DUCTS: Normal caliber.  GALLBLADDER: Withinnormal limits.  SPLEEN: Within normal limits.  PANCREAS: Within normal limits.  ADRENALS: Within normal limits.  KIDNEYS/URETERS: 2 mm nonobstructing left interpole renal calculus. No   hydronephrosis.    BLADDER: Within normal limits.  REPRODUCTIVE ORGANS: 3.1 cm right adnexal cyst, which may be stable since   2022.    BOWEL: No bowel obstruction. Appendix is normal. There are scattered   mildly inflamed loops of small bowel in the anterior right abdomen with   mild vasa recta engorgement/stranding (series 301-64 through 79, 602-21   through 28).  PERITONEUM: No ascites.  VESSELS: Within normal limits.  RETROPERITONEUM/LYMPH NODES: No lymphadenopathy.  ABDOMINAL WALL: Within normal limits.  BONES: Within normal limits.    IMPRESSION:  Scattered mildly inflamed loops of small bowel, suggestive of an   enteritis, may reflect patient's underlying history of inflammatory bowel   disease.    Suspicion for a filling defect within the right lower lobe pulmonary   artery. Dedicated CTA of the chest is recommended for further evaluation.    Findings and follow-up recommendations discussed by Dr. Licona of   radiology with Dr. Kimura of the primary emergency department team at   4:35 AM on 2024    --- End of Report ---         INITIAL GI CONSULTATION  HPI:  44yo w/ PMHx Crohns disease, ventral hernia s/p repair, latent TB who is admitted for abdominal and chest pain as well as diarrhea found to incidentally have PEs. GI consulted for hx of Crohns disease.    Patient reports she was diagnosed 2021 in [facility] and was previously on prednisolone and azathioprine which she reports were stopped ~18 months ago. She notes that she followed at [facility] and plan had been to transition to a biologic however she tested pos for quant gold and was started on isoniozid which she did not tolerate and was therefore never started on biologic therapy. Since discontinuing meds 18 months ago she reports stability of sxs, typically going to the bathroom once daily and easy to pass without any blood. Presented here w/ diarrhea which she reports has now resolved and did not have any hematochezia or other GI sxs. Her last colonoscopy was reportedly 2 years ago and per patient was normal. Last colonoscopy per our records was 2019 showing few superficial rectal ulcers w/ path showing chronic active proctitis. EGD at that time showed normal stomach which was biopsied, one 3mm posterior duodenal polyp. 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       This admission has remained HD stable. CT showed scattered mildly inflamed loops of small bowel suggestive of enteritis as well as filling defect suggestive of PE. Inflamed loops of ileum seen on prior CT . Labs are notable for Hb 7.3 -> 7 s/p 1u pRBCs on 2/3 w/ repeat Hb 9.1 --> 7.8. No bm in 2 days. CRP 47.7.       ASA/NSAIDs/anticoagulation: Started on AC this admission for PE.        FamHx: ***no h/o GI malignancies known  PMH/PSH:  PAST MEDICAL & SURGICAL HISTORY:  Anemia      Gastritis      Crohn's disease      H/O:       H/O umbilical hernia repair  with rectus diastasis repair 3/10/22 in [facility]          MEDS:  MEDICATIONS  (STANDING):  apixaban 10 milliGRAM(s) Oral every 12 hours  influenza   Vaccine 0.5 milliLiter(s) IntraMuscular once  iron sucrose IVPB 200 milliGRAM(s) IV Intermittent every 24 hours    MEDICATIONS  (PRN):  aluminum hydroxide/magnesium hydroxide/simethicone Suspension 30 milliLiter(s) Oral every 4 hours PRN Dyspepsia  famotidine    Tablet 20 milliGRAM(s) Oral daily PRN Abdominal pain    Allergies    No Known Allergies    Intolerances          ______________________________________________________________________  PHYSICAL EXAM:  T(C): 36.5 (24 @ 12:51), Max: 36.9 (24 @ 20:30)  HR: 72 (24 @ 12:51)  BP: 123/72 (24 @ 12:51)  RR: 18 (24 @ 12:51)  SpO2: 98% (24 @ 12:51)  Wt(kg): --      --------------------------------------------------------  IN:    PRBCs (Packed Red Blood Cells): 300 mL  Total IN: 300 mL    OUT:  Total OUT: 0 mL    Total NET: 300 mL        GEN: NAD, normocephalic  CVS: Normal rate, HD stable  CHEST: No signs of respiratory distress, breathing comfortably, no accessory muscle usage  ABD: soft , nontender, nondistended, bowel sounds present  EXTR: no cyanosis, no clubbing, no edema  NEURO: Awake and alert, conversant  SKIN:  warm;  non icteric      Labs/Imaging reviewed.                        7.8    11.27 )-----------( 382      ( 2024 08:36 )             25.8     Last Hb:Hemoglobin: 7.8 g/dL (24 @ 08:36)  Hemoglobin: 9.1 g/dL (24 @ 18:57)  Hemoglobin: 7.0 g/dL (24 @ 07:18)               140   |  105   |  3                  Ca: 8.7    BMP:   ----------------------------< 91     M.00  (24 @ 08:07)             3.4    |  22    | 0.55               Ph: 2.7      LFT:     TPro: 6.2 / Alb: 3.3 / TBili: 0.4 / DBili: x / AST: 12 / ALT: 6 / AlkPhos: 62   (24 @ 08:07)    Creatinine: 0.55 mg/dL  Creatinine: 0.58 mg/dL  Creatinine: 0.60 mg/dL      BUN/Cr: Blood Urea Nitrogen: 3 mg/dL (24 @ 08:07)  /Creatinine: 0.55 mg/dL (24 @ 08:07)    AST/ALTAspartate Aminotransferase (AST/SGOT): 12 U/L (24 @ 08:07)  /Alanine Aminotransferase (ALT/SGPT): 6 U/L (24 @ 08:07)    ALP Alkaline Phosphatase: 62 U/L (24 @ 08:07)    T/Dbili /  INR: INR: 1.09 ratio (24 @ 14:46)      EGD/[place]:  Upper Endoscopy:  (19 @ 11:10)  Colonoscopy:  (19 @ 11:09)      Imaging:  CT Abdomen and Pelvis w/ IV Cont:   ACC: [id number] EXAM:  CT ABDOMEN AND PELVIS IC   ORDERED BY: ATSUMI KIMURA     PROCEDURE DATE:  2024          INTERPRETATION:  CLINICAL INFORMATION: Abdominal pain with intermittent   chest pain for approximately one month. History of Crohn's disease.    COMPARISON: CT abdomen pelvis 2022.    CONTRAST/COMPLICATIONS:  IV Contrast: Omnipaque 350  90 cc administered   10 cc discarded  Oral Contrast: NONE  Complications: None reported at time of study completion    PROCEDURE:  CT of the Abdomen and Pelvis was performed.  Sagittal and coronal reformats were performed.    FINDINGS:  LOWER CHEST: Question filling defect within a right lower lobe pulmonary   artery.    LIVER: Hepatomegaly.  BILE DUCTS: Normal caliber.  GALLBLADDER: Withinnormal limits.  SPLEEN: Within normal limits.  PANCREAS: Within normal limits.  ADRENALS: Within normal limits.  KIDNEYS/URETERS: 2 mm nonobstructing left interpole renal calculus. No   hydronephrosis.    BLADDER: Within normal limits.  REPRODUCTIVE ORGANS: 3.1 cm right adnexal cyst, which may be stable since   2022.    BOWEL: No bowel obstruction. Appendix is normal. There are scattered   mildly inflamed loops of small bowel in the anterior right abdomen with   mild vasa recta engorgement/stranding (series 301-64 through 79, 602-21   through 28).  PERITONEUM: No ascites.  VESSELS: Within normal limits.  RETROPERITONEUM/LYMPH NODES: No lymphadenopathy.  ABDOMINAL WALL: Within normal limits.  BONES: Within normal limits.    IMPRESSION:  Scattered mildly inflamed loops of small bowel, suggestive of an   enteritis, may reflect patient's underlying history of inflammatory bowel   disease.    Suspicion for a filling defect within the right lower lobe pulmonary   artery. Dedicated CTA of the chest is recommended for further evaluation.    Findings and follow-up recommendations discussed by Dr. Licona of   radiology with Dr. Kimura of the primary emergency department team at   4:35 AM on 2024    --- End of Report ---    Colonoscopy 2019  Findings:       A few superficial 3 to 4 mm ulcers were found in the distal rectum. The        interval rectal mucosa was normal. No bleeding was present. No stigmata        of recent bleeding were seen. Biopsies were taken with a cold forceps     for histology. Estimated blood loss was minimal.       A moderate amount of liquid stool was found in the entire colon, without        interference to visualization.       The exam was otherwise normal throughout the examined colon. Mucous was     seen in the terminal ileum, which was easily washed off.                                                                                   Impression:          - A few superfical ulcers in the distal rectum.                        Biopsied. Colonosocpy examination was otherwise normal.  Recommendation:      - Return patient to [facility] for ongoing care.                       - Resume regular diet.                       - Await pathology results.    < end of copied text >  < from: Upper Endoscopy (19 @ 11:10) >                                                                                   Impression:          - Normal esophagus.                       - Z-lineregular, 40 cm from the incisors.                       - Normal stomach. Biopsied.                       - One 3mm posterior wall duodenal bulb polyp. Biopsied.                       - Biopsies were taken with a cold forceps for evaluation                of celiac disease.  Recommendation:      - Return patient to hospital [facility] for ongoing care.                       - Resume regular diet.                       - Await pathology results.                                   < end of copied text >

## 2024-02-04 NOTE — PROGRESS NOTE ADULT - TIME BILLING
- Ordering, reviewing, and interpreting labs, testing, and imaging.  - Independently obtaining a review of systems and performing a physical exam  - Reviewing prior hospitalization and where necessary, outpatient records.  - Reviewing consultant recommendations/communicating with consultants  - Counselling and educating patient and family regarding interpretation of aforementioned items and plan of care.
- Ordering, reviewing, and interpreting labs, testing, and imaging.  - Independently obtaining a review of systems and performing a physical exam  - Reviewing prior hospitalization and where necessary, outpatient records.  - Reviewing consultant recommendations/communicating with consultants  - Counselling and educating patient and family regarding interpretation of aforementioned items and plan of care.
Abdomen soft, non-tender, no guarding.

## 2024-02-05 ENCOUNTER — TRANSCRIPTION ENCOUNTER (OUTPATIENT)
Age: 44
End: 2024-02-05

## 2024-02-05 VITALS
HEART RATE: 78 BPM | SYSTOLIC BLOOD PRESSURE: 107 MMHG | OXYGEN SATURATION: 98 % | RESPIRATION RATE: 18 BRPM | TEMPERATURE: 98 F | DIASTOLIC BLOOD PRESSURE: 55 MMHG

## 2024-02-05 LAB
ALBUMIN SERPL ELPH-MCNC: 3.4 G/DL — SIGNIFICANT CHANGE UP (ref 3.3–5)
ALP SERPL-CCNC: 59 U/L — SIGNIFICANT CHANGE UP (ref 40–120)
ALT FLD-CCNC: 22 U/L — SIGNIFICANT CHANGE UP (ref 4–33)
ANION GAP SERPL CALC-SCNC: 12 MMOL/L — SIGNIFICANT CHANGE UP (ref 7–14)
APCR PPP: 3.01 RATIO — SIGNIFICANT CHANGE UP
APTT BLD: 37.7 SEC — HIGH (ref 24.5–35.6)
AST SERPL-CCNC: 52 U/L — HIGH (ref 4–32)
BASOPHILS # BLD AUTO: 0.08 K/UL — SIGNIFICANT CHANGE UP (ref 0–0.2)
BASOPHILS NFR BLD AUTO: 1.2 % — SIGNIFICANT CHANGE UP (ref 0–2)
BILIRUB SERPL-MCNC: 0.2 MG/DL — SIGNIFICANT CHANGE UP (ref 0.2–1.2)
BUN SERPL-MCNC: 3 MG/DL — LOW (ref 7–23)
CALCIUM SERPL-MCNC: 8.6 MG/DL — SIGNIFICANT CHANGE UP (ref 8.4–10.5)
CARDIOLIPIN AB SER-ACNC: NEGATIVE — SIGNIFICANT CHANGE UP
CHLORIDE SERPL-SCNC: 104 MMOL/L — SIGNIFICANT CHANGE UP (ref 98–107)
CO2 SERPL-SCNC: 21 MMOL/L — LOW (ref 22–31)
CREAT SERPL-MCNC: 0.62 MG/DL — SIGNIFICANT CHANGE UP (ref 0.5–1.3)
DRVVT RATIO: 1.01 RATIO — SIGNIFICANT CHANGE UP (ref 0–1.21)
DRVVT SCREEN TO CONFIRM RATIO: NEGATIVE — SIGNIFICANT CHANGE UP
EGFR: 114 ML/MIN/1.73M2 — SIGNIFICANT CHANGE UP
EOSINOPHIL # BLD AUTO: 0.21 K/UL — SIGNIFICANT CHANGE UP (ref 0–0.5)
EOSINOPHIL NFR BLD AUTO: 3.1 % — SIGNIFICANT CHANGE UP (ref 0–6)
ERYTHROCYTE [SEDIMENTATION RATE] IN BLOOD: 13 MM/HR — SIGNIFICANT CHANGE UP (ref 4–25)
GLUCOSE SERPL-MCNC: 87 MG/DL — SIGNIFICANT CHANGE UP (ref 70–99)
HCT VFR BLD CALC: 27.4 % — LOW (ref 34.5–45)
HGB BLD-MCNC: 8 G/DL — LOW (ref 11.5–15.5)
IANC: 3.72 K/UL — SIGNIFICANT CHANGE UP (ref 1.8–7.4)
IMM GRANULOCYTES NFR BLD AUTO: 1.8 % — HIGH (ref 0–0.9)
LYMPHOCYTES # BLD AUTO: 1.87 K/UL — SIGNIFICANT CHANGE UP (ref 1–3.3)
LYMPHOCYTES # BLD AUTO: 27.7 % — SIGNIFICANT CHANGE UP (ref 13–44)
MAGNESIUM SERPL-MCNC: 2 MG/DL — SIGNIFICANT CHANGE UP (ref 1.6–2.6)
MCHC RBC-ENTMCNC: 17 PG — LOW (ref 27–34)
MCHC RBC-ENTMCNC: 29.2 GM/DL — LOW (ref 32–36)
MCV RBC AUTO: 58.2 FL — LOW (ref 80–100)
MONOCYTES # BLD AUTO: 0.76 K/UL — SIGNIFICANT CHANGE UP (ref 0–0.9)
MONOCYTES NFR BLD AUTO: 11.2 % — SIGNIFICANT CHANGE UP (ref 2–14)
NEUTROPHILS # BLD AUTO: 3.72 K/UL — SIGNIFICANT CHANGE UP (ref 1.8–7.4)
NEUTROPHILS NFR BLD AUTO: 55 % — SIGNIFICANT CHANGE UP (ref 43–77)
NRBC # BLD: 0 /100 WBCS — SIGNIFICANT CHANGE UP (ref 0–0)
NRBC # FLD: 0.02 K/UL — HIGH (ref 0–0)
PHOSPHATE SERPL-MCNC: 3 MG/DL — SIGNIFICANT CHANGE UP (ref 2.5–4.5)
PLATELET # BLD AUTO: 400 K/UL — SIGNIFICANT CHANGE UP (ref 150–400)
POTASSIUM SERPL-MCNC: 3.3 MMOL/L — LOW (ref 3.5–5.3)
POTASSIUM SERPL-SCNC: 3.3 MMOL/L — LOW (ref 3.5–5.3)
PROT C ACT/NOR PPP: 105 % — SIGNIFICANT CHANGE UP (ref 74–150)
PROT S FREE PPP-ACNC: 121 % — SIGNIFICANT CHANGE UP (ref 70–130)
PROT SERPL-MCNC: 6.7 G/DL — SIGNIFICANT CHANGE UP (ref 6–8.3)
RBC # BLD: 4.71 M/UL — SIGNIFICANT CHANGE UP (ref 3.8–5.2)
RBC # FLD: 26.3 % — HIGH (ref 10.3–14.5)
SODIUM SERPL-SCNC: 137 MMOL/L — SIGNIFICANT CHANGE UP (ref 135–145)
WBC # BLD: 6.76 K/UL — SIGNIFICANT CHANGE UP (ref 3.8–10.5)
WBC # FLD AUTO: 6.76 K/UL — SIGNIFICANT CHANGE UP (ref 3.8–10.5)

## 2024-02-05 PROCEDURE — 99239 HOSP IP/OBS DSCHRG MGMT >30: CPT

## 2024-02-05 PROCEDURE — 99223 1ST HOSP IP/OBS HIGH 75: CPT | Mod: GC

## 2024-02-05 RX ORDER — APIXABAN 2.5 MG/1
2 TABLET, FILM COATED ORAL
Qty: 24 | Refills: 0
Start: 2024-02-05 | End: 2024-02-10

## 2024-02-05 RX ORDER — APIXABAN 2.5 MG/1
1 TABLET, FILM COATED ORAL
Qty: 60 | Refills: 0
Start: 2024-02-05 | End: 2024-03-05

## 2024-02-05 RX ORDER — POTASSIUM CHLORIDE 20 MEQ
40 PACKET (EA) ORAL ONCE
Refills: 0 | Status: COMPLETED | OUTPATIENT
Start: 2024-02-05 | End: 2024-02-05

## 2024-02-05 RX ADMIN — APIXABAN 10 MILLIGRAM(S): 2.5 TABLET, FILM COATED ORAL at 11:51

## 2024-02-05 RX ADMIN — Medication 40 MILLIEQUIVALENT(S): at 11:50

## 2024-02-05 NOTE — PROGRESS NOTE ADULT - ATTENDING COMMENTS
43F with symptomatic anemia and PE. c/w hep gtt, monitor for signs of bleeding. would transfuse 1U prbc given complaint of feeling dizzy. would also give Iron infusion given iron deficiency. monitor h/h post transfusion. transition to DOAC if h/h stable.
patient seen and examined at bedside. reports feeling well, no acute complaints. labs and vitals reviewed. no acute changes. had episode of low grade fever during transfusion. h/h responded well to transfusion. no signs of bleeding. transition to DOAC. c/w IV iron. patient endorsing non-specific abd pain, but no diarrhea. consult GI, patient should be restarted on Crohn's therapy given signs of active disease on CT with MAGDALENO, high risk for bleeding while on AC. check ESR, CRP, stool calprotectin.
patient seen and examined at bedside. reports feeling well, no acute complaints. labs and vitals reviewed. no acute changes. had episode of low grade fever during transfusion. h/h responded well to transfusion. no signs of bleeding. transition to DOAC. c/w IV iron. patient endorsing non-specific abd pain, but no diarrhea. consult GI, patient should be restarted on Crohn's therapy given signs of active disease on CT with MAGDALENO, high risk for bleeding while on AC. s/p 1u PRBCs, H/H now stable.   Will dc with outpt GI follow up      Time spent on discharge plannin min

## 2024-02-05 NOTE — PROGRESS NOTE ADULT - ASSESSMENT
42 yo female w/ pmhx of Crohns disease, ventral hernia s/p repair, and latent TB, p/w abdominal and chest pain with imaging finding c/w RLL subsegmental PE and enteritis 44 yo female w/ pmhx of Crohns disease, ventral hernia s/p repair, and latent TB, p/w abdominal and chest pain with imaging finding c/w RLL subsegmental PE and enteritis. Transitioned to eliquis for her PE. Denying abdominal symptoms.

## 2024-02-05 NOTE — PROVIDER CONTACT NOTE (OTHER) - BACKGROUND
patient admitted for pulmonary embolism
patient admitted for PE. patient on heparin gtt.
patient admitted for PE. patient on heparin gtt.
patient admitted for pulmonary embolism

## 2024-02-05 NOTE — PROGRESS NOTE ADULT - PROBLEM SELECTOR PLAN 3
Anemic to 7.3 on admission, baseline 8-10. Likely anemia of chronic disease vs MAGDALENO with poor absorption iso Crohns    > ordering iron studies, type and screen  > Transfuse if Hgb < 7. Giving 1u pRBC for symptomatic anemia today (2/3)  > Hgb generally stable Anemic to 7.3 on admission, baseline 8-10. Likely anemia of chronic disease vs MAGDALENO with poor absorption iso Crohns    > Transfuse if Hgb < 7. Giving 1u pRBC for symptomatic anemia today (2/3)  > Hgb stable, 8.0 today

## 2024-02-05 NOTE — PROVIDER CONTACT NOTE (OTHER) - ACTION/TREATMENT ORDERED:
MD made aware. no new interventions noted at this time.
MD made aware. MD at bedside to assess. Blood bank notified. type and screen drawn and brought to blood bank, blood components and all associated tubing brought to blood bank.
MD made aware. no interventions noted.
blood transfusion stopped. MD made aware. MD said to recheck temp in one hour. no other interventions noted at this time.

## 2024-02-05 NOTE — DISCHARGE NOTE NURSING/CASE MANAGEMENT/SOCIAL WORK - NSDCFUADDAPPT_GEN_ALL_CORE_FT
Please follow up with your primary care provider at [facility] for management of your pulmonary embolism and other chronic issues.

## 2024-02-05 NOTE — PROGRESS NOTE ADULT - SUBJECTIVE AND OBJECTIVE BOX
PROGRESS NOTE:   Authored by: Clovis Corrales M.D.   Internal Medicine PGY-2  Please Contact Via Teams    Patient is a 43y old  Female who presents with a chief complaint of Abdominal and Chest pain (04 Feb 2024 19:56)      SUBJECTIVE / OVERNIGHT EVENTS:  No acute events overnight.     Tele:    Brief Daily Plan:    MEDICATIONS  (STANDING):  apixaban 10 milliGRAM(s) Oral every 12 hours  influenza   Vaccine 0.5 milliLiter(s) IntraMuscular once  iron sucrose IVPB 200 milliGRAM(s) IV Intermittent every 24 hours    MEDICATIONS  (PRN):  aluminum hydroxide/magnesium hydroxide/simethicone Suspension 30 milliLiter(s) Oral every 4 hours PRN Dyspepsia  famotidine    Tablet 20 milliGRAM(s) Oral daily PRN Abdominal pain      CAPILLARY BLOOD GLUCOSE        I&O's Summary      PHYSICAL EXAM:  Vital Signs Last 24 Hrs  T(C): 36.6 (05 Feb 2024 04:48), Max: 36.8 (04 Feb 2024 22:06)  T(F): 97.8 (05 Feb 2024 04:48), Max: 98.3 (04 Feb 2024 22:06)  HR: 72 (05 Feb 2024 04:48) (72 - 77)  BP: 108/59 (05 Feb 2024 04:48) (108/59 - 123/72)  BP(mean): --  RR: 16 (05 Feb 2024 04:48) (16 - 18)  SpO2: 99% (05 Feb 2024 04:48) (97% - 99%)    Parameters below as of 05 Feb 2024 04:48  Patient On (Oxygen Delivery Method): room air        CONSTITUTIONAL: NAD, well-developed  RESPIRATORY: Normal respiratory effort; lungs are clear to auscultation bilaterally  CARDIOVASCULAR: Regular rate and rhythm, normal S1 and S2, no murmur/rub/gallop; No lower extremity edema; Peripheral pulses are 2+ bilaterally  ABDOMEN: Nontender to palpation, normoactive bowel sounds, no rebound/guarding; No hepatosplenomegaly  MUSCLOSKELETAL: no clubbing or cyanosis of digits; no joint swelling or tenderness to palpation  PSYCH: A+O to person, place, and time; affect appropriate    LABS:                        7.8    11.27 )-----------( 382      ( 04 Feb 2024 08:36 )             25.8     02-04    140  |  105  |  3<L>  ----------------------------<  91  3.4<L>   |  22  |  0.55    Ca    8.7      04 Feb 2024 08:07  Phos  2.7     02-04  Mg     2.00     02-04    TPro  6.2  /  Alb  3.3  /  TBili  0.4  /  DBili  x   /  AST  12  /  ALT  6   /  AlkPhos  62  02-04    PTT - ( 04 Feb 2024 07:00 )  PTT:78.6 sec        MICRO:  Urinalysis Basic - ( 04 Feb 2024 08:07 )    Color: x / Appearance: x / SG: x / pH: x  Gluc: 91 mg/dL / Ketone: x  / Bili: x / Urobili: x   Blood: x / Protein: x / Nitrite: x   Leuk Esterase: x / RBC: x / WBC x   Sq Epi: x / Non Sq Epi: x / Bacteria: x          IMAGING: PROGRESS NOTE:   Authored by: Clovis Corrales M.D.   Internal Medicine PGY-2  Please Contact Via Teams    Patient is a 43y old  Female who presents with a chief complaint of Abdominal and Chest pain (04 Feb 2024 19:56)      SUBJECTIVE / OVERNIGHT EVENTS:  No acute events overnight. Patient feeling well this morning with no acute complaints. No obvious signs of bleeding      MEDICATIONS  (STANDING):  apixaban 10 milliGRAM(s) Oral every 12 hours  influenza   Vaccine 0.5 milliLiter(s) IntraMuscular once  iron sucrose IVPB 200 milliGRAM(s) IV Intermittent every 24 hours    MEDICATIONS  (PRN):  aluminum hydroxide/magnesium hydroxide/simethicone Suspension 30 milliLiter(s) Oral every 4 hours PRN Dyspepsia  famotidine    Tablet 20 milliGRAM(s) Oral daily PRN Abdominal pain      CAPILLARY BLOOD GLUCOSE        I&O's Summary      PHYSICAL EXAM:  Vital Signs Last 24 Hrs  T(C): 36.6 (05 Feb 2024 04:48), Max: 36.8 (04 Feb 2024 22:06)  T(F): 97.8 (05 Feb 2024 04:48), Max: 98.3 (04 Feb 2024 22:06)  HR: 72 (05 Feb 2024 04:48) (72 - 77)  BP: 108/59 (05 Feb 2024 04:48) (108/59 - 123/72)  BP(mean): --  RR: 16 (05 Feb 2024 04:48) (16 - 18)  SpO2: 99% (05 Feb 2024 04:48) (97% - 99%)    Parameters below as of 05 Feb 2024 04:48  Patient On (Oxygen Delivery Method): room air        CONSTITUTIONAL: NAD, well-developed  RESPIRATORY: Normal respiratory effort; lungs are clear to auscultation bilaterally  CARDIOVASCULAR: Regular rate and rhythm, normal S1 and S2, no murmurs  ABDOMEN: Nontender to palpation, normoactive bowel sounds, no rebound/guarding; No hepatosplenomegaly  MUSCLOSKELETAL: no clubbing or cyanosis of digits; no joint swelling or tenderness to palpation  PSYCH: A+O to person, place, and time; affect appropriate    LABS:                        7.8    11.27 )-----------( 382      ( 04 Feb 2024 08:36 )             25.8     02-04    140  |  105  |  3<L>  ----------------------------<  91  3.4<L>   |  22  |  0.55    Ca    8.7      04 Feb 2024 08:07  Phos  2.7     02-04  Mg     2.00     02-04    TPro  6.2  /  Alb  3.3  /  TBili  0.4  /  DBili  x   /  AST  12  /  ALT  6   /  AlkPhos  62  02-04    PTT - ( 04 Feb 2024 07:00 )  PTT:78.6 sec        MICRO:  Urinalysis Basic - ( 04 Feb 2024 08:07 )    Color: x / Appearance: x / SG: x / pH: x  Gluc: 91 mg/dL / Ketone: x  / Bili: x / Urobili: x   Blood: x / Protein: x / Nitrite: x   Leuk Esterase: x / RBC: x / WBC x   Sq Epi: x / Non Sq Epi: x / Bacteria: x

## 2024-02-05 NOTE — PROGRESS NOTE ADULT - PROBLEM SELECTOR PLAN 1
Incidentally found to have PE on CT A/P with confirmation on CTA Chest on admission. Patient satting well on RA without radiographic evidence of RV strain.   - May have remote history of DVT with reported left leg swelling  - May be iso Crohn's disease vs primary hypercoagulability disorder given age    > s/p heparin ggt now given Hgb 7.0, generally stable  > transition to eliquis 2/4 given 7.8 s/p 1u prbc   > of note patient did not finish transfusion.   > f/u hypercoagulability w/u. Incidentally found to have PE on CT A/P with confirmation on CTA Chest on admission. Patient satting well on RA without radiographic evidence of RV strain.   - May have remote history of DVT with reported left leg swelling  - May be iso Crohn's disease vs primary hypercoagulability disorder given age    > s/p heparin ggt now given Hgb 7.0, generally stable  > transition to eliquis 2/4 given 7.8 s/p 1u prbc   > of note patient did not finish transfusion due to likely febrile, non-hemolytic transfusion reaction  -F/u hypercoagulability w/u outpatient

## 2024-02-05 NOTE — DISCHARGE NOTE NURSING/CASE MANAGEMENT/SOCIAL WORK - PATIENT PORTAL LINK FT
You can access the FollowMyHealth Patient Portal offered by [facility] by registering at the following website: [email or web address]. By joining [employer]’s FollowMyHealth portal, you will also be able to view your health information using other applications (apps) compatible with our system.

## 2024-02-05 NOTE — PROGRESS NOTE ADULT - PROBLEM SELECTOR PLAN 2
P/w abdominal pain without N/V/D, CT A/P showing enteritis. Symptoms improving with Maalox and pepcid.  - Unlikely to be Crohns flare at this time given lack of diarrhea  - Not currently on therapy for Crohns    > will continue with supportive care with Maalox and Pepcid  > consider GI consult if had developing diarrhea with symptoms c/f Crohns flare. Will provide and reinforce GI follow up as an outpatient P/w abdominal pain without N/V/D, CT A/P showing enteritis. Symptoms improving with Maalox and pepcid.  - Unlikely to be Crohns flare at this time given lack of diarrhea  - Not currently on therapy for Crohns  > will continue with supportive care with Maalox and Pepcid  >GI recs noted. Given that she has minimal abdominal symptom and no diarrhea, she elects to follow up outpatient with GI for further management of her Crohns. Will provide and reinforce GI follow up as an outpatient

## 2024-02-20 NOTE — ED ADULT TRIAGE NOTE - CHIEF COMPLAINT QUOTE
Advanced Practitioner Screening Evaluation    I have evaluated the patient for the purpose of an initial medical screening and to expedite care.     Donny Nascimento Jr. is a 51 year old male who presents to the emergency department today with what appears to be a hordeola to the let lower eyelid. Tenderness presents, minimal swelling. No vision changes.     Limited screening exam:  Gen: Pt is alert, in no acute distress  Resp: Breathing comfortably, unlabored      Initial orders have been placed for the patient's diagnosis and treatment.         This note may have been created using voice dictation technology and may include inadvertent transcriptional errors. Any such errors should be contextually interpreted.    Note to patient: The 21st Century Cures Act makes medical notes available to patients in the interest of transparency. Please be advised this note is a medical document. Medical documents are intended to carry relevant information, convey facts as evidence, and include the clinical opinion/interpretation of the practitioner. The medical note is intended as peer to peer communication and may appear blunt or direct. It is written in medical language and may contain abbreviations or verbiage that are unfamiliar.    Barbara Martinez, MSN, [facility]  Emergency Medicine  [employer]: [phone]     Barbara Martinez, CNP  02/20/24 1755     Pt presents to ED for abd pain, nausea, headache and dizziness x1wk and SOB x2days. LMP 10/2. Pt able to speak in full complete sentences at this time, airway patent, SpO2 100%. Pmhx of anemia.

## 2024-04-26 ENCOUNTER — APPOINTMENT (OUTPATIENT)
Dept: ANTEPARTUM | Facility: CLINIC | Age: 44
End: 2024-04-26

## 2024-04-26 ENCOUNTER — EMERGENCY (EMERGENCY)
Facility: HOSPITAL | Age: 44
LOS: 1 days | Discharge: STILL ADMITTED OR ADMINISTRATIVE | End: 2024-04-26
Attending: STUDENT IN AN ORGANIZED HEALTH CARE EDUCATION/TRAINING PROGRAM | Admitting: EMERGENCY MEDICINE
Payer: MEDICAID

## 2024-04-26 VITALS
SYSTOLIC BLOOD PRESSURE: 116 MMHG | HEART RATE: 78 BPM | TEMPERATURE: 98 F | OXYGEN SATURATION: 99 % | RESPIRATION RATE: 18 BRPM | DIASTOLIC BLOOD PRESSURE: 76 MMHG

## 2024-04-26 DIAGNOSIS — Z98.890 OTHER SPECIFIED POSTPROCEDURAL STATES: Chronic | ICD-10-CM

## 2024-04-26 DIAGNOSIS — Z98.891 HISTORY OF UTERINE SCAR FROM PREVIOUS SURGERY: Chronic | ICD-10-CM

## 2024-04-26 LAB
ALBUMIN SERPL ELPH-MCNC: 3.5 G/DL — SIGNIFICANT CHANGE UP (ref 3.3–5)
ALP SERPL-CCNC: 67 U/L — SIGNIFICANT CHANGE UP (ref 40–120)
ALT FLD-CCNC: 8 U/L — SIGNIFICANT CHANGE UP (ref 4–33)
ANION GAP SERPL CALC-SCNC: 13 MMOL/L — SIGNIFICANT CHANGE UP (ref 7–14)
ANISOCYTOSIS BLD QL: SIGNIFICANT CHANGE UP
APPEARANCE UR: CLEAR — SIGNIFICANT CHANGE UP
APTT BLD: 30 SEC — SIGNIFICANT CHANGE UP (ref 24.5–35.6)
AST SERPL-CCNC: 31 U/L — SIGNIFICANT CHANGE UP (ref 4–32)
BASOPHILS # BLD AUTO: 0.11 K/UL — SIGNIFICANT CHANGE UP (ref 0–0.2)
BASOPHILS NFR BLD AUTO: 1.7 % — SIGNIFICANT CHANGE UP (ref 0–2)
BILIRUB SERPL-MCNC: <0.2 MG/DL — SIGNIFICANT CHANGE UP (ref 0.2–1.2)
BILIRUB UR-MCNC: NEGATIVE — SIGNIFICANT CHANGE UP
BLD GP AB SCN SERPL QL: NEGATIVE — SIGNIFICANT CHANGE UP
BUN SERPL-MCNC: 4 MG/DL — LOW (ref 7–23)
CALCIUM SERPL-MCNC: 9.1 MG/DL — SIGNIFICANT CHANGE UP (ref 8.4–10.5)
CHLORIDE SERPL-SCNC: 103 MMOL/L — SIGNIFICANT CHANGE UP (ref 98–107)
CO2 SERPL-SCNC: 20 MMOL/L — LOW (ref 22–31)
COLOR SPEC: YELLOW — SIGNIFICANT CHANGE UP
CREAT SERPL-MCNC: 0.46 MG/DL — LOW (ref 0.5–1.3)
DACRYOCYTES BLD QL SMEAR: SLIGHT — SIGNIFICANT CHANGE UP
DIFF PNL FLD: NEGATIVE — SIGNIFICANT CHANGE UP
EGFR: 122 ML/MIN/1.73M2 — SIGNIFICANT CHANGE UP
EOSINOPHIL # BLD AUTO: 0.21 K/UL — SIGNIFICANT CHANGE UP (ref 0–0.5)
EOSINOPHIL NFR BLD AUTO: 3.4 % — SIGNIFICANT CHANGE UP (ref 0–6)
GLUCOSE SERPL-MCNC: 86 MG/DL — SIGNIFICANT CHANGE UP (ref 70–99)
GLUCOSE UR QL: NEGATIVE MG/DL — SIGNIFICANT CHANGE UP
HCG SERPL-ACNC: SIGNIFICANT CHANGE UP MIU/ML
HCT VFR BLD CALC: 27 % — LOW (ref 34.5–45)
HGB BLD-MCNC: 9.1 G/DL — LOW (ref 11.5–15.5)
HYPOCHROMIA BLD QL: SIGNIFICANT CHANGE UP
IANC: 3.48 K/UL — SIGNIFICANT CHANGE UP (ref 1.8–7.4)
INR BLD: 1.04 RATIO — SIGNIFICANT CHANGE UP (ref 0.85–1.18)
KETONES UR-MCNC: NEGATIVE MG/DL — SIGNIFICANT CHANGE UP
LEUKOCYTE ESTERASE UR-ACNC: NEGATIVE — SIGNIFICANT CHANGE UP
LYMPHOCYTES # BLD AUTO: 1.98 K/UL — SIGNIFICANT CHANGE UP (ref 1–3.3)
LYMPHOCYTES # BLD AUTO: 31.6 % — SIGNIFICANT CHANGE UP (ref 13–44)
MCHC RBC-ENTMCNC: 21.1 PG — LOW (ref 27–34)
MCHC RBC-ENTMCNC: 33.7 GM/DL — SIGNIFICANT CHANGE UP (ref 32–36)
MCV RBC AUTO: 62.5 FL — LOW (ref 80–100)
MICROCYTES BLD QL: SIGNIFICANT CHANGE UP
MONOCYTES # BLD AUTO: 0.59 K/UL — SIGNIFICANT CHANGE UP (ref 0–0.9)
MONOCYTES NFR BLD AUTO: 9.4 % — SIGNIFICANT CHANGE UP (ref 2–14)
NEUTROPHILS # BLD AUTO: 3.28 K/UL — SIGNIFICANT CHANGE UP (ref 1.8–7.4)
NEUTROPHILS NFR BLD AUTO: 52.2 % — SIGNIFICANT CHANGE UP (ref 43–77)
NITRITE UR-MCNC: NEGATIVE — SIGNIFICANT CHANGE UP
OVALOCYTES BLD QL SMEAR: SIGNIFICANT CHANGE UP
PH UR: 6.5 — SIGNIFICANT CHANGE UP (ref 5–8)
PLAT MORPH BLD: NORMAL — SIGNIFICANT CHANGE UP
PLATELET # BLD AUTO: 370 K/UL — SIGNIFICANT CHANGE UP (ref 150–400)
PLATELET COUNT - ESTIMATE: NORMAL — SIGNIFICANT CHANGE UP
POIKILOCYTOSIS BLD QL AUTO: SLIGHT — SIGNIFICANT CHANGE UP
POLYCHROMASIA BLD QL SMEAR: SLIGHT — SIGNIFICANT CHANGE UP
POTASSIUM SERPL-MCNC: 4.5 MMOL/L — SIGNIFICANT CHANGE UP (ref 3.5–5.3)
POTASSIUM SERPL-SCNC: 4.5 MMOL/L — SIGNIFICANT CHANGE UP (ref 3.5–5.3)
PROT SERPL-MCNC: 7 G/DL — SIGNIFICANT CHANGE UP (ref 6–8.3)
PROT UR-MCNC: NEGATIVE MG/DL — SIGNIFICANT CHANGE UP
PROTHROM AB SERPL-ACNC: 11.7 SEC — SIGNIFICANT CHANGE UP (ref 9.5–13)
RBC # BLD: 4.32 M/UL — SIGNIFICANT CHANGE UP (ref 3.8–5.2)
RBC # FLD: 21.2 % — HIGH (ref 10.3–14.5)
RBC BLD AUTO: ABNORMAL
RH IG SCN BLD-IMP: POSITIVE — SIGNIFICANT CHANGE UP
SODIUM SERPL-SCNC: 136 MMOL/L — SIGNIFICANT CHANGE UP (ref 135–145)
SP GR SPEC: 1.01 — SIGNIFICANT CHANGE UP (ref 1–1.03)
UROBILINOGEN FLD QL: 0.2 MG/DL — SIGNIFICANT CHANGE UP (ref 0.2–1)
VARIANT LYMPHS # BLD: 1.7 % — SIGNIFICANT CHANGE UP (ref 0–6)
WBC # BLD: 6.28 K/UL — SIGNIFICANT CHANGE UP (ref 3.8–10.5)
WBC # FLD AUTO: 6.28 K/UL — SIGNIFICANT CHANGE UP (ref 3.8–10.5)

## 2024-04-26 PROCEDURE — 73562 X-RAY EXAM OF KNEE 3: CPT | Mod: 26,RT

## 2024-04-26 PROCEDURE — 70450 CT HEAD/BRAIN W/O DYE: CPT | Mod: 26,MC

## 2024-04-26 PROCEDURE — 76805 OB US >/= 14 WKS SNGL FETUS: CPT | Mod: 26

## 2024-04-26 PROCEDURE — 99285 EMERGENCY DEPT VISIT HI MDM: CPT

## 2024-04-26 RX ORDER — ACETAMINOPHEN 500 MG
650 TABLET ORAL ONCE
Refills: 0 | Status: COMPLETED | OUTPATIENT
Start: 2024-04-26 | End: 2024-04-26

## 2024-04-26 RX ORDER — POTASSIUM CHLORIDE 20 MEQ
10 PACKET (EA) ORAL
Refills: 0 | Status: DISCONTINUED | OUTPATIENT
Start: 2024-04-26 | End: 2024-04-26

## 2024-04-26 RX ADMIN — Medication 650 MILLIGRAM(S): at 21:00
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
## 2024-04-27 ENCOUNTER — OUTPATIENT (OUTPATIENT)
Dept: INPATIENT UNIT | Facility: HOSPITAL | Age: 44
LOS: 1 days | Discharge: HOME | End: 2024-04-27
Payer: MEDICAID

## 2024-04-27 VITALS
HEART RATE: 68 BPM | OXYGEN SATURATION: 99 % | DIASTOLIC BLOOD PRESSURE: 70 MMHG | SYSTOLIC BLOOD PRESSURE: 109 MMHG | RESPIRATION RATE: 18 BRPM | TEMPERATURE: 98 F

## 2024-04-27 VITALS — DIASTOLIC BLOOD PRESSURE: 65 MMHG | HEART RATE: 59 BPM | SYSTOLIC BLOOD PRESSURE: 117 MMHG

## 2024-04-27 VITALS — HEART RATE: 61 BPM | SYSTOLIC BLOOD PRESSURE: 114 MMHG | DIASTOLIC BLOOD PRESSURE: 57 MMHG

## 2024-04-27 DIAGNOSIS — O26.899 OTHER SPECIFIED PREGNANCY RELATED CONDITIONS, UNSPECIFIED TRIMESTER: ICD-10-CM

## 2024-04-27 DIAGNOSIS — Z98.891 HISTORY OF UTERINE SCAR FROM PREVIOUS SURGERY: Chronic | ICD-10-CM

## 2024-04-27 DIAGNOSIS — Z98.890 OTHER SPECIFIED POSTPROCEDURAL STATES: Chronic | ICD-10-CM

## 2024-04-27 PROCEDURE — 99221 1ST HOSP IP/OBS SF/LOW 40: CPT

## 2024-04-27 RX ORDER — ENOXAPARIN SODIUM 100 MG/ML
100 INJECTION SUBCUTANEOUS
Refills: 0 | DISCHARGE
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
## 2024-04-27 NOTE — OB PROVIDER TRIAGE NOTE - NSOBPROVIDERNOTE_OBGYN_ALL_OB_FT
Fetal wellbeing reassured. Discussed findings with Dr. Prado and Dr. Landeros. Pt. d/c'd home. Pt . follow up with next OB appointment. Pt. instructed to return to triage with increase abdominal contractions, leakage of fluid, vaginal bleeding or decrease fetal movement.
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
## 2024-04-27 NOTE — ED PROVIDER NOTE - NSICDXPASTSURGICALHX_GEN_ALL_CORE_FT
PAST SURGICAL HISTORY:  H/O umbilical hernia repair with rectus diastasis repair 3/10/22 in [facility]    H/O:

## 2024-04-27 NOTE — OB PROVIDER TRIAGE NOTE - NS_OBGYNHISTORY_OBGYN_ALL_OB_FT
Misc x 2  1999  7# bleeding in pregnancy  2001  8# PPH, blood transfusion x 2 units  2004  8#  2006  8#  2008  8#  2010 C/S Cat 2 tracing 7#  2016 [facility] C/S LGA 9#14

## 2024-04-27 NOTE — OB RN TRIAGE NOTE - NS_OBGYNHISTORY_OBGYN_ALL_OB_FT
Misc x 2  1999  7# bleeding in pregnancy  2001  8# PPH, blood transfusion x 2 units  2004  8#  2006  8#  2008  8#  2010 C/S Cat 2 tracing 7#  2016 [facility] C/S LGA 9#14

## 2024-04-27 NOTE — ED PROVIDER NOTE - PROGRESS NOTE DETAILS
OB consulted and said to send patient up stairs once she is medically cleared. Bette PGY1 labs and imaging non-actionable. Will DC to OB. Bette PGY1
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
## 2024-04-27 NOTE — OB PROVIDER TRIAGE NOTE - ADDITIONAL INSTRUCTIONS
Pt. d/c'd home. Pt . follow up with next OB appointment. Pt. instructed to return to triage with increase abdominal contractions, leakage of fluid, vaginal bleeding or decrease fetal movement.

## 2024-04-27 NOTE — OB RN TRIAGE NOTE - FALL HARM RISK - UNIVERSAL INTERVENTIONS
Bed in lowest position, wheels locked, appropriate side rails in place/Call bell, personal items and telephone in reach/Instruct patient to call for assistance before getting out of bed or chair/Non-slip footwear when patient is out of bed/[place] to call system/Physically safe environment - no spills, clutter or unnecessary equipment/Purposeful Proactive Rounding/Room/bathroom lighting operational, light cord in reach

## 2024-04-27 NOTE — OB PROVIDER TRIAGE NOTE - NSLOWDOSEASPIRIN_OBGYN_ALL_OB
General: No focal deficit present. Mental Status: He is alert and oriented to person, place, and time. MDM:    Labs Reviewed   CBC WITH AUTO DIFFERENTIAL - Abnormal; Notable for the following components:       Result Value    WBC 17.0 (*)     RBC 3.43 (*)     Hemoglobin 10.2 (*)     Hematocrit 33.5 (*)     MCHC 30.4 (*)     Segs Relative 80.0 (*)     Lymphocytes % 3.0 (*)     Monocytes % 10.0 (*)     All other components within normal limits   BASIC METABOLIC PANEL - Abnormal; Notable for the following components:    CO2 41 (*)     Creatinine 0.4 (*)     All other components within normal limits   POCT VENOUS - Abnormal; Notable for the following components:    pCO2, Vamsi 74.3 (*)     pO2, Vamsi 60.6 (*)     Base Exc, Mixed 14.4 (*)     HCO3, Venous 42.6 (*)     O2 Sat, Vamsi 88.4 (*)     CO2 Content 44.9 (*)     All other components within normal limits   BRAIN NATRIURETIC PEPTIDE   TROPONIN       CC/HPI Summary, DDx, ED Course, and Reassessment: Patient was given a breathing treatment in the emergency department in addition to the one that was given to him by EMS. I gave the patient a extra dose of Solu-Medrol at 60 mg IV in the emergency department. I also gave the patient 2 Percocet as this was due. The patient normally receives this medication chronically for his chronic pain. Patient was then rapidly weaned back to 2 L of oxygen and the patient's oxygen saturations have maintained at 95%. Patient is completely 100% asymptomatic.   Patient will be sent back to [facility] and rehab the staff there was educated that when the patient is having difficulty breathing and they are giving him a breathing treatment because of wheezing and his COPD it may be in their best interest to place him on a nonrebreather mask or at least increase his oxygen to a higher value than that of just 1 L above his baseline this could be greatly beneficial in preventing the patient from having to come to the emergency No

## 2024-04-27 NOTE — OB PROVIDER TRIAGE NOTE - NSHPLABSRESULTS_GEN_ALL_CORE
9.1    6.28  )-----------( 370      ( 2024 21:06 )             27.0     Fibrinogen     Urinalysis Basic - ( 2024 23:02 )    Color: Yellow / Appearance: Clear / S.006 / pH: x  Gluc: x / Ketone: Negative mg/dL  / Bili: Negative / Urobili: 0.2 mg/dL   Blood: x / Protein: Negative mg/dL / Nitrite: Negative   Leuk Esterase: Negative / RBC: x / WBC x   Sq Epi: x / Non Sq Epi: x / Bacteria: x

## 2024-04-27 NOTE — OB RN TRIAGE NOTE - NSICDXPASTSURGICALHX_GEN_ALL_CORE_FT
PAST SURGICAL HISTORY:  H/O umbilical hernia repair with rectus diastasis repair 3/10/22 in [facility]    H/O:
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
## 2024-04-27 NOTE — ED PROVIDER NOTE - PHYSICAL EXAMINATION
Elizabeth Amos DO (PGY1)   Physical Exam:    Gen: NAD, AOx3, non-toxic appearing, able to ambulate without assistance  Head: NCAT  HEENT: EOMI, PEERLA, normal conjunctiva, tongue midline, oral mucosa moist  Lung: CTAB, no respiratory distress, no wheezes/rhonchi/rales B/L  CV: RRR, no murmurs, rubs or gallops  Abd: soft, NT, ND, no guarding, no rigidity, no rebound tenderness, no CVA tenderness   MSK: R knee ecchymosis and mild swelling on the medial aspect   Neuro: CN2-12 grossly intact, A&Ox4, MS +5/5 in UE and LE BL, finger to nose smooth and rapid, gross sensation intact in UE and LE BL, gait smooth and coordinated

## 2024-04-27 NOTE — OB RN TRIAGE NOTE - NSICDXPASTMEDICALHX_GEN_ALL_CORE_FT
PAST MEDICAL HISTORY:  Anemia     Crohn's disease     Gastritis     History of blood transfusion     Pulmonary embolism

## 2024-04-27 NOTE — OB PROVIDER TRIAGE NOTE - NSHPPHYSICALEXAM_GEN_ALL_CORE
ICU Vital Signs Last 24 Hrs  T(C): 36.8 (27 Apr 2024 02:13), Max: 36.8 (27 Apr 2024 02:13)  T(F): 98.2 (27 Apr 2024 02:13), Max: 98.2 (27 Apr 2024 02:13)  HR: 59 (27 Apr 2024 03:05) (59 - 78)  BP: 117/65 (27 Apr 2024 03:05) (109/70 - 126/85)  BP(mean): --  ABP: --  ABP(mean): --  RR: 18 (27 Apr 2024 02:13) (18 - 18)  SpO2: 99% (27 Apr 2024 00:50) (99% - 99%)    Abdomen soft nontender  TAS: Breech presentation, posterior placenta, M-mode FHR: 145bpm, MVP: 6  Speculum: Cervix appears closed  TVS: CL: 3.58-3.76 no funneling or dynamic changes
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
## 2024-04-30 DIAGNOSIS — O09.293 SUPERVISION OF PREGNANCY WITH OTHER POOR REPRODUCTIVE OR OBSTETRIC HISTORY, THIRD TRIMESTER: ICD-10-CM

## 2024-04-30 DIAGNOSIS — R10.30 LOWER ABDOMINAL PAIN, UNSPECIFIED: ICD-10-CM

## 2024-04-30 DIAGNOSIS — O09.522 SUPERVISION OF ELDERLY MULTIGRAVIDA, SECOND TRIMESTER: ICD-10-CM

## 2024-04-30 DIAGNOSIS — O34.219 MATERNAL CARE FOR UNSPECIFIED TYPE SCAR FROM PREVIOUS CESAREAN DELIVERY: ICD-10-CM

## 2024-04-30 DIAGNOSIS — Z3A.16 16 WEEKS GESTATION OF PREGNANCY: ICD-10-CM
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
## 2024-04-30 DIAGNOSIS — D64.9 ANEMIA, UNSPECIFIED: ICD-10-CM

## 2024-04-30 DIAGNOSIS — O99.013 ANEMIA COMPLICATING PREGNANCY, THIRD TRIMESTER: ICD-10-CM

## 2024-04-30 DIAGNOSIS — O26.893 OTHER SPECIFIED PREGNANCY RELATED CONDITIONS, THIRD TRIMESTER: ICD-10-CM

## 2024-04-30 LAB
-  GENTAMICIN: SIGNIFICANT CHANGE UP
-  OXACILLIN: SIGNIFICANT CHANGE UP
-  RIFAMPIN: SIGNIFICANT CHANGE UP
-  TETRACYCLINE: SIGNIFICANT CHANGE UP
-  TRIMETHOPRIM/SULFAMETHOXAZOLE: SIGNIFICANT CHANGE UP
-  VANCOMYCIN: SIGNIFICANT CHANGE UP
CULTURE RESULTS: ABNORMAL
METHOD TYPE: SIGNIFICANT CHANGE UP
ORGANISM # SPEC MICROSCOPIC CNT: ABNORMAL
ORGANISM # SPEC MICROSCOPIC CNT: ABNORMAL
SPECIMEN SOURCE: SIGNIFICANT CHANGE UP

## 2024-07-21 ENCOUNTER — OUTPATIENT (OUTPATIENT)
Dept: INPATIENT UNIT | Facility: HOSPITAL | Age: 44
LOS: 1 days | Discharge: HOME | End: 2024-07-21
Payer: MEDICAID

## 2024-07-21 VITALS — RESPIRATION RATE: 18 BRPM | TEMPERATURE: 98 F

## 2024-07-21 VITALS — RESPIRATION RATE: 16 BRPM | TEMPERATURE: 98 F

## 2024-07-21 DIAGNOSIS — Z98.890 OTHER SPECIFIED POSTPROCEDURAL STATES: Chronic | ICD-10-CM

## 2024-07-21 DIAGNOSIS — O26.899 OTHER SPECIFIED PREGNANCY RELATED CONDITIONS, UNSPECIFIED TRIMESTER: ICD-10-CM

## 2024-07-21 DIAGNOSIS — Z98.891 HISTORY OF UTERINE SCAR FROM PREVIOUS SURGERY: Chronic | ICD-10-CM

## 2024-07-21 PROBLEM — I26.99 OTHER PULMONARY EMBOLISM WITHOUT ACUTE COR PULMONALE: Chronic | Status: ACTIVE | Noted: 2024-04-27

## 2024-07-21 PROBLEM — Z92.89 PERSONAL HISTORY OF OTHER MEDICAL TREATMENT: Chronic | Status: ACTIVE | Noted: 2024-04-27

## 2024-07-21 LAB
ADD ON TEST-SPECIMEN IN LAB: SIGNIFICANT CHANGE UP
ALBUMIN SERPL ELPH-MCNC: 3.2 G/DL — LOW (ref 3.3–5)
ALP SERPL-CCNC: 127 U/L — HIGH (ref 40–120)
ALT FLD-CCNC: 7 U/L — SIGNIFICANT CHANGE UP (ref 4–33)
AMYLASE P1 CFR SERPL: 57 U/L — SIGNIFICANT CHANGE UP (ref 25–125)
ANION GAP SERPL CALC-SCNC: 14 MMOL/L — SIGNIFICANT CHANGE UP (ref 7–14)
APPEARANCE UR: CLEAR — SIGNIFICANT CHANGE UP
AST SERPL-CCNC: 12 U/L — SIGNIFICANT CHANGE UP (ref 4–32)
BASOPHILS # BLD AUTO: 0.03 K/UL — SIGNIFICANT CHANGE UP (ref 0–0.2)
BASOPHILS NFR BLD AUTO: 0.5 % — SIGNIFICANT CHANGE UP (ref 0–2)
BILIRUB SERPL-MCNC: <0.2 MG/DL — SIGNIFICANT CHANGE UP (ref 0.2–1.2)
BILIRUB UR-MCNC: NEGATIVE — SIGNIFICANT CHANGE UP
BUN SERPL-MCNC: 3 MG/DL — LOW (ref 7–23)
CALCIUM SERPL-MCNC: 8.8 MG/DL — SIGNIFICANT CHANGE UP (ref 8.4–10.5)
CHLORIDE SERPL-SCNC: 103 MMOL/L — SIGNIFICANT CHANGE UP (ref 98–107)
CO2 SERPL-SCNC: 21 MMOL/L — LOW (ref 22–31)
COLOR SPEC: YELLOW — SIGNIFICANT CHANGE UP
CREAT ?TM UR-MCNC: 157 MG/DL — SIGNIFICANT CHANGE UP
CREAT SERPL-MCNC: 0.48 MG/DL — LOW (ref 0.5–1.3)
DIFF PNL FLD: NEGATIVE — SIGNIFICANT CHANGE UP
EGFR: 121 ML/MIN/1.73M2 — SIGNIFICANT CHANGE UP
EGFR: 121 ML/MIN/1.73M2 — SIGNIFICANT CHANGE UP
EOSINOPHIL # BLD AUTO: 0.11 K/UL — SIGNIFICANT CHANGE UP (ref 0–0.5)
EOSINOPHIL NFR BLD AUTO: 1.8 % — SIGNIFICANT CHANGE UP (ref 0–6)
GLUCOSE SERPL-MCNC: 100 MG/DL — HIGH (ref 70–99)
GLUCOSE UR QL: NEGATIVE MG/DL — SIGNIFICANT CHANGE UP
HCT VFR BLD CALC: 27.1 % — LOW (ref 34.5–45)
HGB BLD-MCNC: 8.5 G/DL — LOW (ref 11.5–15.5)
IANC: 4.2 K/UL — SIGNIFICANT CHANGE UP (ref 1.8–7.4)
IMM GRANULOCYTES NFR BLD AUTO: 0.5 % — SIGNIFICANT CHANGE UP (ref 0–0.9)
KETONES UR-MCNC: NEGATIVE MG/DL — SIGNIFICANT CHANGE UP
LDH SERPL L TO P-CCNC: 157 U/L — SIGNIFICANT CHANGE UP (ref 135–225)
LEUKOCYTE ESTERASE UR-ACNC: NEGATIVE — SIGNIFICANT CHANGE UP
LIDOCAIN IGE QN: 13 U/L — SIGNIFICANT CHANGE UP (ref 7–60)
LYMPHOCYTES # BLD AUTO: 1.36 K/UL — SIGNIFICANT CHANGE UP (ref 1–3.3)
LYMPHOCYTES # BLD AUTO: 22 % — SIGNIFICANT CHANGE UP (ref 13–44)
MCHC RBC-ENTMCNC: 21.1 PG — LOW (ref 27–34)
MCHC RBC-ENTMCNC: 31.4 GM/DL — LOW (ref 32–36)
MCV RBC AUTO: 67.4 FL — LOW (ref 80–100)
MONOCYTES # BLD AUTO: 0.46 K/UL — SIGNIFICANT CHANGE UP (ref 0–0.9)
MONOCYTES NFR BLD AUTO: 7.4 % — SIGNIFICANT CHANGE UP (ref 2–14)
NEUTROPHILS # BLD AUTO: 4.2 K/UL — SIGNIFICANT CHANGE UP (ref 1.8–7.4)
NEUTROPHILS NFR BLD AUTO: 67.8 % — SIGNIFICANT CHANGE UP (ref 43–77)
NITRITE UR-MCNC: NEGATIVE — SIGNIFICANT CHANGE UP
NRBC # BLD AUTO: 0 K/UL — SIGNIFICANT CHANGE UP (ref 0–0)
NRBC # BLD: 0 /100 WBCS — SIGNIFICANT CHANGE UP (ref 0–0)
NRBC # FLD: 0 K/UL — SIGNIFICANT CHANGE UP (ref 0–0)
NRBC BLD-RTO: 0 /100 WBCS — SIGNIFICANT CHANGE UP (ref 0–0)
PH UR: 6.5 — SIGNIFICANT CHANGE UP (ref 5–8)
PLATELET # BLD AUTO: 371 K/UL — SIGNIFICANT CHANGE UP (ref 150–400)
POTASSIUM SERPL-MCNC: 3.1 MMOL/L — LOW (ref 3.5–5.3)
POTASSIUM SERPL-SCNC: 3.1 MMOL/L — LOW (ref 3.5–5.3)
PROT ?TM UR-MCNC: 28 MG/DL — SIGNIFICANT CHANGE UP
PROT SERPL-MCNC: 6.5 G/DL — SIGNIFICANT CHANGE UP (ref 6–8.3)
PROT UR-MCNC: SIGNIFICANT CHANGE UP MG/DL
PROT/CREAT UR-RTO: 0.2 RATIO — SIGNIFICANT CHANGE UP (ref 0–0.2)
RBC # BLD: 4.02 M/UL — SIGNIFICANT CHANGE UP (ref 3.8–5.2)
RBC # FLD: 21.5 % — HIGH (ref 10.3–14.5)
SODIUM SERPL-SCNC: 138 MMOL/L — SIGNIFICANT CHANGE UP (ref 135–145)
SP GR SPEC: 1.01 — SIGNIFICANT CHANGE UP (ref 1–1.03)
URATE SERPL-MCNC: 3.5 MG/DL — SIGNIFICANT CHANGE UP (ref 2.5–7)
UROBILINOGEN FLD QL: 1 MG/DL — SIGNIFICANT CHANGE UP (ref 0.2–1)
WBC # BLD: 6.19 K/UL — SIGNIFICANT CHANGE UP (ref 3.8–10.5)
WBC # FLD AUTO: 6.19 K/UL — SIGNIFICANT CHANGE UP (ref 3.8–10.5)

## 2024-07-21 PROCEDURE — 83986 ASSAY PH BODY FLUID NOS: CPT | Mod: QW

## 2024-07-21 PROCEDURE — 99222 1ST HOSP IP/OBS MODERATE 55: CPT | Mod: 25

## 2024-07-21 PROCEDURE — 59025 FETAL NON-STRESS TEST: CPT | Mod: 26

## 2024-07-21 PROCEDURE — 93010 ELECTROCARDIOGRAM REPORT: CPT

## 2024-07-21 RX ORDER — CALCIUM CARBONATE 750 MG/1
1 TABLET ORAL ONCE
Refills: 0 | Status: COMPLETED | OUTPATIENT
Start: 2024-07-21 | End: 2024-07-21

## 2024-07-21 RX ORDER — ACETAMINOPHEN 500 MG/5ML
1000 LIQUID (ML) ORAL ONCE
Refills: 0 | Status: COMPLETED | OUTPATIENT
Start: 2024-07-21 | End: 2024-07-21

## 2024-07-21 RX ADMIN — Medication 20 MILLIGRAM(S): at 03:10

## 2024-07-21 RX ADMIN — Medication 1000 MILLIGRAM(S): at 03:03

## 2024-07-21 RX ADMIN — Medication 20 MILLIEQUIVALENT(S): at 04:05

## 2024-07-21 RX ADMIN — CALCIUM CARBONATE 1 TABLET(S): 750 TABLET ORAL at 03:32

## 2024-07-21 RX ADMIN — Medication 20 MILLIEQUIVALENT(S): at 06:05

## 2024-07-21 RX ADMIN — Medication 400 MILLIGRAM(S): at 02:46

## 2024-07-24 DIAGNOSIS — O09.523 SUPERVISION OF ELDERLY MULTIGRAVIDA, THIRD TRIMESTER: ICD-10-CM

## 2024-07-24 DIAGNOSIS — O34.219 MATERNAL CARE FOR UNSPECIFIED TYPE SCAR FROM PREVIOUS CESAREAN DELIVERY: ICD-10-CM

## 2024-07-24 DIAGNOSIS — K50.90 CROHN'S DISEASE, UNSPECIFIED, WITHOUT COMPLICATIONS: ICD-10-CM

## 2024-07-24 DIAGNOSIS — O99.613 DISEASES OF THE DIGESTIVE SYSTEM COMPLICATING PREGNANCY, THIRD TRIMESTER: ICD-10-CM

## 2024-07-24 DIAGNOSIS — Z3A.28 28 WEEKS GESTATION OF PREGNANCY: ICD-10-CM

## 2024-07-24 DIAGNOSIS — Z03.71 ENCOUNTER FOR SUSPECTED PROBLEM WITH AMNIOTIC CAVITY AND MEMBRANE RULED OUT: ICD-10-CM

## 2024-07-24 DIAGNOSIS — O99.013 ANEMIA COMPLICATING PREGNANCY, THIRD TRIMESTER: ICD-10-CM

## 2024-07-24 DIAGNOSIS — D64.9 ANEMIA, UNSPECIFIED: ICD-10-CM

## 2024-08-29 ENCOUNTER — APPOINTMENT (OUTPATIENT)
Dept: ANTEPARTUM | Facility: CLINIC | Age: 44
End: 2024-08-29

## 2024-08-29 ENCOUNTER — INPATIENT (INPATIENT)
Facility: HOSPITAL | Age: 44
LOS: 5 days | Discharge: HOME | End: 2024-09-04
Attending: SPECIALIST | Admitting: SPECIALIST
Payer: MEDICAID

## 2024-08-29 VITALS
TEMPERATURE: 98 F | RESPIRATION RATE: 16 BRPM | HEART RATE: 81 BPM | DIASTOLIC BLOOD PRESSURE: 70 MMHG | SYSTOLIC BLOOD PRESSURE: 106 MMHG

## 2024-08-29 DIAGNOSIS — O36.4XX0 MATERNAL CARE FOR INTRAUTERINE DEATH, NOT APPLICABLE OR UNSPECIFIED: ICD-10-CM

## 2024-08-29 DIAGNOSIS — O26.899 OTHER SPECIFIED PREGNANCY RELATED CONDITIONS, UNSPECIFIED TRIMESTER: ICD-10-CM

## 2024-08-29 DIAGNOSIS — Z98.890 OTHER SPECIFIED POSTPROCEDURAL STATES: Chronic | ICD-10-CM

## 2024-08-29 DIAGNOSIS — Z98.891 HISTORY OF UTERINE SCAR FROM PREVIOUS SURGERY: Chronic | ICD-10-CM

## 2024-08-29 LAB
A1C WITH ESTIMATED AVERAGE GLUCOSE RESULT: 4.8 % — SIGNIFICANT CHANGE UP (ref 4–5.6)
ADD ON TEST-SPECIMEN IN LAB: SIGNIFICANT CHANGE UP
AMPHET UR-MCNC: NEGATIVE — SIGNIFICANT CHANGE UP
ANISOCYTOSIS BLD QL: SIGNIFICANT CHANGE UP
APTT BLD: 30.2 SEC — SIGNIFICANT CHANGE UP (ref 24.5–35.6)
BARBITURATES UR SCN-MCNC: NEGATIVE — SIGNIFICANT CHANGE UP
BASOPHILS # BLD AUTO: 0.03 K/UL — SIGNIFICANT CHANGE UP (ref 0–0.2)
BASOPHILS NFR BLD AUTO: 0.9 % — SIGNIFICANT CHANGE UP (ref 0–2)
BENZODIAZ UR-MCNC: NEGATIVE — SIGNIFICANT CHANGE UP
BLD GP AB SCN SERPL QL: NEGATIVE — SIGNIFICANT CHANGE UP
COCAINE METAB.OTHER UR-MCNC: NEGATIVE — SIGNIFICANT CHANGE UP
CREATININE URINE RESULT, DAU: 100 MG/DL — SIGNIFICANT CHANGE UP
DACRYOCYTES BLD QL SMEAR: SLIGHT — SIGNIFICANT CHANGE UP
EOSINOPHIL # BLD AUTO: 0.19 K/UL — SIGNIFICANT CHANGE UP (ref 0–0.5)
EOSINOPHIL NFR BLD AUTO: 5.3 % — SIGNIFICANT CHANGE UP (ref 0–6)
ESTIMATED AVERAGE GLUCOSE: 91 — SIGNIFICANT CHANGE UP
FENTANYL UR QL SCN: NEGATIVE — SIGNIFICANT CHANGE UP
FIBRINOGEN PPP-MCNC: 448 MG/DL — SIGNIFICANT CHANGE UP (ref 200–465)
FLUAV AG NPH QL: SIGNIFICANT CHANGE UP
FLUBV AG NPH QL: SIGNIFICANT CHANGE UP
GIANT PLATELETS BLD QL SMEAR: PRESENT — SIGNIFICANT CHANGE UP
HCT VFR BLD CALC: 26.6 % — LOW (ref 34.5–45)
HGB BLD-MCNC: 8.5 G/DL — LOW (ref 11.5–15.5)
HYPOCHROMIA BLD QL: SLIGHT — SIGNIFICANT CHANGE UP
IANC: 1.65 K/UL — LOW (ref 1.8–7.4)
INR BLD: 1.08 RATIO — SIGNIFICANT CHANGE UP (ref 0.85–1.18)
LYMPHOCYTES # BLD AUTO: 1.21 K/UL — SIGNIFICANT CHANGE UP (ref 1–3.3)
LYMPHOCYTES # BLD AUTO: 34.5 % — SIGNIFICANT CHANGE UP (ref 13–44)
MCHC RBC-ENTMCNC: 20.8 PG — LOW (ref 27–34)
MCHC RBC-ENTMCNC: 32 GM/DL — SIGNIFICANT CHANGE UP (ref 32–36)
MCV RBC AUTO: 65.2 FL — LOW (ref 80–100)
METAMYELOCYTES # FLD: 0.9 % — SIGNIFICANT CHANGE UP (ref 0–1)
METHADONE UR-MCNC: NEGATIVE — SIGNIFICANT CHANGE UP
MICROCYTES BLD QL: SIGNIFICANT CHANGE UP
MONOCYTES # BLD AUTO: 0.34 K/UL — SIGNIFICANT CHANGE UP (ref 0–0.9)
MONOCYTES NFR BLD AUTO: 9.7 % — SIGNIFICANT CHANGE UP (ref 2–14)
NEUTROPHILS # BLD AUTO: 1.61 K/UL — LOW (ref 1.8–7.4)
NEUTROPHILS NFR BLD AUTO: 46 % — SIGNIFICANT CHANGE UP (ref 43–77)
OPIATES UR-MCNC: NEGATIVE — SIGNIFICANT CHANGE UP
OVALOCYTES BLD QL SMEAR: SIGNIFICANT CHANGE UP
OXYCODONE UR-MCNC: NEGATIVE — SIGNIFICANT CHANGE UP
PCP SPEC-MCNC: SIGNIFICANT CHANGE UP
PCP UR-MCNC: NEGATIVE — SIGNIFICANT CHANGE UP
PLAT MORPH BLD: NORMAL — SIGNIFICANT CHANGE UP
PLATELET # BLD AUTO: 367 K/UL — SIGNIFICANT CHANGE UP (ref 150–400)
PLATELET COUNT - ESTIMATE: NORMAL — SIGNIFICANT CHANGE UP
POIKILOCYTOSIS BLD QL AUTO: SLIGHT — SIGNIFICANT CHANGE UP
POLYCHROMASIA BLD QL SMEAR: SLIGHT — SIGNIFICANT CHANGE UP
PROTHROM AB SERPL-ACNC: 12.2 SEC — SIGNIFICANT CHANGE UP (ref 9.5–13)
RBC # BLD: 4.08 M/UL — SIGNIFICANT CHANGE UP (ref 3.8–5.2)
RBC # FLD: 19.2 % — HIGH (ref 10.3–14.5)
RBC BLD AUTO: ABNORMAL
RH IG SCN BLD-IMP: POSITIVE — SIGNIFICANT CHANGE UP
RSV RNA NPH QL NAA+NON-PROBE: SIGNIFICANT CHANGE UP
SARS-COV-2 RNA SPEC QL NAA+PROBE: SIGNIFICANT CHANGE UP
SMUDGE CELLS # BLD: PRESENT — SIGNIFICANT CHANGE UP
THC UR QL: NEGATIVE — SIGNIFICANT CHANGE UP
TSH SERPL-MCNC: 0.97 UIU/ML — SIGNIFICANT CHANGE UP (ref 0.27–4.2)
VARIANT LYMPHS # BLD: 2.7 % — SIGNIFICANT CHANGE UP (ref 0–6)
WBC # BLD: 3.51 K/UL — LOW (ref 3.8–10.5)
WBC # FLD AUTO: 3.51 K/UL — LOW (ref 3.8–10.5)

## 2024-08-29 PROCEDURE — 71045 X-RAY EXAM CHEST 1 VIEW: CPT | Mod: 26

## 2024-08-29 PROCEDURE — 71275 CT ANGIOGRAPHY CHEST: CPT | Mod: 26

## 2024-08-29 PROCEDURE — 93010 ELECTROCARDIOGRAM REPORT: CPT

## 2024-08-29 PROCEDURE — 93970 EXTREMITY STUDY: CPT | Mod: 26

## 2024-08-29 RX ORDER — CHLORHEXIDINE GLUCONATE 40 MG/ML
1 SOLUTION TOPICAL DAILY
Refills: 0 | Status: DISCONTINUED | OUTPATIENT
Start: 2024-08-29 | End: 2024-09-01

## 2024-08-29 RX ORDER — POTASSIUM CHLORIDE 10 MEQ
10 TABLET, EXT RELEASE, PARTICLES/CRYSTALS ORAL ONCE
Refills: 0 | Status: DISCONTINUED | OUTPATIENT
Start: 2024-08-29 | End: 2024-08-29

## 2024-08-29 RX ORDER — HEPARIN SODIUM,BOVINE 1000/ML
8000 VIAL (ML) INJECTION EVERY 6 HOURS
Refills: 0 | Status: DISCONTINUED | OUTPATIENT
Start: 2024-08-29 | End: 2024-08-30

## 2024-08-29 RX ORDER — HEPARIN SODIUM,BOVINE 1000/ML
VIAL (ML) INJECTION
Qty: 25000 | Refills: 0 | Status: DISCONTINUED | OUTPATIENT
Start: 2024-08-29 | End: 2024-08-30

## 2024-08-29 RX ORDER — POTASSIUM CHLORIDE 10 MEQ
10 TABLET, EXT RELEASE, PARTICLES/CRYSTALS ORAL ONCE
Refills: 0 | Status: DISCONTINUED | OUTPATIENT
Start: 2024-08-29 | End: 2024-08-30

## 2024-08-29 RX ORDER — HEPARIN SODIUM,BOVINE 1000/ML
4000 VIAL (ML) INJECTION EVERY 6 HOURS
Refills: 0 | Status: DISCONTINUED | OUTPATIENT
Start: 2024-08-29 | End: 2024-08-30

## 2024-08-29 RX ORDER — OXYTOCIN 10 UNIT/ML
333.33 AMPUL (ML) INJECTION
Qty: 20 | Refills: 0 | Status: DISCONTINUED | OUTPATIENT
Start: 2024-08-29 | End: 2024-08-30

## 2024-08-29 RX ADMIN — Medication 125 MILLILITER(S): at 23:05

## 2024-08-29 RX ADMIN — CHLORHEXIDINE GLUCONATE 1 APPLICATION(S): 40 SOLUTION TOPICAL at 23:05

## 2024-08-29 NOTE — OB PROVIDER TRIAGE NOTE - NS_OBGYNHISTORY_OBGYN_ALL_OB_FT
OB HX:    1999   6/3/2001   2004   2006   2008  c/s 2010 low fluid, fetal distress 7 lbs  c/s 3/18/2016 9#7  GYN Hx:   denies OB HX:    1999 7#   6/3/2001 8#   2004 8#   2006 8#12   2008 8#  c/s 2010 low fluid, fetal distress 7 lbs  c/s 3/18/2016 9#7  GYN Hx:   denies

## 2024-08-29 NOTE — OB PROVIDER H&P - ASSESSMENT
44 y/o  @ 33.5 wks gestation presents with no FM x's 4 days :  1708 no FH on sono   1710 DR Brown and Dr Francisco in : confirmed no FH on sono   Dr Brown and Dr Francisco d/w pt plan of care  pt to be admitted to L&D  fetal demise @ 33.5 wks gestation / hx PE /chest x-ray/ bilateral dopplers awaiting prenatal records from [facility] to determine mode of delivery   MFM consult   social work consult   verbal consent obtained for urine toxicology   see admission orders

## 2024-08-29 NOTE — OB RN TRIAGE NOTE - NSICDXPASTSURGICALHX_GEN_ALL_CORE_FT
PAST SURGICAL HISTORY:  H/O umbilical hernia repair with rectus diastasis repair 3/10/22 in [facility]    H/O:       Cimzia Counseling:  I discussed with the patient the risks of Cimzia including but not limited to immunosuppression, allergic reactions and infections.  The patient understands that monitoring is required including a PPD at baseline and must alert us or the primary physician if symptoms of infection or other concerning signs are noted.

## 2024-08-29 NOTE — OB RN TRIAGE NOTE - NS_OBGYNHISTORY_OBGYN_ALL_OB_FT
1999   6/3/2001   2004   2006   2008  c/s 2010 low fluid, fetal distress 7 lbs  c/s 3/18/2016 9#7 1 Principal Discharge DX:	Chest pain

## 2024-08-29 NOTE — OB PROVIDER H&P - NS_OBGYNHISTORY_OBGYN_ALL_OB_FT
OB HX:    1999 7#   6/3/2001 8#   2004 8#   2006 8#12   2008 8#  c/s 2010 low fluid, fetal distress 7 lbs  c/s 3/18/2016 9#7  GYN Hx:   denies

## 2024-08-29 NOTE — OB PROVIDER TRIAGE NOTE - NSICDXPASTSURGICALHX_GEN_ALL_CORE_FT
PAST SURGICAL HISTORY:  H/O umbilical hernia repair with rectus diastasis repair 3/10/22 in [facility]    H/O:

## 2024-08-29 NOTE — OB RN PATIENT PROFILE - NS_SOCIALWORKCONSULTREASON_OBGYN_ALL_OB_FT
IUFD; ongoing CPS case with 13 yo daughter; pt left for [place] 1 month and go back to [place] today due to "daughter stressing her out"

## 2024-08-29 NOTE — CHART NOTE - NSCHARTNOTEFT_GEN_A_CORE
R4 Admission Note    Interval events: Patient seen and evaluated in triage after presenting with no fetal movement of 4 days duration as well as chest pain and subjective fevers at home. In triage patient found to have no FH. MD called to bedside to confirm these findings.    History and chart reviewed.     Patient is a  44yo  at 33w5d GA with a PMHx of PE (diagnosed 2024) presents to triage after returning from [place] with subjective fevers at home, no fetal movement for for days duration, and chest pain of one week duration. Of note patient had been on Lovenox 100 BID since being pregnant due to her history of PE; however, had not been taking her anticoagulation for the past month while she was in [place]. Additionally, patient denies having a recorded fever at home states that they were more subjective in nature. Denies any recent sick contacts. Patient denies SOB. Denies LOF, VB, Cx.     Patient seen and evaluated at bedside with Dr Brown    [facility]  1. PE  -Diagnosed in 2024   OBHx:   GynHx:   PMSHx:   All:   Meds:     Vital Signs Last 24 Hours  T(C): 36.6 (24 @ 16:54), Max: 36.6 (24 @ 16:54)  HR: 83 (24 @ 17:52) (69 - 83)  BP: 106/70 (24 @ 17:10) (106/70 - 106/70)  RR: 16 (24 @ 16:54) (16 - 16)  SpO2: 99% (24 @ 17:52) (96% - 99%)      Physical Exam:  General: NAD  Abdomen: Soft, non-tender, gravid  Ext: No pain or swelling    NST reactive overnight    Labs:              CAPILLARY BLOOD GLUCOSE            A/P:     Patient is a   yo  G-P- at -w-d GA presenting with     #FWB  - NST BID  - ATU sono   - c/w PNV   - f/u GBS     #MWB  - SLIV   - Reg diet  - HSQ R4 Admission Note    Interval events: Patient seen and evaluated in triage after presenting with no fetal movement of 4 days duration as well as chest pain and subjective fevers at home. In triage patient found to have no FH. MD called to bedside to confirm these findings.    History and chart reviewed.     Patient is a  42yo  at 33w5d GA with a PMHx of PE (diagnosed 2024) presents to triage after returning from [place] with subjective fevers at home, no fetal movement for for days duration, and chest pain of one week duration. Of note patient had been on Lovenox 100 BID since being pregnant due to her history of PE; however, had not been taking her anticoagulation for the past month while she was in [place]. Additionally, patient denies having a recorded fever at home states that they were more subjective in nature. Denies any recent sick contacts. Patient denies SOB. Denies LOF, VB, Cx.     Patient seen and evaluated at bedside with Dr Brown    Primary OB: [facility]    [facility] c/b...  1. PE  -Diagnosed in 2024   -Per chart review patient had previously been on Lovenox 100 BID; however, per patient has not taken Lovenox in one month  -Per chart review patient had APLS labs in 2024 that were negative  -Per chart review patient had inherited thrombophilia workup in 2024 that was negative  OBHx:    1999   6/3/2001   2004   2006   2008  c/s 2010 low fluid, fetal distress 7 lbs  c/s 3/18/2016 9#7  GynHx: Denies fibroids, cysts, abnormal paps, STIs  PMSHx: Crohn's Disease (per patient not on medications)  All: NKDA  Meds: None    Vital Signs Last 24 Hours  T(C): 36.6 (24 @ 16:54), Max: 36.6 (24 @ 16:54)  HR: 83 (24 @ 17:52) (69 - 83)  BP: 106/70 (24 @ 17:10) (106/70 - 106/70)  RR: 16 (24 @ 16:54) (16 - 16)  SpO2: 99% (24 @ 17:52) (96% - 99%)      Physical Exam:  General: NAD  Abdomen: Soft, non-tender, gravid  Ext: No pain or swelling    BSUS (performed with Dr Brown): No fetal heart rate.         A/P: 43 year old  at 33w+5d with a known history of PE (2024) as well as two prior C/S presents to triage after returning from [place] with a myriad of complaints: subjective fevers, decreased FM, and chest pain. In triage patient noted to have an IUFD. Vital signs in triage stable. Patient satting % on RA with no signs of maternal tachycardia. Etiology of chest pain is broad; however, with her history of PE and medication non-compliance, concern that patient's PE could have worsened. While patient will need to undergo delivery, delivery to be deferred until chest pain workup is complete.     #chest pain  -EKG  -CTA ordered  -Given patient's normal oxygen saturations and lack of tachycardia, deferring emergent TTE at this time; however, if results of CTA are concerning will readdress  -until CTA results, deferring placing patient on anticoagulation at this time. will make anticoagulation plan pending CTA    #IUFD  -although patient has two prior c/s, per ACOG TOLAC is permissible with two prior c/s. informed patient on risks of repeat C/S vs TOLAC. patient amenable to TOLAC at this time  -CBC/Coags/T+S ordered  -IUFD workup order: APLS negative, TSH, A1C, Toxo, CMV ordered  -pending results of CTA patient to undergo TOLAC induction of labor  -social support provided to patient    patient seen and evaluated w Dr Brown  discussed w Dr Richardson, MFM fellow PGY6    E Maryam PGY4 R4 Admission Note    Interval events: Patient seen and evaluated in triage after presenting with no fetal movement of 4 days duration as well as chest pain and subjective fevers at home. In triage patient found to have no FH. MD called to bedside to confirm these findings.    History and chart reviewed.     Patient is a  44yo  at 33w5d GA with a PMHx of PE (diagnosed 2024) presents to triage after returning from [place] with subjective fevers at home, no fetal movement for for days duration, and chest pain of one week duration. Of note patient had been on Lovenox 100 BID since being pregnant due to her history of PE; however, had not been taking her anticoagulation for the past month while she was in [place]. Additionally, patient denies having a recorded fever at home states that they were more subjective in nature. Denies any recent sick contacts. Patient denies SOB. Denies LOF, VB, Cx.     Patient seen and evaluated at bedside with Dr Brown    Primary OB: [facility]    [facility] c/b...  1. PE  -Diagnosed in 2024   -Per chart review patient had previously been on Lovenox 100 BID; however, per patient has not taken Lovenox in one month  -Per chart review patient had APLS labs in 2024 that were negative  -Per chart review patient had inherited thrombophilia workup in 2024 that was negative  OBHx:    1999   6/3/2001   2004   2006   2008  c/s 2010 low fluid, fetal distress 7 lbs  c/s 3/18/2016 9#7  GynHx: Denies fibroids, cysts, abnormal paps, STIs  PMSHx: Crohn's Disease (per patient not on medications)  All: NKDA  Meds: None    Vital Signs Last 24 Hours  T(C): 36.6 (24 @ 16:54), Max: 36.6 (24 @ 16:54)  HR: 83 (24 @ 17:52) (69 - 83)  BP: 106/70 (24 @ 17:10) (106/70 - 106/70)  RR: 16 (24 @ 16:54) (16 - 16)  SpO2: 99% (24 @ 17:52) (96% - 99%)      Physical Exam:  General: NAD  Abdomen: Soft, non-tender, gravid  Ext: No pain or swelling    BSUS (performed with Dr Brown): No fetal heart rate, Breech positioning        A/P: 43 year old  at 33w+5d with a known history of PE (2024) as well as two prior C/S presents to triage after returning from [place] with a myriad of complaints: subjective fevers, decreased FM, and chest pain. In triage patient noted to have an IUFD. Vital signs in triage stable. Patient satting % on RA with no signs of maternal tachycardia. Etiology of chest pain is broad; however, with her history of PE and medication non-compliance, concern that patient's PE could have worsened. While patient will need to undergo delivery, delivery to be deferred until chest pain workup is complete.     #chest pain  -EKG  -CTA ordered  -Given patient's normal oxygen saturations and lack of tachycardia, deferring emergent TTE at this time; however, if results of CTA are concerning will readdress  -until CTA results, deferring placing patient on anticoagulation at this time. will make anticoagulation plan pending CTA    #IUFD  -although patient has two prior c/s, per ACOG TOLAC is permissible with two prior c/s. informed patient on risks of repeat C/S vs TOLAC. patient amenable to TOLAC at this time  -CBC/Coags/T+S ordered  -IUFD workup order: APLS negative, TSH, A1C, Toxo, CMV ordered  -pending results of CTA patient to undergo TOLAC induction of labor  -social support provided to patient    patient seen and evaluated w Dr Brown  discussed w Dr Richardson, [facility] fellow PGY6    E Maryam PGY4 R4 Admission Note    Interval events: Patient seen and evaluated in triage after presenting with no fetal movement of 4 days duration as well as chest pain and subjective fevers at home. In triage patient found to have no FH. MD called to bedside to confirm these findings.    History and chart reviewed.     Patient is a  42yo  at 33w5d GA with a PMHx of PE (diagnosed 2024) presents to triage after returning from [place] with subjective fevers at home, no fetal movement for for days duration, and chest pain of one week duration. Of note patient had been on Lovenox 100 BID since being pregnant due to her history of PE; however, had not been taking her anticoagulation for the past month while she was in [place]. Additionally, patient denies having a recorded fever at home states that they were more subjective in nature. Denies any recent sick contacts. Patient denies SOB. Denies LOF, VB, Cx.     Patient seen and evaluated at bedside with Dr Brown    Primary OB: [facility]    [facility] c/b...  1. PE  -Diagnosed in 2024   -Per chart review patient had previously been on Lovenox 100 BID; however, per patient has not taken Lovenox in one month  -Per chart review patient had APLS labs in 2024 that were negative  -Per chart review patient had inherited thrombophilia workup in 2024 that was negative  OBHx:    1999   6/3/2001   2004   2006   2008  c/s 2010 low fluid, fetal distress 7 lbs  c/s 3/18/2016 9#7  GynHx: Denies fibroids, cysts, abnormal paps, STIs  PMSHx: Crohn's Disease (per patient not on medications)  All: NKDA  Meds: None    Vital Signs Last 24 Hours  T(C): 36.6 (24 @ 16:54), Max: 36.6 (24 @ 16:54)  HR: 83 (24 @ 17:52) (69 - 83)  BP: 106/70 (24 @ 17:10) (106/70 - 106/70)  RR: 16 (24 @ 16:54) (16 - 16)  SpO2: 99% (24 @ 17:52) (96% - 99%)      Physical Exam:  General: NAD  Abdomen: Soft, non-tender, gravid  Ext: No pain or swelling    BSUS (performed with Dr Brown): No fetal heart rate, Breech positioning        A/P: 43 year old  at 33w+5d with a known history of PE (2024) as well as two prior C/S presents to triage after returning from [place] with a myriad of complaints: subjective fevers, decreased FM, and chest pain. In triage patient noted to have an IUFD. Vital signs in triage stable. Patient satting % on RA with no signs of maternal tachycardia. Etiology of chest pain is broad; however, with her history of PE and medication non-compliance, concern that patient's PE could have worsened. While patient will need to undergo delivery, delivery to be deferred until chest pain workup is complete.     #chest pain  -EKG  -CTA ordered  -Given patient's normal oxygen saturations and lack of tachycardia, deferring emergent TTE at this time; however, if results of CTA are concerning will readdress  -until CTA results, deferring placing patient on anticoagulation at this time. will make anticoagulation plan pending CTA    #IUFD  -although patient has two prior c/s, per ACOG TOLAC is permissible with two prior c/s. informed patient on risks of repeat C/S vs TOLAC. patient amenable to TOLAC at this time  -CBC/Coags/T+S ordered  -IUFD workup order: APLS negative, TSH, A1C, Toxo, CMV ordered  -pending results of CTA patient to undergo TOLAC induction of labor  -social support provided to patient    patient seen and evaluated w Dr Brown  discussed w Dr Richardson, CHASEM fellow PGY6    FIONA Francisco PGY4    Attending note   agree with aforementioned plan   MARK Brown

## 2024-08-29 NOTE — CHART NOTE - NSCHARTNOTEFT_GEN_A_CORE
2220    At bedside to review plan of care with the patient along with performing a cervical exam. Reviewed the aforementioned plan for early epi, anticoagulation, and induction. Patient amendable to plan of care    Patient reporting longstanding epigastric pain but denied any chest pain or shortness of breath at time of evaluation.     Patient had questions regarding a  delivery given she desires sterilization, stating she potentially has some signed paperwork at home. Counseled the patient regarding the recommendation for induction given her significant risk factors/co-morbodities and that a  delivery would only be under emergent circumstances. TOLAC consents were signed. RN and I recommended patient have a support person bring the reported paperwork if possible with the caveat that the paperwork may not be appropriate for this institution and that the paperwork may not be applicable if its not within a certain time frame    Given the opportunity to ask questions and have concerns addressed. All questions were answered to the patient's apparent satisfaction     SVE: 1.5/0/-3    WIth respect to IUFD and disposition, the patient desires the following (and respective/applicable forms were signed):  - Autopsy  - Cytogenetics  - Unsure about hospital vs. private disposition. Partner had mentioned that they wanted a [facility] if possible     Jarred Jennings, PGY-3  Obstetrics & Gynecology

## 2024-08-29 NOTE — OB PROVIDER H&P - ATTENDING COMMENTS
44 y/o  @ 33.5 WKS  arrived back home this AM  after a month long trip to [facility], states went to [place] because she's stressed about ACS involving her 14 year old daughter . She states had a fever 4 days ago and was evaluated in [place]. She was told no FH noted and returned home as a  section was the plan there. C/o ? SOB denies any palpitations  ap care comp by :  recent travel to [place] with fever reported, Fetal demise since 2024  and prior  sectionx2 after 5 .  In addition , (+) Crohn's   2024 diagnosed with pulmonary embolism was on Lovenox 100 mg BID  dose and has not taken in 1 month   pt receives her PNC in [facility]. Her Operative report for second  noted LFT closed in 2 layers. She desires a bilateral tubal ligation no tubal paper signed at [facility] noted in the package of records obtained. Plan -for admission and after lengthy discussion with the patient she desires vaginal delivery after  section. Plan for induction with cervical balloon and Pitocin

## 2024-08-29 NOTE — OB PROVIDER H&P - NSICDXPASTSURGICALHX_GEN_ALL_CORE_FT
PAST SURGICAL HISTORY:  H/O umbilical hernia repair with rectus diastasis repair 3/10/22 in [facility]    H/O:

## 2024-08-29 NOTE — OB PROVIDER H&P - NSHIGHPPHRISK_OBGYN_A_OB_CAL
From: Kaylene Sheikh  To: Sherice Caro DO  Sent: 10/21/2019 7:42 PM CDT  Subject: Non-Urgent Medical Question    Since Celestina and Noris both tried to call yesterday when I wasn't available and I will have limited availability over the next few days, I am wondering if you could just send me a message on [employer].  Thanks.  Kaylene   3

## 2024-08-29 NOTE — OB PROVIDER H&P - NSHPPHYSICALEXAM_GEN_ALL_CORE
abdomen: soft, nt on palp  1708  no FH on sono  1710 Dr Brown/ Dr Francisco   no FH noted on sono   TAS breech / posterior placenta   T(C): 36.6 (08-29-24 @ 16:54), Max: 36.6 (08-29-24 @ 16:54)  HR: 69 (08-29-24 @ 17:37) (69 - 82)  BP: 106/70 (08-29-24 @ 17:10) (106/70 - 106/70)  RR: 16 (08-29-24 @ 16:54) (16 - 16)  SpO2: 98% (08-29-24 @ 17:37) (97% - 99%) room air  lungs: CTAB  heart : RRR

## 2024-08-29 NOTE — OB PROVIDER TRIAGE NOTE - NSHPPHYSICALEXAM_GEN_ALL_CORE
abdomen: soft, nt on palp  1708  no FH on sono  1710 Dr Brown/ Dr Francisco   no FH noted on sono   TAS breech / posterior placenta   T(C): 36.6 (08-29-24 @ 16:54), Max: 36.6 (08-29-24 @ 16:54)  HR: 69 (08-29-24 @ 17:37) (69 - 82)  BP: 106/70 (08-29-24 @ 17:10) (106/70 - 106/70)  RR: 16 (08-29-24 @ 16:54) (16 - 16)  SpO2: 98% (08-29-24 @ 17:37) (97% - 99%) room air  lungs:  heart : RRR abdomen: soft, nt on palp  1708  no FH on sono  1710 Dr Brown/ Dr Francisco   no FH noted on sono   TAS breech / posterior placenta   T(C): 36.6 (08-29-24 @ 16:54), Max: 36.6 (08-29-24 @ 16:54)  HR: 69 (08-29-24 @ 17:37) (69 - 82)  BP: 106/70 (08-29-24 @ 17:10) (106/70 - 106/70)  RR: 16 (08-29-24 @ 16:54) (16 - 16)  SpO2: 98% (08-29-24 @ 17:37) (97% - 99%) room air  lungs: CTAB  heart : RRR

## 2024-08-29 NOTE — OB RN PATIENT PROFILE - NSICDXPASTSURGICALHX_GEN_ALL_CORE_FT
PAST SURGICAL HISTORY:  H/O umbilical hernia repair with rectus diastasis repair 3/10/22 in [facility]    H/O:

## 2024-08-29 NOTE — OB PROVIDER H&P - HISTORY OF PRESENT ILLNESS
PNC @ [facility]  44 y/o  @ 33.5 WKS GESTATION PRESENTS WITH C/O NO fm X'S 4 DAYS pt states arrived back home this AM @ 0800 from [facility], states went to [place] because she is stressed about ACS involving her 14 year old daughter  and also reports ? fever 4 days ago but never took her temperature denies any uc's vb or lof denies any n/v/d pt also c/o chest heaviness/ ? SOB denies any palpitations  ap care comp by :   pt was seen in [facility] 2024 - no FH and they wanted to do a    2024 diagnosed with pulmonary embolism was on Lovenox 100 mg BID  dose and has not taken in 1 month   pt receives her PNC in [facility]   Crohn's disease   desires a bilateral tubal ligation

## 2024-08-29 NOTE — OB PROVIDER TRIAGE NOTE - NSOBPROVIDERNOTE_OBGYN_ALL_OB_FT
42 y/o  @ 33.5 wks gestation presents with no FM x's 4 days :  1708 no FH on sono   1710 DR Brown and Dr Francisco in : confirmed no FH on sono   Dr Brown and Dr Francisco d/w pt plan of care  pt to be admitted to L&D  fetal demise @ 33.5 wks gestation / hx PE /chest x-ray/ bilateral dopplers awaiting prenatal records from [facility] to determine mode of delivery   MFM consult   see admission orders

## 2024-08-29 NOTE — CHART NOTE - NSCHARTNOTEFT_GEN_A_CORE
Patient's presentation/plan of care was discussed with MFM (Shahab Richardson, fellow, and FRED Douglas, attg) and service attendings (Shahab Recinos and DIANA Brown) along with Dr. Garcia of anesthesia. She is in the process of receiving a CTA. DVT studies were negative. Initial labs notable for anemia at 8.5/26.6 and at time of documentation, recent labs have demonstrate a hypokalemia. With regards to her hx of DVT and plan for induction for IUFD, [facility] recommendations are as follows:  - Early epidural analgesia given recommendation is to start a Heparin infusion regardless of what CTA findings demonstrate  - To discontinue the Heparin infusion once in active labor     Jarred Jennings, PGY-3  Obstetrics & Gynecology

## 2024-08-30 LAB
ALBUMIN SERPL ELPH-MCNC: 2.7 G/DL — LOW (ref 3.3–5)
ALBUMIN SERPL ELPH-MCNC: 3 G/DL — LOW (ref 3.3–5)
ALP SERPL-CCNC: 135 U/L — HIGH (ref 40–120)
ALP SERPL-CCNC: 152 U/L — HIGH (ref 40–120)
ALT FLD-CCNC: 5 U/L — SIGNIFICANT CHANGE UP (ref 4–33)
ALT FLD-CCNC: 6 U/L — SIGNIFICANT CHANGE UP (ref 4–33)
ANION GAP SERPL CALC-SCNC: 11 MMOL/L — SIGNIFICANT CHANGE UP (ref 7–14)
ANION GAP SERPL CALC-SCNC: 16 MMOL/L — HIGH (ref 7–14)
APTT BLD: 32 SEC — SIGNIFICANT CHANGE UP (ref 24.5–35.6)
APTT BLD: 39.4 SEC — HIGH (ref 24.5–35.6)
AST SERPL-CCNC: 12 U/L — SIGNIFICANT CHANGE UP (ref 4–32)
AST SERPL-CCNC: 16 U/L — SIGNIFICANT CHANGE UP (ref 4–32)
BILIRUB SERPL-MCNC: 0.6 MG/DL — SIGNIFICANT CHANGE UP (ref 0.2–1.2)
BILIRUB SERPL-MCNC: <0.2 MG/DL — SIGNIFICANT CHANGE UP (ref 0.2–1.2)
BUN SERPL-MCNC: 2 MG/DL — LOW (ref 7–23)
BUN SERPL-MCNC: 2 MG/DL — LOW (ref 7–23)
CALCIUM SERPL-MCNC: 10 MG/DL — SIGNIFICANT CHANGE UP (ref 8.4–10.5)
CALCIUM SERPL-MCNC: 9.3 MG/DL — SIGNIFICANT CHANGE UP (ref 8.4–10.5)
CHLORIDE SERPL-SCNC: 106 MMOL/L — SIGNIFICANT CHANGE UP (ref 98–107)
CHLORIDE SERPL-SCNC: 106 MMOL/L — SIGNIFICANT CHANGE UP (ref 98–107)
CMV IGG FLD QL: 2.8 U/ML — HIGH
CMV IGG SERPL-IMP: POSITIVE
CMV IGM FLD-ACNC: <8 AU/ML — SIGNIFICANT CHANGE UP
CMV IGM SERPL QL: NEGATIVE — SIGNIFICANT CHANGE UP
CO2 SERPL-SCNC: 20 MMOL/L — LOW (ref 22–31)
CO2 SERPL-SCNC: 22 MMOL/L — SIGNIFICANT CHANGE UP (ref 22–31)
CREAT SERPL-MCNC: 0.4 MG/DL — LOW (ref 0.5–1.3)
CREAT SERPL-MCNC: 0.48 MG/DL — LOW (ref 0.5–1.3)
EGFR: 121 ML/MIN/1.73M2 — SIGNIFICANT CHANGE UP
EGFR: 127 ML/MIN/1.73M2 — SIGNIFICANT CHANGE UP
FIBRINOGEN PPP-MCNC: 466 MG/DL — HIGH (ref 200–465)
FIBRINOGEN PPP-MCNC: 589 MG/DL — HIGH (ref 200–465)
GLUCOSE SERPL-MCNC: 78 MG/DL — SIGNIFICANT CHANGE UP (ref 70–99)
GLUCOSE SERPL-MCNC: 79 MG/DL — SIGNIFICANT CHANGE UP (ref 70–99)
HCT VFR BLD CALC: 26.3 % — LOW (ref 34.5–45)
HCT VFR BLD CALC: 34.8 % — SIGNIFICANT CHANGE UP (ref 34.5–45)
HGB BLD-MCNC: 11 G/DL — LOW (ref 11.5–15.5)
HGB BLD-MCNC: 8.3 G/DL — LOW (ref 11.5–15.5)
INR BLD: 1.06 RATIO — SIGNIFICANT CHANGE UP (ref 0.85–1.18)
INR BLD: 1.12 RATIO — SIGNIFICANT CHANGE UP (ref 0.85–1.18)
MCHC RBC-ENTMCNC: 21 PG — LOW (ref 27–34)
MCHC RBC-ENTMCNC: 21.7 PG — LOW (ref 27–34)
MCHC RBC-ENTMCNC: 31.6 GM/DL — LOW (ref 32–36)
MCHC RBC-ENTMCNC: 31.6 GM/DL — LOW (ref 32–36)
MCV RBC AUTO: 66.6 FL — LOW (ref 80–100)
MCV RBC AUTO: 68.6 FL — LOW (ref 80–100)
NRBC # BLD: 0 /100 WBCS — SIGNIFICANT CHANGE UP (ref 0–0)
NRBC # BLD: 0 /100 WBCS — SIGNIFICANT CHANGE UP (ref 0–0)
NRBC # FLD: 0 K/UL — SIGNIFICANT CHANGE UP (ref 0–0)
NRBC # FLD: 0 K/UL — SIGNIFICANT CHANGE UP (ref 0–0)
PLATELET # BLD AUTO: 349 K/UL — SIGNIFICANT CHANGE UP (ref 150–400)
PLATELET # BLD AUTO: 352 K/UL — SIGNIFICANT CHANGE UP (ref 150–400)
POTASSIUM SERPL-MCNC: 3.2 MMOL/L — LOW (ref 3.5–5.3)
POTASSIUM SERPL-MCNC: 3.6 MMOL/L — SIGNIFICANT CHANGE UP (ref 3.5–5.3)
POTASSIUM SERPL-SCNC: 3.2 MMOL/L — LOW (ref 3.5–5.3)
POTASSIUM SERPL-SCNC: 3.6 MMOL/L — SIGNIFICANT CHANGE UP (ref 3.5–5.3)
PROT SERPL-MCNC: 6.2 G/DL — SIGNIFICANT CHANGE UP (ref 6–8.3)
PROT SERPL-MCNC: 6.8 G/DL — SIGNIFICANT CHANGE UP (ref 6–8.3)
PROTHROM AB SERPL-ACNC: 11.8 SEC — SIGNIFICANT CHANGE UP (ref 9.5–13)
PROTHROM AB SERPL-ACNC: 12.5 SEC — SIGNIFICANT CHANGE UP (ref 9.5–13)
RBC # BLD: 3.95 M/UL — SIGNIFICANT CHANGE UP (ref 3.8–5.2)
RBC # BLD: 5.07 M/UL — SIGNIFICANT CHANGE UP (ref 3.8–5.2)
RBC # FLD: 19.6 % — HIGH (ref 10.3–14.5)
RBC # FLD: 21.9 % — HIGH (ref 10.3–14.5)
SODIUM SERPL-SCNC: 139 MMOL/L — SIGNIFICANT CHANGE UP (ref 135–145)
SODIUM SERPL-SCNC: 142 MMOL/L — SIGNIFICANT CHANGE UP (ref 135–145)
T GONDII IGG SER QL: 49.3 IU/ML — HIGH
T GONDII IGG SER QL: POSITIVE
T GONDII IGM SER QL: <3 AU/ML — SIGNIFICANT CHANGE UP
T GONDII IGM SER QL: NEGATIVE — SIGNIFICANT CHANGE UP
T PALLIDUM AB TITR SER: NEGATIVE — SIGNIFICANT CHANGE UP
WBC # BLD: 4.26 K/UL — SIGNIFICANT CHANGE UP (ref 3.8–10.5)
WBC # BLD: 6.11 K/UL — SIGNIFICANT CHANGE UP (ref 3.8–10.5)
WBC # FLD AUTO: 4.26 K/UL — SIGNIFICANT CHANGE UP (ref 3.8–10.5)
WBC # FLD AUTO: 6.11 K/UL — SIGNIFICANT CHANGE UP (ref 3.8–10.5)

## 2024-08-30 PROCEDURE — 88312 SPECIAL STAINS GROUP 1: CPT | Mod: 26

## 2024-08-30 PROCEDURE — 88341 IMHCHEM/IMCYTCHM EA ADD ANTB: CPT | Mod: 26

## 2024-08-30 PROCEDURE — 88342 IMHCHEM/IMCYTCHM 1ST ANTB: CPT | Mod: 26

## 2024-08-30 PROCEDURE — 88307 TISSUE EXAM BY PATHOLOGIST: CPT | Mod: 26

## 2024-08-30 PROCEDURE — 59409 OBSTETRICAL CARE: CPT | Mod: U7,GC

## 2024-08-30 PROCEDURE — 88291 CYTO/MOLECULAR REPORT: CPT

## 2024-08-30 PROCEDURE — 99233 SBSQ HOSP IP/OBS HIGH 50: CPT | Mod: GC

## 2024-08-30 RX ORDER — MENTHOL/CETYLPYRD CL 3 MG
1 LOZENGE MUCOUS MEMBRANE EVERY 6 HOURS
Refills: 0 | Status: DISCONTINUED | OUTPATIENT
Start: 2024-08-30 | End: 2024-09-04

## 2024-08-30 RX ORDER — LANOLIN
1 OINTMENT (GRAM) TOPICAL EVERY 6 HOURS
Refills: 0 | Status: DISCONTINUED | OUTPATIENT
Start: 2024-08-30 | End: 2024-09-04

## 2024-08-30 RX ORDER — HEPARIN SODIUM,BOVINE 1000/ML
1800 VIAL (ML) INJECTION
Qty: 25000 | Refills: 0 | Status: DISCONTINUED | OUTPATIENT
Start: 2024-08-30 | End: 2024-08-31

## 2024-08-30 RX ORDER — TETANUS TOXOID, REDUCED DIPHTHERIA TOXOID AND ACELLULAR PERTUSSIS VACCINE, ADSORBED 5; 2.5; 8; 8; 2.5 [IU]/.5ML; [IU]/.5ML; UG/.5ML; UG/.5ML; UG/.5ML
0.5 SUSPENSION INTRAMUSCULAR ONCE
Refills: 0 | Status: DISCONTINUED | OUTPATIENT
Start: 2024-08-30 | End: 2024-09-04

## 2024-08-30 RX ORDER — OXYTOCIN 10 UNIT/ML
41.67 AMPUL (ML) INJECTION
Qty: 20 | Refills: 0 | Status: DISCONTINUED | OUTPATIENT
Start: 2024-08-30 | End: 2024-08-30

## 2024-08-30 RX ORDER — OXYTOCIN 10 UNIT/ML
16.67 AMPUL (ML) INJECTION
Qty: 20 | Refills: 0 | Status: DISCONTINUED | OUTPATIENT
Start: 2024-08-30 | End: 2024-08-31

## 2024-08-30 RX ORDER — OXYTOCIN 10 UNIT/ML
1 AMPUL (ML) INJECTION
Qty: 30 | Refills: 0 | Status: DISCONTINUED | OUTPATIENT
Start: 2024-08-30 | End: 2024-08-30

## 2024-08-30 RX ORDER — NIFEDIPINE 60 MG/1
30 TABLET, FILM COATED, EXTENDED RELEASE ORAL EVERY 24 HOURS
Refills: 0 | Status: DISCONTINUED | OUTPATIENT
Start: 2024-08-30 | End: 2024-09-04

## 2024-08-30 RX ORDER — PIPERACILLIN SODIUM AND TAZOBACTAM SODIUM 3; .375 G/15ML; G/15ML
4.5 INJECTION, POWDER, FOR SOLUTION INTRAVENOUS EVERY 8 HOURS
Refills: 0 | Status: DISCONTINUED | OUTPATIENT
Start: 2024-08-30 | End: 2024-08-31

## 2024-08-30 RX ORDER — OXYCODONE HYDROCHLORIDE 5 MG/1
5 TABLET ORAL ONCE
Refills: 0 | Status: DISCONTINUED | OUTPATIENT
Start: 2024-08-30 | End: 2024-09-04

## 2024-08-30 RX ORDER — OXYTOCIN 10 UNIT/ML
20 AMPUL (ML) INJECTION ONCE
Refills: 0 | Status: COMPLETED | OUTPATIENT
Start: 2024-08-30 | End: 2024-08-30

## 2024-08-30 RX ORDER — VITAMIN A, ASCORBIC ACID, VITAMIN D, .ALPHA.-TOCOPHEROL, THIAMINE MONONITRATE, RIBOFLAVIN, NIACIN, PYRIDOXINE HYDROCHLORIDE, FOLIC ACID, CYANOCOBALAMIN, CALCIUM, IRON, MAGNESIUM, ZINC, AND COPPER 2700; 70; 400; 30; 1.6; 1.8; 18; 2.5; 1; 12; 100; 65; 25; 25; 2 [IU]/1; MG/1; [IU]/1; [IU]/1; MG/1; MG/1; MG/1; MG/1; MG/1; UG/1; MG/1; MG/1; MG/1; MG/1; MG/1
1 TABLET ORAL DAILY
Refills: 0 | Status: DISCONTINUED | OUTPATIENT
Start: 2024-08-30 | End: 2024-09-04

## 2024-08-30 RX ORDER — IBUPROFEN 600 MG
600 TABLET ORAL EVERY 6 HOURS
Refills: 0 | Status: COMPLETED | OUTPATIENT
Start: 2024-08-30 | End: 2025-07-29

## 2024-08-30 RX ORDER — OXYCODONE HYDROCHLORIDE 5 MG/1
5 TABLET ORAL
Refills: 0 | Status: DISCONTINUED | OUTPATIENT
Start: 2024-08-30 | End: 2024-09-04

## 2024-08-30 RX ORDER — DIPHENHYDRAMINE HCL 50 MG
25 CAPSULE ORAL ONCE
Refills: 0 | Status: COMPLETED | OUTPATIENT
Start: 2024-08-30 | End: 2024-08-30

## 2024-08-30 RX ORDER — KETOROLAC TROMETHAMINE 30 MG/ML
30 INJECTION, SOLUTION INTRAMUSCULAR ONCE
Refills: 0 | Status: DISCONTINUED | OUTPATIENT
Start: 2024-08-30 | End: 2024-08-30

## 2024-08-30 RX ORDER — HYDROCORTISONE 1 %
1 OINTMENT (GRAM) TOPICAL EVERY 6 HOURS
Refills: 0 | Status: DISCONTINUED | OUTPATIENT
Start: 2024-08-30 | End: 2024-09-04

## 2024-08-30 RX ORDER — POTASSIUM CHLORIDE 10 MEQ
40 TABLET, EXT RELEASE, PARTICLES/CRYSTALS ORAL EVERY 4 HOURS
Refills: 0 | Status: COMPLETED | OUTPATIENT
Start: 2024-08-30 | End: 2024-08-30

## 2024-08-30 RX ORDER — NIFEDIPINE 60 MG/1
10 TABLET, FILM COATED, EXTENDED RELEASE ORAL ONCE
Refills: 0 | Status: COMPLETED | OUTPATIENT
Start: 2024-08-30 | End: 2024-08-30

## 2024-08-30 RX ORDER — PRAMOXINE HCL 1 %
1 GEL (GRAM) TOPICAL EVERY 4 HOURS
Refills: 0 | Status: DISCONTINUED | OUTPATIENT
Start: 2024-08-30 | End: 2024-09-04

## 2024-08-30 RX ORDER — DIPHENHYDRAMINE HCL 50 MG
25 CAPSULE ORAL EVERY 6 HOURS
Refills: 0 | Status: DISCONTINUED | OUTPATIENT
Start: 2024-08-30 | End: 2024-09-04

## 2024-08-30 RX ORDER — WITCH HAZEL 1 G/ML
1 LIQUID TOPICAL EVERY 4 HOURS
Refills: 0 | Status: DISCONTINUED | OUTPATIENT
Start: 2024-08-30 | End: 2024-09-04

## 2024-08-30 RX ORDER — OXYTOCIN 10 UNIT/ML
2 AMPUL (ML) INJECTION
Qty: 30 | Refills: 0 | Status: DISCONTINUED | OUTPATIENT
Start: 2024-08-30 | End: 2024-08-30

## 2024-08-30 RX ORDER — ACETAMINOPHEN 325 MG/1
975 TABLET ORAL ONCE
Refills: 0 | Status: COMPLETED | OUTPATIENT
Start: 2024-08-30 | End: 2024-08-30

## 2024-08-30 RX ORDER — SODIUM CHLORIDE 9 MG/ML
3 INJECTION INTRAMUSCULAR; INTRAVENOUS; SUBCUTANEOUS EVERY 8 HOURS
Refills: 0 | Status: DISCONTINUED | OUTPATIENT
Start: 2024-08-30 | End: 2024-09-04

## 2024-08-30 RX ORDER — ACETAMINOPHEN 325 MG/1
975 TABLET ORAL
Refills: 0 | Status: DISCONTINUED | OUTPATIENT
Start: 2024-08-30 | End: 2024-09-04

## 2024-08-30 RX ADMIN — Medication 40 MILLIEQUIVALENT(S): at 09:02

## 2024-08-30 RX ADMIN — Medication 50 GM/HR: at 19:25

## 2024-08-30 RX ADMIN — NIFEDIPINE 30 MILLIGRAM(S): 60 TABLET, FILM COATED, EXTENDED RELEASE ORAL at 18:28

## 2024-08-30 RX ADMIN — ACETAMINOPHEN 975 MILLIGRAM(S): 325 TABLET ORAL at 11:01

## 2024-08-30 RX ADMIN — Medication 1800 UNIT(S)/HR: at 01:23

## 2024-08-30 RX ADMIN — ACETAMINOPHEN 975 MILLIGRAM(S): 325 TABLET ORAL at 23:53

## 2024-08-30 RX ADMIN — Medication 2 MILLIUNIT(S)/MIN: at 01:28

## 2024-08-30 RX ADMIN — Medication 25 MILLIGRAM(S): at 10:28

## 2024-08-30 RX ADMIN — Medication 125 MILLILITER(S): at 23:48

## 2024-08-30 RX ADMIN — Medication 125 MILLILITER(S): at 10:25

## 2024-08-30 RX ADMIN — ACETAMINOPHEN 975 MILLIGRAM(S): 325 TABLET ORAL at 10:26

## 2024-08-30 RX ADMIN — NIFEDIPINE 10 MILLIGRAM(S): 60 TABLET, FILM COATED, EXTENDED RELEASE ORAL at 18:28

## 2024-08-30 RX ADMIN — Medication 300 GRAM(S): at 18:28

## 2024-08-30 RX ADMIN — Medication 1 MILLIUNIT(S)/MIN: at 10:26

## 2024-08-30 RX ADMIN — PIPERACILLIN SODIUM AND TAZOBACTAM SODIUM 200 GRAM(S): 3; .375 INJECTION, POWDER, FOR SOLUTION INTRAVENOUS at 18:46

## 2024-08-30 RX ADMIN — Medication 40 MILLIEQUIVALENT(S): at 01:03

## 2024-08-30 RX ADMIN — Medication 50 GM/HR: at 18:50

## 2024-08-30 RX ADMIN — Medication 40 MILLIEQUIVALENT(S): at 05:06

## 2024-08-30 RX ADMIN — Medication 20 UNIT(S): at 17:12

## 2024-08-30 NOTE — CHART NOTE - NSCHARTNOTEFT_GEN_A_CORE
Meeting held with anesthesia regarding plan of care. Anesthesia voiced concerns over heparin drip in labor given the risk of epidural hematoma which could lead to subsequent paralysis. Per anesthesia, recommendation that heparin gtt be discontinued immediately with 1:1 monitoring of patient for the next hour to ensure epidural does not get dislodged.     Heparin drip turned off.     discussed w Dr Brown, Dr Recinos and anesthesia    FIONA Francisco PGY4

## 2024-08-30 NOTE — OB PROVIDER LABOR PROGRESS NOTE - ASSESSMENT
43y  @ 33w6d admitted for TOLAC in the setting of a known IUFD in a pregnancy complicated by PE diagnosed in 2024 for which she had not been on her prescribed Lovenox for ~1mo while in [place] prior to presentation. Patient asymptomatic at time of evaluation     #Labor   - On Oxytocin  - CB firmly in place     # Issues   - On Hep GTT w/ first aPTT to be drawn @ ~723a  - Will obtain BMP w/ the above   - Dopplers were negative and prelim CTA without evidence of PE. Patient is otherwise saturating well on RA    #Pain Control   - w/ Epi    Jarred Jennings, PGY-3    plan per Dr. SIMÓN Recinos  43y  @ 33w6d admitted for TOLAC in the setting of a known IUFD in a pregnancy complicated by PE diagnosed in 2024 for which she had not been on her prescribed Lovenox for ~1mo while in [place] prior to presentation. Patient asymptomatic at time of evaluation     #Labor   - On Oxytocin  - CB firmly in place     # Issues   - On Hep GTT w/ first aPTT to be drawn @ ~723a  - Will obtain BMP w/ the above   - Dopplers were negative and prelim CTA without evidence of PE. Patient is otherwise saturating well on RA    #Pain Control   - w/ Epi    Jarred Jennings, PGY-3    d/w Dr. DIANA Brown, service attending

## 2024-08-30 NOTE — PROVIDER CONTACT NOTE (OTHER) - ACTION/TREATMENT ORDERED:
Pt to be started on Magnesium Sulfate, Procardia 10mg IR PO, Procardia 30mg PO. Pt educated on POC and verbalizes understanding. Will continue to monitor at this time.

## 2024-08-30 NOTE — CHART NOTE - NSCHARTNOTEFT_GEN_A_CORE
CHASEM On-Call Fellow Note     42 yo  now postpartum day 0 s/p  of demised fetus with maternal history of right lower lobe PE early in pregnancy treated with therapeutic anticoagulation. Delivery itself was uncomplicated with EBL of ~220mL and occurred around 5pm. Her epidural catheter remains in situ and was placed last night. Per inpatient team she has no concerns for ongoing bleeding and has a firm fundus.     The patient’s anticoagulation plan was discussed tonight at Safety Rounds, with recommendation of the following plan:   -Epidural catheter may be removed at the discretion of inpatient anesthesia team  -At least 1 hour following epidural catheter removal and 6-12 hours following vaginal delivery (starting 11pm), can restart heparin drip at 18 units/hour with NO bolus   -Continue heparin drip per protocol until 24 hours postpartum, at which point if patient remains clinically stable can transition to therapeutic Lovenox (1mg/kg). At this point patient will also be >24 status post epidural catheter placement.     Additionally, please ensure patient has comprehensive evaluation for IUFD (syphilis, A1C, TSH, APLS, placental pathology) and is offered fetal autopsy.     Haroon Redd MD, PGY-7

## 2024-08-30 NOTE — OB PROVIDER DELIVERY SUMMARY - NSPROVIDERDELIVERYNOTE_OBGYN_ALL_OB_FT
Spontaneous vaginal delivery of stillborn infant from breech position. Feet delivered spontaneously. Using wet towel with gentle traction the right arm delivered. Left arm delivered spontaneously. Using maternal effort and traction head delivered without complication.. Cord clamped and cut. Placenta delivered intact with a 3 vessel cord. Fundal massage was given and uterine fundus was found to be firm. Vaginal exam revealed an intact cervix, vaginal walls, and sulci. Patient had no lacerations. With additional cytotec UT and pitocin excellent hemostasis was noted. Patient was stable and went to recovery. Count was correct x2. Patient to be restarted on anticoagulation after epidural pulled

## 2024-08-30 NOTE — OB PROVIDER DELIVERY SUMMARY - NSSELHIDDEN_OBGYN_ALL_OB_FT
[NS_DeliveryAttending1_OBGYN_ALL_OB_FT:[id number]=],[NS_DeliveryAssist1_OBGYN_ALL_OB_FT:[id number]],[NS_DeliveryRN_OBGYN_ALL_OB_FT:[id number]=],[NS_CirculateRN2_OBGYN_ALL_OB_FT:[id number]=]

## 2024-08-30 NOTE — OB PROVIDER DELIVERY SUMMARY - NS_MATERNALCOMPOTHER_OBGYN_ALL_OB_FT
PHYSICAL THERAPY - DAILY TREATMENT NOTE    Patient Name: Paul Yeboah        Date: 2021  : 1973   YES Patient  Verified  Visit #:   3   of   15  Insurance: Payor: JANINE / Plan: Lev Tyler 75 / Product Type:  /      In time: 10:30 Out time: 11:30   Total Treatment Time: 60     BCBS/Medicare Time Tracking (below)   Total Timed Codes (min):  NA 1:1 Treatment Time:  NA     TREATMENT AREA = Lower back pain [M54.5]    SUBJECTIVE  Pain Level (on 0 to 10 scale):  6  / 10   Medication Changes/New allergies or changes in medical history, any new surgeries or procedures? NO    If yes, update Summary List   Subjective Functional Status/Changes:  []  No changes reported     Pt reports soreness from fall is improving, and she is having her baseline soreness/pain.        Modalities Rationale:     decrease pain to improve patient's ability to return to pain-free ADLs  15 min [x] Estim, type/location: IFC to l/s in supine                                      []  att     []  unatt     []  w/US     []  w/ice    []  w/heat    min []  Mechanical Traction: type/lbs                   []  pro   []  sup   []  int   []  cont    []  before manual    []  after manual    min []  Ultrasound, settings/location:      min []  Iontophoresis w/ dexamethasone, location:                                               []  take home patch       []  in clinic    min []  Ice     []  Heat    location/position:     min []  Vasopneumatic Device, press/temp:    If using vaso (only need to measure limb vaso being performed on)      pre-treatment girth :       post-treatment girth :       measured at (landmark location) :      min []  Other:    [] Skin assessment post-treatment (if applicable):    [x]  intact    [x]  redness- no adverse reaction                  []redness  adverse reaction:        45 min Therapeutic Exercise:  [x]  See flow sheet   Rationale:      increase ROM and increase strength to improve the patients ability to return to pain-free standing       Billed With/As:   [] TE   [] TA   [] Neuro   [] Self Care Patient Education: [x] Review HEP    [] Progressed/Changed HEP based on:   [] positioning   [] body mechanics   [] transfers   [] heat/ice application    [] other:      Other Objective/Functional Measures: Added TA draw and TA draw with LE march in hooklying today     Post Treatment Pain Level (on 0 to 10) scale:   5  / 10     ASSESSMENT  Assessment/Changes in Function:     Pt required VCs to perform PPT with abs. Pt was encouraged to obtain a lumbar corset to be used at times to help manage her posture/symptoms. []  See Progress Note/Recertification   Patient will continue to benefit from skilled PT services to modify and progress therapeutic interventions, address functional mobility deficits, address ROM deficits, address strength deficits, analyze and modify body mechanics/ergonomics, assess and modify postural abnormalities, address imbalance/dizziness and instruct in home and community integration to attain remaining goals. Progress toward goals / Updated goals: Showing good tolerance to core strengthening, will plan to dd body mechanics training next session.      PLAN  [x]  Upgrade activities as tolerated YES Continue plan of care   []  Discharge due to :    []  Other:      Therapist: Lew Bosch PT    Date: 6/23/2021 Time: 4:27 PM     Future Appointments   Date Time Provider Ashish Vásquez   6/23/2021 10:30 AM Yoli Walden, PT [facility]   6/24/2021  7:30 AM [id number]   6/29/2021  9:30 AM Daniella Wyatt PT [facility]   7/1/2021  5:00 PM Daniella Wyatt PT [facility]   7/6/2021  9:45 AM Yoli Walden PT [facility]   7/8/2021  8:45 AM Daniella Wyatt PT [facility]   7/12/2021  9:45 AM Yoli Walden PT [facility]   7/14/2021  9:00 AM Chery Silva, PT [facility] IUFD

## 2024-08-30 NOTE — PERINATAL/NEONATAL BEREAVEMENT NOTE - PHOTOS TAKEN
Has not been seen by allergist in the last few years.  Has allergies and would like to make appointment with Dr Weaver   yes

## 2024-08-30 NOTE — OB PROVIDER DELIVERY SUMMARY - NS_ROMTYPE_OBGYN_ALL_OB
Date: 4/14/2022    Patient Name: Carlos Hernandez          To Whom it may concern: This letter has been written at the patient's request. The above patient was seen at one of the [facility] locations for treatment of a medical condition. The patient may be excused from extracurricular sports (Tennis)  for 8 weeks .         Sincerely,    Daniel Cote MD
Date: 4/14/2022    Patient Name: Trung Mai          To Whom it may concern: This letter has been written at the patient's request. The above patient was seen at one of the [facility] locations for treatment of a medical condition. The patient may return to physical education for 8 weeks with the following restrictions: sit down exercises only.         Sincerely,    Wes Mendoza MD
[facility]

## 2024-08-30 NOTE — OB RN DELIVERY SUMMARY - NSSELHIDDEN_OBGYN_ALL_OB_FT
[NS_DeliveryAttending1_OBGYN_ALL_OB_FT:[id number]=],[NS_DeliveryAssist1_OBGYN_ALL_OB_FT:[id number]=],[NS_DeliveryRN_OBGYN_ALL_OB_FT:[id number]],[NS_CirculateRN2_OBGYN_ALL_OB_FT:[id number]=]

## 2024-08-30 NOTE — CONSULT NOTE ADULT - ASSESSMENT
42 y/o  at 33w5d, PMH RLL PE diagnosed in 2024 previously on Lovenox 100mg BID (last dose >1 month ago), with IUFD diagnosed approximately 5 days ago and confirmed on sonogram on presentation. Current imaging negative for active DVT (LE Duplex negative, and CTA preliminary reading neg for PE). Patient is currently in the process of induction (TOLAC) with fetus in breech presentation with pitocin.     With history of 2 prior C/S, recommend against prostaglandins (eg. cyototec). For IOL increase pitocin 1x1. No contraindication for other uterotonics such as hemabate, methergine, pitocin. Patient is at high risk of hemorrhage due to risk of rupture due to 2 prior c/s, she is grand multiparous, and with an IUFD there is the added risk of possible DIC. Patient has significant anemia with starting h/h 8.5/26.6. In anticipation for possible hemorrhage and bleeding with delivery, recommend transfusion of 2u PRBCs now.        44 y/o  at 33w5d, PMH RLL PE diagnosed in 2024 previously on Lovenox 100mg BID (last dose >1 month ago), with IUFD diagnosed approximately 5 days ago and confirmed on sonogram on presentation. Current imaging negative for active DVT (LE Duplex negative, and CTA preliminary reading neg for PE). Patient is currently in the process of induction (TOLAC) with fetus in breech presentation with pitocin.     With history of 2 prior C/S, recommend against prostaglandins (eg. cyototec). For IOL increase pitocin 1x1. No contraindication for other uterotonics such as hemabate, methergine, pitocin. Patient is at high risk of hemorrhage due to risk of rupture due to 2 prior c/s, she is grand multiparous, and with an IUFD there is the added risk of possible DIC. Patient has significant anemia with starting h/h 8.5/26.6. In anticipation for possible hemorrhage and bleeding with delivery, recommend transfusion of 2u PRBCs now.     Discussed risk of hemorrhage with the patient and the plan to transfuse due to low starting h/h. Also counseled the patient on the risk of uterine rupture due to h/o 2 prior c/s (risk 1-3%), risk of hemorrhage that may necessitate hysterectomy, possible ICU admission, and risk of maternal death.     INCOMPLETE NOTE   44 y/o  at 33w5d, PMH RLL PE diagnosed in 2024 previously on Lovenox 100mg BID (last dose >1 month ago), with IUFD diagnosed approximately 5 days ago and confirmed on sonogram on presentation. Current imaging negative for active DVT (LE Duplex negative, and CTA preliminary reading neg for PE). Patient is currently in the process of induction (TOLAC) with fetus in breech presentation with pitocin.     With history of 2 prior C/S, recommend against prostaglandins (eg. cyototec). For IOL increase pitocin 1x1. No contraindication for other uterotonics such as hemabate, methergine, pitocin. Patient is at high risk of hemorrhage due to risk of rupture due to 2 prior c/s, she is grand multiparous, and with an IUFD there is the added risk of possible DIC. Patient has significant anemia with starting h/h 8.5/26.6. In anticipation for possible hemorrhage and bleeding with delivery, recommend transfusion of 2u PRBCs now.     Discussed risk of hemorrhage with the patient and the plan to transfuse due to low starting h/h. Also counseled the patient on the risk of uterine rupture due to h/o 2 prior c/s (risk 1-3%), risk of hemorrhage that may necessitate hysterectomy, possible ICU admission, and risk of maternal death. She will need to be on therapeutic anticoagulation postpartum, can start with heparin SQ and transition to lovenox.     INCOMPLETE NOTE   42yo  at 33w5d, PMH RLL PE diagnosed in 2024 previously on Lovenox 100mg BID (last dose >1 month ago), with IUFD diagnosed approximately 5 days ago and confirmed on sonogram on presentation. Current imaging negative for active DVT (LE Duplex negative, and CTA preliminary reading neg for PE). Patient is currently in the process of induction (TOLAC) with fetus in breech presentation with pitocin.     Regarding her anticoagulation, in discussion with anesthesiologists, decision was to hold off on continuing heparin gtt intrapartum while epidural is in place and to continue with SCDs for DVT prophylaxis. She will need to be on therapeutic anticoagulation postpartum with possibly starting with heparin SQ and transition to lovenox for at least 6 weeks postpartum. Will continue to trend coags since heparin was discontinued.     With history of 2 prior C/S, recommend against prostaglandins (eg. cyototec). For IOL increase pitocin 1x1. No contraindication for other uterotonics such as hemabate, methergine, pitocin. Patient is at high risk of hemorrhage due to risk of rupture due to 2 prior c/s, she is grand multiparous, and with an IUFD there is the added risk of possible DIC. Patient has significant anemia with starting h/h 8.5/26.6. In anticipation for possible hemorrhage and bleeding with delivery, recommend transfusion of 2u PRBCs now. Will cross match for 4 additional units.     Discussed risk of hemorrhage with the patient and the plan to transfuse due to low starting h/h. Also counseled the patient on the risk of uterine rupture due to h/o 2 prior c/s (risk 1-3%), risk of hemorrhage that may necessitate hysterectomy, possible ICU admission, and risk of maternal death. She verbalized understanding and signed consents for TOLAC, possible  section and possible hysterectomy. The patient desires tubal for sterilization if c/s were to be performed (consents in chart).     Seen and discussed with TIMOTHY Downs attending  TIMOTHY Doyle MD Fellow

## 2024-08-30 NOTE — OB PROVIDER LABOR PROGRESS NOTE - ASSESSMENT
IOL 2/2 IUFD c/b hx of PE not currently on anticoaguation  -s/p Hep gtt, per anesthesia stopped this AM  -c/w pit, currently at 8  -CB in place, tight on tension    e red pgy4

## 2024-08-30 NOTE — CHART NOTE - NSCHARTNOTEFT_GEN_A_CORE
This is a 44 yo  PPD#0 s/p  / IUFD. Pregnancy c/b history of PE diagnosed in 2024. Patient's delivery was uncomplicated with  during which time she received extra pitocin and cytotec NH. Postpartum patient had a fever of 38.1 in addition to sustained severe range blood pressures. Patient seen and evluated at bedside. Denies HA, changes in vision, RUQ pain, epigastric pain.    Patient counseled on diagnosis of pre-eclampsia with severe features including risk of neurologic sequale like seizure and stroke. Patient expressed understanding    Plan  -Pro 10 IR given and patient to be started on procardia 30xL  -Patient to be started on Magnesium for seizure and stroke ppx  -labs drawn at 5p, will follow up resutls  -BCx, UCx drawn  -patient to be started on Zosyn x 24 hours    discussed w Dr Roderick Francisco PGY4 This is a 44 yo  PPD#0 s/p  / IUFD. Pregnancy c/b history of PE diagnosed in 2024. Patient's delivery was uncomplicated with  during which time she received extra pitocin and cytotec MA. Postpartum patient had a fever of 38.1 in addition to sustained severe range blood pressures. Patient seen and evluated at bedside. Denies HA, changes in vision, RUQ pain, epigastric pain.    Patient counseled on diagnosis of pre-eclampsia with severe features including risk of neurologic sequale like seizure and stroke. Patient expressed understanding    Plan  -Pro 10 IR given and patient to be started on procardia 30xL  -Patient to be started on Magnesium for seizure and stroke ppx  -labs drawn at 5p, will follow up resutls  -BCx, UCx drawn  -patient to be started on Zosyn x 24 hours    discussed w Dr Roderick Francisco PGY4

## 2024-08-30 NOTE — OB PROVIDER LABOR PROGRESS NOTE - ASSESSMENT
A/P:   43y  @ 33w6d admitted for TOLAC in the setting of a known IUFD in a pregnancy complicated by PE diagnosed in 2024 for which she had not been on her prescribed Lovenox for ~1mo while in [place] prior to presentation. Patient asymptomatic at time of evaluation     #Labor   - Cervical ripening balloon placed. Patient tolerated the procedure well. Vaginal and uterine balloons were filled with 60cc of normal Saline each   - For Oxytocin     # Issues   - For HepGTT  - Will switch to PO K for repletion   - Dopplers were negative and prelim CTA without evidence of PE. Patient is otherwise saturating well on RA    #Pain Control   - w/ Epi    Jarred Jennings, PGY-3    plan per Dr. SIMÓN Recinos

## 2024-08-30 NOTE — CHART NOTE - NSCHARTNOTEFT_GEN_A_CORE
Overnight, was able to obtain operative report from 2016 c/s @ [facility]. Procedure described as:  "Secondary repeat low-transverse  section."    Per patient, both of her  deliveries with in the [place] with her first in  at [facility]. Both were full term per the patient. No identifiable risk factors from patient interview  - After speaking with [facility] L&D staff, their current EMR does not allow them to access medical records from     Jarred Jennings, PGY-3  Obstetrics & Gynecology     d/w Dr. DIANA Brown, service attending

## 2024-08-30 NOTE — OB PROVIDER LABOR PROGRESS NOTE - NS_SUBJECTIVE/OBJECTIVE_OBGYN_ALL_OB_FT
Labor & Delivery Progress Note     Pt examined @0025 for cervical balloon placement    T(C): 36.4 (08-29-24 @ 20:10), Max: 36.6 (08-29-24 @ 16:54)  HR: 65 (08-30-24 @ 00:26) (64 - 84)  BP: 109/68 (08-30-24 @ 00:24) (94/52 - 121/65)  RR: 15 (08-29-24 @ 20:10) (15 - 16)  SpO2: 98% (08-30-24 @ 00:26) (93% - 100%)
Labor & Delivery Progress Note     Pt examined @0546 to assess for cervical change     T(C): 36.6 (08-30-24 @ 04:10), Max: 36.7 (08-30-24 @ 02:10)  HR: 69 (08-30-24 @ 05:46) (60 - 85)  BP: 95/57 (08-30-24 @ 05:41) (85/49 - 121/65)  RR: 15 (08-30-24 @ 04:10) (15 - 16)  SpO2: 99% (08-30-24 @ 05:46) (91% - 100%)
patient examined for cervical change
patient examined feeling increased pressure

## 2024-08-30 NOTE — PROVIDER CONTACT NOTE (OTHER) - ASSESSMENT
Pt denies SOB, vision changes, or RUQ pain. Pt firm at the umbilicus with light bleeding. Pt denies any pain.

## 2024-08-30 NOTE — CHART NOTE - NSCHARTNOTEFT_GEN_A_CORE
~2100 (Entry delayed secondary to clinical duties)     Myself at the bedside to evaluate the patient     Denies any chest pain, shortness of breath, or n/v. Tolerating PO. Bedside RN has not noticed any significant post-partum bleeding     Vital Signs Last 24 Hrs  T(C): 36.8 (30 Aug 2024 22:30), Max: 38.1 (30 Aug 2024 17:30)  T(F): 98.2 (30 Aug 2024 22:30), Max: 100.6 (30 Aug 2024 17:30)  HR: 73 (31 Aug 2024 02:43) (60 - 118)  BP: 114/63 (31 Aug 2024 02:32) (87/55 - 171/94)  BP(mean): --  RR: 18 (30 Aug 2024 19:40) (15 - 18)  SpO2: 95% (31 Aug 2024 02:43) (87% - 100%)    Parameters below as of 30 Aug 2024 19:40  Patient On (Oxygen Delivery Method): room air    Gen: No acute distress. Awake. Alert  CV: Regular rate and rhythm. No murmurs appreciated  Pulm: Unlabored breathing. No respiratory distress  Abd: Soft. Non-tender  Extremities: No calf tenderness bilaterally    ***Incomplete note*** ~2100 (Entry delayed secondary to clinical duties)     Myself at the bedside to evaluate the patient     Denies any chest pain, shortness of breath, or n/v. Tolerating PO. Bedside RN has not noticed any significant post-partum bleeding     Vital Signs Last 24 Hrs  T(C): 36.8 (30 Aug 2024 22:30), Max: 38.1 (30 Aug 2024 17:30)  T(F): 98.2 (30 Aug 2024 22:30), Max: 100.6 (30 Aug 2024 17:30)  HR: 73 (31 Aug 2024 02:43) (60 - 118)  BP: 114/63 (31 Aug 2024 02:32) (87/55 - 171/94)  BP(mean): --  RR: 18 (30 Aug 2024 19:40) (15 - 18)  SpO2: 95% (31 Aug 2024 02:43) (87% - 100%)    Parameters below as of 30 Aug 2024 19:40  Patient On (Oxygen Delivery Method): room air    Gen: No acute distress. Awake. Alert  CV: Regular rate and rhythm. No murmurs appreciated  Pulm: Unlabored breathing. No respiratory distress  Abd: Soft. Non-tender. Fundus is firm. No bleeding expressed w/ palpation  Extremities: No calf tenderness bilaterally    Patient is PPD#0 from a  in the setting of an IUFD and PMSHx notable for a PE diagnosed in 2024 for which she has not been on therapeutic AC while abroad in [place]. Delivery w/ a QBL of 222 that was additionally complicated by a diagnosis of PEC with severe features and a post-partum temperature for which she is being treated with Pip/tazo for endometritis for x24hrs. Patient is asymptomatic with minimal bleeding and labs from 630p demonstrated reassuring coags and a CBC. Given such, plan is to start therapeutic AC after removal of her epidural catheter (please refer to chart note by Dr. SOPHIA Redd, M fellow for further details)    Jarred Jennings, PGY-3  Obstetrics & Gynecology     d/w Shahab Vaughn and SRIDHAR Melendez, obgyn service attendings

## 2024-08-30 NOTE — PROVIDER CONTACT NOTE (OTHER) - RECOMMENDATIONS
POC discussed with . Pt s/p [facility] labs. Blood cultures and urine cultures sent.

## 2024-08-31 LAB
A1C WITH ESTIMATED AVERAGE GLUCOSE RESULT: 5.1 % — SIGNIFICANT CHANGE UP (ref 4–5.6)
ALBUMIN SERPL ELPH-MCNC: 2.5 G/DL — LOW (ref 3.3–5)
ALBUMIN SERPL ELPH-MCNC: 2.7 G/DL — LOW (ref 3.3–5)
ALP SERPL-CCNC: 127 U/L — HIGH (ref 40–120)
ALP SERPL-CCNC: 142 U/L — HIGH (ref 40–120)
ALT FLD-CCNC: <5 U/L — SIGNIFICANT CHANGE UP (ref 4–33)
ALT FLD-CCNC: <5 U/L — SIGNIFICANT CHANGE UP (ref 4–33)
ANION GAP SERPL CALC-SCNC: 14 MMOL/L — SIGNIFICANT CHANGE UP (ref 7–14)
ANION GAP SERPL CALC-SCNC: 9 MMOL/L — SIGNIFICANT CHANGE UP (ref 7–14)
APTT BLD: 71.2 SEC — HIGH (ref 24.5–35.6)
APTT BLD: 74.1 SEC — HIGH (ref 24.5–35.6)
AST SERPL-CCNC: 11 U/L — SIGNIFICANT CHANGE UP (ref 4–32)
AST SERPL-CCNC: 17 U/L — SIGNIFICANT CHANGE UP (ref 4–32)
BASOPHILS # BLD AUTO: 0.04 K/UL — SIGNIFICANT CHANGE UP (ref 0–0.2)
BASOPHILS NFR BLD AUTO: 0.3 % — SIGNIFICANT CHANGE UP (ref 0–2)
BILIRUB SERPL-MCNC: 0.2 MG/DL — SIGNIFICANT CHANGE UP (ref 0.2–1.2)
BILIRUB SERPL-MCNC: 0.4 MG/DL — SIGNIFICANT CHANGE UP (ref 0.2–1.2)
BUN SERPL-MCNC: 3 MG/DL — LOW (ref 7–23)
BUN SERPL-MCNC: <2 MG/DL — LOW (ref 7–23)
CALCIUM SERPL-MCNC: 8.7 MG/DL — SIGNIFICANT CHANGE UP (ref 8.4–10.5)
CALCIUM SERPL-MCNC: 9 MG/DL — SIGNIFICANT CHANGE UP (ref 8.4–10.5)
CHLORIDE SERPL-SCNC: 98 MMOL/L — SIGNIFICANT CHANGE UP (ref 98–107)
CHLORIDE SERPL-SCNC: 98 MMOL/L — SIGNIFICANT CHANGE UP (ref 98–107)
CO2 SERPL-SCNC: 23 MMOL/L — SIGNIFICANT CHANGE UP (ref 22–31)
CO2 SERPL-SCNC: 26 MMOL/L — SIGNIFICANT CHANGE UP (ref 22–31)
CREAT SERPL-MCNC: 0.49 MG/DL — LOW (ref 0.5–1.3)
CREAT SERPL-MCNC: 0.6 MG/DL — SIGNIFICANT CHANGE UP (ref 0.5–1.3)
EGFR: 115 ML/MIN/1.73M2 — SIGNIFICANT CHANGE UP
EGFR: 121 ML/MIN/1.73M2 — SIGNIFICANT CHANGE UP
EOSINOPHIL # BLD AUTO: 0.08 K/UL — SIGNIFICANT CHANGE UP (ref 0–0.5)
EOSINOPHIL NFR BLD AUTO: 0.7 % — SIGNIFICANT CHANGE UP (ref 0–6)
ESTIMATED AVERAGE GLUCOSE: 100 — SIGNIFICANT CHANGE UP
FIBRINOGEN PPP-MCNC: 531 MG/DL — HIGH (ref 200–465)
GLUCOSE SERPL-MCNC: 83 MG/DL — SIGNIFICANT CHANGE UP (ref 70–99)
GLUCOSE SERPL-MCNC: 89 MG/DL — SIGNIFICANT CHANGE UP (ref 70–99)
GP B STREP DNA BLD POS QL NAA+NON-PROBE: SIGNIFICANT CHANGE UP
GRAM STN FLD: ABNORMAL
HCT VFR BLD CALC: 33 % — LOW (ref 34.5–45)
HGB BLD-MCNC: 10.9 G/DL — LOW (ref 11.5–15.5)
IANC: 9.81 K/UL — HIGH (ref 1.8–7.4)
IMM GRANULOCYTES NFR BLD AUTO: 0.3 % — SIGNIFICANT CHANGE UP (ref 0–0.9)
INR BLD: 1.03 RATIO — SIGNIFICANT CHANGE UP (ref 0.85–1.18)
LDH SERPL L TO P-CCNC: 237 U/L — HIGH (ref 135–225)
LUPUS ANTICOAGULANT PROFILE RESULT: SIGNIFICANT CHANGE UP
LYMPHOCYTES # BLD AUTO: 1.16 K/UL — SIGNIFICANT CHANGE UP (ref 1–3.3)
LYMPHOCYTES # BLD AUTO: 10 % — LOW (ref 13–44)
MAGNESIUM SERPL-MCNC: 4.5 MG/DL — HIGH (ref 1.6–2.6)
MAGNESIUM SERPL-MCNC: 5.3 MG/DL — HIGH (ref 1.6–2.6)
MCHC RBC-ENTMCNC: 22.2 PG — LOW (ref 27–34)
MCHC RBC-ENTMCNC: 33 GM/DL — SIGNIFICANT CHANGE UP (ref 32–36)
MCV RBC AUTO: 67.2 FL — LOW (ref 80–100)
METHOD TYPE: SIGNIFICANT CHANGE UP
METHOD TYPE: SIGNIFICANT CHANGE UP
MONOCYTES # BLD AUTO: 0.52 K/UL — SIGNIFICANT CHANGE UP (ref 0–0.9)
MONOCYTES NFR BLD AUTO: 4.5 % — SIGNIFICANT CHANGE UP (ref 2–14)
MSSA DNA SPEC QL NAA+PROBE: SIGNIFICANT CHANGE UP
NEUTROPHILS # BLD AUTO: 9.81 K/UL — HIGH (ref 1.8–7.4)
NEUTROPHILS NFR BLD AUTO: 84.2 % — HIGH (ref 43–77)
NRBC # BLD: 0 /100 WBCS — SIGNIFICANT CHANGE UP (ref 0–0)
NRBC # FLD: 0 K/UL — SIGNIFICANT CHANGE UP (ref 0–0)
PLATELET # BLD AUTO: 393 K/UL — SIGNIFICANT CHANGE UP (ref 150–400)
POTASSIUM SERPL-MCNC: 2.9 MMOL/L — CRITICAL LOW (ref 3.5–5.3)
POTASSIUM SERPL-MCNC: 3.6 MMOL/L — SIGNIFICANT CHANGE UP (ref 3.5–5.3)
POTASSIUM SERPL-SCNC: 2.9 MMOL/L — CRITICAL LOW (ref 3.5–5.3)
POTASSIUM SERPL-SCNC: 3.6 MMOL/L — SIGNIFICANT CHANGE UP (ref 3.5–5.3)
PROT SERPL-MCNC: 5.7 G/DL — LOW (ref 6–8.3)
PROT SERPL-MCNC: 6.3 G/DL — SIGNIFICANT CHANGE UP (ref 6–8.3)
PROTHROM AB SERPL-ACNC: 11.6 SEC — SIGNIFICANT CHANGE UP (ref 9.5–13)
RBC # BLD: 4.91 M/UL — SIGNIFICANT CHANGE UP (ref 3.8–5.2)
RBC # FLD: 21.5 % — HIGH (ref 10.3–14.5)
SODIUM SERPL-SCNC: 133 MMOL/L — LOW (ref 135–145)
SODIUM SERPL-SCNC: 135 MMOL/L — SIGNIFICANT CHANGE UP (ref 135–145)
SPECIMEN SOURCE: SIGNIFICANT CHANGE UP
SPECIMEN SOURCE: SIGNIFICANT CHANGE UP
TSH SERPL-MCNC: 0.62 UIU/ML — SIGNIFICANT CHANGE UP (ref 0.27–4.2)
URATE SERPL-MCNC: 4.4 MG/DL — SIGNIFICANT CHANGE UP (ref 2.5–7)
WBC # BLD: 11.65 K/UL — HIGH (ref 3.8–10.5)
WBC # FLD AUTO: 11.65 K/UL — HIGH (ref 3.8–10.5)

## 2024-08-31 PROCEDURE — 99223 1ST HOSP IP/OBS HIGH 75: CPT

## 2024-08-31 RX ORDER — HEPARIN SODIUM,BOVINE 1000/ML
1800 VIAL (ML) INJECTION
Qty: 25000 | Refills: 0 | Status: DISCONTINUED | OUTPATIENT
Start: 2024-08-31 | End: 2024-08-31

## 2024-08-31 RX ORDER — CEFAZOLIN SODIUM 2 G/100ML
2000 INJECTION, SOLUTION INTRAVENOUS EVERY 8 HOURS
Refills: 0 | Status: DISCONTINUED | OUTPATIENT
Start: 2024-09-01 | End: 2024-09-04

## 2024-08-31 RX ORDER — IBUPROFEN 600 MG
600 TABLET ORAL EVERY 6 HOURS
Refills: 0 | Status: DISCONTINUED | OUTPATIENT
Start: 2024-08-31 | End: 2024-09-04

## 2024-08-31 RX ORDER — ENOXAPARIN SODIUM 100 MG/ML
80 INJECTION SUBCUTANEOUS EVERY 12 HOURS
Refills: 0 | Status: DISCONTINUED | OUTPATIENT
Start: 2024-08-31 | End: 2024-09-04

## 2024-08-31 RX ORDER — HEPARIN SODIUM,BOVINE 1000/ML
1900 VIAL (ML) INJECTION
Qty: 25000 | Refills: 0 | Status: DISCONTINUED | OUTPATIENT
Start: 2024-08-31 | End: 2024-08-31

## 2024-08-31 RX ORDER — POTASSIUM CHLORIDE 10 MEQ
10 TABLET, EXT RELEASE, PARTICLES/CRYSTALS ORAL
Refills: 0 | Status: COMPLETED | OUTPATIENT
Start: 2024-08-31 | End: 2024-08-31

## 2024-08-31 RX ADMIN — Medication 100 MILLIEQUIVALENT(S): at 14:38

## 2024-08-31 RX ADMIN — Medication 100 MILLIEQUIVALENT(S): at 13:42

## 2024-08-31 RX ADMIN — PIPERACILLIN SODIUM AND TAZOBACTAM SODIUM 200 GRAM(S): 3; .375 INJECTION, POWDER, FOR SOLUTION INTRAVENOUS at 02:49

## 2024-08-31 RX ADMIN — Medication 19 UNIT(S)/HR: at 14:12

## 2024-08-31 RX ADMIN — Medication 1800 UNIT(S)/HR: at 07:15

## 2024-08-31 RX ADMIN — Medication 50 GM/HR: at 07:36

## 2024-08-31 RX ADMIN — Medication 100 MILLIEQUIVALENT(S): at 09:48

## 2024-08-31 RX ADMIN — Medication 50 MILLILITER(S): at 08:35

## 2024-08-31 RX ADMIN — ACETAMINOPHEN 975 MILLIGRAM(S): 325 TABLET ORAL at 09:35

## 2024-08-31 RX ADMIN — NIFEDIPINE 30 MILLIGRAM(S): 60 TABLET, FILM COATED, EXTENDED RELEASE ORAL at 19:42

## 2024-08-31 RX ADMIN — PIPERACILLIN SODIUM AND TAZOBACTAM SODIUM 200 GRAM(S): 3; .375 INJECTION, POWDER, FOR SOLUTION INTRAVENOUS at 11:30

## 2024-08-31 RX ADMIN — Medication 100 MILLIGRAM(S): at 19:15

## 2024-08-31 RX ADMIN — ACETAMINOPHEN 975 MILLIGRAM(S): 325 TABLET ORAL at 08:49

## 2024-08-31 RX ADMIN — ACETAMINOPHEN 975 MILLIGRAM(S): 325 TABLET ORAL at 23:17

## 2024-08-31 RX ADMIN — Medication 1800 UNIT(S)/HR: at 00:44

## 2024-08-31 RX ADMIN — SODIUM CHLORIDE 3 MILLILITER(S): 9 INJECTION INTRAMUSCULAR; INTRAVENOUS; SUBCUTANEOUS at 13:57

## 2024-08-31 RX ADMIN — Medication 19 UNIT(S)/HR: at 19:13

## 2024-08-31 RX ADMIN — SODIUM CHLORIDE 3 MILLILITER(S): 9 INJECTION INTRAMUSCULAR; INTRAVENOUS; SUBCUTANEOUS at 19:33

## 2024-08-31 RX ADMIN — Medication 100 MILLIEQUIVALENT(S): at 08:34

## 2024-08-31 RX ADMIN — Medication 100 MILLIEQUIVALENT(S): at 12:25

## 2024-08-31 RX ADMIN — ACETAMINOPHEN 975 MILLIGRAM(S): 325 TABLET ORAL at 00:41

## 2024-08-31 RX ADMIN — Medication 100 MILLIEQUIVALENT(S): at 11:16

## 2024-08-31 NOTE — PROVIDER CONTACT NOTE (MEDICATION) - BACKGROUND
IUFD with history of PE in right lower lung lobe. Previously on Lovenox   Heparin drip started 8/31 at 0045.

## 2024-08-31 NOTE — PROGRESS NOTE ADULT - ASSESSMENT
42 yo  PPD#1 from  c/b endometritis and PEC with severe features in the setting of a hx of right lower lobe PE that had been treated up until x1mo ago. Patient's epidural catheter had been removed overnight and they have been started on a Heparin drip. Patient without any complaints at time of evaluation, VS reassuring, and there was no bleeding on exam     # in setting of IUFD  - Continue postpartum care   - For SW evaluation    #PEC w/ Severe Features  - Denies any sx   - BPs controlled on Nifedipine 30mg XL qd s/p Nifedipine 10mg IR ()  - AM HELLP labs   - Will continue to monitor     #Hx of PE  - Patient on Heparin drip  - Plan to continue Heparin drip until 24hrs post-partum with plans to transition to therapeutic Lovenox (see chart note by Dr. SOPHIA Redd for further details)  - s/p Epidural catheter removal yesterday night     #Endometritis  - For x24hrs of Zosyn  - No significant uterine tenderness noted on exam and patient has been afebrile since    Jarred Jennings, PGY-3  Obstetrics & Gynecology      44 yo  PPD#1 from  c/b endometritis and PEC with severe features in the setting of a hx of right lower lobe PE that had been treated up until x1mo ago. Patient's epidural catheter had been removed overnight and they have been started on a Heparin drip. Patient without any complaints at time of evaluation, VS reassuring, and there was no bleeding on exam    # in setting of IUFD  - Continue postpartum care   - For SW evaluation    #PEC w/ Severe Features  - Denies any sx   - BPs controlled on Nifedipine 30mg XL qd s/p Nifedipine 10mg IR ()  - AM HELLP labs   - Will continue to monitor     #Hx of PE  - Patient on Heparin drip  - Plan to continue Heparin drip until 24hrs post-partum with plans to transition to therapeutic Lovenox (see chart note by Dr. SOPHIA Redd for further details)  - s/p Epidural catheter removal yesterday night     #Endometritis  - For x24hrs of Zosyn  - BCx and UCx pending   - No significant uterine tenderness noted on exam and patient has been afebrile since    Jarred Jennings, PGY-3  Obstetrics & Gynecology

## 2024-08-31 NOTE — PROVIDER CONTACT NOTE (CRITICAL VALUE NOTIFICATION) - SITUATION
BMP drawn 8/31 at 0600a- Potassium 2.9
Blood Cultures drawn 8/30 after delivery showed preliminary growth in aerobic for Gram positive cocci in pairs

## 2024-08-31 NOTE — PROVIDER CONTACT NOTE (MEDICATION) - ASSESSMENT
No signs or symptoms of excess bleeding. IV site remains clean dry and intact   Fundus is firm and midline with scant bleeding upon palpation

## 2024-08-31 NOTE — CONSULT NOTE ADULT - TIME BILLING
Reviewing the chart, interpreting lab data, discussing case with fellow, interview and examination of patient, and documentation. Pt is at high risk of mortality due to her disease.
If applicable: reviewing chart including relevant [facility] and/or outside records/notes/labs/imaging/other pertinent results, interview, exam, fetal evaluation including ultrasound and fetal heart rate tracing interpretation, decision making, counseling, consent discussion, answering questions, discussion and planning with consulting services, coordination of care and follow-up.

## 2024-08-31 NOTE — PROVIDER CONTACT NOTE (CRITICAL VALUE NOTIFICATION) - ASSESSMENT
Patient afebrile, vital signs remain within normal limits  Patient currently on Zosyn x 24 hours
Patient alert and oriented. Vital signs as charted.   Magnesium Sulfate infusion running at 50mL/hr. Lactated Ringers at 50mL/hr and Heparin drip running at 18mL/hr

## 2024-08-31 NOTE — PROVIDER CONTACT NOTE (CRITICAL VALUE NOTIFICATION) - BACKGROUND
IUFD at 33.6 weeks.   Hypokalemia 8/30 of 2.8 treated with KCL 40mEq x3
 of IUFD at 33.6 weeks.   Patient with rectal temp of 37.8 rectal at 1700

## 2024-08-31 NOTE — CONSULT NOTE ADULT - ATTENDING COMMENTS
This is a 44 y/o F w/ , PE () on Lovenox, Crohn's who presented to [facility] on 24 w/o fetal movement x 4 days, recent travel to [place], admitted for fetal demise. Pt was induced on , s/p vaginal delivery.   Pt was febrile to 38.1, started on Zosyn for endometritis. Labs w/ leukocytosis to 11.7,  BCx 2/2 GBS.     #GBS bacteremia   #Chorioamnionitis   #Sepsis   #Fetal demise     44 y/o F w/ , PE () on Lovenox, Crohn's who presented to [facility] on 24 w/o fetal movement, admitted w/ fetal demise s/p delivery on , c/b GBS bacteremia chorioamnionitis. Would narrow to Ceftriaxone, f/u repeat BCx for clearance and TTE to r/o IE.      Plan:   1. Ceftriaxone 2 g q24 would plan for 14 day course, will likely need midline.   2. Repeat BCx x 2   3. TTE     Thank you for this consult. Inpatient ID team will follow.    Dimitri Gaspar M.D.  Attending Physician  Division of Infectious Diseases  Department of Medicine    Please contact through [employer]  Office: [phone] (after 5 PM or weekend)
MFM ATTENDING    42yo P7 at 33w5d with IUFD dx several days ago in [place], and two prior c/s undergoing induction of labor after . hx significant for PE dx in early 2024 s/p therapeutic lovenox appx 5.5 months (self discontinued appx end of July).     On this hospitalization she has negative LE dopplers and negative CTA.     Overnight had epidural placed and heparin drip was initated after epidural was placed. Anesthesiology staff this morning recommended discontinuing the heparin drip while epidural is in. During safety rounds it was discussed that the patient completed nearly 6 months of AC and has no evidence of residual or new VTE on imaging thus far, therefore can dc pharmacoprophylaxis temporarily while undergoing induction of labor after 2 prior cesareans and with epidural in situ. Recommend SCD's in labor. Would re-start on therapeutic AC postpartum, with first a heparin drip then transition to lovenox. The timing will depend on if she has a vaginal delivery or  (to start 6-12 hours after vaginal delivery, 12-18 hours after ), and at least 1 hour after epidural catheter removal.     Re: induction of labor,   prostaglandins contraindicated  at time of our eval we were told by her nurse that double balloon catheter was in situ (60/60), oxytocin was at 8mu/min and patient had just ruptured membranes with meconium. We evaluated patient. Balloon was removed, exam was 2-3/50/-3 (Dr. Corley), BSUS: fetal cardiac activity not seen, complete breech presentation. Sena revealed tachysystole so recommended pitocin be reduced to 4mu/min.     Re: hemorrhage risk (2 prior c/s, grand multipara, IUFD)  she is at risk of uterine rupture, hemorrhage, and hysterectomy.   She is starting out with hct of 26.   Recommend 2 IV's (already has)  Recommend transfusion 2u prbc now  T&C for 4 additional  uterotonics and TXA in room at time of delivery  low threshold for MTP    Discussed hemorrhage and uterine rupture risk with patient including possibility of hysterectomy, massive transfusion, ICU stay, maternal death. All questions answered to her satisfaction.     Patient desires tubal sterilization if she has a . BTL papers obtained by resident team.   If not already completed, should have APLS labs sent.     All questions answered to patients and partners satisfaction.   Discussed with Dr. Nunez ().

## 2024-08-31 NOTE — CONSULT NOTE ADULT - ASSESSMENT
44yo  female presented at 33.5 weeks gestation presented in labor.    Earlier prenatal course complicated by PE  She was in [place] for a month prior to presentation and had fever there and was told there no fetal heart rate  ID is asked to evaluate for chorioaminionitis complicated by GBS bacteremia  She was diagnosed with chorioamionitis by primary team  There was no fetal heart rate on admission and she underwent induction of labor  She was febrile on admission to 100.6  Initially she was leukopenic with anemia, normal plts, and normal LFTs  She developed mild leukocytosis following delivery  Blood cultures are growing GBS in 2/4 bottles  CTA chest without pneumonia    Abx:  Zosyn  -->    #GBS bacteremia  #Chorioamnionitis  #Sepsis    GBS bacteremia in setting of chorioamnionitis She is now clinically improving following delivery.    - would dc zosyn and start ceftriaxone 2g q24h  - repeat BCx x2 for clearance  - obtain TTE  - if blood cultures are cleared on repeat and TTE is unremarkable, will plan for 14 days therapy iv ceftriaxone    d/w attending.    Italo Walter, PGY5  ID Fellow  [employer] Teams Preferred  After 5pm/weekends call [phone]

## 2024-08-31 NOTE — CHART NOTE - NSCHARTNOTEFT_GEN_A_CORE
ID Update Note:     BCx now w/ MSSA as well.   Please start Cefazolin 2 g q8 and stop Ceftriaxone.    Dimitri Gaspar M.D.  Attending Physician  Division of Infectious Diseases  Department of Medicine    Please contact through [employer].  Office: [phone] (after 5 PM or weekend)

## 2024-08-31 NOTE — PROGRESS NOTE ADULT - SUBJECTIVE AND OBJECTIVE BOX
R3 Antepartum Note, PPD#1    Patient seen and examined at bedside, no acute overnight events  Heparin drip was started at 0045  Denies any headaches, epigastric pain, blurred vision, chest pain, shortness of breath, fevers, or chills. Voiding spontaneously. Denies any significant pain or vaginal bleeding     Vital Signs Last 24 Hours  T(C): 36.7 (08-31-24 @ 04:30), Max: 38.1 (08-30-24 @ 17:30)  HR: 82 (08-31-24 @ 06:43) (63 - 118)  BP: 124/76 (08-31-24 @ 06:32) (100/58 - 171/94)  RR: 18 (08-30-24 @ 19:40) (16 - 18)  SpO2: 97% (08-31-24 @ 06:43) (87% - 100%)    CAPILLARY BLOOD GLUCOSE          Physical Exam:  General: No acute distress. Resting comfortably  Pulm: Unlabored breathing. No respiratory distress   Abdomen: Soft. Non-tender. Fundus is firm. No bleeding expressed w/ palpation  Ext: No calf tenderness bilaterally        Labs:             11.0   6.11  )-----------( 349      ( 08-30 @ 18:00 )             34.8     08-30 @ 18:00    142  |  106  |  2   ----------------------------<  78  3.6   |  20  |  0.48    Ca    10.0      08-30 @ 18:00    TPro  6.8  /  Alb  3.0  /  TBili  0.6  /  DBili  x   /  AST  16  /  ALT  6   /  AlkPhos  152  08-30 @ 18:00    PT/INR - ( 08-30 @ 18:00 )   PT: 11.8 sec;   INR: 1.06 ratio    PTT - ( 08-30 @ 18:00 )  PTT:32.0 sec        MEDICATIONS  (STANDING):  acetaminophen     Tablet .. 975 milliGRAM(s) Oral <User Schedule>  chlorhexidine 2% Cloths 1 Application(s) Topical daily  diphtheria/tetanus/pertussis (acellular) Vaccine (Adacel) 0.5 milliLiter(s) IntraMuscular once  heparin  Infusion. 1800 Unit(s)/Hr (18 mL/Hr) IV Continuous <Continuous>  ibuprofen  Tablet. 600 milliGRAM(s) Oral every 6 hours  lactated ringers. 1000 milliLiter(s) (50 mL/Hr) IV Continuous <Continuous>  magnesium sulfate Infusion 2 Gm/Hr (50 mL/Hr) IV Continuous <Continuous>  NIFEdipine XL 30 milliGRAM(s) Oral every 24 hours  piperacillin/tazobactam IVPB.. 4.5 Gram(s) IV Intermittent every 8 hours  prenatal multivitamin 1 Tablet(s) Oral daily  sodium chloride 0.9% lock flush 3 milliLiter(s) IV Push every 8 hours    MEDICATIONS  (PRN):  benzocaine 20%/menthol 0.5% Spray 1 Spray(s) Topical every 6 hours PRN for Perineal discomfort  dibucaine 1% Ointment 1 Application(s) Topical every 6 hours PRN Perineal discomfort  diphenhydrAMINE 25 milliGRAM(s) Oral every 6 hours PRN Pruritus  hydrocortisone 1% Cream 1 Application(s) Topical every 6 hours PRN Moderate Pain (4-6)  lanolin Ointment 1 Application(s) Topical every 6 hours PRN nipple soreness  magnesium hydroxide Suspension 30 milliLiter(s) Oral two times a day PRN Constipation  oxyCODONE    IR 5 milliGRAM(s) Oral once PRN Moderate to Severe Pain (4-10)  oxyCODONE    IR 5 milliGRAM(s) Oral every 3 hours PRN Moderate to Severe Pain (4-10)  pramoxine 1%/zinc 5% Cream 1 Application(s) Topical every 4 hours PRN Moderate Pain (4-6)  simethicone 80 milliGRAM(s) Chew every 4 hours PRN Gas  witch hazel Pads 1 Application(s) Topical every 4 hours PRN Perineal discomfort   R3 Note, PPD#1    Patient seen and examined at bedside, no acute overnight events  Heparin drip was started at 0045  Denies any headaches, epigastric pain, blurred vision, chest pain, shortness of breath, fevers, or chills. Voiding spontaneously. Denies any significant pain or vaginal bleeding     Vital Signs Last 24 Hours  T(C): 36.7 (08-31-24 @ 04:30), Max: 38.1 (08-30-24 @ 17:30)  HR: 82 (08-31-24 @ 06:43) (63 - 118)  BP: 124/76 (08-31-24 @ 06:32) (100/58 - 171/94)  RR: 18 (08-30-24 @ 19:40) (16 - 18)  SpO2: 97% (08-31-24 @ 06:43) (87% - 100%)    CAPILLARY BLOOD GLUCOSE          Physical Exam:  General: No acute distress. Resting comfortably  Pulm: Unlabored breathing. No respiratory distress   Abdomen: Soft. Non-tender. Fundus is firm. No bleeding expressed w/ palpation  Ext: No calf tenderness bilaterally        Labs:             11.0   6.11  )-----------( 349      ( 08-30 @ 18:00 )             34.8     08-30 @ 18:00    142  |  106  |  2   ----------------------------<  78  3.6   |  20  |  0.48    Ca    10.0      08-30 @ 18:00    TPro  6.8  /  Alb  3.0  /  TBili  0.6  /  DBili  x   /  AST  16  /  ALT  6   /  AlkPhos  152  08-30 @ 18:00    PT/INR - ( 08-30 @ 18:00 )   PT: 11.8 sec;   INR: 1.06 ratio    PTT - ( 08-30 @ 18:00 )  PTT:32.0 sec        MEDICATIONS  (STANDING):  acetaminophen     Tablet .. 975 milliGRAM(s) Oral <User Schedule>  chlorhexidine 2% Cloths 1 Application(s) Topical daily  diphtheria/tetanus/pertussis (acellular) Vaccine (Adacel) 0.5 milliLiter(s) IntraMuscular once  heparin  Infusion. 1800 Unit(s)/Hr (18 mL/Hr) IV Continuous <Continuous>  ibuprofen  Tablet. 600 milliGRAM(s) Oral every 6 hours  lactated ringers. 1000 milliLiter(s) (50 mL/Hr) IV Continuous <Continuous>  magnesium sulfate Infusion 2 Gm/Hr (50 mL/Hr) IV Continuous <Continuous>  NIFEdipine XL 30 milliGRAM(s) Oral every 24 hours  piperacillin/tazobactam IVPB.. 4.5 Gram(s) IV Intermittent every 8 hours  prenatal multivitamin 1 Tablet(s) Oral daily  sodium chloride 0.9% lock flush 3 milliLiter(s) IV Push every 8 hours    MEDICATIONS  (PRN):  benzocaine 20%/menthol 0.5% Spray 1 Spray(s) Topical every 6 hours PRN for Perineal discomfort  dibucaine 1% Ointment 1 Application(s) Topical every 6 hours PRN Perineal discomfort  diphenhydrAMINE 25 milliGRAM(s) Oral every 6 hours PRN Pruritus  hydrocortisone 1% Cream 1 Application(s) Topical every 6 hours PRN Moderate Pain (4-6)  lanolin Ointment 1 Application(s) Topical every 6 hours PRN nipple soreness  magnesium hydroxide Suspension 30 milliLiter(s) Oral two times a day PRN Constipation  oxyCODONE    IR 5 milliGRAM(s) Oral once PRN Moderate to Severe Pain (4-10)  oxyCODONE    IR 5 milliGRAM(s) Oral every 3 hours PRN Moderate to Severe Pain (4-10)  pramoxine 1%/zinc 5% Cream 1 Application(s) Topical every 4 hours PRN Moderate Pain (4-6)  simethicone 80 milliGRAM(s) Chew every 4 hours PRN Gas  witch hazel Pads 1 Application(s) Topical every 4 hours PRN Perineal discomfort

## 2024-08-31 NOTE — PROVIDER CONTACT NOTE (MEDICATION) - ACTION/TREATMENT ORDERED:
As per MD Jaimes and anticoagulation nomogram, heparin drip to remain at 18mL/hr pending M recommendations. No new orders at this time

## 2024-08-31 NOTE — CONSULT NOTE ADULT - SUBJECTIVE AND OBJECTIVE BOX
Patient is a 43y old  Female who presents with a chief complaint of   HPI:  PNC @ [facility]  44 y/o  @ 33.5 WKS GESTATION PRESENTS WITH C/O NO fm X'S 4 DAYS pt states arrived back home this AM @ 0800 from [facility], states went to [place] because she is stressed about ACS involving her 14 year old daughter  and also reports ? fever 4 days ago but never took her temperature denies any uc's vb or lof denies any n/v/d pt also c/o chest heaviness/ ? SOB denies any palpitations  ap care comp by :   pt was seen in [facility] 2024 - no FH and they wanted to do a    2024 diagnosed with pulmonary embolism was on Lovenox 100 mg BID  dose and has not taken in 1 month   pt receives her PNC in [facility]   Crohn's disease   desires a bilateral tubal ligation  (29 Aug 2024 18:00)       REVIEW OF SYSTEMS  Constitutional: No fevers, chills, weight loss or fatigue   Skin: No rash, no phlebitis	  Eyes: No discharge	  ENMT: No sore throat, oral thrush, ulcers or exudate  Respiratory: No cough, no SOB  Cardiovascular:  No chest pain, palpitations or edema   Gastrointestinal: No pain, nausea, vomiting, diarrhea or constipation	  Genitourinary: No dysuria, discharge or flank pain  MSK: No arthralgias or back pain   Neurological: No HA, no weakness, no seizures, no AMS       prior hospital charts reviewed [V]  primary team notes reviewed [V]  other consultant notes reviewed [V]    PAST MEDICAL & SURGICAL HISTORY:  Anemia      Gastritis      Crohn's disease      History of blood transfusion      Pulmonary embolism      H/O:       H/O umbilical hernia repair  with rectus diastasis repair [facility]10/[facility]    SOCIAL HISTORY:  - Denied smoking and alcohol  - Lives with all children at home    FAMILY HISTORY:  Family history of anemia (Father, Sibling, Child)      Allergies  No Known Allergies      ANTIMICROBIALS:  piperacillin/tazobactam IVPB.. 4.5 every 8 hours      ANTIMICROBIALS (past 90 days):  MEDICATIONS  (STANDING):  piperacillin/tazobactam IVPB..   200 mL/Hr IV Intermittent (24 @ 11:30)   200 mL/Hr IV Intermittent (24 @ 02:49)   200 mL/Hr IV Intermittent (24 @ 18:46)      OTHER MEDS:   MEDICATIONS  (STANDING):  acetaminophen     Tablet .. 975 <User Schedule>  diphenhydrAMINE 25 every 6 hours PRN  diphtheria/tetanus/pertussis (acellular) Vaccine (Adacel) 0.5 once  heparin  Infusion 1900 <Continuous>  ibuprofen  Tablet. 600 every 6 hours  magnesium hydroxide Suspension 30 two times a day PRN  magnesium sulfate Infusion 2 <Continuous>  NIFEdipine XL 30 every 24 hours  oxyCODONE    IR 5 once PRN  oxyCODONE    IR 5 every 3 hours PRN  simethicone 80 every 4 hours PRN      VITALS:  Vital Signs Last 24 Hrs  T(F): 98.1 (24 @ 14:30), Max: 100.6 (24 @ 17:30)    Vital Signs Last 24 Hrs  HR: 75 (24 @ 15:20) (65 - 118)  BP: 107/58 (24 @ 14:32) (104/57 - 171/94)  RR: 17 (24 @ 12:30)  SpO2: 98% (24 @ 15:20) (82% - 100%)  Wt(kg): --    EXAM:  General: Patient in no acute distress   HEENT: NCAT, no oral lesions  CV: S1+S2, no m/r/g appreciated   Lungs: No respiratory distress, CTAB  Abd: Soft, gravid, nontender  Ext: No cyanosis, no edema  Neuro: Alert and oriented  Skin: No rash   IV: No phlebitis      Labs:                        10.9   11.65 )-----------( 393      ( 31 Aug 2024 06:22 )             33.0         135  |  98  |  <2<L>  ----------------------------<  83  2.9<LL>   |  23  |  0.49<L>    Ca    8.7      31 Aug 2024 06:22  Mg     5.30         TPro  5.7<L>  /  Alb  2.5<L>  /  TBili  0.4  /  DBili  x   /  AST  17  /  ALT  <5  /  AlkPhos  127<H>        WBC Trend:  WBC Count: 11.65 (24 @ 06:22)  WBC Count: 6.11 (24 @ 18:00)  WBC Count: 4.26 (24 @ 08:20)  WBC Count: 3.51 (24 @ 19:30)      Auto Neutrophil #: 9.81 K/uL (24 @ 06:22)  Auto Neutrophil #: 1.61 K/uL (24 @ 19:30)  Auto Neutrophil #: 4.20 K/uL (24 @ 02:15)  Auto Neutrophil #: 3.28 K/uL (24 @ 21:06)  Auto Neutrophil #: 3.72 K/uL (24 @ 06:45)      Creatine Trend:  Creatinine: 0.49 ()  Creatinine: 0.48 ()  Creatinine: 0.40 ()  Creatinine: 0.46 ()      Liver Biochemical Testing Trend:  Alanine Aminotransferase (ALT/SGPT): <5 ()  Alanine Aminotransferase (ALT/SGPT): 6 ()  Alanine Aminotransferase (ALT/SGPT): 5 ()  Alanine Aminotransferase (ALT/SGPT): 5 ()  Alanine Aminotransferase (ALT/SGPT): 7 ()  Aspartate Aminotransferase (AST/SGOT): 17 (24 @ 06:22)  Aspartate Aminotransferase (AST/SGOT): 16 (24 @ 18:00)  Aspartate Aminotransferase (AST/SGOT): 12 (24 @ 08:20)  Aspartate Aminotransferase (AST/SGOT): 11 (24 @ 19:30)  Aspartate Aminotransferase (AST/SGOT): 12 (24 @ 02:15)  Bilirubin Total: 0.4 ()  Bilirubin Total: 0.6 ()  Bilirubin Total: <0.2 ()  Bilirubin Total: <0.2 ()  Bilirubin Total: <0.2 ()      Trend LDH  24 @ 06:22  237<H>  24 @ 02:15  157      Auto Eosinophil %: 0.7 % (24 @ 06:22)  Auto Eosinophil %: 5.3 % (24 @ 19:30)      Urinalysis Basic - ( 31 Aug 2024 06:22 )    Color: x / Appearance: x / SG: x / pH: x  Gluc: 83 mg/dL / Ketone: x  / Bili: x / Urobili: x   Blood: x / Protein: x / Nitrite: x   Leuk Esterase: x / RBC: x / WBC x   Sq Epi: x / Non Sq Epi: x / Bacteria: x    MICROBIOLOGY:    Culture - Blood (collected 30 Aug 2024 18:30)  Source: .Blood Blood-Peripheral  Preliminary Report:    Growth in aerobic bottle: Gram positive cocci in pairs    Direct identification is available within approximately 3-5    hours either by Blood Panel Multiplexed PCR or Direct    MALDI-TOF. Details: [email or web address]  Organism: Blood Culture PCR  Organism: Blood Culture PCR    Sensitivities:      Method Type: PCR      -  Streptococcus agalactiae (Group B): Detec    Culture - Blood (collected 30 Aug 2024 18:15)  Source: .Blood Blood-Peripheral  Preliminary Report:    Growth in anaerobic bottle: Gram positive cocci in pairs    Culture - Urine (collected 2024 23:02)  Source: Clean Catch Clean Catch (Midstream)  Final Report:    10,000 - 49,000 CFU/mL Staphylococcus aureus    <10,000 CFU/ml Normal Urogenital maria elena present  Organism: Staphylococcus aureus  Organism: Staphylococcus aureus    Sensitivities:      Method Type: FRIDA      -  Gentamicin: S <=1 Should not be used as monotherapy      -  Oxacillin: S <=0.25 Oxacillin predicts susceptibility for dicloxacillin, methicillin, and nafcillin      -  Rifampin: S <=1 Should not be used as monotherapy      -  Tetracycline: S <=1      -  Trimethoprim/Sulfamethoxazole: S <=0.5/9.5      -  Vancomycin: S 2    Culture - Urine (collected 16 Sep 2022 02:20)  Source: Clean Catch Clean Catch (Midstream)  Final Report:    <10,000 CFU/mL Normal Urogenital Maria Elena    Treponema Pallidum Antibody Interpretation: Negative (24 @ 19:30)  CMV IgG Interpretation: Positive (24 @ 19:30)  Toxoplasma IgG Interpretation: Positive (24 @ 19:30)  Toxoplasma IgM Interpretation: Negative (24 @ 19:30)  CMV IgM Interpretation: Negative (24 @ 19:30)    Lactate Dehydrogenase, Serum: 237 ()    A1C with Estimated Average Glucose Result: 5.1 % (24 @ 12:21)  A1C with Estimated Average Glucose Result: 4.8 % (24 @ 19:30)    RADIOLOGY:  imaging below personally reviewed    < from: CT Angio Chest PE Protocol w/ IV Cont (24 @ 21:15) >  FINDINGS:    LUNGS AND LARGE AIRWAYS: Patent central airways. Minimal left dependent   atelectasis. Right middle lobe and left lower lobe calcified granulomas.  PLEURA: No pleural effusion.  VESSELS: Limited evaluation of some of the subsegmental branches   secondary to motion artifact. No pulmonary arterial emboli within the   other well visualized pulmonary arteries. Interval resolution of   previously visualized right lower lobe pulmonary embolism.  HEART: Heart size is normal. No pericardial effusion.  MEDIASTINUM AND AMOS: No lymphadenopathy.  CHEST WALL AND LOWER NECK: Within normal limits.  VISUALIZED UPPER ABDOMEN: Partially visualized gravid uterus.  BONES: Mild degenerative changes.    IMPRESSION:  Limited evaluation of some of the subsegmental pulmonary arterial   branches due to respiratory motion artifact. No pulmonary arterial emboli   within the other well visualized pulmonary arteries.    < end of copied text >  < from: US Duplex Venous Lower Ext Complete, Bilateral (24 @ 18:23) >  IMPRESSION:  No evidence of deep venous thrombosis in either lower extremity.    < end of copied text >  
MFM Consult    HPI:  44 y/o  who presented at 33w5d after arriving from a flight from [facility] where she was diagnosed with an IUFD approximately 5 days ago and recommended to do a . Patient's pregnancy has been complicated by a PE in RLL on 2024 (found on CT at [facility]). She was then placed on Lovenox 100mg BID, however the patient stopped taking Lovenox for approximately 1 month after running out of supply. The patient had been getting routine prenatal care at [facility] in [place] and desires a tubal ligation. OB history includes 2 prior FT c/s ( & ), which were her most recent deliveries and 5 prior FT NSVDs. Denies complications in prior pregnancies, reports history of blood transfusion in the past.    Sonogram performed on presentation confirmed no FH present and breech presentation. Overnight the M team was consulted and recommended to proceed with TOLAC with early epidural and heparin gtt to be started intrapartum with the plan to discontinue while in active labor. However upon discussion with oncoming anesthesia team for today, concern was raised regarding possible hematoma formation with epidural in place and decision was made to discontinue heparin. Heparin gtt started at 01:23 and was discontinued at approximately 08:00. SCDs were placed for DVT ppx. At the time of our evaluation, IOL had been started with pitocin and cervical ripening balloon. At bedside the patient without acute complaints. Denied SOB, chest pain, palpitations.     OB HX:    1999 7#   6/3/2001 8#   2004 8#   2006 8#12   2008 8#  c/s 2010 low fluid, fetal distress 7 lbs  c/s 3/18/2016 9#7    GYN Hx: denies      PAST MEDICAL & SURGICAL HISTORY:  Anemia  Gastritis  Crohn's disease  History of blood transfusion  Pulmonary embolism  H/O:   H/O umbilical hernia repair  with rectus diastasis repair 3/10/22 in [facility]    Allergies: No Known Allergies    MEDICATIONS  (STANDING):  chlorhexidine 2% Cloths 1 Application(s) Topical daily  lactated ringers. 1000 milliLiter(s) (125 mL/Hr) IV Continuous <Continuous>  oxytocin Infusion 333.333 milliUNIT(s)/Min (1000 mL/Hr) IV Continuous <Continuous>  oxytocin Infusion. 1 milliUNIT(s)/Min (1 mL/Hr) IV Continuous <Continuous>      FAMILY HISTORY:  Family history of anemia (Father, Sibling, Child)      T(F): 98.24 (24 @ 08:06), Max: 98.24 (24 @ 08:06)  HR: 72 (24 @ 10:33) (60 - 88)  BP: 119/74 (24 @ 10:30) (85/49 - 127/68)  RR: 17 (24 @ 08:06) (15 - 17)  SpO2: 99% (24 @ 10:33) (88% - 100%)  I&O's Summary    29 Aug 2024 07:  -  30 Aug 2024 07:00  --------------------------------------------------------  IN: 1099 mL / OUT: 600 mL / NET: 499 mL    30 Aug 2024 07:  -  30 Aug 2024 10:39  --------------------------------------------------------  IN: 250 mL / OUT: 0 mL / NET: 250 mL      General:  In no apparent distress  Abdomen:  Soft, gravid, nontender, nondistended, no rebound, no guarding.  Musculoskeletal:  No calf tenderness  VE (resident exam): 2.5/70/-3, balloon deflated and removed, copious dark green amniotic fluid noted    Non stress test: n/a  Forest: q2min     LABORATORY:                        8.3    4.26  )-----------( 352      ( 30 Aug 2024 08:20 )             26.3         139  |  106  |  2<L>  ----------------------------<  79  3.2<L>   |  22  |  0.40<L>    Ca    9.3      30 Aug 2024 08:20    TPro  6.2  /  Alb  2.7<L>  /  TBili  <0.2  /  DBili  x   /  AST  12  /  ALT  5   /  AlkPhos  135<H>  0830    PT/INR - ( 30 Aug 2024 08:20 )   PT: 12.5 sec;   INR: 1.12 ratio         PTT - ( 30 Aug 2024 08:20 )  PTT:39.4 sec  Urinalysis Basic - ( 30 Aug 2024 08:20 )    Color: x / Appearance: x / SG: x / pH: x  Gluc: 79 mg/dL / Ketone: x  / Bili: x / Urobili: x   Blood: x / Protein: x / Nitrite: x   Leuk Esterase: x / RBC: x / WBC x   Sq Epi: x / Non Sq Epi: x / Bacteria: x        IMAGING:    < from: CT Angio Chest PE Protocol w/ IV Cont (24 @ 21:15) >    ******PRELIMINARY REPORT******      ******PRELIMINARY REPORT******         ACC: [id number] EXAM:  CT ANGIO CHEST PULM ART [facility]   ORDERED BY: ABIODUN BILL     PROCEDURE DATE:  2024    ******PRELIMINARY REPORT******      ******PRELIMINARYREPORT******           INTERPRETATION:  Prelim:  CLINICAL INFORMATION: Patient with prior PE in 2024 noncompliant   with anticoagulation and fevers    COMPARISON: CT chest from 2024    CONTRAST/COMPLICATIONS:  IV Contrast: Omnipaque 99703 cc administered   10 cc discarded  Oral Contrast: NONE  Complications: None reported at time of study completion            Impression:  No pulmonary embolism.    No acute intrathoracic findings.    Partially visualized gravid uterus.      Follow-up final report in a.m.        ******PRELIMINARY REPORT******      ******PRELIMINARY REPORT******       CLAIR CRAIG MD; Resident Radiologist  This document is a PRELIMINARY interpretation and is pending final   attending approval. Aug 29 2024 10:58PM    < end of copied text >  < from: US Duplex Venous Lower Ext Complete, Bilateral (24 @ 18:23) >    ACC: [id number] EXAM:  US DPLX LWR EXT VEINS COMPL BI   ORDERED BY:   ABIODUN BILL     PROCEDURE DATE:  2024          INTERPRETATION:  CLINICAL INFORMATION: Evaluate for DVT.    COMPARISON: 2022    TECHNIQUE: Duplex sonography of the BILATERAL LOWER extremity veins with   color and spectral Doppler, with and without compression.    FINDINGS:    RIGHT:  Normal compressibility of the RIGHT common femoral, femoral and popliteal   veins.  Doppler examination shows normal spontaneous and phasic flow.  No RIGHT calf vein thrombosis is detected.    LEFT:  Normal compressibility of the LEFT common femoral, femoral and popliteal   veins.  Doppler examination shows normal spontaneous and phasic flow.  No LEFT calf vein thrombosis is detected.    IMPRESSION:  No evidence of deep venous thrombosis in either lower extremity.    --- End of Report ---            BORA ADDISON MD; Attending Radiologist  This document has been electronically signed. Aug 29 2024  6:30PM    < end of copied text >

## 2024-09-01 DIAGNOSIS — O36.4XX0 MATERNAL CARE FOR INTRAUTERINE DEATH, NOT APPLICABLE OR UNSPECIFIED: ICD-10-CM

## 2024-09-01 LAB
-  CEFTRIAXONE: SIGNIFICANT CHANGE UP
-  CLINDAMYCIN: SIGNIFICANT CHANGE UP
-  LEVOFLOXACIN: SIGNIFICANT CHANGE UP
-  PENICILLIN: SIGNIFICANT CHANGE UP
-  VANCOMYCIN: SIGNIFICANT CHANGE UP
BLD GP AB SCN SERPL QL: NEGATIVE — SIGNIFICANT CHANGE UP
CULTURE RESULTS: ABNORMAL
CULTURE RESULTS: SIGNIFICANT CHANGE UP
HCT VFR BLD CALC: 34.4 % — LOW (ref 34.5–45)
HGB BLD-MCNC: 11.3 G/DL — LOW (ref 11.5–15.5)
MCHC RBC-ENTMCNC: 22.5 PG — LOW (ref 27–34)
MCHC RBC-ENTMCNC: 32.8 GM/DL — SIGNIFICANT CHANGE UP (ref 32–36)
MCV RBC AUTO: 68.4 FL — LOW (ref 80–100)
METHOD TYPE: SIGNIFICANT CHANGE UP
NRBC # BLD: 0 /100 WBCS — SIGNIFICANT CHANGE UP (ref 0–0)
NRBC # FLD: 0 K/UL — SIGNIFICANT CHANGE UP (ref 0–0)
ORGANISM # SPEC MICROSCOPIC CNT: ABNORMAL
PLATELET # BLD AUTO: 438 K/UL — HIGH (ref 150–400)
RBC # BLD: 5.03 M/UL — SIGNIFICANT CHANGE UP (ref 3.8–5.2)
RBC # FLD: 21.6 % — HIGH (ref 10.3–14.5)
RH IG SCN BLD-IMP: POSITIVE — SIGNIFICANT CHANGE UP
SPECIMEN SOURCE: SIGNIFICANT CHANGE UP
SPECIMEN SOURCE: SIGNIFICANT CHANGE UP
T PALLIDUM AB TITR SER: NEGATIVE — SIGNIFICANT CHANGE UP
WBC # BLD: 9.44 K/UL — SIGNIFICANT CHANGE UP (ref 3.8–10.5)
WBC # FLD AUTO: 9.44 K/UL — SIGNIFICANT CHANGE UP (ref 3.8–10.5)

## 2024-09-01 PROCEDURE — 99232 SBSQ HOSP IP/OBS MODERATE 35: CPT

## 2024-09-01 RX ORDER — CABERGOLINE 0.5 MG/1
1 TABLET ORAL ONCE
Refills: 0 | Status: COMPLETED | OUTPATIENT
Start: 2024-09-01 | End: 2024-09-01

## 2024-09-01 RX ADMIN — ACETAMINOPHEN 975 MILLIGRAM(S): 325 TABLET ORAL at 00:17

## 2024-09-01 RX ADMIN — SODIUM CHLORIDE 3 MILLILITER(S): 9 INJECTION INTRAMUSCULAR; INTRAVENOUS; SUBCUTANEOUS at 22:14

## 2024-09-01 RX ADMIN — CABERGOLINE 1 MILLIGRAM(S): 0.5 TABLET ORAL at 18:29

## 2024-09-01 RX ADMIN — CEFAZOLIN SODIUM 100 MILLIGRAM(S): 2 INJECTION, SOLUTION INTRAVENOUS at 18:28

## 2024-09-01 RX ADMIN — CEFAZOLIN SODIUM 100 MILLIGRAM(S): 2 INJECTION, SOLUTION INTRAVENOUS at 11:10

## 2024-09-01 RX ADMIN — ENOXAPARIN SODIUM 80 MILLIGRAM(S): 100 INJECTION SUBCUTANEOUS at 11:17

## 2024-09-01 RX ADMIN — NIFEDIPINE 30 MILLIGRAM(S): 60 TABLET, FILM COATED, EXTENDED RELEASE ORAL at 18:28

## 2024-09-01 RX ADMIN — Medication 600 MILLIGRAM(S): at 06:47

## 2024-09-01 RX ADMIN — SODIUM CHLORIDE 3 MILLILITER(S): 9 INJECTION INTRAMUSCULAR; INTRAVENOUS; SUBCUTANEOUS at 05:47

## 2024-09-01 RX ADMIN — CEFAZOLIN SODIUM 100 MILLIGRAM(S): 2 INJECTION, SOLUTION INTRAVENOUS at 03:30

## 2024-09-01 RX ADMIN — Medication 600 MILLIGRAM(S): at 07:37

## 2024-09-01 RX ADMIN — SODIUM CHLORIDE 3 MILLILITER(S): 9 INJECTION INTRAMUSCULAR; INTRAVENOUS; SUBCUTANEOUS at 13:57

## 2024-09-01 RX ADMIN — ENOXAPARIN SODIUM 80 MILLIGRAM(S): 100 INJECTION SUBCUTANEOUS at 00:37

## 2024-09-01 RX ADMIN — ENOXAPARIN SODIUM 80 MILLIGRAM(S): 100 INJECTION SUBCUTANEOUS at 22:09

## 2024-09-01 RX ADMIN — SODIUM CHLORIDE 3 MILLILITER(S): 9 INJECTION INTRAMUSCULAR; INTRAVENOUS; SUBCUTANEOUS at 00:04

## 2024-09-01 NOTE — PROGRESS NOTE ADULT - ASSESSMENT
44 yo  PPD#2 from  c/b endometritis and PEC with severe features in the setting of a hx of right lower lobe PE that had been treated up until x1mo ago. Patient now s/p Heparin gtt and transitioned to therapeutic Lovenox. Patient without any complaints at time of evaluation, VS reassuring, and there was no bleeding on exam    # in setting of IUFD  - Continue postpartum care   - SW evaluation    #PEC w/ Severe Features  - Denies any sx   - BPs controlled on Nifedipine 30mg XL qd s/p Nifedipine 10mg IR ()  - AM HELLP labs   - Will continue to monitor     #Hx of PE  - Transitioned from Heparin gtt to therapeutic Lovenox 80mg BID (1mg/kg dosing)  - CTA (): Limited evaluation of some of the subsegmental pulmonary arterial branches due to respiratory motion artifact. No pulmonary arterial emboli within the other well visualized pulmonary arteries.  - LE Dopplers (): No evidence of deep venous thrombosis in either lower extremity.   - f/u TTE     #Endometritis  - s/p Zosyn (-)  - s/p Ceftriaxone ()  - BCx (): GBS, MSSA  - ID following, initial recommendations had been to start patient on Ceftriaxone, however, given additional result of MSSA bacteremia, abx recommendations changed to Cefazolin  - c/w Cefazolin (-)  - No significant uterine tenderness noted on exam and patient has been afebrile since    **INCOMPLETE NOTE**  SOPHIA Corley PGY3   42 yo  PPD#2 from  c/b endometritis and PEC with severe features in the setting of a hx of right lower lobe PE that had been treated up until x1mo ago. Patient now s/p Heparin gtt and transitioned to therapeutic Lovenox. Patient without any complaints at time of evaluation, VS reassuring, and there was no bleeding on exam    # in setting of IUFD  - Continue postpartum care   - SW evaluation    #PEC w/ Severe Features  - Denies any sx   - BPs controlled on Nifedipine 30mg XL qd s/p Nifedipine 10mg IR ()  - AM HELLP labs   - Will continue to monitor     #Hx of PE  - Transitioned from Heparin gtt to therapeutic Lovenox 80mg BID (1mg/kg dosing)  - CTA (): Limited evaluation of some of the subsegmental pulmonary arterial branches due to respiratory motion artifact. No pulmonary arterial emboli within the other well visualized pulmonary arteries.  - LE Dopplers (): No evidence of deep venous thrombosis in either lower extremity.   - f/u TTE     #Endometritis  - s/p Zosyn (-)  - s/p Ceftriaxone ()  - BCx (): GBS, MSSA  - ID following, initial recommendations had been to start patient on Ceftriaxone, however, given additional result of MSSA bacteremia, abx recommendations changed to Cefazolin  - c/w Cefazolin (-)  - No significant uterine tenderness noted on exam and patient has been afebrile since    SOPHIA Corley PGY3

## 2024-09-01 NOTE — PROGRESS NOTE ADULT - NSPROGADDITIONALINFOA_GEN_ALL_CORE
MFM Fellow Addendum    Patient is a 44 yo  PPD2 from  complicated by intrauterine fetal demise, intraamniotic infection now with GBS and MSSA bacteremia, and preeclampsia with severe features.  Patient's blood pressures remain well-controlled on nifedipine 30mg and she is asymptomatic for preeclampsia.   Repeat blood culture was collected this morning. Given sensitivities, ID is following and recommended transition of cetriaxone to earlier-generation cephalosporin (cefazolin). Plan to continue IV antibiotics until negative blood culture, with midline placement thereafter to complete a 14-day course of IV antibiotics.   Patient's antepartum course was also complicated by pulmonary embolism earlier in pregnancy, currently on therapeutic Lovenox with plan to continue for 6 weeks postpartum.   For IUFD evaluation, evaluation thus far negative for causative etiology (A1C, TSH, APLS labs within normal limits). Follow up placental pathology. Social work following.   Patient desires IUD for contraception, advised she must wait 12 weeks following resolution of intra-amniotic infection to place; we discussed bridging methods and will follow up with patient tomorrow regarding whether she would like a bridge on discharge.   Continue inpatient care for IV antibiotics as above.     Patient seen and d/w Dr. Abelino Redd MD, PGY-7 MFM Fellow Addendum    Patient is a 44 yo  PPD2 from  complicated by intrauterine fetal demise, intraamniotic infection now with GBS and MSSA bacteremia, and preeclampsia with severe features.  Patient's blood pressures remain well-controlled on nifedipine 30mg and she is asymptomatic for preeclampsia.   Repeat blood culture was collected this morning. Given sensitivities, ID is following and recommended transition of cetriaxone to earlier-generation cephalosporin (cefazolin). Plan to continue IV antibiotics until negative blood culture, with midline placement thereafter to complete a 14-day course of IV antibiotics.   Patient's antepartum course was also complicated by pulmonary embolism earlier in pregnancy, currently on therapeutic Lovenox with plan to continue for 6 weeks postpartum.   For IUFD evaluation, evaluation thus far negative for causative etiology (A1C, TSH, APLS labs within normal limits). Follow up placental pathology. Social work following. Cabergoline prescribed for lactation cessation.   Patient desires IUD for contraception, advised she must wait 12 weeks following resolution of intra-amniotic infection to place; we discussed bridging methods and will follow up with patient tomorrow regarding whether she would like a bridge on discharge.   Continue inpatient care for IV antibiotics as above.     Patient seen and d/w Dr. Abelino Redd MD, PGY-7

## 2024-09-01 NOTE — PROGRESS NOTE ADULT - SUBJECTIVE AND OBJECTIVE BOX
R3 Progress Note    **INCOMPLETE NOTE**    Patient seen and examined at bedside, no acute overnight events. No acute complaints, pain well controlled. Patient is ambulating, voiding spontaneously, passing gas, and tolerating regular diet. Reports vaginal bleeding decreasing. Denies CP, SOB, N/V, HA, blurred vision, lightheadedness, dizziness.    Vital Signs Last 24 Hours  T(C): 36.8 (09-01-24 @ 00:20), Max: 36.9 (08-31-24 @ 06:30)  HR: 62 (09-01-24 @ 00:20) (62 - 104)  BP: 114/71 (09-01-24 @ 00:20) (97/58 - 146/85)  RR: 16 (09-01-24 @ 00:20) (16 - 18)  SpO2: 98% (09-01-24 @ 00:20) (82% - 100%)    Physical Exam:  General: NAD  Abdomen: Soft, appropriately tender, non-distended, fundus firm  Pelvic: Lochia wnl    Labs:    Blood Type: O Positive  Antibody Screen: Negative  RPR: Negative               10.9   11.65 )-----------( 393      ( 08-31 @ 06:22 )             33.0                11.0   6.11  )-----------( 349      ( 08-30 @ 18:00 )             34.8                8.3    4.26  )-----------( 352      ( 08-30 @ 08:20 )             26.3         MEDICATIONS  (STANDING):  acetaminophen     Tablet .. 975 milliGRAM(s) Oral <User Schedule>  ceFAZolin   IVPB 2000 milliGRAM(s) IV Intermittent every 8 hours  diphtheria/tetanus/pertussis (acellular) Vaccine (Adacel) 0.5 milliLiter(s) IntraMuscular once  enoxaparin Injectable 80 milliGRAM(s) SubCutaneous every 12 hours  ibuprofen  Tablet. 600 milliGRAM(s) Oral every 6 hours  NIFEdipine XL 30 milliGRAM(s) Oral every 24 hours  prenatal multivitamin 1 Tablet(s) Oral daily  sodium chloride 0.9% lock flush 3 milliLiter(s) IV Push every 8 hours    MEDICATIONS  (PRN):  benzocaine 20%/menthol 0.5% Spray 1 Spray(s) Topical every 6 hours PRN for Perineal discomfort  dibucaine 1% Ointment 1 Application(s) Topical every 6 hours PRN Perineal discomfort  diphenhydrAMINE 25 milliGRAM(s) Oral every 6 hours PRN Pruritus  hydrocortisone 1% Cream 1 Application(s) Topical every 6 hours PRN Moderate Pain (4-6)  lanolin Ointment 1 Application(s) Topical every 6 hours PRN nipple soreness  magnesium hydroxide Suspension 30 milliLiter(s) Oral two times a day PRN Constipation  oxyCODONE    IR 5 milliGRAM(s) Oral once PRN Moderate to Severe Pain (4-10)  oxyCODONE    IR 5 milliGRAM(s) Oral every 3 hours PRN Moderate to Severe Pain (4-10)  pramoxine 1%/zinc 5% Cream 1 Application(s) Topical every 4 hours PRN Moderate Pain (4-6)  simethicone 80 milliGRAM(s) Chew every 4 hours PRN Gas  witch hazel Pads 1 Application(s) Topical every 4 hours PRN Perineal discomfort   R3 Progress Note    Patient seen and examined at bedside, no acute overnight events. No acute complaints, pain well controlled.  Denies subjective fevers/chills overnight. Reports vaginal bleeding decreasing.     Vital Signs Last 24 Hours  T(C): 36.8 (09-01-24 @ 00:20), Max: 36.9 (08-31-24 @ 06:30)  HR: 62 (09-01-24 @ 00:20) (62 - 104)  BP: 114/71 (09-01-24 @ 00:20) (97/58 - 146/85)  RR: 16 (09-01-24 @ 00:20) (16 - 18)  SpO2: 98% (09-01-24 @ 00:20) (82% - 100%)    Physical Exam:  General: NAD  Abdomen: Soft, nontender, non-distended, fundus firm  Pelvic: Lochia wnl    Labs:    Blood Type: O Positive  Antibody Screen: Negative  RPR: Negative               10.9   11.65 )-----------( 393      ( 08-31 @ 06:22 )             33.0                11.0   6.11  )-----------( 349      ( 08-30 @ 18:00 )             34.8                8.3    4.26  )-----------( 352      ( 08-30 @ 08:20 )             26.3         MEDICATIONS  (STANDING):  acetaminophen     Tablet .. 975 milliGRAM(s) Oral <User Schedule>  ceFAZolin   IVPB 2000 milliGRAM(s) IV Intermittent every 8 hours  diphtheria/tetanus/pertussis (acellular) Vaccine (Adacel) 0.5 milliLiter(s) IntraMuscular once  enoxaparin Injectable 80 milliGRAM(s) SubCutaneous every 12 hours  ibuprofen  Tablet. 600 milliGRAM(s) Oral every 6 hours  NIFEdipine XL 30 milliGRAM(s) Oral every 24 hours  prenatal multivitamin 1 Tablet(s) Oral daily  sodium chloride 0.9% lock flush 3 milliLiter(s) IV Push every 8 hours    MEDICATIONS  (PRN):  benzocaine 20%/menthol 0.5% Spray 1 Spray(s) Topical every 6 hours PRN for Perineal discomfort  dibucaine 1% Ointment 1 Application(s) Topical every 6 hours PRN Perineal discomfort  diphenhydrAMINE 25 milliGRAM(s) Oral every 6 hours PRN Pruritus  hydrocortisone 1% Cream 1 Application(s) Topical every 6 hours PRN Moderate Pain (4-6)  lanolin Ointment 1 Application(s) Topical every 6 hours PRN nipple soreness  magnesium hydroxide Suspension 30 milliLiter(s) Oral two times a day PRN Constipation  oxyCODONE    IR 5 milliGRAM(s) Oral once PRN Moderate to Severe Pain (4-10)  oxyCODONE    IR 5 milliGRAM(s) Oral every 3 hours PRN Moderate to Severe Pain (4-10)  pramoxine 1%/zinc 5% Cream 1 Application(s) Topical every 4 hours PRN Moderate Pain (4-6)  simethicone 80 milliGRAM(s) Chew every 4 hours PRN Gas  witch hazel Pads 1 Application(s) Topical every 4 hours PRN Perineal discomfort

## 2024-09-02 ENCOUNTER — TRANSCRIPTION ENCOUNTER (OUTPATIENT)
Age: 44
End: 2024-09-02

## 2024-09-02 LAB
-  CLINDAMYCIN: SIGNIFICANT CHANGE UP
-  ERYTHROMYCIN: SIGNIFICANT CHANGE UP
-  GENTAMICIN: SIGNIFICANT CHANGE UP
-  OXACILLIN: SIGNIFICANT CHANGE UP
-  RIFAMPIN: SIGNIFICANT CHANGE UP
-  TETRACYCLINE: SIGNIFICANT CHANGE UP
-  TRIMETHOPRIM/SULFAMETHOXAZOLE: SIGNIFICANT CHANGE UP
-  VANCOMYCIN: SIGNIFICANT CHANGE UP
ALBUMIN SERPL ELPH-MCNC: 2.4 G/DL — LOW (ref 3.3–5)
ALP SERPL-CCNC: 109 U/L — SIGNIFICANT CHANGE UP (ref 40–120)
ALT FLD-CCNC: <5 U/L — SIGNIFICANT CHANGE UP (ref 4–33)
ANION GAP SERPL CALC-SCNC: 13 MMOL/L — SIGNIFICANT CHANGE UP (ref 7–14)
AST SERPL-CCNC: 7 U/L — SIGNIFICANT CHANGE UP (ref 4–32)
BASOPHILS # BLD AUTO: 0.04 K/UL — SIGNIFICANT CHANGE UP (ref 0–0.2)
BASOPHILS NFR BLD AUTO: 0.8 % — SIGNIFICANT CHANGE UP (ref 0–2)
BILIRUB SERPL-MCNC: <0.2 MG/DL — SIGNIFICANT CHANGE UP (ref 0.2–1.2)
BUN SERPL-MCNC: 4 MG/DL — LOW (ref 7–23)
CALCIUM SERPL-MCNC: 8.8 MG/DL — SIGNIFICANT CHANGE UP (ref 8.4–10.5)
CHLORIDE SERPL-SCNC: 107 MMOL/L — SIGNIFICANT CHANGE UP (ref 98–107)
CO2 SERPL-SCNC: 22 MMOL/L — SIGNIFICANT CHANGE UP (ref 22–31)
CREAT SERPL-MCNC: 0.53 MG/DL — SIGNIFICANT CHANGE UP (ref 0.5–1.3)
CULTURE RESULTS: ABNORMAL
EGFR: 118 ML/MIN/1.73M2 — SIGNIFICANT CHANGE UP
EOSINOPHIL # BLD AUTO: 0.12 K/UL — SIGNIFICANT CHANGE UP (ref 0–0.5)
EOSINOPHIL NFR BLD AUTO: 2.3 % — SIGNIFICANT CHANGE UP (ref 0–6)
GLUCOSE SERPL-MCNC: 70 MG/DL — SIGNIFICANT CHANGE UP (ref 70–99)
HCT VFR BLD CALC: 30.4 % — LOW (ref 34.5–45)
HGB BLD-MCNC: 9.9 G/DL — LOW (ref 11.5–15.5)
IANC: 2.99 K/UL — SIGNIFICANT CHANGE UP (ref 1.8–7.4)
IMM GRANULOCYTES NFR BLD AUTO: 0.4 % — SIGNIFICANT CHANGE UP (ref 0–0.9)
LDH SERPL L TO P-CCNC: 168 U/L — SIGNIFICANT CHANGE UP (ref 135–225)
LYMPHOCYTES # BLD AUTO: 1.61 K/UL — SIGNIFICANT CHANGE UP (ref 1–3.3)
LYMPHOCYTES # BLD AUTO: 30.6 % — SIGNIFICANT CHANGE UP (ref 13–44)
MCHC RBC-ENTMCNC: 22.2 PG — LOW (ref 27–34)
MCHC RBC-ENTMCNC: 32.6 GM/DL — SIGNIFICANT CHANGE UP (ref 32–36)
MCV RBC AUTO: 68.3 FL — LOW (ref 80–100)
METHOD TYPE: SIGNIFICANT CHANGE UP
MONOCYTES # BLD AUTO: 0.48 K/UL — SIGNIFICANT CHANGE UP (ref 0–0.9)
MONOCYTES NFR BLD AUTO: 9.1 % — SIGNIFICANT CHANGE UP (ref 2–14)
NEUTROPHILS # BLD AUTO: 2.99 K/UL — SIGNIFICANT CHANGE UP (ref 1.8–7.4)
NEUTROPHILS NFR BLD AUTO: 56.8 % — SIGNIFICANT CHANGE UP (ref 43–77)
NRBC # BLD: 0 /100 WBCS — SIGNIFICANT CHANGE UP (ref 0–0)
NRBC # FLD: 0 K/UL — SIGNIFICANT CHANGE UP (ref 0–0)
ORGANISM # SPEC MICROSCOPIC CNT: ABNORMAL
PLATELET # BLD AUTO: 417 K/UL — HIGH (ref 150–400)
POTASSIUM SERPL-MCNC: 3.3 MMOL/L — LOW (ref 3.5–5.3)
POTASSIUM SERPL-SCNC: 3.3 MMOL/L — LOW (ref 3.5–5.3)
PROT SERPL-MCNC: 5.6 G/DL — LOW (ref 6–8.3)
RBC # BLD: 4.45 M/UL — SIGNIFICANT CHANGE UP (ref 3.8–5.2)
RBC # FLD: 21.4 % — HIGH (ref 10.3–14.5)
SODIUM SERPL-SCNC: 142 MMOL/L — SIGNIFICANT CHANGE UP (ref 135–145)
SPECIMEN SOURCE: SIGNIFICANT CHANGE UP
URATE SERPL-MCNC: 4.7 MG/DL — SIGNIFICANT CHANGE UP (ref 2.5–7)
WBC # BLD: 5.26 K/UL — SIGNIFICANT CHANGE UP (ref 3.8–10.5)
WBC # FLD AUTO: 5.26 K/UL — SIGNIFICANT CHANGE UP (ref 3.8–10.5)

## 2024-09-02 PROCEDURE — 99232 SBSQ HOSP IP/OBS MODERATE 35: CPT

## 2024-09-02 RX ORDER — ACETAMINOPHEN 325 MG/1
3 TABLET ORAL
Qty: 0 | Refills: 0 | DISCHARGE
Start: 2024-09-02

## 2024-09-02 RX ORDER — IBUPROFEN 600 MG
1 TABLET ORAL
Qty: 0 | Refills: 0 | DISCHARGE
Start: 2024-09-02

## 2024-09-02 RX ORDER — ENOXAPARIN SODIUM 100 MG/ML
80 INJECTION SUBCUTANEOUS
Qty: 30 | Refills: 0
Start: 2024-09-02 | End: 2024-10-01

## 2024-09-02 RX ORDER — POTASSIUM CHLORIDE 10 MEQ
20 TABLET, EXT RELEASE, PARTICLES/CRYSTALS ORAL ONCE
Refills: 0 | Status: COMPLETED | OUTPATIENT
Start: 2024-09-02 | End: 2024-09-02

## 2024-09-02 RX ORDER — SODIUM CHLORIDE 9 MG/ML
5 INJECTION INTRAMUSCULAR; INTRAVENOUS; SUBCUTANEOUS
Qty: 4 | Refills: 0
Start: 2024-09-02 | End: 2024-10-01

## 2024-09-02 RX ORDER — CEFAZOLIN SODIUM 2 G/100ML
2 INJECTION, SOLUTION INTRAVENOUS
Qty: 132 | Refills: 0
Start: 2024-09-02 | End: 2024-09-23

## 2024-09-02 RX ORDER — VITAMIN A, ASCORBIC ACID, VITAMIN D, .ALPHA.-TOCOPHEROL, THIAMINE MONONITRATE, RIBOFLAVIN, NIACIN, PYRIDOXINE HYDROCHLORIDE, FOLIC ACID, CYANOCOBALAMIN, CALCIUM, IRON, MAGNESIUM, ZINC, AND COPPER 2700; 70; 400; 30; 1.6; 1.8; 18; 2.5; 1; 12; 100; 65; 25; 25; 2 [IU]/1; MG/1; [IU]/1; [IU]/1; MG/1; MG/1; MG/1; MG/1; MG/1; UG/1; MG/1; MG/1; MG/1; MG/1; MG/1
1 TABLET ORAL
Refills: 0 | DISCHARGE

## 2024-09-02 RX ORDER — NIFEDIPINE 60 MG/1
1 TABLET, FILM COATED, EXTENDED RELEASE ORAL
Qty: 30 | Refills: 0
Start: 2024-09-02 | End: 2024-10-01

## 2024-09-02 RX ADMIN — CEFAZOLIN SODIUM 100 MILLIGRAM(S): 2 INJECTION, SOLUTION INTRAVENOUS at 01:01

## 2024-09-02 RX ADMIN — ENOXAPARIN SODIUM 80 MILLIGRAM(S): 100 INJECTION SUBCUTANEOUS at 23:00

## 2024-09-02 RX ADMIN — ENOXAPARIN SODIUM 80 MILLIGRAM(S): 100 INJECTION SUBCUTANEOUS at 11:37

## 2024-09-02 RX ADMIN — ACETAMINOPHEN 975 MILLIGRAM(S): 325 TABLET ORAL at 10:15

## 2024-09-02 RX ADMIN — Medication 600 MILLIGRAM(S): at 17:43

## 2024-09-02 RX ADMIN — SODIUM CHLORIDE 3 MILLILITER(S): 9 INJECTION INTRAMUSCULAR; INTRAVENOUS; SUBCUTANEOUS at 15:56

## 2024-09-02 RX ADMIN — CEFAZOLIN SODIUM 100 MILLIGRAM(S): 2 INJECTION, SOLUTION INTRAVENOUS at 23:00

## 2024-09-02 RX ADMIN — ACETAMINOPHEN 975 MILLIGRAM(S): 325 TABLET ORAL at 23:45

## 2024-09-02 RX ADMIN — ACETAMINOPHEN 975 MILLIGRAM(S): 325 TABLET ORAL at 22:58

## 2024-09-02 RX ADMIN — Medication 600 MILLIGRAM(S): at 18:18

## 2024-09-02 RX ADMIN — ACETAMINOPHEN 975 MILLIGRAM(S): 325 TABLET ORAL at 02:30

## 2024-09-02 RX ADMIN — CEFAZOLIN SODIUM 100 MILLIGRAM(S): 2 INJECTION, SOLUTION INTRAVENOUS at 08:55

## 2024-09-02 RX ADMIN — ACETAMINOPHEN 975 MILLIGRAM(S): 325 TABLET ORAL at 09:40

## 2024-09-02 RX ADMIN — ACETAMINOPHEN 975 MILLIGRAM(S): 325 TABLET ORAL at 01:40

## 2024-09-02 RX ADMIN — SODIUM CHLORIDE 3 MILLILITER(S): 9 INJECTION INTRAMUSCULAR; INTRAVENOUS; SUBCUTANEOUS at 05:37

## 2024-09-02 RX ADMIN — CEFAZOLIN SODIUM 100 MILLIGRAM(S): 2 INJECTION, SOLUTION INTRAVENOUS at 15:53

## 2024-09-02 RX ADMIN — NIFEDIPINE 30 MILLIGRAM(S): 60 TABLET, FILM COATED, EXTENDED RELEASE ORAL at 17:42

## 2024-09-02 RX ADMIN — Medication 20 MILLIEQUIVALENT(S): at 09:03

## 2024-09-02 RX ADMIN — SODIUM CHLORIDE 3 MILLILITER(S): 9 INJECTION INTRAMUSCULAR; INTRAVENOUS; SUBCUTANEOUS at 22:20

## 2024-09-02 NOTE — PROGRESS NOTE ADULT - ASSESSMENT
44 yo  PPD#3 from  c/b endometritis and PEC with severe features in the setting of a hx of right lower lobe PE that had been treated up until x1mo ago. Patient now s/p Heparin gtt and has since been transitioned to therapeutic Lovenox. Post-partum course now complicated additionally by MSSA bacteremia. At time of evaluation: VS was reassuring, pt was afebrile, and they were non-tender on exam.     # in setting of IUFD  - Continue postpartum care  - s/p SW evaluation    #PEC w/ Severe Features  - Denies any sx  - BPs controlled on Nifedipine 30mg XL qd s/p Nifedipine 10mg IR (). BPs have been 110-130s/70-80  - AM HELLP labs  - Will continue to monitor    #Hx of PE  - Transitioned from Heparin gtt to therapeutic Lovenox 80mg BID (1mg/kg dosing)  - CTA (): Limited evaluation of some of the subsegmental pulmonary arterial branches due to respiratory motion artifact. No pulmonary arterial emboli within the other well visualized pulmonary arteries.  - LE Dopplers (): No evidence of deep venous thrombosis in either lower extremity.  - f/u TTE (pending)    #Endometritis w/ MSSA bacteremia   - s/p Zosyn (-)  - s/p Ceftriaxone ()  - BCx (): GBS, MSSA  - ID following, initial recommendations had been to start patient on Ceftriaxone, however, given additional result of MSSA bacteremia, abx recommendations changed to Cefazolin  - c/w Cefazolin (-)  - No uterine tenderness noted on exam and patient has been afebrile     Jarred Jennings, PGY-3  Obstetrics & Gynecology

## 2024-09-02 NOTE — DISCHARGE NOTE OB - MEDICATION SUMMARY - MEDICATIONS TO CHANGE
I will SWITCH the dose or number of times a day I take the medications listed below when I get home from the hospital:    apixaban 5 mg oral tablet  -- 2 tab(s) by mouth every 12 hours    enoxaparin 100 mg/mL injectable solution  -- 100 milligram(s) subcutaneously 2 times a day

## 2024-09-02 NOTE — PROGRESS NOTE ADULT - ASSESSMENT
This is a 44 y/o F w/ , PE () on Lovenox, Crohn's who presented to [facility] on 24 w/o fetal movement x 4 days, recent travel to [place], admitted for fetal demise. Pt was induced on , s/p vaginal delivery.   Pt was febrile to 38.1, started on Zosyn for endometritis. Labs w/ leukocytosis to 11.7,  BCx 2/2 GBS.     #MSSA bacteremia   #GBS bacteremia   #Chorioamnionitis   #Sepsis   #Fetal demise     44 y/o F w/ , PE () on Lovenox, Crohn's who presented to [facility] on 24 w/o fetal movement, admitted w/ fetal demise s/p delivery on , c/b GBS bacteremia chorioamnionitis. Would narrow to Ceftriaxone, f/u repeat BCx for clearance and TTE to r/o IE.    Now with MSSA bacteremia as well, changed to Cefazolin.     Plan:   1. Cefazolin 2 g q8, will likely need 4 week course and midline   2. Repeat BCx x 2,  NGTD   3. TTE     Thank you for this consult. Inpatient ID team will follow.    Dimitri Gaspar M.D.  Attending Physician  Division of Infectious Diseases  Department of Medicine

## 2024-09-02 NOTE — DISCHARGE NOTE OB - MEDICATION SUMMARY - MEDICATIONS TO STOP TAKING
I will STOP taking the medications listed below when I get home from the hospital:    apixaban 5 mg oral tablet  -- 1 tab(s) by mouth every 12 hours Please start taking 1 tablet every 12 hours on 2/11.

## 2024-09-02 NOTE — PROGRESS NOTE ADULT - SUBJECTIVE AND OBJECTIVE BOX
Infectious Diseases Follow Up:    Patient is a 43y old  Female who presents with a chief complaint of     Interval History/ROS:  Pt feeling well, denies F/C. +some lower abdominal pain     Allergies  No Known Allergies        ANTIMICROBIALS:  ceFAZolin   IVPB 2000 every 8 hours      Current Abx:     Previous Abx     OTHER MEDS:  MEDICATIONS  (STANDING):  acetaminophen     Tablet .. 975 <User Schedule>  diphenhydrAMINE 25 every 6 hours PRN  diphtheria/tetanus/pertussis (acellular) Vaccine (Adacel) 0.5 once  enoxaparin Injectable 80 every 12 hours  ibuprofen  Tablet. 600 every 6 hours  magnesium hydroxide Suspension 30 two times a day PRN  NIFEdipine XL 30 every 24 hours  oxyCODONE    IR 5 once PRN  oxyCODONE    IR 5 every 3 hours PRN  simethicone 80 every 4 hours PRN      Vital Signs Last 24 Hrs  T(C): 36.8 (02 Sep 2024 05:50), Max: 37.1 (01 Sep 2024 22:36)  T(F): 98.2 (02 Sep 2024 05:50), Max: 98.7 (01 Sep 2024 22:36)  HR: 53 (02 Sep 2024 05:50) (53 - 72)  BP: 109/68 (02 Sep 2024 05:50) (109/68 - 137/84)  BP(mean): --  RR: 16 (02 Sep 2024 05:50) (16 - 18)  SpO2: 96% (02 Sep 2024 05:50) (95% - 98%)    Parameters below as of 02 Sep 2024 05:50  Patient On (Oxygen Delivery Method): room air        PHYSICAL EXAM:  GENERAL: NAD, well-developed  HEAD:  Atraumatic, Normocephalic  EYES: EOMI, conjunctiva and sclera clear  CHEST/LUNG: Clear to auscultation bilaterally; No wheeze  HEART: Regular rate and rhythm; No murmurs, rubs, or gallops  ABDOMEN: Soft, Nontender, Nondistended; Bowel sounds present. +gravid uterus   PSYCH: AAOx3                            9.9    5.26  )-----------( 417      ( 02 Sep 2024 05:52 )             30.4       09-02    142  |  107  |  4<L>  ----------------------------<  70  3.3<L>   |  22  |  0.53    Ca    8.8      02 Sep 2024 05:52  Mg     5.30     08-31    TPro  5.6<L>  /  Alb  2.4<L>  /  TBili  <0.2  /  DBili  x   /  AST  7   /  ALT  <5  /  AlkPhos  109  09-02      Urinalysis Basic - ( 02 Sep 2024 05:52 )    Color: x / Appearance: x / SG: x / pH: x  Gluc: 70 mg/dL / Ketone: x  / Bili: x / Urobili: x   Blood: x / Protein: x / Nitrite: x   Leuk Esterase: x / RBC: x / WBC x   Sq Epi: x / Non Sq Epi: x / Bacteria: x        MICROBIOLOGY:  v  Clean Catch Clean Catch (Midstream)  08-30-24   >=3 organisms. Probable collection contamination.  --  --      .Blood Blood-Peripheral  08-30-24   Growth in aerobic bottle: Streptococcus agalactiae (Group B)  Direct identification is available within approximately 3-5  hours either by Blood Panel Multiplexed PCR or Direct  MALDI-TOF. Details: [email or web address]  --  Blood Culture PCR  Streptococcus agalactiae (Group B)      .Blood Blood-Peripheral  08-30-24   Growth in anaerobic bottle: Streptococcus agalactiae (Group B)  See previous culture [id number]  Growth in aerobic bottle: Staphylococcus aureus  Direct identification is available within approximately 3-5  hours either by Blood Panel Multiplexed PCR or Direct  MALDI-TOF. Details: [email or web address]  --  Blood Culture PCR        CMV IgG Antibody: 2.80 U/mL (08-29-24 @ 19:30)  Toxoplasma IgG Screen: 49.30 IU/mL (08-29-24 @ 19:30)          RADIOLOGY:

## 2024-09-02 NOTE — DISCHARGE NOTE OB - PROVIDER TOKENS
PROVIDER:[TOKEN:[[id number]]],PROVIDER:[TOKEN:[[id number]]] PROVIDER:[TOKEN:[[id number]],SCHEDULEDAPPT:[09/11/2024],SCHEDULEDAPPTTIME:[04:00 PM]],PROVIDER:[TOKEN:[[id number]]]

## 2024-09-02 NOTE — DISCHARGE NOTE OB - CARE PLAN
1 Principal Discharge DX:	Vaginal delivery  Assessment and plan of treatment:	Make your follow-up appointment with your doctor as ordered/for a blood pressure check 1 week after discharge. No heavy lifting, driving, or strenuous activity for 6 weeks. Nothing per vagina such as tampons, intercourse, douches or tub baths for 6 weeks or until you see your doctor. Call your doctor with any signs and symptoms of infection such as fever, chills, nausea, or vomiting.  Call your doctor if you're unable to tolerate food, have an increase in vaginal bleeding, or have difficulty urinating. Call your doctor if you have pain that is not relieved by your prescribed medications. Notify your doctor with any other concerns.   Call [phone] if you have any of these concerns in the next 6 weeks.  Secondary Diagnosis:	Bacteremia  Assessment and plan of treatment:	Complete IV antibiotics as instructed   Follow up with Infectious Disease as instructed  Secondary Diagnosis:	Hypertension  Assessment and plan of treatment:	- Continue BP meds as prescribed (hold is BP is under 110/60)  -Take blood pressure with at home cuff prior to taking medications; if BP is >150/90 call MD  - Return to hospital with headaches, visual changes, abdominal pain, nausea, vomiting, chest pain or shortness of breathe  - Follow up with OB in 2 days for BP check  - Follow up with [facility] (email sent for follow up; call [phone])  Secondary Diagnosis:	History of pulmonary embolism  Assessment and plan of treatment:	Continue Lovenox 80mg twice a day

## 2024-09-02 NOTE — CHART NOTE - NSCHARTNOTEFT_GEN_A_CORE
Spoke to ID Dr. Gaspar regarding any options for once daily IV medications for compliance at home to complete recommended 14 day course. Per ID Cefazolin is the best therapy, and only other therapies would be more frequent. Expressed compliance concerns due to HX of missed lovenox doses in pregnancy when patient went to [place].     relayed info to Case Management     Tamica Law NP Spoke to ID Dr. Gaspar regarding any options for once daily IV medications for compliance at home to complete recommended 4 week course. Per ID Cefazolin is the best therapy, and only other therapies would be more frequent. Expressed compliance concerns due to HX of missed lovenox doses in pregnancy when patient went to [place].     relayed info to Case Management     Tamica Law NP

## 2024-09-02 NOTE — PROGRESS NOTE ADULT - SUBJECTIVE AND OBJECTIVE BOX
R3 Note, PPD#3/HD#5    Patient seen and examined at bedside, no acute overnight events    Patient denies any fevers or chills. Patient is tolerating PO and ambulating. She denies any significant vaginal bleeding  Patient notes some lower pelvic discomfort that improves with PO analgesics   Denies any headaches, scotomas, chest pain, RUQ pain, or n/v     Vital Signs Last 24 Hours  T(C): 36.8 (09-02-24 @ 01:26), Max: 37.1 (09-01-24 @ 22:36)  HR: 69 (09-02-24 @ 01:26) (60 - 85)  BP: 127/80 (09-02-24 @ 01:26) (114/73 - 137/84)  RR: 17 (09-02-24 @ 01:26) (16 - 18)  SpO2: 95% (09-02-24 @ 01:26) (95% - 98%)    CAPILLARY BLOOD GLUCOSE          Physical Exam:  General: No acute distress. Resting comfortably in bed prior to evaluation  Pulm: Unlabored breathing. No respiratory distress   Abdomen: Soft. Non-tender. Fundus is firm. No rebound or guarding. Uterus is non-tender. No overlying skin changes noted   Ext: No calf tenderness bilaterally        Labs:             11.3   9.44  )-----------( 438      ( 09-01 @ 06:00 )             34.4     08-31 @ 16:54    133  |  98  |  3   ----------------------------<  89  3.6   |  26  |  0.60    Ca    9.0      08-31 @ 16:54  Mg     5.30     08-31 @ 12:21    TPro  6.3  /  Alb  2.7  /  TBili  0.2  /  DBili  x   /  AST  11  /  ALT  <5  /  AlkPhos  142  08-31 @ 16:54    PT/INR - ( 08-31 @ 12:21 )   PT: 11.7 sec;   INR: 1.04 ratio    PTT - ( 08-31 @ 16:54 )  PTT:71.2 sec    Uric Acid: (08-31 @ 06:22)  4.4      Fibrinogen: (08-31 @ 06:22)  --       LDH: (08-31 @ 06:22)  237        MEDICATIONS  (STANDING):  acetaminophen     Tablet .. 975 milliGRAM(s) Oral <User Schedule>  ceFAZolin   IVPB 2000 milliGRAM(s) IV Intermittent every 8 hours  diphtheria/tetanus/pertussis (acellular) Vaccine (Adacel) 0.5 milliLiter(s) IntraMuscular once  enoxaparin Injectable 80 milliGRAM(s) SubCutaneous every 12 hours  ibuprofen  Tablet. 600 milliGRAM(s) Oral every 6 hours  NIFEdipine XL 30 milliGRAM(s) Oral every 24 hours  prenatal multivitamin 1 Tablet(s) Oral daily  sodium chloride 0.9% lock flush 3 milliLiter(s) IV Push every 8 hours    MEDICATIONS  (PRN):  benzocaine 20%/menthol 0.5% Spray 1 Spray(s) Topical every 6 hours PRN for Perineal discomfort  dibucaine 1% Ointment 1 Application(s) Topical every 6 hours PRN Perineal discomfort  diphenhydrAMINE 25 milliGRAM(s) Oral every 6 hours PRN Pruritus  hydrocortisone 1% Cream 1 Application(s) Topical every 6 hours PRN Moderate Pain (4-6)  lanolin Ointment 1 Application(s) Topical every 6 hours PRN nipple soreness  magnesium hydroxide Suspension 30 milliLiter(s) Oral two times a day PRN Constipation  oxyCODONE    IR 5 milliGRAM(s) Oral once PRN Moderate to Severe Pain (4-10)  oxyCODONE    IR 5 milliGRAM(s) Oral every 3 hours PRN Moderate to Severe Pain (4-10)  pramoxine 1%/zinc 5% Cream 1 Application(s) Topical every 4 hours PRN Moderate Pain (4-6)  simethicone 80 milliGRAM(s) Chew every 4 hours PRN Gas  witch hazel Pads 1 Application(s) Topical every 4 hours PRN Perineal discomfort

## 2024-09-02 NOTE — DISCHARGE NOTE OB - CARE PROVIDER_API CALL
Rishi Foley NP in [facility]omens Health  [address] [address] [place] [address]  Phone: [phone]  Fax: [phone]  Follow Up Time:     Dimitri Gaspar  Infectious Disease  [address] [place] [address]  Phone: [phone]  Fax: [phone]  Follow Up Time:    Rishi Foley  NP in [facility]om[facility]  [address] [address] [place] [address]  Phone: [phone]  Fax: [phone]  Scheduled Appointment: 09/11/2024 04:00 PM    Dimitri Gaspar  Infectious Disease  [address] [place] [address]  Phone: [phone]  Fax: [phone]  Follow Up Time:

## 2024-09-02 NOTE — DISCHARGE NOTE OB - PLAN OF CARE
37.1 Make your follow-up appointment with your doctor as ordered/for a blood pressure check 1 week after discharge. No heavy lifting, driving, or strenuous activity for 6 weeks. Nothing per vagina such as tampons, intercourse, douches or tub baths for 6 weeks or until you see your doctor. Call your doctor with any signs and symptoms of infection such as fever, chills, nausea, or vomiting.  Call your doctor if you're unable to tolerate food, have an increase in vaginal bleeding, or have difficulty urinating. Call your doctor if you have pain that is not relieved by your prescribed medications. Notify your doctor with any other concerns.   Call [phone] if you have any of these concerns in the next 6 weeks. Complete IV antibiotics as instructed   Follow up with Infectious Disease as instructed Continue Lovenox 80mg twice a day - Continue BP meds as prescribed (hold is BP is under 110/60)  -Take blood pressure with at home cuff prior to taking medications; if BP is >150/90 call MD  - Return to hospital with headaches, visual changes, abdominal pain, nausea, vomiting, chest pain or shortness of breathe  - Follow up with OB in 2 days for BP check  - Follow up with [facility] (email sent for follow up; call [phone])

## 2024-09-02 NOTE — DISCHARGE NOTE OB - MEDICATION SUMMARY - MEDICATIONS TO TAKE
I will START or STAY ON the medications listed below when I get home from the hospital:    ibuprofen 600 mg oral tablet  -- 1 tab(s) by mouth every 6 hours  -- Indication: For PAIN    acetaminophen 325 mg oral tablet  -- 3 tab(s) by mouth every 8 hours as needed for  mild pain  -- Indication: For pain    Lovenox 80 mg/0.8 mL injectable solution  -- 80 milligram(s) subcutaneously 2 times a day  -- Indication: For PE hx    NIFEdipine 30 mg oral tablet, extended release  -- 1 tab(s) by mouth every 24 hours  -- Indication: For PEC    ceFAZolin 2 g intravenous injection  -- 2 gram(s) intravenous every 8 hours ID: Dr. Dimitri Gaspar  -- Indication: For Bacteremia    sodium chloride 0.9% injectable solution  -- 5 milliliter(s) injectable 3 times a day Before/ After IV antibiotics  -- Indication: For IV    I will START or STAY ON the medications listed below when I get home from the hospital:    electronic blood pressure monitor  -- Take blood pressure three times per day call with BP above 140/90  -- Indication: For Hypertension    ibuprofen 600 mg oral tablet  -- 1 tab(s) by mouth every 6 hours  -- Indication: For PAIN    acetaminophen 325 mg oral tablet  -- 3 tab(s) by mouth every 8 hours as needed for  mild pain  -- Indication: For pain    Lovenox 80 mg/0.8 mL injectable solution  -- 80 milligram(s) subcutaneously 2 times a day  -- Indication: For PE hx    NIFEdipine 30 mg oral tablet, extended release  -- 1 tab(s) by mouth every 24 hours  -- Indication: For PEC    ceFAZolin 2 g intravenous injection  -- 2 gram(s) intravenous every 8 hours ID: Dr. Dimitri Gaspar  -- Indication: For Bacteremia    sodium chloride 0.9% injectable solution  -- 5 milliliter(s) injectable 3 times a day Before/ After IV antibiotics  -- Indication: For IV

## 2024-09-02 NOTE — DISCHARGE NOTE OB - CARE PROVIDERS DIRECT ADDRESSES
,DirectAddress_Unknown,[email or web address]

## 2024-09-02 NOTE — PROGRESS NOTE ADULT - NSPROGADDITIONALINFOA_GEN_ALL_CORE
MFM Fellow Addendum    Patient is a 42 yo  PPD3 from  complicated by intrauterine fetal demise, intraamniotic infection with GBS and MSSA bacteremia, history of pulmonary embolism in pregnancy, and preeclampsia with severe features by blood pressure criteria.     Blood pressures remain low-normal on nifedipine 30mg, will tentatively continue medication but consider discontinuation if BPs remain low-normal.   Repeat blood culture from  are pending and patient continues on IV cefazolin, with plan to complete 14-day course. Following 48 hours of negative blood culture results, plan to place midline for outpatient administration.   Patient continues on therapeutic Lovenox with plan to continue for 6 weeks postpartum for history of PE.   For IUFD evaluation, evaluation thus far negative for causative etiology (A1C, TSH within normal limits). APLS labs are pending. Follow up placental pathology. Social work following. She is s/p cabergoline for lactation suppression.   Patient desires IUD for contraception, advised she must wait 12 weeks following resolution of intra-amniotic infection to place; following contraceptive discussions, will discharge home on oral progesterone-only pills as bridge. Patient counseled she cannot receive estrogen-containing contraceptives.   Continue inpatient care for IV antibiotics as above.     Patient seen and d/w Dr. Abelino Redd MD, PGY-7

## 2024-09-02 NOTE — CHART NOTE - NSCHARTNOTESELECT_GEN_ALL_CORE
Event Note
R3 Eval
R3 Pt Records
R4 Chart Note
R4 Triage Evaluation
Event Note
Event Note
R3 Plan of Care Update
R3 Pt Communication
R4 Chart Note-Heparin gtt

## 2024-09-02 NOTE — DISCHARGE NOTE OB - PATIENT PORTAL LINK FT
You can access the FollowMyHealth Patient Portal offered by [facility] by registering at the following website: [email or web address]. By joining [employer]’s FollowMyHealth portal, you will also be able to view your health information using other applications (apps) compatible with our system.

## 2024-09-02 NOTE — DISCHARGE NOTE OB - HOSPITAL COURSE
Patient is a 44 yo  admitted with intrauterine fetal demise. s/p  complicated by intraamniotic infection with GBS and MSSA bacteremia, history of pulmonary embolism in pregnancy, and preeclampsia with severe features by blood pressure criteria. BP well controlled on nifedipine 30mg. Per ID: IV cefazolin, with plan to complete 4 week course. Patient continues on therapeutic Lovenox with plan to continue for 6 weeks postpartum for history of PE.  For IUFD evaluation, evaluation thus far negative for causative etiology (A1C, TSH within normal limits). APLS labs are pending. Follow up placental pathology. Social work following. She is s/p cabergoline for lactation suppression. Patient desires IUD for contraception, advised she must wait 12 weeks following resolution of intra-amniotic infection to place; following contraceptive discussions, will discharge home on oral progesterone-only pills as bridge.

## 2024-09-03 ENCOUNTER — RESULT REVIEW (OUTPATIENT)
Age: 44
End: 2024-09-03

## 2024-09-03 LAB
ALBUMIN SERPL ELPH-MCNC: 2.5 G/DL — LOW (ref 3.3–5)
ALP SERPL-CCNC: 107 U/L — SIGNIFICANT CHANGE UP (ref 40–120)
ALT FLD-CCNC: <5 U/L — SIGNIFICANT CHANGE UP (ref 4–33)
ANION GAP SERPL CALC-SCNC: 13 MMOL/L — SIGNIFICANT CHANGE UP (ref 7–14)
AST SERPL-CCNC: 11 U/L — SIGNIFICANT CHANGE UP (ref 4–32)
BASOPHILS # BLD AUTO: 0.03 K/UL — SIGNIFICANT CHANGE UP (ref 0–0.2)
BASOPHILS NFR BLD AUTO: 0.6 % — SIGNIFICANT CHANGE UP (ref 0–2)
BILIRUB SERPL-MCNC: <0.2 MG/DL — SIGNIFICANT CHANGE UP (ref 0.2–1.2)
BUN SERPL-MCNC: 4 MG/DL — LOW (ref 7–23)
CALCIUM SERPL-MCNC: 8.6 MG/DL — SIGNIFICANT CHANGE UP (ref 8.4–10.5)
CHLORIDE SERPL-SCNC: 104 MMOL/L — SIGNIFICANT CHANGE UP (ref 98–107)
CO2 SERPL-SCNC: 22 MMOL/L — SIGNIFICANT CHANGE UP (ref 22–31)
CREAT SERPL-MCNC: 0.47 MG/DL — LOW (ref 0.5–1.3)
EGFR: 122 ML/MIN/1.73M2 — SIGNIFICANT CHANGE UP
EOSINOPHIL # BLD AUTO: 0.14 K/UL — SIGNIFICANT CHANGE UP (ref 0–0.5)
EOSINOPHIL NFR BLD AUTO: 3 % — SIGNIFICANT CHANGE UP (ref 0–6)
GLUCOSE SERPL-MCNC: 79 MG/DL — SIGNIFICANT CHANGE UP (ref 70–99)
HCT VFR BLD CALC: 31 % — LOW (ref 34.5–45)
HGB BLD-MCNC: 10.2 G/DL — LOW (ref 11.5–15.5)
IANC: 2.64 K/UL — SIGNIFICANT CHANGE UP (ref 1.8–7.4)
IMM GRANULOCYTES NFR BLD AUTO: 0.6 % — SIGNIFICANT CHANGE UP (ref 0–0.9)
LYMPHOCYTES # BLD AUTO: 1.42 K/UL — SIGNIFICANT CHANGE UP (ref 1–3.3)
LYMPHOCYTES # BLD AUTO: 30.7 % — SIGNIFICANT CHANGE UP (ref 13–44)
MCHC RBC-ENTMCNC: 22.6 PG — LOW (ref 27–34)
MCHC RBC-ENTMCNC: 32.9 GM/DL — SIGNIFICANT CHANGE UP (ref 32–36)
MCV RBC AUTO: 68.7 FL — LOW (ref 80–100)
MONOCYTES # BLD AUTO: 0.36 K/UL — SIGNIFICANT CHANGE UP (ref 0–0.9)
MONOCYTES NFR BLD AUTO: 7.8 % — SIGNIFICANT CHANGE UP (ref 2–14)
NEUTROPHILS # BLD AUTO: 2.64 K/UL — SIGNIFICANT CHANGE UP (ref 1.8–7.4)
NEUTROPHILS NFR BLD AUTO: 57.3 % — SIGNIFICANT CHANGE UP (ref 43–77)
NRBC # BLD: 0 /100 WBCS — SIGNIFICANT CHANGE UP (ref 0–0)
NRBC # FLD: 0 K/UL — SIGNIFICANT CHANGE UP (ref 0–0)
PLATELET # BLD AUTO: 392 K/UL — SIGNIFICANT CHANGE UP (ref 150–400)
POTASSIUM SERPL-MCNC: 3.4 MMOL/L — LOW (ref 3.5–5.3)
POTASSIUM SERPL-SCNC: 3.4 MMOL/L — LOW (ref 3.5–5.3)
PROT SERPL-MCNC: 5.6 G/DL — LOW (ref 6–8.3)
RBC # BLD: 4.51 M/UL — SIGNIFICANT CHANGE UP (ref 3.8–5.2)
RBC # FLD: 21.3 % — HIGH (ref 10.3–14.5)
SODIUM SERPL-SCNC: 139 MMOL/L — SIGNIFICANT CHANGE UP (ref 135–145)
WBC # BLD: 4.62 K/UL — SIGNIFICANT CHANGE UP (ref 3.8–10.5)
WBC # FLD AUTO: 4.62 K/UL — SIGNIFICANT CHANGE UP (ref 3.8–10.5)

## 2024-09-03 PROCEDURE — 99232 SBSQ HOSP IP/OBS MODERATE 35: CPT

## 2024-09-03 PROCEDURE — 93306 TTE W/DOPPLER COMPLETE: CPT | Mod: 26

## 2024-09-03 PROCEDURE — 99232 SBSQ HOSP IP/OBS MODERATE 35: CPT | Mod: GC

## 2024-09-03 RX ADMIN — SODIUM CHLORIDE 3 MILLILITER(S): 9 INJECTION INTRAMUSCULAR; INTRAVENOUS; SUBCUTANEOUS at 06:02

## 2024-09-03 RX ADMIN — ACETAMINOPHEN 975 MILLIGRAM(S): 325 TABLET ORAL at 21:21

## 2024-09-03 RX ADMIN — ENOXAPARIN SODIUM 80 MILLIGRAM(S): 100 INJECTION SUBCUTANEOUS at 22:06

## 2024-09-03 RX ADMIN — ENOXAPARIN SODIUM 80 MILLIGRAM(S): 100 INJECTION SUBCUTANEOUS at 11:22

## 2024-09-03 RX ADMIN — Medication 30 MILLILITER(S): at 18:09

## 2024-09-03 RX ADMIN — ACETAMINOPHEN 975 MILLIGRAM(S): 325 TABLET ORAL at 22:23

## 2024-09-03 RX ADMIN — SODIUM CHLORIDE 3 MILLILITER(S): 9 INJECTION INTRAMUSCULAR; INTRAVENOUS; SUBCUTANEOUS at 22:23

## 2024-09-03 RX ADMIN — SODIUM CHLORIDE 3 MILLILITER(S): 9 INJECTION INTRAMUSCULAR; INTRAVENOUS; SUBCUTANEOUS at 14:53

## 2024-09-03 RX ADMIN — CEFAZOLIN SODIUM 100 MILLIGRAM(S): 2 INJECTION, SOLUTION INTRAVENOUS at 06:05

## 2024-09-03 RX ADMIN — CEFAZOLIN SODIUM 100 MILLIGRAM(S): 2 INJECTION, SOLUTION INTRAVENOUS at 22:07

## 2024-09-03 RX ADMIN — NIFEDIPINE 30 MILLIGRAM(S): 60 TABLET, FILM COATED, EXTENDED RELEASE ORAL at 18:11

## 2024-09-03 RX ADMIN — CEFAZOLIN SODIUM 100 MILLIGRAM(S): 2 INJECTION, SOLUTION INTRAVENOUS at 14:18

## 2024-09-03 NOTE — PROGRESS NOTE ADULT - ASSESSMENT
44 yo  PPD#4 from  c/b endometritis and PEC with severe features in the setting of a hx of right lower lobe PE that had been treated up until x1mo ago. Patient now s/p Heparin gtt and has since been transitioned to therapeutic Lovenox. Post-partum course now complicated additionally by MSSA bacteremia. Patient stable and progressing appropriately postpartum.      # in setting of IUFD  - Continue postpartum care  - s/p SW evaluation    #PEC w/ Severe Features  - Denies any sx  - BPs controlled on Nifedipine 30mg XL qd s/p Nifedipine 10mg IR (). BPs have been 100-120s/60-80s overnight  - AM CBC/CMP wnl  - Will continue to monitor    #Hx of PE  - Transitioned from Heparin gtt to therapeutic Lovenox 80mg BID (1mg/kg dosing)  - CTA (): Limited evaluation of some of the subsegmental pulmonary arterial branches due to respiratory motion artifact. No pulmonary arterial emboli within the other well visualized pulmonary arteries.  - LE Dopplers (): No evidence of deep venous thrombosis in either lower extremity.  - f/u TTE (pending)    #Endometritis w/ MSSA bacteremia   - s/p Zosyn (-)  - s/p Ceftriaxone ()  - BCx (): GBS, MSSA  - BCx(): NGTD  - ID following, initial recommendations had been to start patient on Ceftriaxone, however, given additional result of MSSA bacteremia, abx recommendations changed to Cefazolin - recommended on continuing antibiotics x4 weeks, will need midline  - c/w Cefazolin (-)    SOPHIA Corley PGY3

## 2024-09-03 NOTE — PROGRESS NOTE ADULT - SUBJECTIVE AND OBJECTIVE BOX
R3 Progress Note    Patient seen and examined at bedside, no acute overnight events. No acute complaints, reports occasional lower abdominal pain. Patient is ambulating, voiding spontaneously, passing gas, and tolerating regular diet. Reports vaginal bleeding decreasing. Denies CP, SOB, N/V, HA, blurred vision, lightheadedness, dizziness.    Vital Signs Last 24 Hours  T(C): 36.8 (09-03-24 @ 06:25), Max: 36.8 (09-03-24 @ 06:25)  HR: 57 (09-03-24 @ 06:25) (57 - 77)  BP: 128/77 (09-03-24 @ 06:25) (99/63 - 150/86)  RR: 18 (09-03-24 @ 06:25) (16 - 18)  SpO2: 98% (09-03-24 @ 06:25) (97% - 100%)    Physical Exam:  General: NAD  Abdomen: Soft, mildly tender to palpation diffuse lower abdomen, non-distended, fundus firm  Pelvic: Lochia wnl    Labs:    Blood Type: O Positive  Antibody Screen: Negative  RPR: Negative               10.2   4.62  )-----------( 392      ( 09-03 @ 05:54 )             31.0                9.9    5.26  )-----------( 417      ( 09-02 @ 05:52 )             30.4                11.3   9.44  )-----------( 438      ( 09-01 @ 06:00 )             34.4         MEDICATIONS  (STANDING):  acetaminophen     Tablet .. 975 milliGRAM(s) Oral <User Schedule>  ceFAZolin   IVPB 2000 milliGRAM(s) IV Intermittent every 8 hours  diphtheria/tetanus/pertussis (acellular) Vaccine (Adacel) 0.5 milliLiter(s) IntraMuscular once  enoxaparin Injectable 80 milliGRAM(s) SubCutaneous every 12 hours  ibuprofen  Tablet. 600 milliGRAM(s) Oral every 6 hours  NIFEdipine XL 30 milliGRAM(s) Oral every 24 hours  prenatal multivitamin 1 Tablet(s) Oral daily  sodium chloride 0.9% lock flush 3 milliLiter(s) IV Push every 8 hours    MEDICATIONS  (PRN):  benzocaine 20%/menthol 0.5% Spray 1 Spray(s) Topical every 6 hours PRN for Perineal discomfort  dibucaine 1% Ointment 1 Application(s) Topical every 6 hours PRN Perineal discomfort  diphenhydrAMINE 25 milliGRAM(s) Oral every 6 hours PRN Pruritus  hydrocortisone 1% Cream 1 Application(s) Topical every 6 hours PRN Moderate Pain (4-6)  lanolin Ointment 1 Application(s) Topical every 6 hours PRN nipple soreness  magnesium hydroxide Suspension 30 milliLiter(s) Oral two times a day PRN Constipation  oxyCODONE    IR 5 milliGRAM(s) Oral once PRN Moderate to Severe Pain (4-10)  oxyCODONE    IR 5 milliGRAM(s) Oral every 3 hours PRN Moderate to Severe Pain (4-10)  pramoxine 1%/zinc 5% Cream 1 Application(s) Topical every 4 hours PRN Moderate Pain (4-6)  simethicone 80 milliGRAM(s) Chew every 4 hours PRN Gas  witch hazel Pads 1 Application(s) Topical every 4 hours PRN Perineal discomfort

## 2024-09-03 NOTE — PROGRESS NOTE ADULT - ASSESSMENT
This is a 42 y/o F w/ , PE () on Lovenox, Crohn's who presented to [facility] on 24 w/o fetal movement x 4 days, recent travel to [place], admitted for fetal demise. Pt was induced on , s/p vaginal delivery.   Pt was febrile to 38.1, started on Zosyn for endometritis. Labs w/ leukocytosis to 11.7,  BCx 2/2 GBS.     #MSSA bacteremia   #GBS bacteremia   #Chorioamnionitis   #Sepsis   #Fetal demise     42 y/o F w/ , PE () on Lovenox, Crohn's who presented to [facility] on 24 w/o fetal movement, admitted w/ fetal demise s/p delivery on , c/b GBS bacteremia chorioamnionitis. Would narrow to Ceftriaxone, f/u repeat BCx for clearance and TTE to r/o IE.    Now with MSSA bacteremia as well, changed to Cefazolin.     Plan:   1. Cefazolin 2 g q8, will likely need 4 week course and midline   2. Repeat BCx x 2,  NGTD   3. TTE w/o vegetations     OPAT Recommendations:   1. Cefazolin 2g q8 for 4 week course from negative BCx, end date: 24  2. CBC, BMP weekly, send to [email or web address]  3. Will arrange ID followup     Thank you for this consult. Inpatient ID consult team will sign off.    Further changes in lab values, imaging studies, or clinical status will not be known to ID inpatient consultants unless specifically communicated by primary team.    Dimitri Gaspar MD  Attending Physician  Division of Infectious Diseases  Department of Medicine    Please contact through MS Teams message.  Office: [phone] (after 5 PM or weekend)

## 2024-09-03 NOTE — PROGRESS NOTE ADULT - SUBJECTIVE AND OBJECTIVE BOX
Infectious Diseases Follow Up:    Patient is a 43y old  Female who presents with a chief complaint of     Interval History/ROS:  Feeling well, no acute complaints     Allergies  No Known Allergies        ANTIMICROBIALS:  ceFAZolin   IVPB 2000 every 8 hours      Current Abx:     Previous Abx     OTHER MEDS:  MEDICATIONS  (STANDING):  acetaminophen     Tablet .. 975 <User Schedule>  diphenhydrAMINE 25 every 6 hours PRN  diphtheria/tetanus/pertussis (acellular) Vaccine (Adacel) 0.5 once  enoxaparin Injectable 80 every 12 hours  ibuprofen  Tablet. 600 every 6 hours  magnesium hydroxide Suspension 30 two times a day PRN  NIFEdipine XL 30 every 24 hours  oxyCODONE    IR 5 once PRN  oxyCODONE    IR 5 every 3 hours PRN  simethicone 80 every 4 hours PRN      Vital Signs Last 24 Hrs  T(C): 36.6 (03 Sep 2024 14:37), Max: 36.8 (03 Sep 2024 06:25)  T(F): 97.9 (03 Sep 2024 14:37), Max: 98.2 (03 Sep 2024 06:25)  HR: 53 (03 Sep 2024 14:37) (53 - 61)  BP: 125/74 (03 Sep 2024 14:37) (109/65 - 150/86)  BP(mean): --  RR: 17 (03 Sep 2024 14:37) (16 - 18)  SpO2: 100% (03 Sep 2024 14:37) (97% - 100%)    Parameters below as of 03 Sep 2024 14:37  Patient On (Oxygen Delivery Method): room air      PHYSICAL EXAM:  GENERAL: NAD, well-developed  HEAD:  Atraumatic, Normocephalic  EYES: EOMI, conjunctiva and sclera clear  CHEST/LUNG: Clear to auscultation bilaterally; No wheeze  HEART: Regular rate and rhythm; No murmurs, rubs, or gallops  ABDOMEN: Soft, Nontender, Nondistended; Bowel sounds present. +gravid uterus   PSYCH: AAOx3                          10.2   4.62  )-----------( 392      ( 03 Sep 2024 05:54 )             31.0       09-03    139  |  104  |  4<L>  ----------------------------<  79  3.4<L>   |  22  |  0.47<L>    Ca    8.6      03 Sep 2024 05:54    TPro  5.6<L>  /  Alb  2.5<L>  /  TBili  <0.2  /  DBili  x   /  AST  11  /  ALT  <5  /  AlkPhos  107  09-03      Urinalysis Basic - ( 03 Sep 2024 05:54 )    Color: x / Appearance: x / SG: x / pH: x  Gluc: 79 mg/dL / Ketone: x  / Bili: x / Urobili: x   Blood: x / Protein: x / Nitrite: x   Leuk Esterase: x / RBC: x / WBC x   Sq Epi: x / Non Sq Epi: x / Bacteria: x        MICROBIOLOGY:  v  .Blood Blood-Arterial  09-01-24   No growth at 48 Hours  --  --      .Blood Blood  09-01-24   No growth at 48 Hours  --  --      Clean Catch Clean Catch (Midstream)  08-30-24   >=3 organisms. Probable collection contamination.  --  --      .Blood Blood-Peripheral  08-30-24   Growth in aerobic bottle: Streptococcus agalactiae (Group B)  Direct identification is available within approximately 3-5  hours either by Blood Panel Multiplexed PCR or Direct  MALDI-TOF. Details: [email or web address]  --  Blood Culture PCR  Streptococcus agalactiae (Group B)      .Blood Blood-Peripheral  08-30-24   Growth in anaerobic bottle: Streptococcus agalactiae (Group B)  See previous culture [id number]  Growth in aerobic bottle: Staphylococcus aureus  Direct identification is available within approximately 3-5  hours either by Blood Panel Multiplexed PCR or Direct  MALDI-TOF. Details: [email or web address]  --  Blood Culture PCR  Staphylococcus aureus        CMV IgG Antibody: 2.80 U/mL (08-29-24 @ 19:30)  Toxoplasma IgG Screen: 49.30 IU/mL (08-29-24 @ 19:30)          RADIOLOGY:

## 2024-09-04 VITALS
TEMPERATURE: 98 F | SYSTOLIC BLOOD PRESSURE: 119 MMHG | OXYGEN SATURATION: 98 % | HEART RATE: 58 BPM | RESPIRATION RATE: 17 BRPM | DIASTOLIC BLOOD PRESSURE: 76 MMHG

## 2024-09-04 LAB
B2 GLYCOPROT1 IGA SER QL: <2 U/ML — SIGNIFICANT CHANGE UP
B2 GLYCOPROT1 IGG SER-ACNC: <1.4 U/ML — SIGNIFICANT CHANGE UP
B2 GLYCOPROT1 IGM SER-ACNC: <1.5 U/ML — SIGNIFICANT CHANGE UP
CARDIOLIPIN IGM SER-MCNC: 1.5 MPL U/ML — SIGNIFICANT CHANGE UP
CARDIOLIPIN IGM SER-MCNC: <1.6 GPL U/ML — SIGNIFICANT CHANGE UP
DEPRECATED CARDIOLIPIN IGA SER: <2 APL U/ML — SIGNIFICANT CHANGE UP
HCT VFR BLD CALC: 31.7 % — LOW (ref 34.5–45)
HGB BLD-MCNC: 10.4 G/DL — LOW (ref 11.5–15.5)
LUPUS ANTICOAGULANT PROFILE RESULT: SIGNIFICANT CHANGE UP
MCHC RBC-ENTMCNC: 22.5 PG — LOW (ref 27–34)
MCHC RBC-ENTMCNC: 32.8 GM/DL — SIGNIFICANT CHANGE UP (ref 32–36)
MCV RBC AUTO: 68.5 FL — LOW (ref 80–100)
NRBC # BLD: 0 /100 WBCS — SIGNIFICANT CHANGE UP (ref 0–0)
NRBC # FLD: 0 K/UL — SIGNIFICANT CHANGE UP (ref 0–0)
PLATELET # BLD AUTO: 430 K/UL — HIGH (ref 150–400)
RBC # BLD: 4.63 M/UL — SIGNIFICANT CHANGE UP (ref 3.8–5.2)
RBC # FLD: 21.8 % — HIGH (ref 10.3–14.5)
WBC # BLD: 4.95 K/UL — SIGNIFICANT CHANGE UP (ref 3.8–10.5)
WBC # FLD AUTO: 4.95 K/UL — SIGNIFICANT CHANGE UP (ref 3.8–10.5)

## 2024-09-04 PROCEDURE — 99232 SBSQ HOSP IP/OBS MODERATE 35: CPT

## 2024-09-04 PROCEDURE — 99238 HOSP IP/OBS DSCHRG MGMT 30/<: CPT | Mod: GC

## 2024-09-04 RX ADMIN — SODIUM CHLORIDE 3 MILLILITER(S): 9 INJECTION INTRAMUSCULAR; INTRAVENOUS; SUBCUTANEOUS at 06:18

## 2024-09-04 RX ADMIN — CEFAZOLIN SODIUM 100 MILLIGRAM(S): 2 INJECTION, SOLUTION INTRAVENOUS at 06:08

## 2024-09-04 RX ADMIN — Medication 30 MILLILITER(S): at 10:31

## 2024-09-04 RX ADMIN — ENOXAPARIN SODIUM 80 MILLIGRAM(S): 100 INJECTION SUBCUTANEOUS at 10:30

## 2024-09-04 NOTE — PROGRESS NOTE ADULT - SUBJECTIVE AND OBJECTIVE BOX
R3 Progress Note    Delayed documentation 2/2 to clinical duties, patient seen approximately 7:30am    Patient seen and examined at bedside, no acute overnight events. No acute complaints, pain well controlled. Patient is ambulating, voiding spontaneously, passing gas, and tolerating regular diet. Reports vaginal bleeding decreasing. Denies CP, SOB, N/V, HA, blurred vision, lightheadedness, dizziness.    Vital Signs Last 24 Hours  T(C): 36.7 (09-04-24 @ 06:22), Max: 37.2 (09-03-24 @ 21:17)  HR: 53 (09-04-24 @ 06:22) (53 - 69)  BP: 119/74 (09-04-24 @ 06:22) (108/67 - 148/80)  RR: 18 (09-04-24 @ 06:22) (16 - 18)  SpO2: 98% (09-04-24 @ 06:22) (96% - 100%)    Physical Exam:  General: NAD  Abdomen: Soft, nontender, non-distended, fundus firm  Pelvic: Lochia wnl    Labs:    Blood Type: O Positive  Antibody Screen: Negative  RPR: Negative               10.4   4.95  )-----------( 430      ( 09-04 @ 06:07 )             31.7                10.2   4.62  )-----------( 392      ( 09-03 @ 05:54 )             31.0                9.9    5.26  )-----------( 417      ( 09-02 @ 05:52 )             30.4         MEDICATIONS  (STANDING):  acetaminophen     Tablet .. 975 milliGRAM(s) Oral <User Schedule>  ceFAZolin   IVPB 2000 milliGRAM(s) IV Intermittent every 8 hours  diphtheria/tetanus/pertussis (acellular) Vaccine (Adacel) 0.5 milliLiter(s) IntraMuscular once  enoxaparin Injectable 80 milliGRAM(s) SubCutaneous every 12 hours  ibuprofen  Tablet. 600 milliGRAM(s) Oral every 6 hours  NIFEdipine XL 30 milliGRAM(s) Oral every 24 hours  prenatal multivitamin 1 Tablet(s) Oral daily  sodium chloride 0.9% lock flush 3 milliLiter(s) IV Push every 8 hours    MEDICATIONS  (PRN):  benzocaine 20%/menthol 0.5% Spray 1 Spray(s) Topical every 6 hours PRN for Perineal discomfort  dibucaine 1% Ointment 1 Application(s) Topical every 6 hours PRN Perineal discomfort  diphenhydrAMINE 25 milliGRAM(s) Oral every 6 hours PRN Pruritus  hydrocortisone 1% Cream 1 Application(s) Topical every 6 hours PRN Moderate Pain (4-6)  lanolin Ointment 1 Application(s) Topical every 6 hours PRN nipple soreness  magnesium hydroxide Suspension 30 milliLiter(s) Oral two times a day PRN Constipation  oxyCODONE    IR 5 milliGRAM(s) Oral once PRN Moderate to Severe Pain (4-10)  oxyCODONE    IR 5 milliGRAM(s) Oral every 3 hours PRN Moderate to Severe Pain (4-10)  pramoxine 1%/zinc 5% Cream 1 Application(s) Topical every 4 hours PRN Moderate Pain (4-6)  simethicone 80 milliGRAM(s) Chew every 4 hours PRN Gas  witch hazel Pads 1 Application(s) Topical every 4 hours PRN Perineal discomfort

## 2024-09-04 NOTE — ADVANCED PRACTICE NURSE CONSULT - REASON FOR CONSULT
Patient is aware and alert. Midline insertion along with risks, benefits, possible complications and infection prevention explained to patient who verbalized understanding. All questions addressed and patient gave verbal consent to place midline. Left arm cleansed with CHG. Using sterile technique under ultra sound guidance, placed PowerGlide Pro Midline 20G /10cm into Left Cephalic vein. Brisk blood return and flushed with 20Mls of normal saline. Minimal blood loss and patient tolerated procedure well. CHG dressing placed. All sharps accounted for. Report given to district RN and ordering provider. LOT#[id number]: , REF# [id number]:

## 2024-09-04 NOTE — PROGRESS NOTE ADULT - ATTENDING COMMENTS
MFM ATTENDING    Doing well this am. Denies n/v/f/c/cp/sob/palpitations, able to ambulate, cameron po, voiding, passing flatus and BM, pain well controlled.     Appreciate ID input.     BCx positive 8/30, repeat on 9/1 with no growth.   Plan for midline, likely ancef x4 weeks from negative cx per ID.  BCM pops bridge to IUD  lovenox x6 weeks postpartum. APLS pending.    Upon dc desires to follow up with us rather than her prenatal care site ([facility]).   Team to arrange follow up in [facility] upon dc.
MFM Attending:   Agree with above notes. Patient doing well without complaints. Lactation as decreased and improving and patient appears well. For admission until medline can be placed with BCx negative x 48 hours and for home care thereafter with 14 days of IV tx per ID consult.   BCM: POPs bridge to IUD  Tx lovenox x 6 weeks postpartum   APLS labs pending, f/u     JONNA Roca MD
MFM ATTENDING    No complaints, wants to go home.     BCx positive 8/30, repeat on 9/1 with no growth.     Midline today then dc home  case management involved, arranging home IV abx.   follow up in clinic arranged.   will need ID followup.     Cont lovenox therapeutic x6 weeks pp
MFM Attending:   Agree with above notes. Discussed current active issues of 1) need for AC 2) bacteremia 3) sPEC and 4) postpartum care per above. For therapeutic AC x 6 weeks postpartum. ID cosulted and appreciate reccs, for 14 days of IV tx, plan for midline. Repeat Cx done today. Discussed BCM as detailed above and addressed lactation.     JONNA Roca MD

## 2024-09-04 NOTE — PROGRESS NOTE ADULT - ASSESSMENT
44 yo  PPD#5 from  c/b endometritis and PEC with severe features in the setting of a hx of right lower lobe PE that had been treated up until x1mo ago. Patient now s/p Heparin gtt and has since been transitioned to therapeutic Lovenox. Post-partum course now complicated additionally by MSSA bacteremia being treated with IV antibiotics. Patient stable and progressing appropriately postpartum.      # in setting of IUFD  - Continue postpartum care  - s/p SW evaluation    #PEC w/ Severe Features  - Denies any sx  - BPs controlled on Nifedipine 30mg XL qd s/p Nifedipine 10mg IR (). BPs wnl  overnight      #Hx of PE  - Transitioned from Heparin gtt to therapeutic Lovenox 80mg BID (1mg/kg dosing)  - CTA (): Limited evaluation of some of the subsegmental pulmonary arterial branches due to respiratory motion artifact. No pulmonary arterial emboli within the other well visualized pulmonary arteries.  - LE Dopplers (): No evidence of deep venous thrombosis in either lower extremity.  - TTE (9/3): wnl, EF 54%, trace mitral regurg    #Endometritis w/ MSSA bacteremia   - s/p Zosyn (-)  - s/p Ceftriaxone ()  - BCx (): GBS, MSSA  - BCx(): NGTD  - ID following, initial recommendations had been to start patient on Ceftriaxone, however, given additional result of MSSA bacteremia, abx recommendations changed to Cefazolin - recommended on continuing antibiotics x4 weeks, will need midline  - c/w Cefazolin (-)  - For midline placement today prior to discharge, home nursing has been arranged with case management    SOPHIA Corley PGY3

## 2024-09-04 NOTE — PROGRESS NOTE ADULT - TIME BILLING
If applicable: Reviewing chart including relevant [facility] and/or outside records/notes/labs/imaging/other pertinent results, interview, exam, fetal evaluation including ultrasound and fetal heart rate tracing interpretation, decision making, counseling, consent discussion, answering questions, discussion and planning with consulting services, coordination of care and follow-up.
If applicable: Reviewing chart including relevant [facility] and/or outside records/notes/labs/imaging/other pertinent results, interview, exam, fetal evaluation including ultrasound and fetal heart rate tracing interpretation, decision making, counseling, consent discussion, answering questions, discussion and planning with consulting services, coordination of care and follow-up.

## 2024-09-05 PROCEDURE — 88342 IMHCHEM/IMCYTCHM 1ST ANTB: CPT | Mod: 26

## 2024-09-11 ENCOUNTER — OUTPATIENT (OUTPATIENT)
Dept: OUTPATIENT SERVICES | Facility: HOSPITAL | Age: 44
LOS: 1 days | End: 2024-09-11

## 2024-09-11 ENCOUNTER — APPOINTMENT (OUTPATIENT)
Dept: OBGYN | Facility: HOSPITAL | Age: 44
End: 2024-09-11

## 2024-09-11 VITALS
HEART RATE: 62 BPM | DIASTOLIC BLOOD PRESSURE: 90 MMHG | BODY MASS INDEX: 27.43 KG/M2 | HEIGHT: 68 IN | TEMPERATURE: 97.4 F | WEIGHT: 181 LBS | SYSTOLIC BLOOD PRESSURE: 140 MMHG

## 2024-09-11 DIAGNOSIS — Z98.891 HISTORY OF UTERINE SCAR FROM PREVIOUS SURGERY: Chronic | ICD-10-CM

## 2024-09-11 PROCEDURE — 99212 OFFICE O/P EST SF 10 MIN: CPT | Mod: TH,GE

## 2024-09-22 NOTE — HISTORY OF PRESENT ILLNESS
[FreeTextEntry1] : 42 yo  PPD#12 presents for postpartum visit s/p   IUFD c/b hx of PE on lovenox, GBS and MSSA bacteremia on cefazolin, and sPEC w/o SF (BPs) on nifedipine. Patient received routine PNC at [facility] in [place] however presented to [facility] on  at 33w5d after arriving from a flight from [facility] where she was diagnosed w/ IUFD 5 days prior and recommendation for c/s. On presentation to [facility], no FH confirmed and breech presentation thus patient proceeded with TOLAC. in setting of IUFD@33w6d on . L&D/pp course c/b hx of RLL PE in pregnancy (2024) with interruption in anticoagulation d8riphu prior to delivery due to low supply. During admission patient was on Heparin gtt then transitioned to therapeutic lovenox 80mg BID (1mg/kg dosing) which is to be continued for 6 weeks postpartum. CTA  negative, BL LE Dopplers () negative, and TTE (9/3) wnl EF54%, trace mitral regurg. Further c/b intraamniotic infection w/ GBS and MSSA bacteremia for which she received zosyn (-) and Ceftriaxone (), and Cefazolin. Patient had midline placed prior to discharge with plan to continue cefazolin for 4 weeks pospartum. Finally, course c/b PEC w/ SF (BPs). On discharge well controlled with nifedipene 30mg qdaily.  Received cabergoline for lactation suppression and requested POPs for birth control with plan for IUD at 12 weeks pp (no sooner due to III). Patient discharged on PPD#5 in stable condition.    Today, patient is reporting doing well since discharge. Denies fevers/chills, n/v, CP/SOB, vision changes, RUQ/epigastric pain, abdominal pain/cramping, or vaginal bleeding. Reports HA but improved with tylenol. Eating and drinking well. Denies changes in urinary or bowel movements. BP elevated at 140/90 however has not taken BP med today since she takes daily dose at 6pm.  Finally, patient seen by  at this visit and further endorses doing well to SW- has good family support and denies SI.   IUFD eval negative for cause to date:   A1C, TSH wnl   APLS labs Cardioliopin Ab IgA negative, Anticardiolipin IgG/IgM negative, Beta 2 Glycoprotein 1 IgG/IgM/IgA Ab negative  Placental pathology pending    OBHx:   1999 7#   6/3/2001 8#   2004 8#   2006 8#12   2008 8#  c/s 2010 low fluid, fetal distress 7 lbs  c/s 3/18/2016 9#7   Gyn:LMP: 2024. Denies hx of cysts, fibroids, endometriosis, abnormal pap smears, STIs   PMHx: Anemia, Gastritis, Crohn's disease, hx PE ()   PSHx: umbilical hernia repair with rectus diastasis repair 3/10/22 in [facility], CSx2  All: NKDA   Current Meds: Lovenox 80 mg SQ bid, Nifedipine 30 mg q24hrs, Cefazolin 2g IV injection q8hrs   Psych: Denies anxiety/depression   Social: Per chart review, open ACS case with 14-year-old daughter which prompted initial trip to [place].

## 2024-09-22 NOTE — PLAN
[FreeTextEntry1] : AP: 44 yo  PPD#12 presents for postpartum visit s/p  2/2 IUFD c/b hx of PE on lovenox, GBS and MSSA bacteremia on cefazolin, and sPEC w/o SF (BPs) on nifedipine.: Patient is stable and doing well.    #PEC w/ SF   - BP today 140/90 however patient had not taken daily nifedipine dose. Discussed with patient to continue to monitor BPs with her home BP cuff. Discussed that if BPs continue to be elevated then she should call clinic. Patient understands and agrees with plan.  - c/w nifedipine    # Hx of PE   - c/w therapeutic lovenox 80mg BID (1mg/kg dosing) until 6 weeks postpartum.  #GBS and MSSA bacteremia  - Midline in place  - c/w cefazolin for 4 weeks postpartum  #Postpartum   - f/u in 4 weeks for 6wk pp visit    D/w Dr. Deana Stephens, PGY1

## 2024-09-22 NOTE — HISTORY OF PRESENT ILLNESS
[FreeTextEntry1] : 42 yo  PPD#12 presents for postpartum visit s/p   IUFD c/b hx of PE on lovenox, GBS and MSSA bacteremia on cefazolin, and sPEC w/o SF (BPs) on nifedipine. Patient received routine PNC at [facility] in [place] however presented to [facility] on  at 33w5d after arriving from a flight from [facility] where she was diagnosed w/ IUFD 5 days prior and recommendation for c/s. On presentation to [facility], no FH confirmed and breech presentation thus patient proceeded with TOLAC. in setting of IUFD@33w6d on . L&D/pp course c/b hx of RLL PE in pregnancy (2024) with interruption in anticoagulation [id number] prior to delivery due to low supply. During admission patient was on Heparin gtt then transitioned to therapeutic lovenox 80mg BID (1mg/kg dosing) which is to be continued for 6 weeks postpartum. CTA  negative, BL LE Dopplers () negative, and TTE (9/3) wnl EF54%, trace mitral regurg. Further c/b intraamniotic infection w/ GBS and MSSA bacteremia for which she received zosyn (-) and Ceftriaxone (), and Cefazolin. Patient had midline placed prior to discharge with plan to continue cefazolin for 4 weeks pospartum. Finally, course c/b PEC w/ SF (BPs). On discharge well controlled with nifedipene 30mg qdaily.  Received cabergoline for lactation suppression and requested POPs for birth control with plan for IUD at 12 weeks pp (no sooner due to III). Patient discharged on PPD#5 in stable condition.    Today, patient is reporting doing well since discharge. Denies fevers/chills, n/v, CP/SOB, vision changes, RUQ/epigastric pain, abdominal pain/cramping, or vaginal bleeding. Reports HA but improved with tylenol. Eating and drinking well. Denies changes in urinary or bowel movements. BP elevated at 140/90 however has not taken BP med today since she takes daily dose at 6pm.  Finally, patient seen by  at this visit and further endorses doing well to SW- has good family support and denies SI.   IUFD eval negative for cause to date:   A1C, TSH wnl   APLS labs Cardioliopin Ab IgA negative, Anticardiolipin IgG/IgM negative, Beta 2 Glycoprotein 1 IgG/IgM/IgA Ab negative  Placental pathology pending    OBHx:   1999 7#   6/3/2001 8#   2004 8#   2006 8#12   2008 8#  c/s 2010 low fluid, fetal distress 7 lbs  c/s 3/18/2016 9#7   Gyn:LMP: 2024. Denies hx of cysts, fibroids, endometriosis, abnormal pap smears, STIs   PMHx: Anemia, Gastritis, Crohn's disease, hx PE ()   PSHx: umbilical hernia repair with rectus diastasis repair 3/10/22 in [facility], CSx2  All: NKDA   Current Meds: Lovenox 80 mg SQ bid, Nifedipine 30 mg q24hrs, Cefazolin 2g IV injection q8hrs   Psych: Denies anxiety/depression   Social: Per chart review, open ACS case with 14-year-old daughter which prompted initial trip to [place].

## 2024-09-22 NOTE — HISTORY OF PRESENT ILLNESS
[FreeTextEntry1] : 44 yo  PPD#12 presents for postpartum visit s/p   IUFD c/b hx of PE on lovenox, GBS and MSSA bacteremia on cefazolin, and sPEC w/o SF (BPs) on nifedipine. Patient received routine PNC at [facility] in [place] however presented to [facility] on  at 33w5d after [place] [facility] [place] was diagnosed w/ IUFD 5 days prior and recommendation for c/s. On presentation to [facility], no FH confirmed and breech presentation thus patient proceeded with TOLAC. in setting of IUFD@33w6d on . L&D/pp course c/b hx of RLL PE in pregnancy (2024) with interruption in anticoagulation c8igcps prior to delivery due to low supply. During admission patient was on Heparin gtt then transitioned to therapeutic lovenox 80mg BID (1mg/kg dosing) which is to be continued for 6 weeks postpartum. CTA  negative, BL LE Dopplers () negative, and TTE (9/3) wnl EF54%, trace mitral regurg. Further c/b intraamniotic infection w/ GBS and MSSA bacteremia for which she received zosyn (-) and Ceftriaxone (), and Cefazolin. Patient had midline placed prior to discharge with plan to continue cefazolin for 4 weeks pospartum. Finally, course c/b PEC w/ SF (BPs). On discharge well controlled with nifedipene 30mg qdaily.  Received cabergoline for lactation suppression and requested POPs for birth control with plan for IUD at 12 weeks pp (no sooner due to III). Patient discharged on PPD#5 in stable condition.    Today, patient is reporting doing well since discharge. Denies fevers/chills, n/v, CP/SOB, vision changes, RUQ/epigastric pain, abdominal pain/cramping, or vaginal bleeding. Reports HA but improved with tylenol. Eating and drinking well. Denies changes in urinary or bowel movements. BP elevated at 140/90 however has not taken BP med today since she takes daily dose at 6pm.  Finally, patient seen by  at this visit and further endorses doing well to SW- has good family support and denies SI.   IUFD eval negative for cause to date:   A1C, TSH wnl   APLS labs Cardioliopin Ab IgA negative, Anticardiolipin IgG/IgM negative, Beta 2 Glycoprotein 1 IgG/IgM/IgA Ab negative  Placental pathology pending    OBHx:   1999 7#   6/3/2001 8#   2004 8#   2006 8#12   2008 8#  c/s 2010 low fluid, fetal distress 7 lbs  c/s 3/18/2016 9#7   Gyn:LMP: 2024. Denies hx of cysts, fibroids, endometriosis, abnormal pap smears, STIs   PMHx: Anemia, Gastritis, Crohn's disease, hx PE ()   PSHx: umbilical hernia repair with rectus diastasis repair 3/10/22 in [facility], CSx2  All: NKDA   Current Meds: Lovenox 80 mg SQ bid, Nifedipine 30 mg q24hrs, Cefazolin 2g IV injection q8hrs   Psych: Denies anxiety/depression   Social: Per chart review, open ACS case with 14-year-old daughter which prompted initial trip to [place].

## 2024-09-22 NOTE — PLAN
[FreeTextEntry1] : AP: 42 yo  PPD#12 presents for postpartum visit s/p  2/2 IUFD c/b hx of PE on lovenox, GBS and MSSA bacteremia on cefazolin, and sPEC w/o SF (BPs) on nifedipine.: Patient is stable and doing well.    #PEC w/ SF   - BP today 140/90 however patient had not taken daily nifedipine dose. Discussed with patient to continue to monitor BPs with her home BP cuff. Discussed that if BPs continue to be elevated then she should call clinic. Patient understands and agrees with plan.  - c/w nifedipine    # Hx of PE   - c/w therapeutic lovenox 80mg BID (1mg/kg dosing) until 6 weeks postpartum.  #GBS and MSSA bacteremia  - Midline in place  - c/w cefazolin for 4 weeks postpartum  #Postpartum   - f/u in 4 weeks for 6wk pp visit    D/w Dr. Deana Stephens, PGY1

## 2024-09-22 NOTE — HISTORY OF PRESENT ILLNESS
[FreeTextEntry1] : 44 yo  PPD#12 presents for postpartum visit s/p   IUFD c/b hx of PE on lovenox, GBS and MSSA bacteremia on cefazolin, and sPEC w/o SF (BPs) on nifedipine. Patient received routine PNC at [facility] in [place] however presented to [facility] on  at 33w5d after arriving from a flight from [facility] [place] [facility] where she was diagnosed w/ IUFD 5 days prior and recommendation for c/s. On presentation to [facility], no FH confirmed and breech presentation thus patient proceeded with TOLAC. in setting of IUFD@33w6d on . L&D/pp course c/b hx of RLL PE in pregnancy (2024) with interruption in anticoagulation [id number] prior to delivery due to low supply. During admission patient was on Heparin gtt then transitioned to therapeutic lovenox 80mg BID (1mg/kg dosing) which is to be continued for 6 weeks postpartum. CTA  negative, BL LE Dopplers () negative, and TTE (9/3) wnl EF54%, trace mitral regurg. Further c/b intraamniotic infection w/ GBS and MSSA bacteremia for which she received zosyn (-) and Ceftriaxone (), and Cefazolin. Patient had midline placed prior to discharge with plan to continue cefazolin for 4 weeks pospartum. Finally, course c/b PEC w/ SF (BPs). On discharge well controlled with nifedipene 30mg qdaily.  Received cabergoline for lactation suppression and requested POPs for birth control with plan for IUD at 12 weeks pp (no sooner due to III). Patient discharged on PPD#5 in stable condition.    Today, patient is reporting doing well since discharge. Denies fevers/chills, n/v, CP/SOB, vision changes, RUQ/epigastric pain, abdominal pain/cramping, or vaginal bleeding. Reports HA but improved with tylenol. Eating and drinking well. Denies changes in urinary or bowel movements. BP elevated at 140/90 however has not taken BP med today since she takes daily dose at 6pm.  Finally, patient seen by  at this visit and further endorses doing well to SW- has good family support and denies SI.   IUFD eval negative for cause to date:   A1C, TSH wnl   APLS labs Cardioliopin Ab IgA negative, Anticardiolipin IgG/IgM negative, Beta 2 Glycoprotein 1 IgG/IgM/IgA Ab negative  Placental pathology pending    OBHx:   1999 7#   6/3/2001 8#   2004 8#   2006 8#12   2008 8#  c/s 2010 low fluid, fetal distress 7 lbs  c/s 3/18/2016 9#7   Gyn:LMP: 2024. Denies hx of cysts, fibroids, endometriosis, abnormal pap smears, STIs   PMHx: Anemia, Gastritis, Crohn's disease, hx PE ()   PSHx: umbilical hernia repair with rectus diastasis repair 3/10/22 in [facility], CSx2  All: NKDA   Current Meds: Lovenox 80 mg SQ bid, Nifedipine 30 mg q24hrs, Cefazolin 2g IV injection q8hrs   Psych: Denies anxiety/depression   Social: Per chart review, open ACS case with 14-year-old daughter which prompted initial trip to [place].

## 2024-09-22 NOTE — HISTORY OF PRESENT ILLNESS
[FreeTextEntry1] : 42 yo  PPD#12 presents for postpartum visit s/p   IUFD c/b hx of PE on lovenox, GBS and MSSA bacteremia on cefazolin, and sPEC w/o SF (BPs) on nifedipine. Patient received routine PNC at [facility] in [place] however presented to [facility] on  at 33w5d after arriving from a [place] [facility] [place] where she was diagnosed w/ IUFD 5 days prior and recommendation for c/s. On presentation to [facility], no FH confirmed and breech presentation thus patient proceeded with TOLAC. in setting of IUFD@33w6d on . L&D/pp course c/b hx of RLL PE in pregnancy (2024) with interruption in anticoagulation c2sjeyh prior to delivery due to low supply. During admission patient was on Heparin gtt then transitioned to therapeutic lovenox 80mg BID (1mg/kg dosing) which is to be continued for 6 weeks postpartum. CTA  negative, BL LE Dopplers () negative, and TTE (9/3) wnl EF54%, trace mitral regurg. Further c/b intraamniotic infection w/ GBS and MSSA bacteremia for which she received zosyn (-) and Ceftriaxone (), and Cefazolin. Patient had midline placed prior to discharge with plan to continue cefazolin for 4 weeks pospartum. Finally, course c/b PEC w/ SF (BPs). On discharge well controlled with nifedipene 30mg qdaily.  Received cabergoline for lactation suppression and requested POPs for birth control with plan for IUD at 12 weeks pp (no sooner due to III). Patient discharged on PPD#5 in stable condition.    Today, patient is reporting doing well since discharge. Denies fevers/chills, n/v, CP/SOB, vision changes, RUQ/epigastric pain, abdominal pain/cramping, or vaginal bleeding. Reports HA but improved with tylenol. Eating and drinking well. Denies changes in urinary or bowel movements. BP elevated at 140/90 however has not taken BP med today since she takes daily dose at 6pm.  Finally, patient seen by  at this visit and further endorses doing well to SW- has good family support and denies SI.   IUFD eval negative for cause to date:   A1C, TSH wnl   APLS labs Cardioliopin Ab IgA negative, Anticardiolipin IgG/IgM negative, Beta 2 Glycoprotein 1 IgG/IgM/IgA Ab negative  Placental pathology pending    OBHx:   1999 7#   6/3/2001 8#   2004 8#   2006 8#12   2008 8#  c/s 2010 low fluid, fetal distress 7 lbs  c/s 3/18/2016 9#7   Gyn:LMP: 2024. Denies hx of cysts, fibroids, endometriosis, abnormal pap smears, STIs   PMHx: Anemia, Gastritis, Crohn's disease, hx PE ()   PSHx: umbilical hernia repair with rectus diastasis repair 3/10/22 in [facility], CSx2  All: NKDA   Current Meds: Lovenox 80 mg SQ bid, Nifedipine 30 mg q24hrs, Cefazolin 2g IV injection q8hrs   Psych: Denies anxiety/depression   Social: Per chart review, open ACS case with 14-year-old daughter which prompted initial trip to [place].

## 2024-09-23 LAB — CHROM ANALY OVERALL INTERP SPEC-IMP: SIGNIFICANT CHANGE UP

## 2024-11-06 ENCOUNTER — APPOINTMENT (OUTPATIENT)
Dept: OBGYN | Facility: HOSPITAL | Age: 44
End: 2024-11-06

## 2024-11-06 ENCOUNTER — OUTPATIENT (OUTPATIENT)
Dept: OUTPATIENT SERVICES | Facility: HOSPITAL | Age: 44
LOS: 1 days | End: 2024-11-06

## 2024-11-06 VITALS
WEIGHT: 181 LBS | HEART RATE: 79 BPM | TEMPERATURE: 97.7 F | SYSTOLIC BLOOD PRESSURE: 128 MMHG | HEIGHT: 68 IN | DIASTOLIC BLOOD PRESSURE: 93 MMHG | BODY MASS INDEX: 27.43 KG/M2

## 2024-11-06 DIAGNOSIS — Z86.711 PERSONAL HISTORY OF PULMONARY EMBOLISM: ICD-10-CM

## 2024-11-06 DIAGNOSIS — Z98.890 OTHER SPECIFIED POSTPROCEDURAL STATES: Chronic | ICD-10-CM

## 2024-11-06 DIAGNOSIS — Z30.9 ENCOUNTER FOR CONTRACEPTIVE MANAGEMENT, UNSPECIFIED: ICD-10-CM

## 2024-11-06 PROCEDURE — 99213 OFFICE O/P EST LOW 20 MIN: CPT | Mod: TH,GE

## 2024-11-07 DIAGNOSIS — Z30.9 ENCOUNTER FOR CONTRACEPTIVE MANAGEMENT, UNSPECIFIED: ICD-10-CM

## 2024-11-12 ENCOUNTER — EMERGENCY (EMERGENCY)
Facility: HOSPITAL | Age: 44
LOS: 1 days | Discharge: HOME | End: 2024-11-12
Attending: STUDENT IN AN ORGANIZED HEALTH CARE EDUCATION/TRAINING PROGRAM | Admitting: STUDENT IN AN ORGANIZED HEALTH CARE EDUCATION/TRAINING PROGRAM
Payer: MEDICAID

## 2024-11-12 VITALS
OXYGEN SATURATION: 100 % | RESPIRATION RATE: 18 BRPM | SYSTOLIC BLOOD PRESSURE: 116 MMHG | HEART RATE: 59 BPM | TEMPERATURE: 98 F | DIASTOLIC BLOOD PRESSURE: 88 MMHG

## 2024-11-12 VITALS
RESPIRATION RATE: 16 BRPM | SYSTOLIC BLOOD PRESSURE: 136 MMHG | DIASTOLIC BLOOD PRESSURE: 95 MMHG | OXYGEN SATURATION: 96 % | HEIGHT: 65 IN | HEART RATE: 61 BPM | TEMPERATURE: 98 F | WEIGHT: 179.9 LBS

## 2024-11-12 DIAGNOSIS — Z98.891 HISTORY OF UTERINE SCAR FROM PREVIOUS SURGERY: Chronic | ICD-10-CM

## 2024-11-12 DIAGNOSIS — Z98.890 OTHER SPECIFIED POSTPROCEDURAL STATES: Chronic | ICD-10-CM

## 2024-11-12 LAB
ALBUMIN SERPL ELPH-MCNC: 3.4 G/DL — SIGNIFICANT CHANGE UP (ref 3.3–5)
ALP SERPL-CCNC: 82 U/L — SIGNIFICANT CHANGE UP (ref 40–120)
ALT FLD-CCNC: 9 U/L — SIGNIFICANT CHANGE UP (ref 4–33)
ANION GAP SERPL CALC-SCNC: 16 MMOL/L — HIGH (ref 7–14)
APPEARANCE UR: CLEAR — SIGNIFICANT CHANGE UP
AST SERPL-CCNC: 27 U/L — SIGNIFICANT CHANGE UP (ref 4–32)
BASOPHILS # BLD AUTO: 0.06 K/UL — SIGNIFICANT CHANGE UP (ref 0–0.2)
BASOPHILS NFR BLD AUTO: 1 % — SIGNIFICANT CHANGE UP (ref 0–2)
BILIRUB SERPL-MCNC: <0.2 MG/DL — SIGNIFICANT CHANGE UP (ref 0.2–1.2)
BILIRUB UR-MCNC: NEGATIVE — SIGNIFICANT CHANGE UP
BUN SERPL-MCNC: 8 MG/DL — SIGNIFICANT CHANGE UP (ref 7–23)
CALCIUM SERPL-MCNC: 9.3 MG/DL — SIGNIFICANT CHANGE UP (ref 8.4–10.5)
CHLORIDE SERPL-SCNC: 103 MMOL/L — SIGNIFICANT CHANGE UP (ref 98–107)
CO2 SERPL-SCNC: 19 MMOL/L — LOW (ref 22–31)
COLOR SPEC: YELLOW — SIGNIFICANT CHANGE UP
CREAT SERPL-MCNC: 0.61 MG/DL — SIGNIFICANT CHANGE UP (ref 0.5–1.3)
CRP SERPL-MCNC: 17.1 MG/L — HIGH
DIFF PNL FLD: NEGATIVE — SIGNIFICANT CHANGE UP
EGFR: 114 ML/MIN/1.73M2 — SIGNIFICANT CHANGE UP
EOSINOPHIL # BLD AUTO: 0.16 K/UL — SIGNIFICANT CHANGE UP (ref 0–0.5)
EOSINOPHIL NFR BLD AUTO: 2.7 % — SIGNIFICANT CHANGE UP (ref 0–6)
ERYTHROCYTE [SEDIMENTATION RATE] IN BLOOD: 18 MM/HR — SIGNIFICANT CHANGE UP (ref 4–25)
GLUCOSE SERPL-MCNC: 93 MG/DL — SIGNIFICANT CHANGE UP (ref 70–99)
GLUCOSE UR QL: NEGATIVE MG/DL — SIGNIFICANT CHANGE UP
HCG SERPL-ACNC: <1 MIU/ML — SIGNIFICANT CHANGE UP
HCT VFR BLD CALC: 33.5 % — LOW (ref 34.5–45)
HGB BLD-MCNC: 11 G/DL — LOW (ref 11.5–15.5)
IANC: 3.56 K/UL — SIGNIFICANT CHANGE UP (ref 1.8–7.4)
IMM GRANULOCYTES NFR BLD AUTO: 0.3 % — SIGNIFICANT CHANGE UP (ref 0–0.9)
KETONES UR-MCNC: NEGATIVE MG/DL — SIGNIFICANT CHANGE UP
LEUKOCYTE ESTERASE UR-ACNC: NEGATIVE — SIGNIFICANT CHANGE UP
LIDOCAIN IGE QN: 18 U/L — SIGNIFICANT CHANGE UP (ref 7–60)
LYMPHOCYTES # BLD AUTO: 1.58 K/UL — SIGNIFICANT CHANGE UP (ref 1–3.3)
LYMPHOCYTES # BLD AUTO: 26.6 % — SIGNIFICANT CHANGE UP (ref 13–44)
MCHC RBC-ENTMCNC: 23.3 PG — LOW (ref 27–34)
MCHC RBC-ENTMCNC: 32.8 G/DL — SIGNIFICANT CHANGE UP (ref 32–36)
MCV RBC AUTO: 71 FL — LOW (ref 80–100)
MONOCYTES # BLD AUTO: 0.57 K/UL — SIGNIFICANT CHANGE UP (ref 0–0.9)
MONOCYTES NFR BLD AUTO: 9.6 % — SIGNIFICANT CHANGE UP (ref 2–14)
NEUTROPHILS # BLD AUTO: 3.56 K/UL — SIGNIFICANT CHANGE UP (ref 1.8–7.4)
NEUTROPHILS NFR BLD AUTO: 59.8 % — SIGNIFICANT CHANGE UP (ref 43–77)
NITRITE UR-MCNC: NEGATIVE — SIGNIFICANT CHANGE UP
NRBC # BLD: 0 /100 WBCS — SIGNIFICANT CHANGE UP (ref 0–0)
NRBC # FLD: 0 K/UL — SIGNIFICANT CHANGE UP (ref 0–0)
PH UR: 6 — SIGNIFICANT CHANGE UP (ref 5–8)
PLATELET # BLD AUTO: 433 K/UL — HIGH (ref 150–400)
POTASSIUM SERPL-MCNC: 4.3 MMOL/L — SIGNIFICANT CHANGE UP (ref 3.5–5.3)
POTASSIUM SERPL-SCNC: 4.3 MMOL/L — SIGNIFICANT CHANGE UP (ref 3.5–5.3)
PROT SERPL-MCNC: 6.7 G/DL — SIGNIFICANT CHANGE UP (ref 6–8.3)
PROT UR-MCNC: NEGATIVE MG/DL — SIGNIFICANT CHANGE UP
RBC # BLD: 4.72 M/UL — SIGNIFICANT CHANGE UP (ref 3.8–5.2)
RBC # FLD: 18.6 % — HIGH (ref 10.3–14.5)
SODIUM SERPL-SCNC: 138 MMOL/L — SIGNIFICANT CHANGE UP (ref 135–145)
SP GR SPEC: 1.02 — SIGNIFICANT CHANGE UP (ref 1–1.03)
UROBILINOGEN FLD QL: 0.2 MG/DL — SIGNIFICANT CHANGE UP (ref 0.2–1)
WBC # BLD: 5.95 K/UL — SIGNIFICANT CHANGE UP (ref 3.8–10.5)
WBC # FLD AUTO: 5.95 K/UL — SIGNIFICANT CHANGE UP (ref 3.8–10.5)

## 2024-11-12 PROCEDURE — 74177 CT ABD & PELVIS W/CONTRAST: CPT | Mod: 26,MC

## 2024-11-12 PROCEDURE — 99285 EMERGENCY DEPT VISIT HI MDM: CPT | Mod: 25

## 2024-11-12 PROCEDURE — 93010 ELECTROCARDIOGRAM REPORT: CPT

## 2024-11-12 RX ORDER — FAMOTIDINE 10 MG/ML
20 INJECTION INTRAVENOUS ONCE
Refills: 0 | Status: COMPLETED | OUTPATIENT
Start: 2024-11-12 | End: 2024-11-12

## 2024-11-12 RX ORDER — MAGNESIUM, ALUMINUM HYDROXIDE 200-200 MG
30 TABLET,CHEWABLE ORAL ONCE
Refills: 0 | Status: COMPLETED | OUTPATIENT
Start: 2024-11-12 | End: 2024-11-12

## 2024-11-12 RX ORDER — ACETAMINOPHEN 500 MG
1000 TABLET ORAL ONCE
Refills: 0 | Status: COMPLETED | OUTPATIENT
Start: 2024-11-12 | End: 2024-11-12

## 2024-11-12 RX ORDER — SODIUM CHLORIDE 9 MG/ML
1000 INJECTION, SOLUTION INTRAMUSCULAR; INTRAVENOUS; SUBCUTANEOUS ONCE
Refills: 0 | Status: COMPLETED | OUTPATIENT
Start: 2024-11-12 | End: 2024-11-12

## 2024-11-12 RX ORDER — MORPHINE SULFATE 30 MG/1
4 TABLET, EXTENDED RELEASE ORAL ONCE
Refills: 0 | Status: DISCONTINUED | OUTPATIENT
Start: 2024-11-12 | End: 2024-11-12

## 2024-11-12 RX ADMIN — SODIUM CHLORIDE 1000 MILLILITER(S): 9 INJECTION, SOLUTION INTRAMUSCULAR; INTRAVENOUS; SUBCUTANEOUS at 04:35

## 2024-11-12 RX ADMIN — MORPHINE SULFATE 4 MILLIGRAM(S): 30 TABLET, EXTENDED RELEASE ORAL at 04:56

## 2024-11-12 RX ADMIN — Medication 400 MILLIGRAM(S): at 04:35

## 2024-11-12 RX ADMIN — FAMOTIDINE 20 MILLIGRAM(S): 10 INJECTION INTRAVENOUS at 06:19

## 2024-11-12 RX ADMIN — Medication 1000 MILLIGRAM(S): at 05:05

## 2024-11-12 RX ADMIN — Medication 30 MILLILITER(S): at 06:21

## 2024-11-12 NOTE — ED PROVIDER NOTE - NSFOLLOWUPINSTRUCTIONS_ED_ALL_ED_FT
Thank you for visiting our Emergency Department, it has been a pleasure taking part in your healthcare.  Please read and follow all of your discharge instructions. These instructions contain important information regarding your Emergency Department visit and future medical care.     Your discharge diagnosis is: enteritis   Please take all discharge medications as indicated below:  Please continue all medications as rx'd by your PMD.  Please follow up with your Primary Care Doctor (PMD) within x48 hours.  Bring and show your PMD all documents and results you were given during your ED visit.  If you do not have a primary care doctor please call [phone] to establish primary care.  Please follow up with your Gastroenterologist (GI) within x48 hours.  A copy of resulted labs, imaging, and findings have been provided to you.   You can also access all of your results through the [facility] [employer].  If you have questions about your results, please call the Emergency Department.  During your visit and at time of discharge, you had a detailed discussion with your provider regarding your diagnosis, care management and discharge planning.  Topics that were discussed included but were not limited to: return precautions, follow up visits with existing or new providers, new prescriptions and/or medication changes, wound and/or splint/cast care, incidental laboratory/radiology findings, or other care   aspects specific to your diagnosis and treatment. You have been given the opportunity to have your questions answered. At this time you have been deemed stable and fit for discharge.  Return precautions to the Emergency Department include but are not limited to: unrelenting nausea, vomiting, fever, chills, chest pain, shortness of breath, dizziness, chest or abdominal pain, worsening back pain, syncope, blood in urine or stool, headache that doesn't resolve, numbness or tingling, loss of sensation, loss of motor function, or any other concerning symptoms.    Please bring all ED Documents you were given during your stay to your PMD.   They contain important information for you and your PMD, including incidental lab/radiology findings that your PMD should be aware of.

## 2024-11-12 NOTE — ED PROVIDER NOTE - NS ED ROS FT
REVIEW OF SYSTEMS:  CONSTITUTIONAL: No weakness, fevers or chills  EYES/ENT: No visual changes;  No vertigo or throat pain   NECK: No pain or stiffness  RESPIRATORY: No cough, wheezing, hemoptysis; No shortness of breath  CARDIOVASCULAR: No chest pain or palpitations  GASTROINTESTINAL: + Abdominal pain, + Diarrhea.  No nausea, vomiting, or hematemesis; No  constipation. No melena or hematochezia.  GENITOURINARY: No dysuria, frequency or hematuria  NEUROLOGICAL: No numbness or weakness  SKIN: No itching, rashes

## 2024-11-12 NOTE — ED PROVIDER NOTE - CLINICAL SUMMARY MEDICAL DECISION MAKING FREE TEXT BOX
45yo w/ PMHx Crohn's disease, ventral hernia s/p repair with mesh in [facility] (March 10, 2022), latent TB, PE s/p treatment presenting with 2 weeks of diffuse abdominal pain and non-bloody diarrhea. Her pain is diffuse and is worse with PO intake. Patient has progressive abdominal pain weight associated 20lb weight loss in the past 1month. She has decreased PO intake, however denies n/v. She denies fevers, chills, chest pain, dyspnea, dysuria. Pt is not currently on treatment for Chron's disease. ED vitals signs: AF, HR 60s, /90, RA. Exam with diffuse abdominal tenderness. Ddx for abdominal pain includes Chron' s flair r/o infectious etiologies. Plan for labs, CTAP, pain control. 45yo w/ PMHx Crohn's disease, ventral hernia s/p repair with mesh in [facility] (March 10, 2022), latent TB, PE s/p treatment presenting with 2 weeks of diffuse abdominal pain and non-bloody diarrhea. Her pain is diffuse and is worse with PO intake. Patient has progressive abdominal pain weight associated 20lb weight loss in the past 1month. She has decreased PO intake, however denies n/v. She denies fevers, chills, chest pain, dyspnea, dysuria. Pt is not currently on treatment for Chron' s disease. ED vitals signs: AF, HR 60s, /90, RA. Exam with diffuse abdominal tenderness. Ddx for abdominal pain includes Chron' s flair vs pancreatitis,  r/o infectious etiologies. Plan for labs, GI PCR, CTAP, pain control.

## 2024-11-12 NOTE — ED PROVIDER NOTE - PHYSICAL EXAMINATION
GENERAL: NAD, lying in bed comfortably  HEAD: Atraumatic, normocephalic  EYES: conjunctiva and sclera clear  ENT: dry mucous membranes  NECK: Supple, no JVD  HEART: S1, S2, Regular rate and rhythm, no murmurs, rubs, or gallops  LUNGS: Unlabored respirations, clear to auscultation bilaterally, no crackles, wheezing, or rhonchi  ABDOMEN: Soft, Diffusely tender, nondistended, hyperactive bowel sounds, no rebound or guarding, midline scar from prior surgery, no CVA tenderness   EXTREMITIES: 2+ peripheral pulses bilaterally. No clubbing, cyanosis, or edema  NERVOUS SYSTEM:  A&Ox3, no focal deficits   SKIN: No rashes or lesions

## 2024-11-12 NOTE — ED PROVIDER NOTE - PATIENT PORTAL LINK FT
You can access the FollowMyHealth Patient Portal offered by [facility] by registering at the following website: [email or web address]. By joining [employer]s FollowMyHealth portal, you will also be able to view your health information using other applications (apps) compatible with our system.

## 2024-11-12 NOTE — ED PROVIDER NOTE - ATTENDING CONTRIBUTION TO CARE
I have personally performed a face to face medical and diagnostic evaluation of the patient. I have discussed with and reviewed the Resident's note and agree with the History, ROS, Physical Exam and MDM unless otherwise indicated. A brief summary of my personal evaluation and impression can be found below.    Sue LERMA: 44F hx of Crohn's disease ventral hernia repair, with mesh, latent TB PE with treatment in the past, presents with a chief complaint of 2 weeks of diffuse abdominal pain nonbloody diarrhea and nausea but no vomiting, no fever, worse with p.o. intake, is not taking any meds at home for this, denies any fever chest pain trouble breathing urinary or bowel complaints otherwise she can move everything and feel everything,    All other ROS negative, except as above and as per HPI and ROS section.    VITALS: Initial triage and subsequent vitals have been reviewed by me.  GEN APPEARANCE: Alert, non-toxic, well-appearing, NAD.  HEAD: Atraumatic.  EYES: PERRLa, EOMI, vision grossly intact.   NECK: Supple  CV: RRR, S1S2, no c/r/m/g. Cap refill <2 seconds. No bruits.   LUNGS: CTAB. No abnormal breath sounds.  ABDOMEN: diffuse abdominal tenderness   MSK/EXT: No spinal or extremity point tenderness. No CVA ttp. Pelvis stable. No peripheral edema.  NEURO: Alert, follows commands. Weight bearing normal. Speech normal. Sensation and motor normal x4 extremities.   SKIN: Warm, dry and intact. No rash.  PSYCH: Appropriate    Plan/MDM: Exam vitals are stable nontoxic-appearing with physical exam as above DDx concern for likely Crohn's flare, presentation does not appear consistent of PE ACS or dissection, given presentation with abdominal tenderness left also consider bowel obstruction, will check labs give meds reassess consider possible admission patient medical to ER if pain is not controlled in the ER.

## 2024-11-12 NOTE — ED ADULT TRIAGE NOTE - CHIEF COMPLAINT QUOTE
pt c/o generalized abdominal discomfort, nausea, diarrhea and poor po intake x2 weeks. Hx. Crohns. gastritis. anemia (last transfusion august). pt denies chest pain, sob, fevers/chills.

## 2024-11-12 NOTE — ED ADULT NURSE NOTE - OBJECTIVE STATEMENT
BREAK RN: 44 year old AO4 ambulatory female c/o nausea, vomiting, diarrhea, poor PO intake for 2 weeks. PMHx of Crohn, gastritis,  chronic anemia with transfusions in the past. States the pain has worsened over 2 week prompting ED visit. Denies blood in stool. Denies headache, chest pain, SOB. RR even and unlabored. Right 20g IV placed, labs drawn, medicated as per eMAR. Pt safety maintained. Pending imaging.

## 2024-11-12 NOTE — ED PROVIDER NOTE - NSICDXPASTSURGICALHX_GEN_ALL_CORE_FT
PAST SURGICAL HISTORY:  H/O umbilical hernia repair with rectus diastasis repair 3/10/22 in [facility]    H/O:

## 2024-11-12 NOTE — ED PROVIDER NOTE - OBJECTIVE STATEMENT
43yo w/ PMHx Crohn's disease, ventral hernia s/p repair with mesh in [facility] (March 10, 2022), latent TB, PE s/p treatment presenting with 2 weeks of diffuse abdominal pain and non-bloody diarrhea. Her pain is diffuse and is worse with PO intake. Patient has progressive abdominal pain weight associated 20lb weight loss in the past 1month. She has decreased PO intake, however denies n/v. She denies fevers, chills, chest pain, dyspnea, dysuria. Pt is not currently on treatment for Chron' s disease, however as per chart review, was previously on pred / Azathioprine. She was most recently hospitalized  in 2/2024.   ROS: + Headache, decreased PO intake, 20lb weight loss.

## 2024-11-12 NOTE — ED ADULT TRIAGE NOTE - MEANS OF ARRIVAL
ambulatory
history of hemorrhagic CVA in 2009  patient was unable to name her neurologist  PT consult  c/w dysphagia 1 diet with thin liquids (which was the recommended consistency on discharge recs in 2009)  aspiration and fall precautions

## 2024-11-12 NOTE — ED PROVIDER NOTE - PROGRESS NOTE DETAILS
Sue LERMA: Pt assessed at beside. Pt resting comfortably, pain controlled, pt questions answered. Vital signs stable.  feels improved wants to go home   Strict return precautions were discussed. Pt expressed understanding.  pt to follow up viola MITCHELL

## 2024-11-12 NOTE — ED ADULT NURSE NOTE - PAIN: PRESENCE, MLM
JONNA KANG  MRN-[id number]  Patient is a 69y old  Female who presents with a chief complaint of SBO (20 Oct 2023 05:28)    HPI:   68 y/o female with hx of multiple bouts of SBO, first surgery July 2022, [facility], pt presents with diffuse abdominal pain, distension started this AM, progressively getting worse, 3 episodes of vomiting, no fever, no chills, no CP, no SOB, + two small BM today, pt requesting Dilaudid for pain, recent facelift on steroids and Levaquin last dose yesterday,  Currently patient states that she has been vomiting x3 episodes.  Notes incomplete bowel emptying.  Has had diarrhea for past few days.  No recent illness that she is aware of.  Also with complaints of urinary symptoms- pt could not elaborate.    (04 Oct 2023 23:01)      Events in last 24 hours:     REVIEW OF SYSTEMS:    Gen: No fever  EYES/ENT: No visual changes;  No vertigo or throat pain   NECK: No pain   RES:  No shortness of breath or Cough  CARD: No chest pain   GI: No abdominal pain  : No dysuria  NEURO: No weakness  SKIN: No itching, rashes     Physical Exam:  Vital Signs Last 24 Hrs  T(C): 36.4 (20 Oct 2023 04:09), Max: 36.7 (19 Oct 2023 12:12)  T(F): 97.6 (20 Oct 2023 04:09), Max: 98.1 (19 Oct 2023 12:12)  HR: 67 (20 Oct 2023 04:09) (67 - 91)  BP: 128/76 (20 Oct 2023 04:09) (105/68 - 128/76)  BP(mean): --  RR: 18 (20 Oct 2023 04:09) (17 - 18)  SpO2: 95% (20 Oct 2023 04:09) (92% - 96%)    Parameters below as of 20 Oct 2023 04:09  Patient On (Oxygen Delivery Method): room air        Gen:  Awake, alert   CNS: non focal 	  Neck: no JVD  RES : clear , no wheezing                      CVS: Regular  rhythm. Normal S1/S2  Abd: Soft, non-distended. Bowel sounds present.  Skin: No rash.  Ext:  no edema    ============================I/O===========================   I&O's Detail    19 Oct 2023 07:01  -  20 Oct 2023 07:00  --------------------------------------------------------  IN:    IV PiggyBack: 200 mL    Oral Fluid: 540 mL    sodium chloride 0.9% w/ Additives: 900 mL  Total IN: 1640 mL    OUT:    Emesis (mL): 100 mL  Total OUT: 100 mL    Total NET: 1540 mL        ============================ LABS =========================                        11.6   7.87  )-----------( 418      ( 20 Oct 2023 08:05 )             37.2     10-19    145  |  109<H>  |  5<L>  ----------------------------<  99  3.3<L>   |  31  |  0.74    Ca    9.3      19 Oct 2023 09:11            Urinalysis Basic - ( 19 Oct 2023 09:11 )    Color: x / Appearance: x / SG: x / pH: x  Gluc: 99 mg/dL / Ketone: x  / Bili: x / Urobili: x   Blood: x / Protein: x / Nitrite: x   Leuk Esterase: x / RBC: x / WBC x   Sq Epi: x / Non Sq Epi: x / Bacteria: x      ======================Micro/Rad/Cardio=================  Culture: Reviewed   CXR: Reviewed  Echo:Reviewed  ======================================================  PAST MEDICAL & SURGICAL HISTORY:  Hypothyroidism      HLD (hyperlipidemia)      SBO (small bowel obstruction)      S/P small bowel resection      History of facelift          RESTRAINTS  ======================================================  I have evaluated this patient and have determined that restraints are warranted to optimize medical care. Patient was assessed for current physical and psychological risk factors as well as special needs. There are no medical conditions or limitations that would place this patient at risk while in restraints.  Type of restraint: bilat is wrist. The behavioral criteria for discontinuation of restraint:   -to prevent pulling at ET tube or other lines in place.   -combative aggressive behavior causing harm to pt themselves and staff   -The Attending was notified about the restraints     ***This patient requires critical care for support of one or more vital organ systems with a high probability of imminent or life threatening deterioration in his/her condition    **This patient requires a high level of care due to the complexity and severity of their condition which increases the amount and complexity of data to be reviewed and analyzed in addition to the risk of complications, morbidity and mortality of patient management      *This patient requires a moderate level of care due to the complexity and severity of their condition which increases the amount and complexity of data to be reviewed and analyzed in addition to the risk of complications, morbidity and mortality of patient management      ====================ASSESSMENT ==============  1.   2.   3.   4.     Plan:  ====================== NEUROLOGY=====================  acetaminophen   IVPB .. 1000 milliGRAM(s) IV Intermittent every 6 hours  HYDROmorphone  Injectable 0.25 milliGRAM(s) IV Push every 4 hours PRN Severe Pain (7 - 10)  LORazepam   Injectable 0.5 milliGRAM(s) IV Push once  ondansetron Injectable 4 milliGRAM(s) IV Push every 6 hours PRN Nausea and/or Vomiting    ==================== RESPIRATORY======================  Mechanical Ventilation:    diphenhydrAMINE Injectable 25 milliGRAM(s) IV Push two times a day PRN Insomnia or pruritis  diphenhydrAMINE Injectable 50 milliGRAM(s) IV Push once    ====================CARDIOVASCULAR==================    ===================HEMATOLOGIC/ONC ===================  enoxaparin Injectable 40 milliGRAM(s) SubCutaneous every 24 hours    ===================== RENAL =========================  Continue monitoring urine output    ==================== GASTROINTESTINAL===================  famotidine    Tablet 20 milliGRAM(s) Oral daily  pantoprazole    Tablet 40 milliGRAM(s) Oral two times a day  sodium chloride 0.9% with potassium chloride 40 mEq/L 1000 milliLiter(s) (75 mL/Hr) IV Continuous <Continuous>    =======================    ENDOCRINE  =====================  levothyroxine 88 MICROGram(s) Oral daily    ========================INFECTIOUS DISEASE================      TOTAL CRITICAL CARE TIME: __/__ minutes (EXCLUSIVE of any non bundled procedures)    Critical Care time: 75 mins assessing presenting problems of acute illness that poses high probability of life threatening deterioration or end organ damage/dysfunction.  Medical decision making including Initiating plan of care, reviewing data, reviewing radiology, direct patient bedside evaluation and interpretation of vital signs, any necessary ventilator management , discussion with multidisciplinary team, discussing goals of care with patient/family, all non inclusive of procedures     Initial   Time spent on this patient encounter, which includes documenting this note in the electronic medical record, was 75 55 40 minutes including assessing the presenting problems with associated risks, reviewing medical records to prepare for the encounter, and meeting face to face with the patient to obtain additional history. I have also performed an appropriate physical exam, made interventions listed and ordered and interpreted appropriate diagnostic studies as documented. To improve communication and patient safety, I have coordinated care with the multidisciplinary team including the bedside nurse, appropriate attending of record and consultants as needed.    F/U  Time spent on this patient encounter, which includes documenting this note in the electronic medical record, was 50 35 25 minutes including assessing the presenting problems with associated risks, reviewing medical records to prepare for the encounter, and meeting face to face with the patient to obtain additional history. I have also performed an appropriate physical exam, made interventions listed and ordered and interpreted appropriate diagnostic studies as documented. To improve communication and patient safety, I have coordinated care with the multidisciplinary team including the bedside nurse, appropriate attending of record and consultants as needed.     JONNA KANG  MRN-[id number]  Patient is a 69y old  Female who presents with a chief complaint of SBO (20 Oct 2023 05:28)    HPI:   70 y/o female with hx of multiple bouts of SBO, first surgery July 2022, [facility], pt presents with diffuse abdominal pain, distension started this AM, progressively getting worse, 3 episodes of vomiting, no fever, no chills, no CP, no SOB, + two small BM today, pt requesting Dilaudid for pain, recent facelift on steroids and Levaquin last dose yesterday,  Currently patient states that she has been vomiting x3 episodes.  Notes incomplete bowel emptying.  Has had diarrhea for past few days.  No recent illness that she is aware of.  Also with complaints of urinary symptoms- pt could not elaborate.    (04 Oct 2023 23:01)      Events in last 24 hours: RRT called this  AM for respiratory distress after pt went to the bathroom. Pt complaining of dyspnea and unable to move air. Pt having some upper airway stridor. Pt was given Duoneb, raecemic epi, IM epi, and solumedrol for presumed, reactive airway disease and ? anaphylaxis.     Breathing pattern did not improve during RRT and pt was placed on CPAP machine and was given ativan 0.5 mg which improved breathing pattern and pt was transition to NC shortly after   Pt was transferred to the ICU for airway monitoring until she can be evaluated by ENT for ? vocal cord dysfunction     REVIEW OF SYSTEMS:    Gen: No fever  EYES/ENT: No visual changes;  No vertigo or throat pain   NECK: No pain   RES:  + shortness of breath +Cough  CARD: No chest pain   GI: No abdominal pain  : No dysuria  NEURO: No weakness  SKIN: No itching, rashes     Physical Exam:  Vital Signs Last 24 Hrs  T(C): 36.4 (20 Oct 2023 04:09), Max: 36.7 (19 Oct 2023 12:12)  T(F): 97.6 (20 Oct 2023 04:09), Max: 98.1 (19 Oct 2023 12:12)  HR: 67 (20 Oct 2023 04:09) (67 - 91)  BP: 128/76 (20 Oct 2023 04:09) (105/68 - 128/76)  BP(mean): --  RR: 18 (20 Oct 2023 04:09) (17 - 18)  SpO2: 95% (20 Oct 2023 04:09) (92% - 96%)    Parameters below as of 20 Oct 2023 04:09  Patient On (Oxygen Delivery Method): room air        Gen:  Awake, alert   CNS: non focal 	  Neck: no JVD  RES : clear , no wheezing                      CVS: Regular  rhythm. Normal S1/S2  Abd: Soft, non-distended. Bowel sounds present.  Skin: No rash.  Ext:  no edema    ============================I/O===========================   I&O's Detail    19 Oct 2023 07:01  -  20 Oct 2023 07:00  --------------------------------------------------------  IN:    IV PiggyBack: 200 mL    Oral Fluid: 540 mL    sodium chloride 0.9% w/ Additives: 900 mL  Total IN: 1640 mL    OUT:    Emesis (mL): 100 mL  Total OUT: 100 mL    Total NET: 1540 mL        ============================ LABS =========================                        11.6   7.87  )-----------( 418      ( 20 Oct 2023 08:05 )             37.2     10-19    145  |  109<H>  |  5<L>  ----------------------------<  99  3.3<L>   |  31  |  0.74    Ca    9.3      19 Oct 2023 09:11            Urinalysis Basic - ( 19 Oct 2023 09:11 )    Color: x / Appearance: x / SG: x / pH: x  Gluc: 99 mg/dL / Ketone: x  / Bili: x / Urobili: x   Blood: x / Protein: x / Nitrite: x   Leuk Esterase: x / RBC: x / WBC x   Sq Epi: x / Non Sq Epi: x / Bacteria: x      ======================Micro/Rad/Cardio=================  Culture: Reviewed   CXR: Reviewed  Echo:Reviewed  ======================================================  PAST MEDICAL & SURGICAL HISTORY:  Hypothyroidism      HLD (hyperlipidemia)      SBO (small bowel obstruction)      S/P small bowel resection      History of facelift          RESTRAINTS  ======================================================  I have evaluated this patient and have determined that restraints are warranted to optimize medical care. Patient was assessed for current physical and psychological risk factors as well as special needs. There are no medical conditions or limitations that would place this patient at risk while in restraints.  Type of restraint: bilat is wrist. The behavioral criteria for discontinuation of restraint:   -to prevent pulling at ET tube or other lines in place.   -combative aggressive behavior causing harm to pt themselves and staff   -The Attending was notified about the restraints     ***This patient requires critical care for support of one or more vital organ systems with a high probability of imminent or life threatening deterioration in his/her condition    **This patient requires a high level of care due to the complexity and severity of their condition which increases the amount and complexity of data to be reviewed and analyzed in addition to the risk of complications, morbidity and mortality of patient management      *This patient requires a moderate level of care due to the complexity and severity of their condition which increases the amount and complexity of data to be reviewed and analyzed in addition to the risk of complications, morbidity and mortality of patient management      ====================ASSESSMENT ==============  1.   2.   3.   4.     Plan:  ====================== NEUROLOGY=====================  acetaminophen   IVPB .. 1000 milliGRAM(s) IV Intermittent every 6 hours  HYDROmorphone  Injectable 0.25 milliGRAM(s) IV Push every 4 hours PRN Severe Pain (7 - 10)  LORazepam   Injectable 0.5 milliGRAM(s) IV Push once  ondansetron Injectable 4 milliGRAM(s) IV Push every 6 hours PRN Nausea and/or Vomiting    ==================== RESPIRATORY======================  Mechanical Ventilation:    diphenhydrAMINE Injectable 25 milliGRAM(s) IV Push two times a day PRN Insomnia or pruritis  diphenhydrAMINE Injectable 50 milliGRAM(s) IV Push once    ====================CARDIOVASCULAR==================    ===================HEMATOLOGIC/ONC ===================  enoxaparin Injectable 40 milliGRAM(s) SubCutaneous every 24 hours    ===================== RENAL =========================  Continue monitoring urine output    ==================== GASTROINTESTINAL===================  famotidine    Tablet 20 milliGRAM(s) Oral daily  pantoprazole    Tablet 40 milliGRAM(s) Oral two times a day  sodium chloride 0.9% with potassium chloride 40 mEq/L 1000 milliLiter(s) (75 mL/Hr) IV Continuous <Continuous>    =======================    ENDOCRINE  =====================  levothyroxine 88 MICROGram(s) Oral daily    ========================INFECTIOUS DISEASE================      TOTAL CRITICAL CARE TIME: __/__ minutes (EXCLUSIVE of any non bundled procedures)    Critical Care time: 75 mins assessing presenting problems of acute illness that poses high probability of life threatening deterioration or end organ damage/dysfunction.  Medical decision making including Initiating plan of care, reviewing data, reviewing radiology, direct patient bedside evaluation and interpretation of vital signs, any necessary ventilator management , discussion with multidisciplinary team, discussing goals of care with patient/family, all non inclusive of procedures     Initial   Time spent on this patient encounter, which includes documenting this note in the electronic medical record, was 75 55 40 minutes including assessing the presenting problems with associated risks, reviewing medical records to prepare for the encounter, and meeting face to face with the patient to obtain additional history. I have also performed an appropriate physical exam, made interventions listed and ordered and interpreted appropriate diagnostic studies as documented. To improve communication and patient safety, I have coordinated care with the multidisciplinary team including the bedside nurse, appropriate attending of record and consultants as needed.    F/U  Time spent on this patient encounter, which includes documenting this note in the electronic medical record, was 50 35 25 minutes including assessing the presenting problems with associated risks, reviewing medical records to prepare for the encounter, and meeting face to face with the patient to obtain additional history. I have also performed an appropriate physical exam, made interventions listed and ordered and interpreted appropriate diagnostic studies as documented. To improve communication and patient safety, I have coordinated care with the multidisciplinary team including the bedside nurse, appropriate attending of record and consultants as needed.     JONNA KANG  MRN-[id number]  Patient is a 69y old  Female who presents with a chief complaint of SBO (20 Oct 2023 05:28)    HPI:   68 y/o female with hx of multiple bouts of SBO, first surgery July 2022, [facility], pt presents with diffuse abdominal pain, distension started this AM, progressively getting worse, 3 episodes of vomiting, no fever, no chills, no CP, no SOB, + two small BM today, pt requesting Dilaudid for pain, recent facelift on steroids and Levaquin last dose yesterday,  Currently patient states that she has been vomiting x3 episodes.  Notes incomplete bowel emptying.  Has had diarrhea for past few days.  No recent illness that she is aware of.  Also with complaints of urinary symptoms- pt could not elaborate.    (04 Oct 2023 23:01)      Events in last 24 hours: RRT called this  AM for respiratory distress after pt went to the bathroom. Pt complaining of dyspnea and unable to move air. Pt having some upper airway stridor. Pt was given Duoneb, raecemic epi, IM epi, and solumedrol for presumed, reactive airway disease and ? anaphylaxis. Pt never dropped SpO2 below 95% and remained normotensive, no active rash on exam during RRT   Pt persistently s/o SOB during RRT and complaining that she "cannot breath"    Breathing pattern did not improve during RRT and pt was placed on CPAP machine and was given ativan 0.5 mg which improved breathing pattern and pt was transition to NC shortly after   Pt was transferred to the ICU for airway monitoring until she can be evaluated by ENT for ? vocal cord dysfunction     REVIEW OF SYSTEMS:    Gen: No fever  EYES/ENT: No visual changes;  No vertigo or throat pain   NECK: No pain   RES:  + shortness of breath +Cough  CARD: No chest pain   GI: No abdominal pain  : No dysuria  NEURO: No weakness  SKIN: No itching, rashes     Physical Exam:  Vital Signs Last 24 Hrs  T(C): 36.4 (20 Oct 2023 04:09), Max: 36.7 (19 Oct 2023 12:12)  T(F): 97.6 (20 Oct 2023 04:09), Max: 98.1 (19 Oct 2023 12:12)  HR: 67 (20 Oct 2023 04:09) (67 - 91)  BP: 128/76 (20 Oct 2023 04:09) (105/68 - 128/76)  BP(mean): --  RR: 18 (20 Oct 2023 04:09) (17 - 18)  SpO2: 95% (20 Oct 2023 04:09) (92% - 96%)    Parameters below as of 20 Oct 2023 04:09  Patient On (Oxygen Delivery Method): room air        Gen:  Awake, alert   CNS: non focal awake alert and oriented X4   Neck: no JVD  RES : clear, stridor improved from RRT                    CVS: Regular  rhythm. Normal S1/S2  Abd: Soft, non-distended. Bowel sounds present.  Skin: No rash.  Ext:  no edema    ============================I/O===========================   I&O's Detail    19 Oct 2023 07:01  -  20 Oct 2023 07:00  --------------------------------------------------------  IN:    IV PiggyBack: 200 mL    Oral Fluid: 540 mL    sodium chloride 0.9% w/ Additives: 900 mL  Total IN: 1640 mL    OUT:    Emesis (mL): 100 mL  Total OUT: 100 mL    Total NET: 1540 mL        ============================ LABS =========================                        11.6   7.87  )-----------( 418      ( 20 Oct 2023 08:05 )             37.2     10-19    145  |  109<H>  |  5<L>  ----------------------------<  99  3.3<L>   |  31  |  0.74    Ca    9.3      19 Oct 2023 09:11            Urinalysis Basic - ( 19 Oct 2023 09:11 )    Color: x / Appearance: x / SG: x / pH: x  Gluc: 99 mg/dL / Ketone: x  / Bili: x / Urobili: x   Blood: x / Protein: x / Nitrite: x   Leuk Esterase: x / RBC: x / WBC x   Sq Epi: x / Non Sq Epi: x / Bacteria: x      ======================Micro/Rad/Cardio=================  Culture: Reviewed   CXR: clear at this time pending official report     ======================================================  PAST MEDICAL & SURGICAL HISTORY:  Hypothyroidism      HLD (hyperlipidemia)      SBO (small bowel obstruction)      S/P small bowel resection      History of facelift          **This patient requires a high level of care due to the complexity and severity of their condition which increases the amount and complexity of data to be reviewed and analyzed in addition to the risk of complications, morbidity and mortality of patient management        ====================ASSESSMENT ==============  1. Reactive airway disease vs. vocal cord dysfunction       Plan:    -Pt with previous ordered for dilaudid 0.25 q4hr, will continue weaning dilaudid per previous conversations.   -Pt has SOB with expiratory wheezing during RRT.   -On initial presentation there was ddx of anaphylaxis, there was no hypotension, rash/hive. No recent meds were given.   -At RRT pt was given raecemic epi, duoneb, and treated empirically with epi, solumedrol  -given recent intubation for EGD, inpatient stressors and improvement with ativan, there is high suspicion for some type of vocal cord dysfunction that caused patients acute SOB.   -ENT consulted for laryngeal edema - penidng eval   -will continue respiratory monitoring at this time, pt has been titrated off CPAP and placed on NC since she has been in ICU   -Will continue PRN benadryl for   -enoxaparin Injectable 40 milliGRAM(s) SubCutaneous every 24 hours for DVT ppx   -levothyroxine 88 MICROGram(s) Oral daily for hypothyroid   -Diet as tolerated   -Continue IVF Hydration   GI ppx with pro            Time spent on this patient encounter, which includes documenting this note in the electronic medical record, was 75 minutes including assessing the presenting problems with associated risks, reviewing medical records to prepare for the encounter, and meeting face to face with the patient to obtain additional history. I have also performed an appropriate physical exam, made interventions listed and ordered and interpreted appropriate diagnostic studies as documented. To improve communication and patient safety, I have coordinated care with the multidisciplinary team including the bedside nurse, appropriate attending of record and consultants as needed.       complains of pain/discomfort

## 2024-12-12 ENCOUNTER — APPOINTMENT (OUTPATIENT)
Dept: OBGYN | Facility: HOSPITAL | Age: 44
End: 2024-12-12

## 2024-12-26 ENCOUNTER — EMERGENCY (EMERGENCY)
Facility: HOSPITAL | Age: 44
LOS: 1 days | Discharge: HOME | End: 2024-12-26
Attending: EMERGENCY MEDICINE | Admitting: EMERGENCY MEDICINE
Payer: MEDICAID

## 2024-12-26 VITALS
RESPIRATION RATE: 18 BRPM | HEIGHT: 65 IN | SYSTOLIC BLOOD PRESSURE: 120 MMHG | OXYGEN SATURATION: 98 % | DIASTOLIC BLOOD PRESSURE: 86 MMHG | HEART RATE: 66 BPM | TEMPERATURE: 98 F | WEIGHT: 134.92 LBS

## 2024-12-26 DIAGNOSIS — Z98.891 HISTORY OF UTERINE SCAR FROM PREVIOUS SURGERY: Chronic | ICD-10-CM

## 2024-12-26 DIAGNOSIS — Z98.890 OTHER SPECIFIED POSTPROCEDURAL STATES: Chronic | ICD-10-CM

## 2024-12-26 LAB
ALBUMIN SERPL ELPH-MCNC: 3.3 G/DL — SIGNIFICANT CHANGE UP (ref 3.3–5)
ALP SERPL-CCNC: 90 U/L — SIGNIFICANT CHANGE UP (ref 40–120)
ALT FLD-CCNC: 10 U/L — SIGNIFICANT CHANGE UP (ref 4–33)
ANION GAP SERPL CALC-SCNC: 12 MMOL/L — SIGNIFICANT CHANGE UP (ref 7–14)
APTT BLD: 30.3 SEC — SIGNIFICANT CHANGE UP (ref 24.5–35.6)
AST SERPL-CCNC: 16 U/L — SIGNIFICANT CHANGE UP (ref 4–32)
BASOPHILS # BLD AUTO: 0.05 K/UL — SIGNIFICANT CHANGE UP (ref 0–0.2)
BASOPHILS NFR BLD AUTO: 1 % — SIGNIFICANT CHANGE UP (ref 0–2)
BILIRUB SERPL-MCNC: <0.2 MG/DL — SIGNIFICANT CHANGE UP (ref 0.2–1.2)
BLD GP AB SCN SERPL QL: NEGATIVE — SIGNIFICANT CHANGE UP
BUN SERPL-MCNC: 6 MG/DL — LOW (ref 7–23)
CALCIUM SERPL-MCNC: 8.8 MG/DL — SIGNIFICANT CHANGE UP (ref 8.4–10.5)
CHLORIDE SERPL-SCNC: 101 MMOL/L — SIGNIFICANT CHANGE UP (ref 98–107)
CO2 SERPL-SCNC: 25 MMOL/L — SIGNIFICANT CHANGE UP (ref 22–31)
CREAT SERPL-MCNC: 0.57 MG/DL — SIGNIFICANT CHANGE UP (ref 0.5–1.3)
EGFR: 116 ML/MIN/1.73M2 — SIGNIFICANT CHANGE UP
EOSINOPHIL # BLD AUTO: 0.14 K/UL — SIGNIFICANT CHANGE UP (ref 0–0.5)
EOSINOPHIL NFR BLD AUTO: 2.9 % — SIGNIFICANT CHANGE UP (ref 0–6)
FOLATE SERPL-MCNC: 10.1 NG/ML — SIGNIFICANT CHANGE UP (ref 3.1–17.5)
GLUCOSE SERPL-MCNC: 91 MG/DL — SIGNIFICANT CHANGE UP (ref 70–99)
HCT VFR BLD CALC: 31.3 % — LOW (ref 34.5–45)
HGB BLD-MCNC: 10.5 G/DL — LOW (ref 11.5–15.5)
IANC: 2.87 K/UL — SIGNIFICANT CHANGE UP (ref 1.8–7.4)
IMM GRANULOCYTES NFR BLD AUTO: 0.4 % — SIGNIFICANT CHANGE UP (ref 0–0.9)
INR BLD: 1.11 RATIO — SIGNIFICANT CHANGE UP (ref 0.85–1.16)
IRON SATN MFR SERPL: 22 UG/DL — LOW (ref 30–160)
IRON SATN MFR SERPL: 8 % — LOW (ref 14–50)
LIDOCAIN IGE QN: 18 U/L — SIGNIFICANT CHANGE UP (ref 7–60)
LYMPHOCYTES # BLD AUTO: 1.23 K/UL — SIGNIFICANT CHANGE UP (ref 1–3.3)
LYMPHOCYTES # BLD AUTO: 25.5 % — SIGNIFICANT CHANGE UP (ref 13–44)
MCHC RBC-ENTMCNC: 22.9 PG — LOW (ref 27–34)
MCHC RBC-ENTMCNC: 33.5 G/DL — SIGNIFICANT CHANGE UP (ref 32–36)
MCV RBC AUTO: 68.3 FL — LOW (ref 80–100)
MONOCYTES # BLD AUTO: 0.51 K/UL — SIGNIFICANT CHANGE UP (ref 0–0.9)
MONOCYTES NFR BLD AUTO: 10.6 % — SIGNIFICANT CHANGE UP (ref 2–14)
NEUTROPHILS # BLD AUTO: 2.87 K/UL — SIGNIFICANT CHANGE UP (ref 1.8–7.4)
NEUTROPHILS NFR BLD AUTO: 59.6 % — SIGNIFICANT CHANGE UP (ref 43–77)
NRBC # BLD: 0 /100 WBCS — SIGNIFICANT CHANGE UP (ref 0–0)
NRBC # FLD: 0 K/UL — SIGNIFICANT CHANGE UP (ref 0–0)
PLATELET # BLD AUTO: 473 K/UL — HIGH (ref 150–400)
POTASSIUM SERPL-MCNC: 3.5 MMOL/L — SIGNIFICANT CHANGE UP (ref 3.5–5.3)
POTASSIUM SERPL-SCNC: 3.5 MMOL/L — SIGNIFICANT CHANGE UP (ref 3.5–5.3)
PROT SERPL-MCNC: 6.5 G/DL — SIGNIFICANT CHANGE UP (ref 6–8.3)
PROTHROM AB SERPL-ACNC: 12.9 SEC — SIGNIFICANT CHANGE UP (ref 9.9–13.4)
RBC # BLD: 4.58 M/UL — SIGNIFICANT CHANGE UP (ref 3.8–5.2)
RBC # BLD: 4.58 M/UL — SIGNIFICANT CHANGE UP (ref 3.8–5.2)
RBC # FLD: 17.4 % — HIGH (ref 10.3–14.5)
RETICS #: 30.6 K/UL — SIGNIFICANT CHANGE UP (ref 25–125)
RETICS/RBC NFR: 0.7 % — SIGNIFICANT CHANGE UP (ref 0.5–2.5)
RH IG SCN BLD-IMP: POSITIVE — SIGNIFICANT CHANGE UP
SODIUM SERPL-SCNC: 138 MMOL/L — SIGNIFICANT CHANGE UP (ref 135–145)
TIBC SERPL-MCNC: 290 UG/DL — SIGNIFICANT CHANGE UP (ref 220–430)
TSH SERPL-MCNC: 0.67 UIU/ML — SIGNIFICANT CHANGE UP (ref 0.27–4.2)
UIBC SERPL-MCNC: 268 UG/DL — SIGNIFICANT CHANGE UP (ref 110–370)
VIT B12 SERPL-MCNC: 377 PG/ML — SIGNIFICANT CHANGE UP (ref 200–900)
WBC # BLD: 4.82 K/UL — SIGNIFICANT CHANGE UP (ref 3.8–10.5)
WBC # FLD AUTO: 4.82 K/UL — SIGNIFICANT CHANGE UP (ref 3.8–10.5)

## 2024-12-26 PROCEDURE — 99284 EMERGENCY DEPT VISIT MOD MDM: CPT

## 2024-12-26 RX ORDER — FERROUS SULFATE 325(65) MG
1 TABLET ORAL
Qty: 7 | Refills: 0
Start: 2024-12-26 | End: 2025-01-08

## 2024-12-26 RX ORDER — ACETAMINOPHEN 500MG 500 MG/1
1000 TABLET, COATED ORAL ONCE
Refills: 0 | Status: COMPLETED | OUTPATIENT
Start: 2024-12-26 | End: 2024-12-26

## 2024-12-26 RX ORDER — SODIUM CHLORIDE 9 MG/ML
1000 INJECTION, SOLUTION INTRAMUSCULAR; INTRAVENOUS; SUBCUTANEOUS ONCE
Refills: 0 | Status: COMPLETED | OUTPATIENT
Start: 2024-12-26 | End: 2024-12-26

## 2024-12-26 RX ADMIN — ACETAMINOPHEN 500MG 400 MILLIGRAM(S): 500 TABLET, COATED ORAL at 16:23

## 2024-12-26 RX ADMIN — SODIUM CHLORIDE 1000 MILLILITER(S): 9 INJECTION, SOLUTION INTRAMUSCULAR; INTRAVENOUS; SUBCUTANEOUS at 14:51

## 2024-12-26 NOTE — ED PROVIDER NOTE - OBJECTIVE STATEMENT
44 y.o. F, PMHx significant for Crohn's disease, ventral hernia s/p repair with mesh, latent TB, PE, presenting with N/V, generalized abdominal pain and weight loss". Pt has had symptoms for greater than one month, worse over the last few days impacting PO intake and sleep. Localizes pain to the epigastrium. Took tylenol at 10am this morning. Endorses N without vomiting, and intermittent CP. Denies F/C, SOB changes in urination or BM

## 2024-12-26 NOTE — ED ADULT NURSE NOTE - ALCOHOL PRE SCREEN (AUDIT - C)
Spoke to patient stated only have a  Month left of thyroid meds. Have missed several appts with Dr. Mina due to having no insurance. Have not seen patient since last year 07/2021. Voiced to patient must be seen in the clinic to obtain more refills on meds. Patient stated she can come in at the end of August. Scheduled with [facility] Aug. 23 at 4:30pm. Patient voiced understanding no other questions voiced at this time.   Statement Selected

## 2024-12-26 NOTE — ED PROVIDER NOTE - CLINICAL SUMMARY MEDICAL DECISION MAKING FREE TEXT BOX
44 y.o. F, PMHx significant for Crohn's disease, ventral hernia s/p repair with mesh, latent TB, PE, presenting with N/V, generalized abdominal pain and weight loss". Physical exam notable for diffuse abdominal pain.  Given medical history likely related to patient's Crohn disease.  Will obtain screening labs, lipase, UA, provide IV fluids and Tylenol and reassess.  Patient currently not being actively treated for Crohn's will recommend patient follow-up with GI physician for management of her Crohn's disease pending results of workup.

## 2024-12-26 NOTE — ED PROVIDER NOTE - PHYSICAL EXAMINATION
GEN: Patient awake and alert. No acute distress, non-toxic.  Head: normocephalic, atraumatic  Neck:  full ROM  Eyes: EOMI,  CARDIAC: RRR.  No murmur.  PULM: CTA B/L no wheeze, rales or rhonchi. No signs of respiratory distress  ABD: Soft, nontender, nondistended. No rebound, no involuntary guarding.  : No suprapubic tenderness.  MSK: Moving all extremities spontaneously.   NEURO: A&Ox3.  SKIN: Warm, dry, no rash

## 2024-12-26 NOTE — ED ADULT NURSE NOTE - OBJECTIVE STATEMENT
Patient is a 43 y/o female with a chief complaint of hair loss x a few weeks, early satiety, weight loss x a few months with associated nausea and dizziness since this morning. Patient states that she has a hx of Crohn's and is not taking any medications. States that she was here recently for same symptoms with no findings. Denies fever, chills, hematuria, back pain, SOB, chest pain. A/O x 4, rate and rhythm regular, lungs clear b/l lobes throughout with no adventitious sounds, abdomen diffusely tender, plan of care reviewed, safety maintained, daughter at bedside

## 2024-12-26 NOTE — ED PROVIDER NOTE - PROGRESS NOTE DETAILS
pt re pt improved in symptoms, labwork grossly non-actionable. At this time will d/c and recommend follow up to GI for further management of pts crohns

## 2024-12-26 NOTE — ED PROVIDER NOTE - NSICDXPASTSURGICALHX_GEN_ALL_CORE_FT
PAST SURGICAL HISTORY:  H/O umbilical hernia repair with rectus diastasis repair 3/10/22 in [facility]    H/O:

## 2024-12-26 NOTE — ED PROVIDER NOTE - NSFOLLOWUPINSTRUCTIONS_ED_ALL_ED_FT
You were seen in the Emergency Department for abdominal pain. You symptoms are likely related to your Crohns disease. I am recommending you to follow-up with a GI physician for further management of your symptoms as well as Crohn's disease.  Listed below are the contact information for a gastroenterologist.  Please call the numbers and get an appointment to be seen within the next 1-3 days.    If you develop fever, chills, headache, chest pain, shortness of breath, abdominal pain, changes in urination including burning or pain when you pee, blood in your urine, severe diarrhea or blood in your stools, worsening of your symptoms, or for any other questions or concerns, please return to the emergency department.    Please FOLLOW UP WITH YOUR PRIMARY CARE DOCTOR within 1-3 days for continued management and to ensure your symptoms resolve.

## 2024-12-26 NOTE — ED PROVIDER NOTE - PATIENT PORTAL LINK FT
You can access the FollowMyHealth Patient Portal offered by [facility] by registering at the following website: [email or web address]. By joining [employer]’s FollowMyHealth portal, you will also be able to view your health information using other applications (apps) compatible with our system.

## 2024-12-26 NOTE — ED PROVIDER NOTE - ATTENDING CONTRIBUTION TO CARE
Patient with crohn's dz multiple sx PTED with epigastric pain as described will treat symptomatically (HBDz pancreatitis doubt ACS; LUCIA) while r/o ab pathology and reassess  Dispo as per results and response

## 2024-12-26 NOTE — ED PROVIDER NOTE - NSFOLLOWUPCLINICS_GEN_ALL_ED_FT
Gastroenterology at [facility]  Gastroenterology  [address] [place] [address]  Phone: [phone]  Fax:   Follow Up Time: 1-3 Days    [facility] Gastroenterology  Gastroenterology  [address] [place] [id number]  Phone: [phone]  Fax:   Follow Up Time: 1-3 Days

## 2025-01-02 ENCOUNTER — EMERGENCY (EMERGENCY)
Facility: HOSPITAL | Age: 45
LOS: 1 days | Discharge: HOME | End: 2025-01-02
Admitting: STUDENT IN AN ORGANIZED HEALTH CARE EDUCATION/TRAINING PROGRAM
Payer: MEDICAID

## 2025-01-02 VITALS
OXYGEN SATURATION: 96 % | WEIGHT: 175.05 LBS | TEMPERATURE: 98 F | DIASTOLIC BLOOD PRESSURE: 89 MMHG | HEART RATE: 72 BPM | SYSTOLIC BLOOD PRESSURE: 131 MMHG | HEIGHT: 65 IN | RESPIRATION RATE: 18 BRPM

## 2025-01-02 DIAGNOSIS — Z98.891 HISTORY OF UTERINE SCAR FROM PREVIOUS SURGERY: Chronic | ICD-10-CM

## 2025-01-02 DIAGNOSIS — Z98.890 OTHER SPECIFIED POSTPROCEDURAL STATES: Chronic | ICD-10-CM

## 2025-01-02 LAB — D DIMER BLD IA.RAPID-MCNC: 177 NG/ML DDU — SIGNIFICANT CHANGE UP

## 2025-01-02 PROCEDURE — 99285 EMERGENCY DEPT VISIT HI MDM: CPT

## 2025-01-02 PROCEDURE — 93010 ELECTROCARDIOGRAM REPORT: CPT

## 2025-01-02 RX ORDER — KETOROLAC TROMETHAMINE 30 MG/ML
30 INJECTION INTRAMUSCULAR; INTRAVENOUS ONCE
Refills: 0 | Status: DISCONTINUED | OUTPATIENT
Start: 2025-01-02 | End: 2025-01-02

## 2025-01-02 RX ORDER — SODIUM CHLORIDE 9 MG/ML
1000 INJECTION, SOLUTION INTRAMUSCULAR; INTRAVENOUS; SUBCUTANEOUS ONCE
Refills: 0 | Status: COMPLETED | OUTPATIENT
Start: 2025-01-02 | End: 2025-01-02

## 2025-01-02 RX ADMIN — SODIUM CHLORIDE 1000 MILLILITER(S): 9 INJECTION, SOLUTION INTRAMUSCULAR; INTRAVENOUS; SUBCUTANEOUS at 23:41

## 2025-01-02 RX ADMIN — KETOROLAC TROMETHAMINE 30 MILLIGRAM(S): 30 INJECTION INTRAMUSCULAR; INTRAVENOUS at 23:41

## 2025-01-02 NOTE — ED ADULT NURSE NOTE - NSFALLUNIVINTERV_ED_ALL_ED
Bed/Stretcher in lowest position, wheels locked, appropriate side rails in place/Call bell, personal items and telephone in reach/Instruct patient to call for assistance before getting out of bed/chair/stretcher/Non-slip footwear applied when patient is off stretcher/[place] to call system/Physically safe environment - no spills, clutter or unnecessary equipment/Purposeful proactive rounding/Room/bathroom lighting operational, light cord in reach

## 2025-01-02 NOTE — ED ADULT TRIAGE NOTE - CHIEF COMPLAINT QUOTE
c/o L chest pain and generalized abdominal pain x 2 months. seen in ED last week for same symptoms an dc with Gi follow up. endorsing nausea. Denies SOB, vomiting, fevers. hx: anemia, chrons

## 2025-01-03 VITALS
TEMPERATURE: 98 F | RESPIRATION RATE: 18 BRPM | DIASTOLIC BLOOD PRESSURE: 76 MMHG | SYSTOLIC BLOOD PRESSURE: 115 MMHG | HEART RATE: 63 BPM | OXYGEN SATURATION: 99 %

## 2025-01-03 LAB
ADD ON TEST-SPECIMEN IN LAB: SIGNIFICANT CHANGE UP
ALBUMIN SERPL ELPH-MCNC: 3.4 G/DL — SIGNIFICANT CHANGE UP (ref 3.3–5)
ALP SERPL-CCNC: 101 U/L — SIGNIFICANT CHANGE UP (ref 40–120)
ALT FLD-CCNC: 13 U/L — SIGNIFICANT CHANGE UP (ref 4–33)
ANION GAP SERPL CALC-SCNC: 11 MMOL/L — SIGNIFICANT CHANGE UP (ref 7–14)
AST SERPL-CCNC: 9 U/L — SIGNIFICANT CHANGE UP (ref 4–32)
BASOPHILS # BLD AUTO: 0.06 K/UL — SIGNIFICANT CHANGE UP (ref 0–0.2)
BASOPHILS NFR BLD AUTO: 1.4 % — SIGNIFICANT CHANGE UP (ref 0–2)
BILIRUB SERPL-MCNC: 0.3 MG/DL — SIGNIFICANT CHANGE UP (ref 0.2–1.2)
BUN SERPL-MCNC: 5 MG/DL — LOW (ref 7–23)
CALCIUM SERPL-MCNC: 9.2 MG/DL — SIGNIFICANT CHANGE UP (ref 8.4–10.5)
CHLORIDE SERPL-SCNC: 103 MMOL/L — SIGNIFICANT CHANGE UP (ref 98–107)
CO2 SERPL-SCNC: 23 MMOL/L — SIGNIFICANT CHANGE UP (ref 22–31)
CREAT SERPL-MCNC: 0.61 MG/DL — SIGNIFICANT CHANGE UP (ref 0.5–1.3)
CRP SERPL-MCNC: 53 MG/L — HIGH
EGFR: 114 ML/MIN/1.73M2 — SIGNIFICANT CHANGE UP
EOSINOPHIL # BLD AUTO: 0.15 K/UL — SIGNIFICANT CHANGE UP (ref 0–0.5)
EOSINOPHIL NFR BLD AUTO: 3.5 % — SIGNIFICANT CHANGE UP (ref 0–6)
GLUCOSE SERPL-MCNC: 85 MG/DL — SIGNIFICANT CHANGE UP (ref 70–99)
HCT VFR BLD CALC: 30.2 % — LOW (ref 34.5–45)
HGB BLD-MCNC: 10.1 G/DL — LOW (ref 11.5–15.5)
IANC: 1.93 K/UL — SIGNIFICANT CHANGE UP (ref 1.8–7.4)
IMM GRANULOCYTES NFR BLD AUTO: 0.5 % — SIGNIFICANT CHANGE UP (ref 0–0.9)
LIDOCAIN IGE QN: 10 U/L — SIGNIFICANT CHANGE UP (ref 7–60)
LYMPHOCYTES # BLD AUTO: 1.54 K/UL — SIGNIFICANT CHANGE UP (ref 1–3.3)
LYMPHOCYTES # BLD AUTO: 36.1 % — SIGNIFICANT CHANGE UP (ref 13–44)
MCHC RBC-ENTMCNC: 22.1 PG — LOW (ref 27–34)
MCHC RBC-ENTMCNC: 33.4 G/DL — SIGNIFICANT CHANGE UP (ref 32–36)
MCV RBC AUTO: 65.9 FL — LOW (ref 80–100)
MONOCYTES # BLD AUTO: 0.57 K/UL — SIGNIFICANT CHANGE UP (ref 0–0.9)
MONOCYTES NFR BLD AUTO: 13.3 % — SIGNIFICANT CHANGE UP (ref 2–14)
NEUTROPHILS # BLD AUTO: 1.93 K/UL — SIGNIFICANT CHANGE UP (ref 1.8–7.4)
NEUTROPHILS NFR BLD AUTO: 45.2 % — SIGNIFICANT CHANGE UP (ref 43–77)
NRBC # BLD: 0 /100 WBCS — SIGNIFICANT CHANGE UP (ref 0–0)
NRBC # FLD: 0 K/UL — SIGNIFICANT CHANGE UP (ref 0–0)
NT-PROBNP SERPL-SCNC: 79 PG/ML — SIGNIFICANT CHANGE UP
PLATELET # BLD AUTO: 481 K/UL — HIGH (ref 150–400)
POTASSIUM SERPL-MCNC: 3.1 MMOL/L — LOW (ref 3.5–5.3)
POTASSIUM SERPL-SCNC: 3.1 MMOL/L — LOW (ref 3.5–5.3)
PROT SERPL-MCNC: 6.5 G/DL — SIGNIFICANT CHANGE UP (ref 6–8.3)
RBC # BLD: 4.58 M/UL — SIGNIFICANT CHANGE UP (ref 3.8–5.2)
RBC # FLD: 17.7 % — HIGH (ref 10.3–14.5)
SODIUM SERPL-SCNC: 137 MMOL/L — SIGNIFICANT CHANGE UP (ref 135–145)
TROPONIN T, HIGH SENSITIVITY RESULT: 7 NG/L — SIGNIFICANT CHANGE UP
WBC # BLD: 4.27 K/UL — SIGNIFICANT CHANGE UP (ref 3.8–10.5)
WBC # FLD AUTO: 4.27 K/UL — SIGNIFICANT CHANGE UP (ref 3.8–10.5)

## 2025-01-03 NOTE — ED PROVIDER NOTE - PATIENT PORTAL LINK FT
You can access the FollowMyHealth Patient Portal offered by [facility] by registering at the following website: [email or web address]. By joining [employer]’s FollowMyHealth portal, you will also be able to view your health information using other applications (apps) compatible with our system.

## 2025-01-03 NOTE — ED PROVIDER NOTE - PROGRESS NOTE DETAILS
JAZMYN Loomis: Pt requesting to go home does not want to wait on CT scan. Pt feeling better than initial presentation. Advised of work-up/test results and discussed return precautions. Pt provided with a copy of all tests done in the ED at this time. All questions answered, pt verbalized understanding. JAZMYN Loomis: Labs notable for CRP 53 will order CT to further assess Crohn's, likely flare. Remainder of labs WNL.

## 2025-01-03 NOTE — ED PROVIDER NOTE - CLINICAL SUMMARY MEDICAL DECISION MAKING FREE TEXT BOX
44 y.o. F, PMHx significant for Crohn's disease, ventral hernia s/p repair with mesh, latent TB, PE present c/o  L chest pain and generalized abdominal pain x 2 months. Associated with nausea. No vomiting or diarrhea. No fever chills or urinary symptoms. Will obtain labs and 44 y.o. F, PMHx significant for Crohn's disease, ventral hernia s/p repair with mesh, latent TB, PE present c/o  L chest pain and generalized abdominal pain x 2 months. Associated with nausea. No vomiting or diarrhea. No fever chills or urinary symptoms. Pt has not scheduled appt with GI doctor and is unsure of her last colonoscopy. No other voiced complaints.  VSS. Exam as noted above. DDX to consider but not limited to: crohn's flare, colitis. Will obtain labs and provide analgesics. Will defer CT until labs results. CT from Nov 2024 with inflammatory changes consistent with her hx of Crohn's. Follow progress notes to dispo.

## 2025-01-03 NOTE — ED PROVIDER NOTE - PHYSICAL EXAMINATION
CONSTITUTIONAL: Well-appearing; well-nourished; in no apparent distress. Non-toxic appearing.   NEURO: Alert & oriented. Gait steady without assistance. Sensory and motor functions are grossly intact.  PSYCH: Mood appropriate. Thought processes intact.   NECK: Supple  CARD: Regular rate and rhythm, no murmurs  RESP: No accessory muscle use; breath sounds clear and equal bilaterally; no wheezes, rhonchi, or rales     ABD: Soft; non-distended; diffuse tenderness, no focal tenderness. No guarding or rebound.   MUSCULOSKELETAL/EXTREMITIES: FROM in all four extremities; no extremity edema.  SKIN: Warm; dry; no apparent lesions or exudate

## 2025-01-03 NOTE — ED PROVIDER NOTE - NSICDXPASTSURGICALHX_GEN_ALL_CORE_FT
PAST SURGICAL HISTORY:  H/O umbilical hernia repair with rectus diastasis repair 3/10/22 in [facility]    H/O:

## 2025-01-04 LAB — CORTIS SERPL-MCNC: 8.1 UG/DL — SIGNIFICANT CHANGE UP

## 2025-03-13 ENCOUNTER — APPOINTMENT (OUTPATIENT)
Dept: OBGYN | Facility: HOSPITAL | Age: 45
End: 2025-03-13

## 2025-04-15 ENCOUNTER — EMERGENCY (EMERGENCY)
Facility: HOSPITAL | Age: 45
LOS: 1 days | End: 2025-04-15
Attending: EMERGENCY MEDICINE | Admitting: EMERGENCY MEDICINE
Payer: MEDICAID

## 2025-04-15 VITALS
HEART RATE: 68 BPM | OXYGEN SATURATION: 99 % | SYSTOLIC BLOOD PRESSURE: 109 MMHG | RESPIRATION RATE: 18 BRPM | DIASTOLIC BLOOD PRESSURE: 65 MMHG

## 2025-04-15 VITALS
OXYGEN SATURATION: 97 % | HEART RATE: 70 BPM | RESPIRATION RATE: 16 BRPM | WEIGHT: 165.35 LBS | DIASTOLIC BLOOD PRESSURE: 75 MMHG | SYSTOLIC BLOOD PRESSURE: 112 MMHG | TEMPERATURE: 99 F

## 2025-04-15 DIAGNOSIS — Z98.891 HISTORY OF UTERINE SCAR FROM PREVIOUS SURGERY: Chronic | ICD-10-CM

## 2025-04-15 DIAGNOSIS — Z98.890 OTHER SPECIFIED POSTPROCEDURAL STATES: Chronic | ICD-10-CM

## 2025-04-15 LAB
ALBUMIN SERPL ELPH-MCNC: 3.1 G/DL — LOW (ref 3.3–5)
ALP SERPL-CCNC: 68 U/L — SIGNIFICANT CHANGE UP (ref 40–120)
ALT FLD-CCNC: 6 U/L — SIGNIFICANT CHANGE UP (ref 4–33)
ANION GAP SERPL CALC-SCNC: 10 MMOL/L — SIGNIFICANT CHANGE UP (ref 7–14)
ANISOCYTOSIS BLD QL: SLIGHT — SIGNIFICANT CHANGE UP
APPEARANCE UR: CLEAR — SIGNIFICANT CHANGE UP
AST SERPL-CCNC: 11 U/L — SIGNIFICANT CHANGE UP (ref 4–32)
BASOPHILS # BLD AUTO: 0.04 K/UL — SIGNIFICANT CHANGE UP (ref 0–0.2)
BASOPHILS NFR BLD AUTO: 0.9 % — SIGNIFICANT CHANGE UP (ref 0–2)
BILIRUB SERPL-MCNC: <0.2 MG/DL — SIGNIFICANT CHANGE UP (ref 0.2–1.2)
BILIRUB UR-MCNC: NEGATIVE — SIGNIFICANT CHANGE UP
BUN SERPL-MCNC: 8 MG/DL — SIGNIFICANT CHANGE UP (ref 7–23)
CALCIUM SERPL-MCNC: 8.8 MG/DL — SIGNIFICANT CHANGE UP (ref 8.4–10.5)
CHLORIDE SERPL-SCNC: 103 MMOL/L — SIGNIFICANT CHANGE UP (ref 98–107)
CO2 SERPL-SCNC: 24 MMOL/L — SIGNIFICANT CHANGE UP (ref 22–31)
COLOR SPEC: YELLOW — SIGNIFICANT CHANGE UP
CREAT SERPL-MCNC: 0.72 MG/DL — SIGNIFICANT CHANGE UP (ref 0.5–1.3)
DIFF PNL FLD: NEGATIVE — SIGNIFICANT CHANGE UP
EGFR: 106 ML/MIN/1.73M2 — SIGNIFICANT CHANGE UP
EGFR: 106 ML/MIN/1.73M2 — SIGNIFICANT CHANGE UP
ELLIPTOCYTES BLD QL SMEAR: SLIGHT — SIGNIFICANT CHANGE UP
EOSINOPHIL # BLD AUTO: 0.08 K/UL — SIGNIFICANT CHANGE UP (ref 0–0.5)
EOSINOPHIL NFR BLD AUTO: 1.8 % — SIGNIFICANT CHANGE UP (ref 0–6)
GIANT PLATELETS BLD QL SMEAR: PRESENT — SIGNIFICANT CHANGE UP
GLUCOSE SERPL-MCNC: 91 MG/DL — SIGNIFICANT CHANGE UP (ref 70–99)
GLUCOSE UR QL: NEGATIVE MG/DL — SIGNIFICANT CHANGE UP
HCG SERPL-ACNC: <1 MIU/ML — SIGNIFICANT CHANGE UP
HCT VFR BLD CALC: 31.2 % — LOW (ref 34.5–45)
HGB BLD-MCNC: 10.2 G/DL — LOW (ref 11.5–15.5)
HIV 1+2 AB+HIV1 P24 AG SERPL QL IA: SIGNIFICANT CHANGE UP
HYPOCHROMIA BLD QL: SLIGHT — SIGNIFICANT CHANGE UP
IANC: 2.41 K/UL — SIGNIFICANT CHANGE UP (ref 1.8–7.4)
KETONES UR-MCNC: ABNORMAL MG/DL
LEUKOCYTE ESTERASE UR-ACNC: NEGATIVE — SIGNIFICANT CHANGE UP
LIDOCAIN IGE QN: 23 U/L — SIGNIFICANT CHANGE UP (ref 7–60)
LYMPHOCYTES # BLD AUTO: 1.31 K/UL — SIGNIFICANT CHANGE UP (ref 1–3.3)
LYMPHOCYTES # BLD AUTO: 28.6 % — SIGNIFICANT CHANGE UP (ref 13–44)
MAGNESIUM SERPL-MCNC: 1.8 MG/DL — SIGNIFICANT CHANGE UP (ref 1.6–2.6)
MCHC RBC-ENTMCNC: 22 PG — LOW (ref 27–34)
MCHC RBC-ENTMCNC: 32.7 G/DL — SIGNIFICANT CHANGE UP (ref 32–36)
MCV RBC AUTO: 67.4 FL — LOW (ref 80–100)
MICROCYTES BLD QL: SLIGHT — SIGNIFICANT CHANGE UP
MONOCYTES # BLD AUTO: 0.28 K/UL — SIGNIFICANT CHANGE UP (ref 0–0.9)
MONOCYTES NFR BLD AUTO: 6.2 % — SIGNIFICANT CHANGE UP (ref 2–14)
NEUTROPHILS # BLD AUTO: 2.86 K/UL — SIGNIFICANT CHANGE UP (ref 1.8–7.4)
NEUTROPHILS NFR BLD AUTO: 62.5 % — SIGNIFICANT CHANGE UP (ref 43–77)
NITRITE UR-MCNC: NEGATIVE — SIGNIFICANT CHANGE UP
OVALOCYTES BLD QL SMEAR: SLIGHT — SIGNIFICANT CHANGE UP
PH UR: 6.5 — SIGNIFICANT CHANGE UP (ref 5–8)
PHOSPHATE SERPL-MCNC: 3.4 MG/DL — SIGNIFICANT CHANGE UP (ref 2.5–4.5)
PLAT MORPH BLD: NORMAL — SIGNIFICANT CHANGE UP
PLATELET # BLD AUTO: 417 K/UL — HIGH (ref 150–400)
PLATELET COUNT - ESTIMATE: NORMAL — SIGNIFICANT CHANGE UP
POIKILOCYTOSIS BLD QL AUTO: SLIGHT — SIGNIFICANT CHANGE UP
POTASSIUM SERPL-MCNC: 4 MMOL/L — SIGNIFICANT CHANGE UP (ref 3.5–5.3)
POTASSIUM SERPL-SCNC: 4 MMOL/L — SIGNIFICANT CHANGE UP (ref 3.5–5.3)
PROT SERPL-MCNC: 5.8 G/DL — LOW (ref 6–8.3)
PROT UR-MCNC: NEGATIVE MG/DL — SIGNIFICANT CHANGE UP
RBC # BLD: 4.63 M/UL — SIGNIFICANT CHANGE UP (ref 3.8–5.2)
RBC # FLD: 19.6 % — HIGH (ref 10.3–14.5)
RBC BLD AUTO: ABNORMAL
SCHISTOCYTES BLD QL AUTO: SLIGHT — SIGNIFICANT CHANGE UP
SMUDGE CELLS # BLD: PRESENT — SIGNIFICANT CHANGE UP
SODIUM SERPL-SCNC: 137 MMOL/L — SIGNIFICANT CHANGE UP (ref 135–145)
SP GR SPEC: 1.02 — SIGNIFICANT CHANGE UP (ref 1–1.03)
UROBILINOGEN FLD QL: 0.2 MG/DL — SIGNIFICANT CHANGE UP (ref 0.2–1)
WBC # BLD: 4.58 K/UL — SIGNIFICANT CHANGE UP (ref 3.8–10.5)
WBC # FLD AUTO: 4.58 K/UL — SIGNIFICANT CHANGE UP (ref 3.8–10.5)

## 2025-04-15 PROCEDURE — 93010 ELECTROCARDIOGRAM REPORT: CPT

## 2025-04-15 PROCEDURE — 99284 EMERGENCY DEPT VISIT MOD MDM: CPT

## 2025-04-15 PROCEDURE — 71046 X-RAY EXAM CHEST 2 VIEWS: CPT | Mod: 26

## 2025-04-15 RX ORDER — KETOROLAC TROMETHAMINE 30 MG/ML
15 INJECTION, SOLUTION INTRAMUSCULAR; INTRAVENOUS ONCE
Refills: 0 | Status: DISCONTINUED | OUTPATIENT
Start: 2025-04-15 | End: 2025-04-15

## 2025-04-15 RX ADMIN — Medication 2000 MILLILITER(S): at 13:28

## 2025-04-15 RX ADMIN — Medication 20 MILLIGRAM(S): at 13:28

## 2025-04-15 RX ADMIN — KETOROLAC TROMETHAMINE 15 MILLIGRAM(S): 30 INJECTION, SOLUTION INTRAMUSCULAR; INTRAVENOUS at 13:28

## 2025-04-15 NOTE — ED PROVIDER NOTE - WR ORDER DATE AND TIME 1
When discharged, take only medications prescribed as instructed by your hospital provider    Do not stop or change any medications until you discuss changes with your own prescriber    Do not take any other medications, including left over medications from before your admission, over the counter medications or herbal supplements, unless you discuss with your own provider
15-Apr-2025 13:02

## 2025-04-15 NOTE — ED PROVIDER NOTE - NSFOLLOWUPCLINICS_GEN_ALL_ED_FT
[facility] - Ambulatory Care Clinic  OB/GYN & Surg  [address] [place] [id number]  Phone: [phone]  Fax:   Follow Up Time: 4-6 Days    [facility] - Infectious Disease  Infectious Disease  [address] [place] [address] [place] [address]  Phone: [phone]  Fax:   Follow Up Time: 4-6 Days    [facility] Gastroenterology  Gastroenterology  [address] [place] [address]  Phone: [phone]  Fax:   Follow Up Time: 4-6 Days

## 2025-04-15 NOTE — ED PROVIDER NOTE - CLINICAL SUMMARY MEDICAL DECISION MAKING FREE TEXT BOX
43 female with hx of anemia, prior PE, & Crohn's disease.   Patient presenting to the ED reporting 10 years of diffuse abdominal pain.    Vitals stable.  Nontoxic appearing, n/v intact.  Airway intact, no respiratory distress, no hypoxia.  No abdominal or CVA tenderness.    Patient with discharge paperwork from [facility] in the past 2 months with CT showing mild right-sided small bowel loop thickening but no evidence of abscess, perforation, or obstruction.  Also with left intrarenal stone 0.3-0.5 cm.  While in [place] patient was treated with ceftriaxone & doxycycline for her "prolapsed uterus".  Imaging showed mild uterine enlargement with concern for adenomyosis.  Prior GI note and [facility] 6/28/22 reports that Crohn's was diagnosed by tissue biopsy and they will do.  Patient was started on azathioprine/prednisone at that time (which she is no longer taking) and supposed to be started on immunomodulators but has been awaiting treatment for latent TB to begin these medications.  Later notes report that patient was lost to follow-up and patient reports that she never completed treatment.    Plan to obtain:  - Labs, IV fluids, antipyretics, analgesia, & antiemetics as needed, observe/reassess    ED Course:  Lab values demonstrate no acute/emergent pathology.  My independent interpretation of the EKG: Sinus @ [], normal axis, normal intervals, normal ST/T  My independent interpretation of XR: [No consolidation/effusion/pntx.]    [***]    [Patient advised regarding need for close outpatient follow up.  Patient stable for further care in outpatient setting. No indication for inpatient admission at this time. Patient advised regarding symptomatic & supportive care and symptoms to prompt ED return. Strict return precautions provided.]    [Patient requires inpatient admission for further care & stabilization. Care signed out to inpatient team.] 43 female with hx of anemia, prior PE, & Crohn's disease.   Patient presenting to the ED reporting 10 years of diffuse abdominal pain.    Vitals stable.  Nontoxic appearing, n/v intact.  Airway intact, no respiratory distress, no hypoxia.  No abdominal or CVA tenderness.    Patient with discharge paperwork from [facility] in the past 2 months with CT showing mild right-sided small bowel loop thickening but no evidence of abscess, perforation, or obstruction.  Also with left intrarenal stone 0.3-0.5 cm.  While in [place] patient was treated with ceftriaxone & doxycycline for her "prolapsed uterus".  Imaging showed mild uterine enlargement with concern for adenomyosis.  Prior GI note and [facility] 6/28/22 reports that Crohn's was diagnosed by tissue biopsy and they will do.  Patient was started on azathioprine/prednisone at that time (which she is no longer taking) and supposed to be started on immunomodulators but has been awaiting treatment for latent TB to begin these medications.  Later notes report that patient was lost to follow-up and patient reports that she never completed treatment.    Plan to obtain:  - Labs, IV fluids, antipyretics, analgesia, & antiemetics as needed, observe/reassess    ED Course:  Lab values demonstrate no acute/emergent pathology.  My independent interpretation of XR: No consolidation/effusion/pntx.  Analgesia given. Symptoms improved. Patient well-appearing. Tolerating PO intake in ED. No abdominal tenderness on reassessment.  Patient advised regarding need for close outpatient follow up.   Patient stable for further care in outpatient setting. No indication for inpatient admission at this time. Patient advised regarding symptomatic & supportive care and symptoms to prompt ED return. Strict return precautions provided. 44 female with hx of anemia, prior PE, & Crohn's disease.   Patient presenting to the ED reporting 10 years of diffuse abdominal pain.    Vitals stable.  Nontoxic appearing, n/v intact.  Airway intact, no respiratory distress, no hypoxia.  No abdominal or CVA tenderness.    Patient with discharge paperwork from [facility] in the past 2 months with CT showing mild right-sided small bowel loop thickening but no evidence of abscess, perforation, or obstruction.  Also with left intrarenal stone 0.3-0.5 cm.  While in [place] patient was treated with ceftriaxone & doxycycline for her "prolapsed uterus".  Imaging showed mild uterine enlargement with concern for adenomyosis.  Prior GI note and [facility] 6/28/22 reports that Crohn's was diagnosed by tissue biopsy and they will do.  Patient was started on azathioprine/prednisone at that time (which she is no longer taking) and supposed to be started on immunomodulators but has been awaiting treatment for latent TB to begin these medications.  Later notes report that patient was lost to follow-up and patient reports that she never completed treatment.    Plan to obtain:  - Labs, IV fluids, antipyretics, analgesia, & antiemetics as needed, observe/reassess    ED Course:  Lab values demonstrate no acute/emergent pathology.  My independent interpretation of XR: No consolidation/effusion/pntx.  Analgesia given. Symptoms improved. Patient well-appearing. Tolerating PO intake in ED. No abdominal tenderness on reassessment.  Patient advised regarding need for close outpatient follow up.   Patient stable for further care in outpatient setting. No indication for inpatient admission at this time. Patient advised regarding symptomatic & supportive care and symptoms to prompt ED return. Strict return precautions provided.

## 2025-04-15 NOTE — ED PROVIDER NOTE - NSFOLLOWUPINSTRUCTIONS_ED_ALL_ED_FT
Please follow up with your PMD or Medicine Clinic in 2-3 days.  Follow up with Gastronterology in 1 week regarding your chronic abdominal pain & Chron's disease treatment.  Follow up with Infectious Disease in 1 week regarding your need to treat latent TB before Gastroenterology can continue your treatment for Crohn's.  Follow up with Gyn in 1 week regarding your chronic uterine prolapse.  Return to the ER for worsening or concerning symptoms.  Your results for testing will be available in the Follow My Health application which can be downloaded to your phone or checked online on a computer.  [email or web address]://www.[email or web address].  See attached printed discharge papers for further information.  Take Acetaminophen (Tylenol) 650mg every 6 hours as needed for pain or fever.  Take Ibuprofen (Advil or Motrin) 400mg every 6 hours as needed for pain or fever with food.  Take Famotidine (Pepcid) 20mg (1 tablet) every 12 hours as needed for abdominal pain.  Take Maalox 30mL every 6 hours as needed for abdominal pain.

## 2025-04-15 NOTE — ED PROVIDER NOTE - PROGRESS NOTE DETAILS
Results reviewed.  Patient reports feeling better.  Tolerating PO intake.  Patient advised regarding symptomatic/supportive care, importance of outpatient follow up, and symptoms to prompt ED return.

## 2025-04-15 NOTE — ED PROVIDER NOTE - WR INTERPRETATION DATE TIME  1
Spoke to son Abdoulaye via telephone. He defers DME needs to [facility]. Order for hospital bed, wheelchair, caroline lift, and mattress sent to [facility]     3:11 PM  Referral sent to [facility] for IV abx    15-Apr-2025 14:17

## 2025-04-15 NOTE — ED PROVIDER NOTE - NS ED ROS FT
Constitutional: (-) fever (-) chills  HENT: (-) congestion (-) rhinorrhea (-) sore throat  Eyes: (-) pain (-) redness  Respiratory: (-) cough (-) shortness of breath (-) wheezing (-) stridor  Cardiovascular: (-) chest pain (-) palpitations (-) leg swelling  Gastrointestinal: Normal flatus. (+) abdominal pain (-) blood in stool (no melena/hematochezia) (-) diarrhea (-) vomiting  Genitourinary: (-) dysuria (-) hematuria (-) vaginal bleeding/discharge   Musculoskeletal: (-) gait problem (-) joint swelling (-) myalgias  Skin: (-) color change (-) rash  Neurological: (-) weakness (-) numbness (-) headaches  Psychiatric/Behavioral: (-) confusion

## 2025-04-15 NOTE — ED PROVIDER NOTE - OBJECTIVE STATEMENT
43 female with hx of anemia, prior PE, & Crohn's disease.   Patient presenting to the ED reporting 10 years of diffuse abdominal pain. 44 female with hx of anemia, prior PE, & Crohn's disease.   Patient presenting to the ED reporting 10 years of diffuse abdominal pain.

## 2025-04-15 NOTE — ED ADULT NURSE NOTE - OBJECTIVE STATEMENT
a&ox3, c/o chronic generalized abd pain with mild nausea, stated she tolerated breakfast this am. stated she does not know her medical histories, denies taking meds on the regular basis.  respirations even and unlabored. no active vomiting or s/s of distress noted. meds given as ordered.

## 2025-04-15 NOTE — ED ADULT TRIAGE NOTE - CHIEF COMPLAINT QUOTE
c/o generalized abdominal pain X "a while". reports was told "has a prolapsed uterus" and "kidney problem". endorsing nausea. Phx pulmonary embolism, gastritis, anemia, Chron's disease, anemia, blood transfusions

## 2025-04-16 LAB
HCV AB S/CO SERPL IA: 0.09 S/CO — SIGNIFICANT CHANGE UP (ref 0–0.79)
HCV AB SERPL-IMP: SIGNIFICANT CHANGE UP

## 2025-05-08 NOTE — ED ADULT TRIAGE NOTE - INTERNATIONAL TRAVEL
No

## 2025-06-08 NOTE — OB RN TRIAGE NOTE - FALL HARM RISK - UNIVERSAL INTERVENTIONS
EMERGENCY DEPARTMENT ENCOUNTER    Room Number:  N437/1  PCP: Angelica Anthony MD  Independent Historians: Patient and Family    HPI:  Chief Complaint: had concerns including Shortness of Breath and Fall.      A complete HPI/ROS/PMH/PSH/SH/FH are unobtainable due to: None    Chronic or social conditions impacting patient care (Social Determinants of Health): None      Context: Inge Borjas is a 89 y.o. female with a medical history of valvular heart disease, A-fib, anemia who presents to the ED c/o acute generalized weakness, shortness of breath, and frequent falls.  Patient with some shortness of breath for the past several months evaluated by cardiology Dr. Bloom.  Since Wednesday night patient has had several falls including today.  Today when patient fell  was trying to help her up when he then fell and went into cardiac arrest and has .  He was brought in by EMS coding and was pronounced dead here.  Patient while walking with family and neighbors was noted to be markedly short of breath so was convinced to check in herself for further assessment.  Patient has had a deep cough that is nonproductive but denies fevers, chest pain, syncope.        Review of prior external notes (non-ED) -and- Review of prior external test results outside of this encounter: I reviewed discharge summary from patient's last admission which was in March of this year.  Patient was admitted for her shortness of breath and had a right and left heart cath performed by Dr. Bloom:    Conclusions:   1. Left main: Normal  2. LAD: Diffuse 20 to 30% proximal stenosis.  Discrete 30 to 40% mid vessel stenosis.  Mid septal  branch with discrete 99% stenosis  3. LCX: Diffuse 20 to 30% mid vessel stenosis  4. RCA: Luminal irregularities proximal segment.  5.  Mildly elevated right atrial and wedge pressure.  Large V wave on wedge pressure.  6.  Moderate pulmonary hypertension    Prescription drug monitoring program review:      N/A    PAST MEDICAL HISTORY  Active Ambulatory Problems     Diagnosis Date Noted    Trigeminal neuralgia 07/19/2016    Malignant neoplasm of sigmoid colon 07/19/2016    COLVIN (dyspnea on exertion) 03/03/2025    Unstable angina 03/03/2025    Dyspnea on exertion 03/26/2025    Nonrheumatic tricuspid valve regurgitation 05/13/2025    Nonrheumatic mitral valve regurgitation 05/13/2025     Resolved Ambulatory Problems     Diagnosis Date Noted    No Resolved Ambulatory Problems     Past Medical History:   Diagnosis Date    Anemia     Arthritis     Atrial fibrillation     Colon cancer 2012    Disease of thyroid gland     Drug therapy 2012    H/O Herpes simplex     Hyperlipidemia     Hypertension     Intervertebral disk disease     Polio          PAST SURGICAL HISTORY  Past Surgical History:   Procedure Laterality Date    ARTHROSCOPY SHOULDER W/ OPEN ROTATOR CUFF REPAIR      BREAST BIOPSY Left     at least 25 years ago.    CARDIAC CATHETERIZATION  3/25/2025    Procedure: Right and Left Heart Cath;  Surgeon: Mukul Bloom MD;  Location:  [facility];  Service: Cardiology;;    CARDIAC CATHETERIZATION N/A 3/25/2025    Procedure: Coronary angiography;  Surgeon: Mukul Bloom MD;  Location:  [facility];  Service: Cardiology;  Laterality: N/A;    COLON RESECTION  08/2015    JOINT REPLACEMENT  1947    Elbow transplant and shoulder fusion    TONSILLECTOMY           FAMILY HISTORY  Family History   Problem Relation Age of Onset    Colon cancer Father         Late in life    Heart disease Father     No Known Problems Mother          SOCIAL HISTORY  Social History     Socioeconomic History    Marital status:      Spouse name: Burt   Tobacco Use    Smoking status: Never     Passive exposure: Never    Smokeless tobacco: Never   Vaping Use    Vaping status: Never Used   Substance and Sexual Activity    Alcohol use: No    Drug use: No    Sexual activity: Defer     Partners: Male          ALLERGIES  Cardizem [diltiazem hcl] and Amoxicillin        REVIEW OF SYSTEMS  Review of Systems  Included in HPI  All systems reviewed and negative except for those discussed in HPI.      PHYSICAL EXAM    I have reviewed the triage vital signs and nursing notes.    ED Triage Vitals   Temp Heart Rate Resp BP SpO2   06/08/25 1704 06/08/25 1704 06/08/25 1704 06/08/25 1706 06/08/25 1704   98 °F (36.7 °C) 78 16 (!) 154/104 94 %      Temp src Heart Rate Source Patient Position BP Location FiO2 (%)   -- -- 06/08/25 1706 06/08/25 1706 --     Sitting Left arm        Physical Exam  GENERAL: Pleasant cooperative thin female of advanced age, conversant, alert, no acute distress  SKIN: Warm, dry, ecchymosis to chin, ecchymosis to right forearm   HENT: Normocephalic, atraumatic  EYES: no scleral icterus, EOMI  Neck: No spinous tenderness to palpation and no bony step-offs  CV: regular rhythm, regular rate  RESPIRATORY: normal effort, diminished at the bases with no wheezing  ABDOMEN: soft, nontender, nondistended  MUSCULOSKELETAL: no deformity, 1+ pitting edema ble  NEURO: alert, moves all extremities, follows commands                                                                   LAB RESULTS  Recent Results (from the past 24 hours)   ECG 12 Lead ED Triage Standing Order; SOA    Collection Time: 06/08/25  5:12 PM   Result Value Ref Range    QT Interval 387 ms    QTC Interval 483 ms   Comprehensive Metabolic Panel    Collection Time: 06/08/25  5:15 PM    Specimen: Arm, Left; Blood   Result Value Ref Range    Glucose 93 65 - 99 mg/dL    BUN 47.0 (H) 8.0 - 23.0 mg/dL    Creatinine 1.63 (H) 0.57 - 1.00 mg/dL    Sodium 136 136 - 145 mmol/L    Potassium 5.2 3.5 - 5.2 mmol/L    Chloride 99 98 - 107 mmol/L    CO2 26.4 22.0 - 29.0 mmol/L    Calcium 9.6 8.2 - 9.6 mg/dL    Total Protein 7.1 6.0 - 8.5 g/dL    Albumin 4.3 3.5 - 5.2 g/dL    ALT (SGPT) 29 1 - 33 U/L    AST (SGOT) 24 1 - 32 U/L    Alkaline Phosphatase 124 (H) 39 - 117  U/L    Total Bilirubin 0.4 0.0 - 1.2 mg/dL    Globulin 2.8 gm/dL    A/G Ratio 1.5 g/dL    BUN/Creatinine Ratio 28.8 (H) 7.0 - 25.0    Anion Gap 10.6 5.0 - 15.0 mmol/L    eGFR 29.5 (L) >60.0 mL/min/1.73   BNP    Collection Time: 06/08/25  5:15 PM    Specimen: Arm, Left; Blood   Result Value Ref Range    proBNP 6,285.0 (H) 0.0 - 1,800.0 pg/mL   High Sensitivity Troponin T    Collection Time: 06/08/25  5:15 PM    Specimen: Arm, Left; Blood   Result Value Ref Range    HS Troponin T 21 (H) <14 ng/L   Green Top (Gel)    Collection Time: 06/08/25  5:15 PM   Result Value Ref Range    Extra Tube Hold for add-ons.    Lavender Top    Collection Time: 06/08/25  5:15 PM   Result Value Ref Range    Extra Tube hold for add-on    Gold Top - SST    Collection Time: 06/08/25  5:15 PM   Result Value Ref Range    Extra Tube Hold for add-ons.    Light Blue Top    Collection Time: 06/08/25  5:15 PM   Result Value Ref Range    Extra Tube Hold for add-ons.    CBC Auto Differential    Collection Time: 06/08/25  5:15 PM    Specimen: Arm, Left; Blood   Result Value Ref Range    WBC 5.96 3.40 - 10.80 10*3/mm3    RBC 3.75 (L) 3.77 - 5.28 10*6/mm3    Hemoglobin 11.9 (L) 12.0 - 15.9 g/dL    Hematocrit 36.3 34.0 - 46.6 %    MCV 96.8 79.0 - 97.0 fL    MCH 31.7 26.6 - 33.0 pg    MCHC 32.8 31.5 - 35.7 g/dL    RDW 14.7 12.3 - 15.4 %    RDW-SD 52.1 37.0 - 54.0 fl    MPV 9.9 6.0 - 12.0 fL    Platelets 158 140 - 450 10*3/mm3    Neutrophil % 77.1 (H) 42.7 - 76.0 %    Lymphocyte % 11.6 (L) 19.6 - 45.3 %    Monocyte % 9.1 5.0 - 12.0 %    Eosinophil % 0.3 0.3 - 6.2 %    Basophil % 0.2 0.0 - 1.5 %    Immature Grans % 1.7 (H) 0.0 - 0.5 %    Neutrophils, Absolute 4.60 1.70 - 7.00 10*3/mm3    Lymphocytes, Absolute 0.69 (L) 0.70 - 3.10 10*3/mm3    Monocytes, Absolute 0.54 0.10 - 0.90 10*3/mm3    Eosinophils, Absolute 0.02 0.00 - 0.40 10*3/mm3    Basophils, Absolute 0.01 0.00 - 0.20 10*3/mm3    Immature Grans, Absolute 0.10 (H) 0.00 - 0.05 10*3/mm3   High  Sensitivity Troponin T 1Hr    Collection Time: 06/08/25  6:17 PM    Specimen: Blood   Result Value Ref Range    HS Troponin T 20 (H) <14 ng/L    Troponin T Numeric Delta -1 ng/L    Troponin T % Delta -5 Abnormal if >/= 20%   Carbamazepine Level, Total    Collection Time: 06/08/25  6:17 PM    Specimen: Blood   Result Value Ref Range    Carbamazepine Level 5.8 4.0 - 12.0 mcg/mL   Respiratory Panel PCR w/COVID-19(SARS-CoV-2) [facility], NP Swab in UTM/VTM, 2 HR TAT - Swab, Nasopharynx    Collection Time: 06/08/25  6:40 PM    Specimen: Nasopharynx; Swab   Result Value Ref Range    ADENOVIRUS, PCR Not Detected Not Detected    Coronavirus 229E Not Detected Not Detected    Coronavirus HKU1 Not Detected Not Detected    Coronavirus NL63 Not Detected Not Detected    Coronavirus OC43 Not Detected Not Detected    COVID19 Not Detected Not Detected - Ref. Range    Human Metapneumovirus Not Detected Not Detected    Human Rhinovirus/Enterovirus Detected (A) Not Detected    Influenza A PCR Not Detected Not Detected    Influenza B PCR Not Detected Not Detected    Parainfluenza Virus 1 Not Detected Not Detected    Parainfluenza Virus 2 Not Detected Not Detected    Parainfluenza Virus 3 Not Detected Not Detected    Parainfluenza Virus 4 Not Detected Not Detected    RSV, PCR Not Detected Not Detected    Bordetella pertussis pcr Not Detected Not Detected    Bordetella parapertussis PCR Not Detected Not Detected    Chlamydophila pneumoniae PCR Not Detected Not Detected    Mycoplasma pneumo by PCR Not Detected Not Detected         RADIOLOGY  CT Chest Without Contrast Diagnostic  Result Date: 6/8/2025  CT OF THE CHEST WITHOUT CONTRAST  HISTORY: Frequent falls. Shortness of breath.  COMPARISON: None available.  TECHNIQUE: Axial CT imaging was obtained through the thorax. No IV contrast was administered.  FINDINGS: Old bilateral rib fractures are noted. The patient also has compression deformity of T10, which was also  present on exam from November 14, 2024. There is thoracic scoliosis, with convexity to the left. There are advanced degenerative changes of the right shoulder. There is biapical scarring. Mediastinal lymph nodes do not appear pathologically enlarged. The heart is enlarged. There is some interlobular septal thickening, as well as trace right pleural effusion and small left pleural effusion. Appearance suggests congestive heart failure. There is scarring noted at the lung bases. The thoracic aorta is normal in caliber. There is enlargement of the main pulmonary artery, which can be seen in the setting of pulmonary arterial hypertension. Thyroid gland, trachea, and esophagus are within normal limits. Images through the upper abdomen demonstrate a left renal cyst. Patient does appear to have some body wall edema.       1. No acute traumatic injury identified. 2. Cardiomegaly, interlobular septal thickening, and small effusions, suggesting congestive heart failure.  Radiation dose reduction techniques were utilized, including automated exposure control and exposure modulation based on body size.   This report was finalized on 6/8/2025 9:08 PM by Dr. Neela Arvizu M.D on Workstation: [id number]      CT Cervical Spine Without Contrast  Result Date: 6/8/2025  CT OF THE CERVICAL SPINE  HISTORY: Neck pain. Multiple falls.  COMPARISON: None available.  TECHNIQUE: Axial CT imaging was obtained through the cervical spine. Coronal and sagittal reformatted images were obtained.  FINDINGS: No acute fracture or subluxation of the cervical spine is seen. There is anterolisthesis of C3 on C4 and C4 on C5. There is some retrolisthesis of C5 on C6. Intervertebral disc space narrowing is most significant at C5-C6 and C6-C7. There is no prevertebral soft tissue swelling. Moderately Cini significant canal stenosis at any level. Neuroforaminal narrowing is most significant on the left at C5-C6.      No acute fracture or subluxation  identified.  Radiation dose reduction techniques were utilized, including automated exposure control and exposure modulation based on body size.   This report was finalized on 6/8/2025 9:02 PM by Dr. Neela Arvizu M.D on Workstation: [id number]      CT Head Without Contrast  Result Date: 6/8/2025  CT OF THE HEAD WITHOUT CONTRAST  HISTORY: Multiple falls  COMPARISON: June 4, 2025  TECHNIQUE: Axial CT imaging was obtained through the brain. No IV contrast was administered.  FINDINGS: No acute intracranial hemorrhage is seen. There is atrophy. There is periventricular and deep white matter microangiopathic change. There is some encephalomalacia suspected within the left middle cranial fossa. No calvarial fracture is seen. The paranasal sinuses and mastoid air cells appear clear. There are asymmetric degenerative changes of the right TMJ. Left globe appears small and distorted.      No acute intracranial findings.  Radiation dose reduction techniques were utilized, including automated exposure control and exposure modulation based on body size.   This report was finalized on 6/8/2025 8:58 PM by Dr. Neela Arvizu M.D on Workstation: [id number]      XR Chest 1 View  Result Date: 6/8/2025  XR CHEST 1 VW-   INDICATION: Shortness of air  COMPARISON: Chest radiograph June 4, 2025  TECHNIQUE: 1 view chest  FINDINGS:  Calcified pulmonary nodules in the left lower lung, consistent with prior granulomatous infection. Linear opacities at the left lung base. No effusions. Enlarged cardiac silhouette. Calcified mediastinal and left hilar lymph nodes, consistent with prior granulomatous infection. Old right-sided rib fractures. Left humeral head anchor and suspected old Hill-Sachs deformity. Right glenohumeral arthropathy, incompletely imaged. Thoracic dextrocurvature.       1. Stable chest. 2. Cardiomegaly. 3. Subsegmental atelectasis or scarring suspected at the left lung base 4. Old right rib cage trauma  This report  was finalized on 6/8/2025 5:43 PM by Dr. Curly Cee M.D on Workstation: [id number]          MEDICATIONS GIVEN IN ER  Medications   sodium chloride 0.9 % flush 10 mL (has no administration in time range)   ipratropium-albuterol (DUO-NEB) nebulizer solution 3 mL (has no administration in time range)   furosemide (LASIX) injection 40 mg (40 mg Intravenous Given 6/9/25 0056)   aspirin EC tablet 81 mg (has no administration in time range)   atenolol (TENORMIN) tablet 100 mg (100 mg Oral Given 6/9/25 0043)   brimonidine (ALPHAGAN) 0.2 % ophthalmic solution 1 drop (1 drop Both Eyes Given 6/9/25 0237)   calcium carbonate (oyster shell) tablet 500 mg (has no administration in time range)   carBAMazepine XR (TEGretol  XR) 12 hr tablet 100 mg (100 mg Oral Given 6/9/25 0236)   dorzolamide (TRUSOPT) 2 % ophthalmic solution 1 drop (has no administration in time range)   terazosin (HYTRIN) capsule 5 mg (5 mg Oral Given 6/9/25 0236)   fluticasone (FLONASE) 50 MCG/ACT nasal spray 2 spray (has no administration in time range)   losartan (COZAAR) tablet 100 mg (has no administration in time range)   multivitamin with minerals 1 tablet (has no administration in time range)   spironolactone (ALDACTONE) tablet 25 mg (0 mg Oral Hold 6/9/25 0029)   levothyroxine (SYNTHROID, LEVOTHROID) tablet 50 mcg (has no administration in time range)   acetaminophen (TYLENOL) tablet 650 mg (has no administration in time range)         ORDERS PLACED DURING THIS VISIT:  Orders Placed This Encounter   Procedures    Respiratory Panel PCR w/COVID-19(SARS-CoV-2) [facility], NP Swab in UTM/VTM, 2 HR TAT - Swab, Nasopharynx    XR Chest 1 View    CT Head Without Contrast    CT Cervical Spine Without Contrast    CT Chest Without Contrast Diagnostic    [place] Draw    Comprehensive Metabolic Panel    BNP    High Sensitivity Troponin T    CBC Auto Differential    High Sensitivity Troponin T 1Hr    Carbamazepine Level, Total    Diet:  Cardiac; Healthy Heart (2-3 Na+); Fluid Consistency: Thin (IDDSI 0)    Undress & Gown    Continuous Pulse Oximetry    Vital Signs    Inpatient Spiritual Care Consult    Inpatient Cardiology Consult    Inpatient Case Management  Consult    Inpatient Nutrition Consult    OT Consult: Eval & Treat ADL Performance Below Baseline    PT Consult: Eval & Treat Functional Mobility Below Baseline    Oxygen Therapy- Nasal Cannula; Titrate 1-6 LPM Per SpO2; 90 - 95%    ECG 12 Lead ED Triage Standing Order; SOA    Insert Peripheral IV    Inpatient Admission    CBC & Differential    Green Top (Gel)    Lavender Top    Gold Top - SST    Light Blue Top         OUTPATIENT MEDICATION MANAGEMENT:  Current Facility-Administered Medications Ordered in Epic   Medication Dose Route Frequency Provider Last Rate Last Admin    acetaminophen (TYLENOL) tablet 650 mg  650 mg Oral Q6H PRN Mary Basilio APRN        aspirin EC tablet 81 mg  81 mg Oral Daily Harper Jacques MD        atenolol (TENORMIN) tablet 100 mg  100 mg Oral Q12H Harper Jacques MD   100 mg at 06/09/25 0043    brimonidine (ALPHAGAN) 0.2 % ophthalmic solution 1 drop  1 drop Both Eyes BID Harper Jacques MD   1 drop at 06/09/25 0237    calcium carbonate (oyster shell) tablet 500 mg  500 mg Oral Daily Harper Jacques MD        carBAMazepine XR (TEGretol  XR) 12 hr tablet 100 mg  100 mg Oral BID Harper Jacques MD   100 mg at 06/09/25 0236    dorzolamide (TRUSOPT) 2 % ophthalmic solution 1 drop  1 drop Both Eyes TID Harper Jacques MD        fluticasone (FLONASE) 50 MCG/ACT nasal spray 2 spray  2 spray Nasal Daily Harper Jacques MD        furosemide (LASIX) injection 40 mg  40 mg Intravenous Q12H Harper Jacques MD   40 mg at 06/09/25 0056    ipratropium-albuterol (DUO-NEB) nebulizer solution 3 mL  3 mL Nebulization Q6H PRN Mary Basilio APRN        levothyroxine (SYNTHROID, LEVOTHROID) tablet 50 mcg   50 mcg Oral Q AM Harper Jacques MD        losartan (COZAAR) tablet 100 mg  100 mg Oral Daily Harper Jacques MD        multivitamin with minerals 1 tablet  1 tablet Oral Daily Harper Jacques MD        sodium chloride 0.9 % flush 10 mL  10 mL Intravenous PRN Maria Del Rosario Barton MD        spironolactone (ALDACTONE) tablet 25 mg  25 mg Oral Daily Harper Jacques MD        terazosin (HYTRIN) capsule 5 mg  5 mg Oral Nightly Harper Jacques MD   5 mg at 06/09/25 0236     No current [facility]-ordered outpatient medications on file.         PROCEDURES  Procedures            PROGRESS, DATA ANALYSIS, CONSULTS, AND MEDICAL DECISION MAKING  All labs have been independently interpreted by me.  All radiology studies have been reviewed by me. All EKG's have been independently viewed and interpreted by me.  Discussion below represents my analysis of pertinent findings related to patient's condition, differential diagnosis, treatment plan and final disposition.    This patient presents with dyspnea, most likely secondary to her known valvular heart disease and pulmonary hypertension.  The differential diagnosis list includes but is not limited to:   - acute cardiac etiologies like ACS, CHF, pericardial effusion or even tamponade  - acute respiratory etiologies like acute PE, pneumothorax , asthma, COPD exacerbation, allergic etiologies, or infectious etiologies such as PNA   - non-cardiopulmonary causes like toxidromes, metabolic etiologies such as acidemia or electrolyte derangements or even DKA, sepsis, neurologic causes including GBS and myasthenia gravis  Plan EKG, CXR, Labs incl bnp and trop and +/- viral swab.  Also plan on CT head given patient's frequent falls and generalized weakness.          ED Course as of 06/09/25 0357   Sun Jun 08, 2025   1906 Stable anemia, stable CKD [AR]   1935 EKG ER MD interpretation   Time: 17: 12  Rhythm and rate: Atrial fibrillation at a rate of 92 with  significant artifact limiting ST segments assessment   [AR]   1936 I Reviewed patient's chest x-ray and on my interpretation patient has significant scoliosis and right-sided rib fractures that are old [AR]   1940 I discussed patient's case with Dr. Jacques who is amenable to admitting the patient for further care. CT head, ct chest and resp viral panel pending. [AR]   1946 Pt up to bedside potty with increased work of breathing and oxygen sat drop to 85% on RA. Pt placed on 2L via NC for comfort. [AR]      ED Course User Index  [AR] Maria Del Rosario Barton MD             AS OF 03:57 EDT VITALS:    BP - 122/88  HR - 91  TEMP - 98 °F (36.7 °C)  O2 SATS - 99%      COMPLEXITY OF CARE  The patient requires admission.    DIAGNOSIS  Final diagnoses:   Shortness of breath   Frequent falls   History of atrial fibrillation   History of chronic CHF   Acute hypoxic respiratory failure   Rhinovirus infection         DISPOSITION  ED Disposition       ED Disposition   Decision to Admit    Condition   --    Comment   Level of Care: Telemetry [5]   Diagnosis: Shortness of breath [786.05.ICD-9-CM]   Admitting Physician: JOSÉ JACQUES [[id number]]   Attending Physician: JOSÉ JACQUES [[id number]]   Certification: I Certify That Inpatient Hospital Services Are Medically Necessary For Greater Than 2 Midnights                  Please note that portions of this document were completed with a voice recognition program.    Note Disclaimer: At [facility], we believe that sharing information builds trust and better relationships. You are receiving this note because you recently visited [facility]. It is possible you will see health information before a provider has talked with you about it. This kind of information can be easy to misunderstand. To help you fully understand what it means for your health, we urge you to discuss this note with your provider.         Maria Del Rosario Barton MD  06/09/25 0357     Bed in lowest position, wheels locked, appropriate side rails in place/Call bell, personal items and telephone in reach/Instruct patient to call for assistance before getting out of bed or chair/Non-slip footwear when patient is out of bed/[place] to call system/Physically safe environment - no spills, clutter or unnecessary equipment/Purposeful Proactive Rounding/Room/bathroom lighting operational, light cord in reach